# Patient Record
Sex: FEMALE | Race: OTHER | Employment: OTHER | URBAN - NONMETROPOLITAN AREA
[De-identification: names, ages, dates, MRNs, and addresses within clinical notes are randomized per-mention and may not be internally consistent; named-entity substitution may affect disease eponyms.]

---

## 2017-01-10 VITALS — DIASTOLIC BLOOD PRESSURE: 90 MMHG | SYSTOLIC BLOOD PRESSURE: 178 MMHG

## 2017-01-10 DIAGNOSIS — Z01.30 BP CHECK: Primary | ICD-10-CM

## 2017-01-10 DIAGNOSIS — I10 ESSENTIAL HYPERTENSION: ICD-10-CM

## 2017-01-10 DIAGNOSIS — E11.9 TYPE 2 DIABETES MELLITUS WITHOUT COMPLICATION, WITHOUT LONG-TERM CURRENT USE OF INSULIN (HCC): ICD-10-CM

## 2017-01-11 ENCOUNTER — TELEPHONE (OUTPATIENT)
Dept: FAMILY MEDICINE CLINIC | Age: 67
End: 2017-01-11

## 2017-01-11 RX ORDER — HYDROCHLOROTHIAZIDE 50 MG/1
50 TABLET ORAL DAILY
Qty: 30 TABLET | Refills: 1 | Status: SHIPPED | OUTPATIENT
Start: 2017-01-11 | End: 2017-03-03 | Stop reason: SDUPTHER

## 2017-02-01 ENCOUNTER — OFFICE VISIT (OUTPATIENT)
Dept: FAMILY MEDICINE CLINIC | Age: 67
End: 2017-02-01

## 2017-02-01 VITALS
WEIGHT: 170 LBS | SYSTOLIC BLOOD PRESSURE: 152 MMHG | HEART RATE: 84 BPM | HEIGHT: 57 IN | BODY MASS INDEX: 36.68 KG/M2 | DIASTOLIC BLOOD PRESSURE: 82 MMHG | RESPIRATION RATE: 14 BRPM

## 2017-02-01 DIAGNOSIS — E11.9 TYPE 2 DIABETES MELLITUS WITHOUT COMPLICATION, WITHOUT LONG-TERM CURRENT USE OF INSULIN (HCC): ICD-10-CM

## 2017-02-01 DIAGNOSIS — I10 ESSENTIAL HYPERTENSION: Primary | ICD-10-CM

## 2017-02-01 PROCEDURE — 3014F SCREEN MAMMO DOC REV: CPT | Performed by: FAMILY MEDICINE

## 2017-02-01 PROCEDURE — 1090F PRES/ABSN URINE INCON ASSESS: CPT | Performed by: FAMILY MEDICINE

## 2017-02-01 PROCEDURE — 99213 OFFICE O/P EST LOW 20 MIN: CPT | Performed by: FAMILY MEDICINE

## 2017-02-01 PROCEDURE — 1123F ACP DISCUSS/DSCN MKR DOCD: CPT | Performed by: FAMILY MEDICINE

## 2017-02-01 PROCEDURE — 1036F TOBACCO NON-USER: CPT | Performed by: FAMILY MEDICINE

## 2017-02-01 PROCEDURE — 4040F PNEUMOC VAC/ADMIN/RCVD: CPT | Performed by: FAMILY MEDICINE

## 2017-02-01 PROCEDURE — G8399 PT W/DXA RESULTS DOCUMENT: HCPCS | Performed by: FAMILY MEDICINE

## 2017-02-01 PROCEDURE — G8419 CALC BMI OUT NRM PARAM NOF/U: HCPCS | Performed by: FAMILY MEDICINE

## 2017-02-01 PROCEDURE — G8427 DOCREV CUR MEDS BY ELIG CLIN: HCPCS | Performed by: FAMILY MEDICINE

## 2017-02-01 PROCEDURE — 3045F PR MOST RECENT HEMOGLOBIN A1C LEVEL 7.0-9.0%: CPT | Performed by: FAMILY MEDICINE

## 2017-02-01 PROCEDURE — G8484 FLU IMMUNIZE NO ADMIN: HCPCS | Performed by: FAMILY MEDICINE

## 2017-02-01 PROCEDURE — 3017F COLORECTAL CA SCREEN DOC REV: CPT | Performed by: FAMILY MEDICINE

## 2017-02-01 RX ORDER — BLOOD PRESSURE TEST KIT
KIT MISCELLANEOUS
Qty: 1 KIT | Refills: 0 | Status: SHIPPED | OUTPATIENT
Start: 2017-02-01 | End: 2018-09-06

## 2017-02-01 RX ORDER — GLIMEPIRIDE 1 MG/1
1 TABLET ORAL
Qty: 90 TABLET | Refills: 1 | Status: SHIPPED
Start: 2017-02-01 | End: 2017-04-19 | Stop reason: SDUPTHER

## 2017-03-03 DIAGNOSIS — I10 ESSENTIAL HYPERTENSION: ICD-10-CM

## 2017-03-04 RX ORDER — HYDROCHLOROTHIAZIDE 50 MG/1
TABLET ORAL
Qty: 30 TABLET | Refills: 1 | Status: SHIPPED | OUTPATIENT
Start: 2017-03-04 | End: 2017-04-19 | Stop reason: SDUPTHER

## 2017-03-28 DIAGNOSIS — E11.9 TYPE 2 DIABETES MELLITUS WITHOUT COMPLICATION, WITHOUT LONG-TERM CURRENT USE OF INSULIN (HCC): ICD-10-CM

## 2017-03-28 RX ORDER — GLIMEPIRIDE 1 MG/1
TABLET ORAL
Qty: 45 TABLET | Refills: 0 | Status: SHIPPED | OUTPATIENT
Start: 2017-03-28 | End: 2017-06-06 | Stop reason: DRUGHIGH

## 2017-04-12 DIAGNOSIS — E87.6 HYPOKALEMIA: Primary | ICD-10-CM

## 2017-04-19 ENCOUNTER — OFFICE VISIT (OUTPATIENT)
Dept: FAMILY MEDICINE CLINIC | Age: 67
End: 2017-04-19
Payer: MEDICARE

## 2017-04-19 VITALS
HEART RATE: 84 BPM | SYSTOLIC BLOOD PRESSURE: 124 MMHG | DIASTOLIC BLOOD PRESSURE: 84 MMHG | HEIGHT: 57 IN | RESPIRATION RATE: 16 BRPM | BODY MASS INDEX: 37.58 KG/M2 | WEIGHT: 174.2 LBS

## 2017-04-19 DIAGNOSIS — E11.9 TYPE 2 DIABETES MELLITUS WITHOUT COMPLICATION, WITHOUT LONG-TERM CURRENT USE OF INSULIN (HCC): ICD-10-CM

## 2017-04-19 DIAGNOSIS — E78.2 MIXED HYPERLIPIDEMIA: ICD-10-CM

## 2017-04-19 DIAGNOSIS — Z12.31 ENCOUNTER FOR SCREENING MAMMOGRAM FOR BREAST CANCER: ICD-10-CM

## 2017-04-19 DIAGNOSIS — Z23 NEED FOR VACCINATION WITH 13-POLYVALENT PNEUMOCOCCAL CONJUGATE VACCINE: ICD-10-CM

## 2017-04-19 DIAGNOSIS — Z00.00 ROUTINE GENERAL MEDICAL EXAMINATION AT A HEALTH CARE FACILITY: Primary | ICD-10-CM

## 2017-04-19 DIAGNOSIS — E55.9 VITAMIN D DEFICIENCY: ICD-10-CM

## 2017-04-19 DIAGNOSIS — H53.8 BLURRY VISION: ICD-10-CM

## 2017-04-19 DIAGNOSIS — E87.6 HYPOKALEMIA: ICD-10-CM

## 2017-04-19 DIAGNOSIS — I10 ESSENTIAL HYPERTENSION: ICD-10-CM

## 2017-04-19 PROCEDURE — G8399 PT W/DXA RESULTS DOCUMENT: HCPCS | Performed by: FAMILY MEDICINE

## 2017-04-19 PROCEDURE — 3044F HG A1C LEVEL LT 7.0%: CPT | Performed by: FAMILY MEDICINE

## 2017-04-19 PROCEDURE — 1123F ACP DISCUSS/DSCN MKR DOCD: CPT | Performed by: FAMILY MEDICINE

## 2017-04-19 PROCEDURE — G8427 DOCREV CUR MEDS BY ELIG CLIN: HCPCS | Performed by: FAMILY MEDICINE

## 2017-04-19 PROCEDURE — 1036F TOBACCO NON-USER: CPT | Performed by: FAMILY MEDICINE

## 2017-04-19 PROCEDURE — 3017F COLORECTAL CA SCREEN DOC REV: CPT | Performed by: FAMILY MEDICINE

## 2017-04-19 PROCEDURE — G0438 PPPS, INITIAL VISIT: HCPCS | Performed by: FAMILY MEDICINE

## 2017-04-19 PROCEDURE — 3014F SCREEN MAMMO DOC REV: CPT | Performed by: FAMILY MEDICINE

## 2017-04-19 PROCEDURE — G0009 ADMIN PNEUMOCOCCAL VACCINE: HCPCS | Performed by: FAMILY MEDICINE

## 2017-04-19 PROCEDURE — 4040F PNEUMOC VAC/ADMIN/RCVD: CPT | Performed by: FAMILY MEDICINE

## 2017-04-19 PROCEDURE — G8417 CALC BMI ABV UP PARAM F/U: HCPCS | Performed by: FAMILY MEDICINE

## 2017-04-19 PROCEDURE — 1090F PRES/ABSN URINE INCON ASSESS: CPT | Performed by: FAMILY MEDICINE

## 2017-04-19 PROCEDURE — 90670 PCV13 VACCINE IM: CPT | Performed by: FAMILY MEDICINE

## 2017-04-19 PROCEDURE — 99214 OFFICE O/P EST MOD 30 MIN: CPT | Performed by: FAMILY MEDICINE

## 2017-04-19 RX ORDER — HYDROCHLOROTHIAZIDE 50 MG/1
TABLET ORAL
Qty: 45 TABLET | Refills: 1 | Status: SHIPPED | OUTPATIENT
Start: 2017-04-19 | End: 2017-06-06 | Stop reason: SINTOL

## 2017-04-19 RX ORDER — GLIMEPIRIDE 1 MG/1
TABLET ORAL
Qty: 45 TABLET | Refills: 6 | Status: CANCELLED | OUTPATIENT
Start: 2017-04-19

## 2017-04-19 RX ORDER — SIMVASTATIN 40 MG
TABLET ORAL
Qty: 90 TABLET | Refills: 1 | Status: SHIPPED | OUTPATIENT
Start: 2017-04-19 | End: 2017-10-26 | Stop reason: SDUPTHER

## 2017-04-19 RX ORDER — GLIMEPIRIDE 1 MG/1
0.5 TABLET ORAL 2 TIMES DAILY
Qty: 90 TABLET | Refills: 1 | Status: SHIPPED | OUTPATIENT
Start: 2017-04-19 | End: 2017-10-10 | Stop reason: SDUPTHER

## 2017-04-19 RX ORDER — CHOLECALCIFEROL (VITAMIN D3) 1250 MCG
1 CAPSULE ORAL WEEKLY
Qty: 13 CAPSULE | Refills: 1 | Status: SHIPPED | OUTPATIENT
Start: 2017-04-19 | End: 2017-10-13 | Stop reason: SDUPTHER

## 2017-04-19 ASSESSMENT — LIFESTYLE VARIABLES
HOW MANY STANDARD DRINKS CONTAINING ALCOHOL DO YOU HAVE ON A TYPICAL DAY: 0
HOW OFTEN DO YOU HAVE SIX OR MORE DRINKS ON ONE OCCASION: 0
AUDIT-C TOTAL SCORE: 1
HOW OFTEN DO YOU HAVE A DRINK CONTAINING ALCOHOL: 1

## 2017-04-19 ASSESSMENT — ANXIETY QUESTIONNAIRES: GAD7 TOTAL SCORE: 0

## 2017-04-19 ASSESSMENT — PATIENT HEALTH QUESTIONNAIRE - PHQ9: SUM OF ALL RESPONSES TO PHQ QUESTIONS 1-9: 0

## 2017-04-25 ENCOUNTER — TELEPHONE (OUTPATIENT)
Dept: FAMILY MEDICINE CLINIC | Age: 67
End: 2017-04-25

## 2017-04-25 ENCOUNTER — OFFICE VISIT (OUTPATIENT)
Dept: PRIMARY CARE CLINIC | Age: 67
End: 2017-04-25
Payer: MEDICARE

## 2017-04-25 VITALS
TEMPERATURE: 97.5 F | SYSTOLIC BLOOD PRESSURE: 118 MMHG | HEART RATE: 76 BPM | DIASTOLIC BLOOD PRESSURE: 78 MMHG | OXYGEN SATURATION: 97 % | BODY MASS INDEX: 37.48 KG/M2 | WEIGHT: 173.2 LBS

## 2017-04-25 DIAGNOSIS — M79.621 AXILLARY TENDERNESS, RIGHT: Primary | ICD-10-CM

## 2017-04-25 PROCEDURE — 1036F TOBACCO NON-USER: CPT | Performed by: FAMILY MEDICINE

## 2017-04-25 PROCEDURE — 3017F COLORECTAL CA SCREEN DOC REV: CPT | Performed by: FAMILY MEDICINE

## 2017-04-25 PROCEDURE — G8399 PT W/DXA RESULTS DOCUMENT: HCPCS | Performed by: FAMILY MEDICINE

## 2017-04-25 PROCEDURE — G8427 DOCREV CUR MEDS BY ELIG CLIN: HCPCS | Performed by: FAMILY MEDICINE

## 2017-04-25 PROCEDURE — 1090F PRES/ABSN URINE INCON ASSESS: CPT | Performed by: FAMILY MEDICINE

## 2017-04-25 PROCEDURE — 1123F ACP DISCUSS/DSCN MKR DOCD: CPT | Performed by: FAMILY MEDICINE

## 2017-04-25 PROCEDURE — G8417 CALC BMI ABV UP PARAM F/U: HCPCS | Performed by: FAMILY MEDICINE

## 2017-04-25 PROCEDURE — 99213 OFFICE O/P EST LOW 20 MIN: CPT | Performed by: FAMILY MEDICINE

## 2017-04-25 PROCEDURE — 3014F SCREEN MAMMO DOC REV: CPT | Performed by: FAMILY MEDICINE

## 2017-04-25 PROCEDURE — 4040F PNEUMOC VAC/ADMIN/RCVD: CPT | Performed by: FAMILY MEDICINE

## 2017-04-28 DIAGNOSIS — N64.4 BREAST PAIN: Primary | ICD-10-CM

## 2017-06-01 ENCOUNTER — TELEPHONE (OUTPATIENT)
Dept: FAMILY MEDICINE CLINIC | Age: 67
End: 2017-06-01

## 2017-06-06 ENCOUNTER — OFFICE VISIT (OUTPATIENT)
Dept: PRIMARY CARE CLINIC | Age: 67
End: 2017-06-06
Payer: MEDICARE

## 2017-06-06 ENCOUNTER — HOSPITAL ENCOUNTER (OUTPATIENT)
Age: 67
Setting detail: SPECIMEN
Discharge: HOME | End: 2017-06-06
Payer: MEDICARE

## 2017-06-06 VITALS
HEIGHT: 57 IN | SYSTOLIC BLOOD PRESSURE: 152 MMHG | DIASTOLIC BLOOD PRESSURE: 92 MMHG | RESPIRATION RATE: 16 BRPM | BODY MASS INDEX: 37.54 KG/M2 | TEMPERATURE: 98.9 F | HEART RATE: 82 BPM | WEIGHT: 174 LBS | OXYGEN SATURATION: 98 %

## 2017-06-06 DIAGNOSIS — M25.541 ARTHRALGIA OF BOTH HANDS: Primary | ICD-10-CM

## 2017-06-06 DIAGNOSIS — I10 ESSENTIAL HYPERTENSION: ICD-10-CM

## 2017-06-06 DIAGNOSIS — M25.541 ARTHRALGIA OF BOTH HANDS: ICD-10-CM

## 2017-06-06 DIAGNOSIS — M25.542 ARTHRALGIA OF BOTH HANDS: ICD-10-CM

## 2017-06-06 DIAGNOSIS — E87.6 HYPOKALEMIA: ICD-10-CM

## 2017-06-06 DIAGNOSIS — M25.542 ARTHRALGIA OF BOTH HANDS: Primary | ICD-10-CM

## 2017-06-06 LAB
ANION GAP SERPL CALCULATED.3IONS-SCNC: 17 MMOL/L (ref 9–17)
BUN BLDV-MCNC: 12 MG/DL (ref 8–23)
BUN/CREAT BLD: 20 (ref 9–20)
CALCIUM SERPL-MCNC: 9.5 MG/DL (ref 8.6–10.4)
CHLORIDE BLD-SCNC: 100 MMOL/L (ref 98–107)
CO2: 25 MMOL/L (ref 20–31)
CREAT SERPL-MCNC: 0.6 MG/DL (ref 0.5–0.9)
GFR AFRICAN AMERICAN: >60 ML/MIN
GFR NON-AFRICAN AMERICAN: >60 ML/MIN
GFR SERPL CREATININE-BSD FRML MDRD: NORMAL ML/MIN/{1.73_M2}
GFR SERPL CREATININE-BSD FRML MDRD: NORMAL ML/MIN/{1.73_M2}
GLUCOSE BLD-MCNC: 91 MG/DL (ref 70–99)
POTASSIUM SERPL-SCNC: 3.7 MMOL/L (ref 3.7–5.3)
RHEUMATOID FACTOR: <10 IU/ML
SEDIMENTATION RATE, ERYTHROCYTE: 17 MM (ref 0–30)
SODIUM BLD-SCNC: 142 MMOL/L (ref 135–144)
URIC ACID: 4.7 MG/DL (ref 2.4–5.7)

## 2017-06-06 PROCEDURE — 84550 ASSAY OF BLOOD/URIC ACID: CPT | Performed by: FAMILY MEDICINE

## 2017-06-06 PROCEDURE — 1036F TOBACCO NON-USER: CPT | Performed by: FAMILY MEDICINE

## 2017-06-06 PROCEDURE — 36415 COLL VENOUS BLD VENIPUNCTURE: CPT | Performed by: FAMILY MEDICINE

## 2017-06-06 PROCEDURE — G8399 PT W/DXA RESULTS DOCUMENT: HCPCS | Performed by: FAMILY MEDICINE

## 2017-06-06 PROCEDURE — 99214 OFFICE O/P EST MOD 30 MIN: CPT | Performed by: FAMILY MEDICINE

## 2017-06-06 PROCEDURE — G8417 CALC BMI ABV UP PARAM F/U: HCPCS | Performed by: FAMILY MEDICINE

## 2017-06-06 PROCEDURE — 4040F PNEUMOC VAC/ADMIN/RCVD: CPT | Performed by: FAMILY MEDICINE

## 2017-06-06 PROCEDURE — 1090F PRES/ABSN URINE INCON ASSESS: CPT | Performed by: FAMILY MEDICINE

## 2017-06-06 PROCEDURE — 1123F ACP DISCUSS/DSCN MKR DOCD: CPT | Performed by: FAMILY MEDICINE

## 2017-06-06 PROCEDURE — 3014F SCREEN MAMMO DOC REV: CPT | Performed by: FAMILY MEDICINE

## 2017-06-06 PROCEDURE — 85651 RBC SED RATE NONAUTOMATED: CPT | Performed by: FAMILY MEDICINE

## 2017-06-06 PROCEDURE — 3017F COLORECTAL CA SCREEN DOC REV: CPT | Performed by: FAMILY MEDICINE

## 2017-06-06 PROCEDURE — G8427 DOCREV CUR MEDS BY ELIG CLIN: HCPCS | Performed by: FAMILY MEDICINE

## 2017-06-06 PROCEDURE — 80048 BASIC METABOLIC PNL TOTAL CA: CPT | Performed by: FAMILY MEDICINE

## 2017-06-06 RX ORDER — PREDNISONE 10 MG/1
TABLET ORAL
Qty: 36 TABLET | Refills: 0 | Status: SHIPPED | OUTPATIENT
Start: 2017-06-06 | End: 2017-06-16

## 2017-06-07 LAB — ANTI-NUCLEAR ANTIBODY (ANA): NEGATIVE

## 2017-06-08 LAB — CCP IGG ANTIBODIES: <1.5 U/ML

## 2017-06-17 ENCOUNTER — HOSPITAL ENCOUNTER (OUTPATIENT)
Age: 67
Setting detail: OBSERVATION
Discharge: HOME | End: 2017-06-18
Attending: EMERGENCY MEDICINE | Admitting: INTERNAL MEDICINE
Payer: MEDICARE

## 2017-06-17 ENCOUNTER — APPOINTMENT (OUTPATIENT)
Dept: GENERAL RADIOLOGY | Age: 67
End: 2017-06-17
Payer: MEDICARE

## 2017-06-17 ENCOUNTER — TELEPHONE (OUTPATIENT)
Dept: PRIMARY CARE CLINIC | Age: 67
End: 2017-06-17

## 2017-06-17 DIAGNOSIS — E87.6 HYPOKALEMIA: ICD-10-CM

## 2017-06-17 DIAGNOSIS — R07.89 OTHER CHEST PAIN: Primary | ICD-10-CM

## 2017-06-17 DIAGNOSIS — R20.0 NUMBNESS AND TINGLING OF RIGHT ARM: ICD-10-CM

## 2017-06-17 DIAGNOSIS — R07.89 ATYPICAL CHEST PAIN: ICD-10-CM

## 2017-06-17 DIAGNOSIS — R20.2 NUMBNESS AND TINGLING OF RIGHT ARM: ICD-10-CM

## 2017-06-17 LAB
ABSOLUTE EOS #: 0.1 K/UL (ref 0–0.4)
ABSOLUTE LYMPH #: 4 K/UL (ref 1–4.8)
ABSOLUTE MONO #: 0.9 K/UL (ref 0.1–1.2)
ANION GAP SERPL CALCULATED.3IONS-SCNC: 12 MMOL/L (ref 9–17)
BASOPHILS # BLD: 0 %
BASOPHILS ABSOLUTE: 0 K/UL (ref 0–0.2)
BUN BLDV-MCNC: 16 MG/DL (ref 8–23)
BUN/CREAT BLD: 21 (ref 9–20)
CALCIUM SERPL-MCNC: 9.7 MG/DL (ref 8.6–10.4)
CHLORIDE BLD-SCNC: 97 MMOL/L (ref 98–107)
CHP ED QC CHECK: YES
CO2: 27 MMOL/L (ref 20–31)
CREAT SERPL-MCNC: 0.76 MG/DL (ref 0.5–0.9)
D DIMER: 127 NG/ML
DIFFERENTIAL TYPE: ABNORMAL
EKG ATRIAL RATE: 61 BPM
EKG ATRIAL RATE: 84 BPM
EKG P AXIS: 38 DEGREES
EKG P AXIS: 49 DEGREES
EKG P-R INTERVAL: 142 MS
EKG P-R INTERVAL: 148 MS
EKG Q-T INTERVAL: 364 MS
EKG Q-T INTERVAL: 402 MS
EKG QRS DURATION: 74 MS
EKG QRS DURATION: 78 MS
EKG QTC CALCULATION (BAZETT): 404 MS
EKG QTC CALCULATION (BAZETT): 430 MS
EKG R AXIS: -13 DEGREES
EKG R AXIS: -8 DEGREES
EKG T AXIS: 0 DEGREES
EKG T AXIS: 35 DEGREES
EKG VENTRICULAR RATE: 61 BPM
EKG VENTRICULAR RATE: 84 BPM
EOSINOPHILS RELATIVE PERCENT: 1 %
GFR AFRICAN AMERICAN: >60 ML/MIN
GFR NON-AFRICAN AMERICAN: >60 ML/MIN
GFR SERPL CREATININE-BSD FRML MDRD: ABNORMAL ML/MIN/{1.73_M2}
GFR SERPL CREATININE-BSD FRML MDRD: ABNORMAL ML/MIN/{1.73_M2}
GLUCOSE BLD-MCNC: 153 MG/DL (ref 65–105)
GLUCOSE BLD-MCNC: 198 MG/DL (ref 65–105)
GLUCOSE BLD-MCNC: 240 MG/DL (ref 70–99)
GLUCOSE BLD-MCNC: 272 MG/DL (ref 65–105)
HCT VFR BLD CALC: 41.2 % (ref 36–46)
HEMOGLOBIN: 13.4 G/DL (ref 12–16)
INR BLD: 1
LYMPHOCYTES # BLD: 35 %
MCH RBC QN AUTO: 26.4 PG (ref 26–34)
MCHC RBC AUTO-ENTMCNC: 32.5 G/DL (ref 31–37)
MCV RBC AUTO: 81.3 FL (ref 80–100)
MONOCYTES # BLD: 8 %
PARTIAL THROMBOPLASTIN TIME: 25.2 SEC (ref 27–35)
PDW BLD-RTO: 14.2 % (ref 11–14.5)
PLATELET # BLD: 225 K/UL (ref 140–450)
PLATELET ESTIMATE: ABNORMAL
PMV BLD AUTO: 10.1 FL (ref 6–12)
POTASSIUM SERPL-SCNC: 3.7 MMOL/L (ref 3.7–5.3)
PROTHROMBIN TIME: 10.1 SEC (ref 9.4–11.3)
RBC # BLD: 5.07 M/UL (ref 4–5.2)
RBC # BLD: ABNORMAL 10*6/UL
SEG NEUTROPHILS: 56 %
SEGMENTED NEUTROPHILS ABSOLUTE COUNT: 6.5 K/UL (ref 1.8–7.7)
SODIUM BLD-SCNC: 136 MMOL/L (ref 135–144)
TROPONIN INTERP: NORMAL
TROPONIN T: <0.03 NG/ML
WBC # BLD: 11.6 K/UL (ref 3.5–11)
WBC # BLD: ABNORMAL 10*3/UL

## 2017-06-17 PROCEDURE — 6370000000 HC RX 637 (ALT 250 FOR IP): Performed by: EMERGENCY MEDICINE

## 2017-06-17 PROCEDURE — 96361 HYDRATE IV INFUSION ADD-ON: CPT

## 2017-06-17 PROCEDURE — 99219 PR INITIAL OBSERVATION CARE/DAY 50 MINUTES: CPT | Performed by: INTERNAL MEDICINE

## 2017-06-17 PROCEDURE — 2580000003 HC RX 258: Performed by: EMERGENCY MEDICINE

## 2017-06-17 PROCEDURE — G0378 HOSPITAL OBSERVATION PER HR: HCPCS

## 2017-06-17 PROCEDURE — 93005 ELECTROCARDIOGRAM TRACING: CPT

## 2017-06-17 PROCEDURE — 85730 THROMBOPLASTIN TIME PARTIAL: CPT

## 2017-06-17 PROCEDURE — 99284 EMERGENCY DEPT VISIT MOD MDM: CPT

## 2017-06-17 PROCEDURE — 80048 BASIC METABOLIC PNL TOTAL CA: CPT

## 2017-06-17 PROCEDURE — 6370000000 HC RX 637 (ALT 250 FOR IP): Performed by: INTERNAL MEDICINE

## 2017-06-17 PROCEDURE — 85610 PROTHROMBIN TIME: CPT

## 2017-06-17 PROCEDURE — 6360000002 HC RX W HCPCS: Performed by: EMERGENCY MEDICINE

## 2017-06-17 PROCEDURE — 94760 N-INVAS EAR/PLS OXIMETRY 1: CPT

## 2017-06-17 PROCEDURE — 96374 THER/PROPH/DIAG INJ IV PUSH: CPT

## 2017-06-17 PROCEDURE — 71010 XR CHEST PORTABLE: CPT | Performed by: RADIOLOGY

## 2017-06-17 PROCEDURE — 82947 ASSAY GLUCOSE BLOOD QUANT: CPT

## 2017-06-17 PROCEDURE — 85025 COMPLETE CBC W/AUTO DIFF WBC: CPT

## 2017-06-17 PROCEDURE — 96372 THER/PROPH/DIAG INJ SC/IM: CPT

## 2017-06-17 PROCEDURE — 71010 XR CHEST PORTABLE: CPT

## 2017-06-17 PROCEDURE — 84484 ASSAY OF TROPONIN QUANT: CPT

## 2017-06-17 PROCEDURE — 85379 FIBRIN DEGRADATION QUANT: CPT

## 2017-06-17 PROCEDURE — 36415 COLL VENOUS BLD VENIPUNCTURE: CPT

## 2017-06-17 PROCEDURE — 2580000003 HC RX 258: Performed by: INTERNAL MEDICINE

## 2017-06-17 RX ORDER — ASPIRIN 81 MG/1
324 TABLET, CHEWABLE ORAL ONCE
Status: COMPLETED | OUTPATIENT
Start: 2017-06-17 | End: 2017-06-17

## 2017-06-17 RX ORDER — ACETAMINOPHEN 325 MG/1
650 TABLET ORAL EVERY 4 HOURS PRN
Status: DISCONTINUED | OUTPATIENT
Start: 2017-06-17 | End: 2017-06-18 | Stop reason: HOSPADM

## 2017-06-17 RX ORDER — SIMVASTATIN 40 MG
40 TABLET ORAL NIGHTLY
Status: DISCONTINUED | OUTPATIENT
Start: 2017-06-17 | End: 2017-06-18 | Stop reason: HOSPADM

## 2017-06-17 RX ORDER — ONDANSETRON 2 MG/ML
4 INJECTION INTRAMUSCULAR; INTRAVENOUS EVERY 6 HOURS PRN
Status: DISCONTINUED | OUTPATIENT
Start: 2017-06-17 | End: 2017-06-18 | Stop reason: HOSPADM

## 2017-06-17 RX ORDER — PANTOPRAZOLE SODIUM 40 MG/1
40 TABLET, DELAYED RELEASE ORAL
Status: DISCONTINUED | OUTPATIENT
Start: 2017-06-18 | End: 2017-06-18 | Stop reason: HOSPADM

## 2017-06-17 RX ORDER — VERAPAMIL HYDROCHLORIDE 240 MG/1
120 TABLET, FILM COATED, EXTENDED RELEASE ORAL NIGHTLY
Status: DISCONTINUED | OUTPATIENT
Start: 2017-06-18 | End: 2017-06-18 | Stop reason: HOSPADM

## 2017-06-17 RX ORDER — GLIMEPIRIDE 2 MG/1
0.5 TABLET ORAL 2 TIMES DAILY
Status: DISCONTINUED | OUTPATIENT
Start: 2017-06-17 | End: 2017-06-18 | Stop reason: HOSPADM

## 2017-06-17 RX ORDER — ASPIRIN 81 MG/1
81 TABLET, CHEWABLE ORAL DAILY
Status: DISCONTINUED | OUTPATIENT
Start: 2017-06-18 | End: 2017-06-17

## 2017-06-17 RX ORDER — DEXTROSE MONOHYDRATE 25 G/50ML
12.5 INJECTION, SOLUTION INTRAVENOUS PRN
Status: DISCONTINUED | OUTPATIENT
Start: 2017-06-17 | End: 2017-06-18 | Stop reason: HOSPADM

## 2017-06-17 RX ORDER — ASPIRIN 81 MG/1
81 TABLET ORAL DAILY
Status: DISCONTINUED | OUTPATIENT
Start: 2017-06-17 | End: 2017-06-18 | Stop reason: HOSPADM

## 2017-06-17 RX ORDER — DEXTROSE MONOHYDRATE 50 MG/ML
100 INJECTION, SOLUTION INTRAVENOUS PRN
Status: DISCONTINUED | OUTPATIENT
Start: 2017-06-17 | End: 2017-06-18 | Stop reason: HOSPADM

## 2017-06-17 RX ORDER — SODIUM CHLORIDE 0.9 % (FLUSH) 0.9 %
10 SYRINGE (ML) INJECTION EVERY 12 HOURS SCHEDULED
Status: DISCONTINUED | OUTPATIENT
Start: 2017-06-17 | End: 2017-06-18 | Stop reason: HOSPADM

## 2017-06-17 RX ORDER — ONDANSETRON 2 MG/ML
4 INJECTION INTRAMUSCULAR; INTRAVENOUS ONCE
Status: COMPLETED | OUTPATIENT
Start: 2017-06-17 | End: 2017-06-17

## 2017-06-17 RX ORDER — SODIUM CHLORIDE 0.9 % (FLUSH) 0.9 %
10 SYRINGE (ML) INJECTION PRN
Status: DISCONTINUED | OUTPATIENT
Start: 2017-06-17 | End: 2017-06-18 | Stop reason: HOSPADM

## 2017-06-17 RX ORDER — NICOTINE POLACRILEX 4 MG
15 LOZENGE BUCCAL PRN
Status: DISCONTINUED | OUTPATIENT
Start: 2017-06-17 | End: 2017-06-18 | Stop reason: HOSPADM

## 2017-06-17 RX ORDER — VERAPAMIL HYDROCHLORIDE 240 MG/1
120 TABLET, FILM COATED, EXTENDED RELEASE ORAL DAILY
Status: DISCONTINUED | OUTPATIENT
Start: 2017-06-17 | End: 2017-06-17

## 2017-06-17 RX ORDER — 0.9 % SODIUM CHLORIDE 0.9 %
1000 INTRAVENOUS SOLUTION INTRAVENOUS ONCE
Status: COMPLETED | OUTPATIENT
Start: 2017-06-17 | End: 2017-06-17

## 2017-06-17 RX ADMIN — Medication 30 ML: at 12:35

## 2017-06-17 RX ADMIN — ONDANSETRON 4 MG: 2 INJECTION INTRAMUSCULAR; INTRAVENOUS at 12:33

## 2017-06-17 RX ADMIN — SODIUM CHLORIDE 1000 ML: 9 INJECTION, SOLUTION INTRAVENOUS at 12:31

## 2017-06-17 RX ADMIN — INSULIN LISPRO 2 UNITS: 100 INJECTION, SOLUTION INTRAVENOUS; SUBCUTANEOUS at 21:23

## 2017-06-17 RX ADMIN — Medication 10 ML: at 21:23

## 2017-06-17 RX ADMIN — INSULIN LISPRO 1 UNITS: 100 INJECTION, SOLUTION INTRAVENOUS; SUBCUTANEOUS at 18:46

## 2017-06-17 RX ADMIN — GLIMEPIRIDE 0.5 MG: 2 TABLET ORAL at 21:21

## 2017-06-17 RX ADMIN — ENOXAPARIN SODIUM 80 MG: 80 INJECTION SUBCUTANEOUS at 14:05

## 2017-06-17 RX ADMIN — METFORMIN HYDROCHLORIDE 500 MG: 500 TABLET ORAL at 21:22

## 2017-06-17 RX ADMIN — INSULIN HUMAN 6 UNITS: 100 INJECTION, SOLUTION PARENTERAL at 13:09

## 2017-06-17 RX ADMIN — SIMVASTATIN 40 MG: 40 TABLET, FILM COATED ORAL at 21:20

## 2017-06-17 RX ADMIN — ASPIRIN 81 MG 324 MG: 81 TABLET ORAL at 13:59

## 2017-06-17 ASSESSMENT — PAIN DESCRIPTION - PAIN TYPE: TYPE: ACUTE PAIN

## 2017-06-17 ASSESSMENT — PAIN SCALES - GENERAL: PAINLEVEL_OUTOF10: 5

## 2017-06-17 ASSESSMENT — PAIN DESCRIPTION - FREQUENCY: FREQUENCY: INTERMITTENT

## 2017-06-17 ASSESSMENT — PAIN DESCRIPTION - LOCATION: LOCATION: ABDOMEN

## 2017-06-18 ENCOUNTER — APPOINTMENT (OUTPATIENT)
Dept: GENERAL RADIOLOGY | Age: 67
End: 2017-06-18
Payer: MEDICARE

## 2017-06-18 VITALS
HEART RATE: 82 BPM | SYSTOLIC BLOOD PRESSURE: 136 MMHG | TEMPERATURE: 98.2 F | BODY MASS INDEX: 39.26 KG/M2 | DIASTOLIC BLOOD PRESSURE: 63 MMHG | OXYGEN SATURATION: 93 % | HEIGHT: 57 IN | WEIGHT: 182 LBS | RESPIRATION RATE: 14 BRPM

## 2017-06-18 LAB
ANION GAP SERPL CALCULATED.3IONS-SCNC: 11 MMOL/L (ref 9–17)
BUN BLDV-MCNC: 13 MG/DL (ref 8–23)
BUN/CREAT BLD: 17 (ref 9–20)
CALCIUM SERPL-MCNC: 9 MG/DL (ref 8.6–10.4)
CHLORIDE BLD-SCNC: 97 MMOL/L (ref 98–107)
CHOLESTEROL/HDL RATIO: 3.9
CHOLESTEROL: 186 MG/DL
CO2: 30 MMOL/L (ref 20–31)
CREAT SERPL-MCNC: 0.75 MG/DL (ref 0.5–0.9)
EKG ATRIAL RATE: 69 BPM
EKG P AXIS: 30 DEGREES
EKG P-R INTERVAL: 156 MS
EKG Q-T INTERVAL: 402 MS
EKG QRS DURATION: 82 MS
EKG QTC CALCULATION (BAZETT): 430 MS
EKG R AXIS: -7 DEGREES
EKG T AXIS: 15 DEGREES
EKG VENTRICULAR RATE: 69 BPM
GFR AFRICAN AMERICAN: >60 ML/MIN
GFR NON-AFRICAN AMERICAN: >60 ML/MIN
GFR SERPL CREATININE-BSD FRML MDRD: ABNORMAL ML/MIN/{1.73_M2}
GFR SERPL CREATININE-BSD FRML MDRD: ABNORMAL ML/MIN/{1.73_M2}
GLUCOSE BLD-MCNC: 134 MG/DL (ref 65–105)
GLUCOSE BLD-MCNC: 145 MG/DL (ref 65–105)
GLUCOSE BLD-MCNC: 155 MG/DL (ref 70–99)
HCT VFR BLD CALC: 38.4 % (ref 36–46)
HDLC SERPL-MCNC: 48 MG/DL
HEMOGLOBIN: 12.7 G/DL (ref 12–16)
LDL CHOLESTEROL: 64 MG/DL (ref 0–130)
MCH RBC QN AUTO: 27.1 PG (ref 26–34)
MCHC RBC AUTO-ENTMCNC: 33.2 G/DL (ref 31–37)
MCV RBC AUTO: 81.7 FL (ref 80–100)
PDW BLD-RTO: 14.7 % (ref 11–14.5)
PLATELET # BLD: 209 K/UL (ref 140–450)
PMV BLD AUTO: 9.6 FL (ref 6–12)
POTASSIUM SERPL-SCNC: 3.6 MMOL/L (ref 3.7–5.3)
RBC # BLD: 4.7 M/UL (ref 4–5.2)
SODIUM BLD-SCNC: 138 MMOL/L (ref 135–144)
TRIGL SERPL-MCNC: 371 MG/DL
TROPONIN INTERP: NORMAL
TROPONIN INTERP: NORMAL
TROPONIN T: <0.03 NG/ML
TROPONIN T: <0.03 NG/ML
VLDLC SERPL CALC-MCNC: ABNORMAL MG/DL (ref 1–30)
WBC # BLD: 10.2 K/UL (ref 3.5–11)

## 2017-06-18 PROCEDURE — 6370000000 HC RX 637 (ALT 250 FOR IP): Performed by: INTERNAL MEDICINE

## 2017-06-18 PROCEDURE — 96372 THER/PROPH/DIAG INJ SC/IM: CPT

## 2017-06-18 PROCEDURE — 85027 COMPLETE CBC AUTOMATED: CPT

## 2017-06-18 PROCEDURE — 99217 PR OBSERVATION CARE DISCHARGE MANAGEMENT: CPT | Performed by: INTERNAL MEDICINE

## 2017-06-18 PROCEDURE — 2580000003 HC RX 258: Performed by: INTERNAL MEDICINE

## 2017-06-18 PROCEDURE — 72050 X-RAY EXAM NECK SPINE 4/5VWS: CPT

## 2017-06-18 PROCEDURE — 80048 BASIC METABOLIC PNL TOTAL CA: CPT

## 2017-06-18 PROCEDURE — 94760 N-INVAS EAR/PLS OXIMETRY 1: CPT

## 2017-06-18 PROCEDURE — 72050 X-RAY EXAM NECK SPINE 4/5VWS: CPT | Performed by: RADIOLOGY

## 2017-06-18 PROCEDURE — 82947 ASSAY GLUCOSE BLOOD QUANT: CPT

## 2017-06-18 PROCEDURE — 84484 ASSAY OF TROPONIN QUANT: CPT

## 2017-06-18 PROCEDURE — G0378 HOSPITAL OBSERVATION PER HR: HCPCS

## 2017-06-18 PROCEDURE — 36415 COLL VENOUS BLD VENIPUNCTURE: CPT

## 2017-06-18 PROCEDURE — 6360000002 HC RX W HCPCS: Performed by: INTERNAL MEDICINE

## 2017-06-18 PROCEDURE — 93005 ELECTROCARDIOGRAM TRACING: CPT

## 2017-06-18 PROCEDURE — 80061 LIPID PANEL: CPT

## 2017-06-18 RX ORDER — PANTOPRAZOLE SODIUM 40 MG/1
40 TABLET, DELAYED RELEASE ORAL
Qty: 30 TABLET | Refills: 0 | Status: SHIPPED | OUTPATIENT
Start: 2017-06-18 | End: 2017-07-21

## 2017-06-18 RX ADMIN — VERAPAMIL HYDROCHLORIDE 120 MG: 240 TABLET, FILM COATED, EXTENDED RELEASE ORAL at 08:33

## 2017-06-18 RX ADMIN — Medication 10 ML: at 08:34

## 2017-06-18 RX ADMIN — ACETAMINOPHEN 650 MG: 325 TABLET ORAL at 00:50

## 2017-06-18 RX ADMIN — METFORMIN HYDROCHLORIDE 500 MG: 500 TABLET ORAL at 08:32

## 2017-06-18 RX ADMIN — INSULIN LISPRO 1 UNITS: 100 INJECTION, SOLUTION INTRAVENOUS; SUBCUTANEOUS at 08:29

## 2017-06-18 RX ADMIN — GLIMEPIRIDE 0.5 MG: 2 TABLET ORAL at 08:31

## 2017-06-18 RX ADMIN — PANTOPRAZOLE SODIUM 40 MG: 40 TABLET, DELAYED RELEASE ORAL at 08:29

## 2017-06-18 RX ADMIN — ACETAMINOPHEN 650 MG: 325 TABLET ORAL at 10:29

## 2017-06-18 RX ADMIN — ENOXAPARIN SODIUM 40 MG: 40 INJECTION SUBCUTANEOUS at 08:30

## 2017-06-18 RX ADMIN — ASPIRIN 81 MG: 81 TABLET, COATED ORAL at 08:29

## 2017-06-18 ASSESSMENT — PAIN SCALES - GENERAL
PAINLEVEL_OUTOF10: 4
PAINLEVEL_OUTOF10: 3
PAINLEVEL_OUTOF10: 0

## 2017-06-19 ENCOUNTER — APPOINTMENT (OUTPATIENT)
Dept: CT IMAGING | Age: 67
End: 2017-06-19
Payer: MEDICARE

## 2017-06-19 ENCOUNTER — TELEPHONE (OUTPATIENT)
Dept: FAMILY MEDICINE CLINIC | Age: 67
End: 2017-06-19

## 2017-06-19 ENCOUNTER — HOSPITAL ENCOUNTER (EMERGENCY)
Age: 67
Discharge: ANOTHER HOSPITAL | End: 2017-06-19
Attending: EMERGENCY MEDICINE
Payer: MEDICARE

## 2017-06-19 ENCOUNTER — TELEPHONE (OUTPATIENT)
Dept: CARDIOLOGY | Age: 67
End: 2017-06-19

## 2017-06-19 ENCOUNTER — HOSPITAL ENCOUNTER (OUTPATIENT)
Age: 67
Setting detail: OBSERVATION
LOS: 1 days | Discharge: HOME | End: 2017-06-21
Attending: INTERNAL MEDICINE | Admitting: INTERNAL MEDICINE
Payer: MEDICARE

## 2017-06-19 VITALS
OXYGEN SATURATION: 96 % | BODY MASS INDEX: 38.4 KG/M2 | HEIGHT: 57 IN | WEIGHT: 178 LBS | RESPIRATION RATE: 20 BRPM | HEART RATE: 90 BPM | TEMPERATURE: 99.3 F | DIASTOLIC BLOOD PRESSURE: 81 MMHG | SYSTOLIC BLOOD PRESSURE: 179 MMHG

## 2017-06-19 DIAGNOSIS — R07.9 CHEST PAIN, UNSPECIFIED TYPE: Primary | ICD-10-CM

## 2017-06-19 DIAGNOSIS — E11.9 TYPE 2 DIABETES MELLITUS WITHOUT COMPLICATION, WITHOUT LONG-TERM CURRENT USE OF INSULIN (HCC): ICD-10-CM

## 2017-06-19 LAB
ABSOLUTE EOS #: 0.2 K/UL (ref 0–0.4)
ABSOLUTE LYMPH #: 3.9 K/UL (ref 1–4.8)
ABSOLUTE MONO #: 0.7 K/UL (ref 0.1–1.2)
ANION GAP SERPL CALCULATED.3IONS-SCNC: 15 MMOL/L (ref 9–17)
BASOPHILS # BLD: 0 %
BASOPHILS ABSOLUTE: 0 K/UL (ref 0–0.2)
BUN BLDV-MCNC: 13 MG/DL (ref 8–23)
BUN/CREAT BLD: 15 (ref 9–20)
CALCIUM SERPL-MCNC: 9.7 MG/DL (ref 8.6–10.4)
CHLORIDE BLD-SCNC: 98 MMOL/L (ref 98–107)
CHP ED QC CHECK: YES
CO2: 27 MMOL/L (ref 20–31)
CREAT SERPL-MCNC: 0.84 MG/DL (ref 0.5–0.9)
DIFFERENTIAL TYPE: ABNORMAL
EOSINOPHILS RELATIVE PERCENT: 1 %
GFR AFRICAN AMERICAN: >60 ML/MIN
GFR NON-AFRICAN AMERICAN: >60 ML/MIN
GFR SERPL CREATININE-BSD FRML MDRD: ABNORMAL ML/MIN/{1.73_M2}
GFR SERPL CREATININE-BSD FRML MDRD: ABNORMAL ML/MIN/{1.73_M2}
GLUCOSE BLD-MCNC: 150 MG/DL (ref 70–99)
GLUCOSE BLD-MCNC: 249 MG/DL (ref 65–105)
HCT VFR BLD CALC: 42.2 % (ref 36–46)
HEMOGLOBIN: 13.6 G/DL (ref 12–16)
LYMPHOCYTES # BLD: 35 %
MCH RBC QN AUTO: 26.4 PG (ref 26–34)
MCHC RBC AUTO-ENTMCNC: 32.2 G/DL (ref 31–37)
MCV RBC AUTO: 82.1 FL (ref 80–100)
MONOCYTES # BLD: 6 %
PDW BLD-RTO: 14.7 % (ref 11–14.5)
PLATELET # BLD: 233 K/UL (ref 140–450)
PLATELET ESTIMATE: ABNORMAL
PMV BLD AUTO: 9.8 FL (ref 6–12)
POTASSIUM SERPL-SCNC: 3.6 MMOL/L (ref 3.7–5.3)
RBC # BLD: 5.14 M/UL (ref 4–5.2)
RBC # BLD: ABNORMAL 10*6/UL
SEG NEUTROPHILS: 58 %
SEGMENTED NEUTROPHILS ABSOLUTE COUNT: 6.4 K/UL (ref 1.8–7.7)
SODIUM BLD-SCNC: 140 MMOL/L (ref 135–144)
TROPONIN INTERP: NORMAL
TROPONIN T: <0.03 NG/ML
WBC # BLD: 11.1 K/UL (ref 3.5–11)
WBC # BLD: ABNORMAL 10*3/UL

## 2017-06-19 PROCEDURE — 6360000002 HC RX W HCPCS

## 2017-06-19 PROCEDURE — 6360000002 HC RX W HCPCS: Performed by: EMERGENCY MEDICINE

## 2017-06-19 PROCEDURE — 96375 TX/PRO/DX INJ NEW DRUG ADDON: CPT

## 2017-06-19 PROCEDURE — 71260 CT THORAX DX C+: CPT | Performed by: RADIOLOGY

## 2017-06-19 PROCEDURE — 82947 ASSAY GLUCOSE BLOOD QUANT: CPT

## 2017-06-19 PROCEDURE — 85025 COMPLETE CBC W/AUTO DIFF WBC: CPT

## 2017-06-19 PROCEDURE — 96374 THER/PROPH/DIAG INJ IV PUSH: CPT

## 2017-06-19 PROCEDURE — 84484 ASSAY OF TROPONIN QUANT: CPT

## 2017-06-19 PROCEDURE — 6360000004 HC RX CONTRAST MEDICATION: Performed by: EMERGENCY MEDICINE

## 2017-06-19 PROCEDURE — 80048 BASIC METABOLIC PNL TOTAL CA: CPT

## 2017-06-19 PROCEDURE — 36415 COLL VENOUS BLD VENIPUNCTURE: CPT

## 2017-06-19 PROCEDURE — 99285 EMERGENCY DEPT VISIT HI MDM: CPT

## 2017-06-19 PROCEDURE — 6370000000 HC RX 637 (ALT 250 FOR IP)

## 2017-06-19 PROCEDURE — 2580000003 HC RX 258: Performed by: EMERGENCY MEDICINE

## 2017-06-19 PROCEDURE — 93005 ELECTROCARDIOGRAM TRACING: CPT

## 2017-06-19 PROCEDURE — G0378 HOSPITAL OBSERVATION PER HR: HCPCS

## 2017-06-19 PROCEDURE — 71260 CT THORAX DX C+: CPT

## 2017-06-19 PROCEDURE — 6370000000 HC RX 637 (ALT 250 FOR IP): Performed by: INTERNAL MEDICINE

## 2017-06-19 RX ORDER — METHYLPREDNISOLONE SODIUM SUCCINATE 125 MG/2ML
125 INJECTION, POWDER, LYOPHILIZED, FOR SOLUTION INTRAMUSCULAR; INTRAVENOUS ONCE
Status: COMPLETED | OUTPATIENT
Start: 2017-06-19 | End: 2017-06-19

## 2017-06-19 RX ORDER — DIPHENHYDRAMINE HYDROCHLORIDE 50 MG/ML
50 INJECTION INTRAMUSCULAR; INTRAVENOUS ONCE
Status: COMPLETED | OUTPATIENT
Start: 2017-06-19 | End: 2017-06-19

## 2017-06-19 RX ORDER — ASPIRIN 81 MG/1
TABLET, CHEWABLE ORAL
Status: COMPLETED
Start: 2017-06-19 | End: 2017-06-19

## 2017-06-19 RX ORDER — ASPIRIN 81 MG/1
81 TABLET, CHEWABLE ORAL DAILY
Status: DISCONTINUED | OUTPATIENT
Start: 2017-06-20 | End: 2017-06-21 | Stop reason: HOSPADM

## 2017-06-19 RX ORDER — ACETAMINOPHEN 325 MG/1
650 TABLET ORAL EVERY 4 HOURS PRN
Status: DISCONTINUED | OUTPATIENT
Start: 2017-06-19 | End: 2017-06-21 | Stop reason: HOSPADM

## 2017-06-19 RX ORDER — ASPIRIN 81 MG/1
324 TABLET, CHEWABLE ORAL ONCE
Status: COMPLETED | OUTPATIENT
Start: 2017-06-19 | End: 2017-06-19

## 2017-06-19 RX ORDER — PANTOPRAZOLE SODIUM 40 MG/1
40 TABLET, DELAYED RELEASE ORAL
Status: DISCONTINUED | OUTPATIENT
Start: 2017-06-20 | End: 2017-06-21 | Stop reason: HOSPADM

## 2017-06-19 RX ORDER — 0.9 % SODIUM CHLORIDE 0.9 %
1000 INTRAVENOUS SOLUTION INTRAVENOUS ONCE
Status: COMPLETED | OUTPATIENT
Start: 2017-06-19 | End: 2017-06-19

## 2017-06-19 RX ORDER — LABETALOL HYDROCHLORIDE 5 MG/ML
10 INJECTION, SOLUTION INTRAVENOUS EVERY 6 HOURS PRN
Status: DISCONTINUED | OUTPATIENT
Start: 2017-06-19 | End: 2017-06-21 | Stop reason: HOSPADM

## 2017-06-19 RX ORDER — SIMVASTATIN 40 MG
40 TABLET ORAL NIGHTLY
Status: DISCONTINUED | OUTPATIENT
Start: 2017-06-19 | End: 2017-06-21 | Stop reason: HOSPADM

## 2017-06-19 RX ADMIN — DIPHENHYDRAMINE HYDROCHLORIDE 50 MG: 50 INJECTION, SOLUTION INTRAMUSCULAR; INTRAVENOUS at 13:20

## 2017-06-19 RX ADMIN — ACETAMINOPHEN 650 MG: 325 TABLET ORAL at 21:55

## 2017-06-19 RX ADMIN — SODIUM CHLORIDE 1000 ML: 9 INJECTION, SOLUTION INTRAVENOUS at 13:16

## 2017-06-19 RX ADMIN — METHYLPREDNISOLONE SODIUM SUCCINATE 125 MG: 125 INJECTION, POWDER, FOR SOLUTION INTRAMUSCULAR; INTRAVENOUS at 13:19

## 2017-06-19 RX ADMIN — ASPIRIN 81 MG 324 MG: 81 TABLET ORAL at 17:40

## 2017-06-19 RX ADMIN — ASPIRIN 324 MG: 81 TABLET, CHEWABLE ORAL at 17:40

## 2017-06-19 RX ADMIN — IOHEXOL 100 ML: 350 INJECTION, SOLUTION INTRAVENOUS at 14:08

## 2017-06-19 ASSESSMENT — PAIN DESCRIPTION - DESCRIPTORS
DESCRIPTORS: ACHING
DESCRIPTORS: SHARP
DESCRIPTORS: TIGHTNESS

## 2017-06-19 ASSESSMENT — PAIN DESCRIPTION - PAIN TYPE
TYPE: ACUTE PAIN

## 2017-06-19 ASSESSMENT — PAIN DESCRIPTION - LOCATION
LOCATION: BACK
LOCATION: HEAD
LOCATION: CHEST

## 2017-06-19 ASSESSMENT — PAIN DESCRIPTION - PROGRESSION
CLINICAL_PROGRESSION: NOT CHANGED
CLINICAL_PROGRESSION: GRADUALLY IMPROVING

## 2017-06-19 ASSESSMENT — PAIN SCALES - GENERAL
PAINLEVEL_OUTOF10: 4
PAINLEVEL_OUTOF10: 2
PAINLEVEL_OUTOF10: 4
PAINLEVEL_OUTOF10: 2
PAINLEVEL_OUTOF10: 7

## 2017-06-19 ASSESSMENT — PAIN DESCRIPTION - ONSET
ONSET: ON-GOING
ONSET: ON-GOING
ONSET: AWAKENED FROM SLEEP

## 2017-06-19 ASSESSMENT — PAIN DESCRIPTION - FREQUENCY
FREQUENCY: CONTINUOUS
FREQUENCY: CONTINUOUS

## 2017-06-19 ASSESSMENT — PAIN DESCRIPTION - ORIENTATION: ORIENTATION: MID

## 2017-06-20 ENCOUNTER — APPOINTMENT (OUTPATIENT)
Dept: CARDIAC CATH/INVASIVE PROCEDURES | Age: 67
End: 2017-06-20
Attending: INTERNAL MEDICINE
Payer: MEDICARE

## 2017-06-20 PROBLEM — Z95.5 S/P DRUG ELUTING CORONARY STENT PLACEMENT: Status: ACTIVE | Noted: 2017-06-20

## 2017-06-20 LAB
ACTIVATED CLOTTING TIME: 213 SEC (ref 79–149)
ANION GAP SERPL CALCULATED.3IONS-SCNC: 18 MMOL/L (ref 9–17)
BUN BLDV-MCNC: 19 MG/DL (ref 8–23)
BUN/CREAT BLD: ABNORMAL (ref 9–20)
CALCIUM SERPL-MCNC: 8.9 MG/DL (ref 8.6–10.4)
CHLORIDE BLD-SCNC: 100 MMOL/L (ref 98–107)
CO2: 21 MMOL/L (ref 20–31)
CREAT SERPL-MCNC: 0.79 MG/DL (ref 0.5–0.9)
EKG ATRIAL RATE: 89 BPM
EKG P AXIS: 45 DEGREES
EKG P-R INTERVAL: 184 MS
EKG Q-T INTERVAL: 366 MS
EKG QRS DURATION: 72 MS
EKG QTC CALCULATION (BAZETT): 445 MS
EKG R AXIS: -14 DEGREES
EKG T AXIS: -10 DEGREES
EKG VENTRICULAR RATE: 89 BPM
GFR AFRICAN AMERICAN: >60 ML/MIN
GFR NON-AFRICAN AMERICAN: >60 ML/MIN
GFR SERPL CREATININE-BSD FRML MDRD: ABNORMAL ML/MIN/{1.73_M2}
GFR SERPL CREATININE-BSD FRML MDRD: ABNORMAL ML/MIN/{1.73_M2}
GLUCOSE BLD-MCNC: 228 MG/DL (ref 65–105)
GLUCOSE BLD-MCNC: 231 MG/DL (ref 65–105)
GLUCOSE BLD-MCNC: 247 MG/DL (ref 65–105)
GLUCOSE BLD-MCNC: 255 MG/DL (ref 70–99)
GLUCOSE BLD-MCNC: 284 MG/DL (ref 65–105)
GLUCOSE BLD-MCNC: 351 MG/DL (ref 65–105)
HCT VFR BLD CALC: 37.4 % (ref 36–46)
HEMOGLOBIN: 12.6 G/DL (ref 12–16)
LV EF: 58 %
LVEF MODALITY: NORMAL
MCH RBC QN AUTO: 27 PG (ref 26–34)
MCHC RBC AUTO-ENTMCNC: 33.8 G/DL (ref 31–37)
MCV RBC AUTO: 79.9 FL (ref 80–100)
PDW BLD-RTO: 15 % (ref 12.5–15.4)
PLATELET # BLD: 227 K/UL (ref 140–450)
PMV BLD AUTO: 9.9 FL (ref 6–12)
POTASSIUM SERPL-SCNC: 3.9 MMOL/L (ref 3.7–5.3)
RBC # BLD: 4.68 M/UL (ref 4–5.2)
SODIUM BLD-SCNC: 139 MMOL/L (ref 135–144)
TSH SERPL DL<=0.05 MIU/L-ACNC: 0.32 MIU/L (ref 0.3–5)
WBC # BLD: 15.2 K/UL (ref 3.5–11)

## 2017-06-20 PROCEDURE — 93458 L HRT ARTERY/VENTRICLE ANGIO: CPT | Performed by: INTERNAL MEDICINE

## 2017-06-20 PROCEDURE — C9600 PERC DRUG-EL COR STENT SING: HCPCS | Performed by: INTERNAL MEDICINE

## 2017-06-20 PROCEDURE — 6370000000 HC RX 637 (ALT 250 FOR IP): Performed by: INTERNAL MEDICINE

## 2017-06-20 PROCEDURE — 93306 TTE W/DOPPLER COMPLETE: CPT

## 2017-06-20 PROCEDURE — 6360000002 HC RX W HCPCS: Performed by: INTERNAL MEDICINE

## 2017-06-20 PROCEDURE — C1725 CATH, TRANSLUMIN NON-LASER: HCPCS

## 2017-06-20 PROCEDURE — 2580000003 HC RX 258: Performed by: INTERNAL MEDICINE

## 2017-06-20 PROCEDURE — 96374 THER/PROPH/DIAG INJ IV PUSH: CPT

## 2017-06-20 PROCEDURE — C1894 INTRO/SHEATH, NON-LASER: HCPCS

## 2017-06-20 PROCEDURE — 6370000000 HC RX 637 (ALT 250 FOR IP)

## 2017-06-20 PROCEDURE — 36415 COLL VENOUS BLD VENIPUNCTURE: CPT

## 2017-06-20 PROCEDURE — C1769 GUIDE WIRE: HCPCS

## 2017-06-20 PROCEDURE — 93880 EXTRACRANIAL BILAT STUDY: CPT

## 2017-06-20 PROCEDURE — C1874 STENT, COATED/COV W/DEL SYS: HCPCS

## 2017-06-20 PROCEDURE — 2500000003 HC RX 250 WO HCPCS

## 2017-06-20 PROCEDURE — 80048 BASIC METABOLIC PNL TOTAL CA: CPT

## 2017-06-20 PROCEDURE — 85347 COAGULATION TIME ACTIVATED: CPT

## 2017-06-20 PROCEDURE — 96375 TX/PRO/DX INJ NEW DRUG ADDON: CPT

## 2017-06-20 PROCEDURE — 84443 ASSAY THYROID STIM HORMONE: CPT

## 2017-06-20 PROCEDURE — 93005 ELECTROCARDIOGRAM TRACING: CPT

## 2017-06-20 PROCEDURE — 82947 ASSAY GLUCOSE BLOOD QUANT: CPT

## 2017-06-20 PROCEDURE — 85027 COMPLETE CBC AUTOMATED: CPT

## 2017-06-20 PROCEDURE — 2500000003 HC RX 250 WO HCPCS: Performed by: INTERNAL MEDICINE

## 2017-06-20 PROCEDURE — C1887 CATHETER, GUIDING: HCPCS

## 2017-06-20 PROCEDURE — G0378 HOSPITAL OBSERVATION PER HR: HCPCS

## 2017-06-20 PROCEDURE — C1760 CLOSURE DEV, VASC: HCPCS

## 2017-06-20 RX ORDER — DIPHENHYDRAMINE HYDROCHLORIDE 50 MG/ML
50 INJECTION INTRAMUSCULAR; INTRAVENOUS ONCE
Status: COMPLETED | OUTPATIENT
Start: 2017-06-20 | End: 2017-06-20

## 2017-06-20 RX ORDER — METHYLPREDNISOLONE SODIUM SUCCINATE 125 MG/2ML
125 INJECTION, POWDER, LYOPHILIZED, FOR SOLUTION INTRAMUSCULAR; INTRAVENOUS ONCE
Status: COMPLETED | OUTPATIENT
Start: 2017-06-20 | End: 2017-06-20

## 2017-06-20 RX ORDER — DEXTROSE MONOHYDRATE 25 G/50ML
12.5 INJECTION, SOLUTION INTRAVENOUS PRN
Status: DISCONTINUED | OUTPATIENT
Start: 2017-06-20 | End: 2017-06-21 | Stop reason: HOSPADM

## 2017-06-20 RX ORDER — DEXTROSE MONOHYDRATE 50 MG/ML
100 INJECTION, SOLUTION INTRAVENOUS PRN
Status: DISCONTINUED | OUTPATIENT
Start: 2017-06-20 | End: 2017-06-21 | Stop reason: HOSPADM

## 2017-06-20 RX ORDER — NICOTINE POLACRILEX 4 MG
15 LOZENGE BUCCAL PRN
Status: DISCONTINUED | OUTPATIENT
Start: 2017-06-20 | End: 2017-06-21 | Stop reason: HOSPADM

## 2017-06-20 RX ORDER — SODIUM CHLORIDE 9 MG/ML
INJECTION, SOLUTION INTRAVENOUS CONTINUOUS
Status: DISCONTINUED | OUTPATIENT
Start: 2017-06-20 | End: 2017-06-21 | Stop reason: HOSPADM

## 2017-06-20 RX ORDER — SODIUM CHLORIDE 0.9 % (FLUSH) 0.9 %
10 SYRINGE (ML) INJECTION EVERY 12 HOURS SCHEDULED
Status: DISCONTINUED | OUTPATIENT
Start: 2017-06-20 | End: 2017-06-21 | Stop reason: HOSPADM

## 2017-06-20 RX ORDER — SODIUM CHLORIDE 0.9 % (FLUSH) 0.9 %
10 SYRINGE (ML) INJECTION PRN
Status: DISCONTINUED | OUTPATIENT
Start: 2017-06-20 | End: 2017-06-21 | Stop reason: HOSPADM

## 2017-06-20 RX ADMIN — ASPIRIN 81 MG: 81 TABLET, CHEWABLE ORAL at 07:55

## 2017-06-20 RX ADMIN — LABETALOL HYDROCHLORIDE 10 MG: 5 INJECTION, SOLUTION INTRAVENOUS at 01:46

## 2017-06-20 RX ADMIN — SODIUM CHLORIDE: 9 INJECTION, SOLUTION INTRAVENOUS at 14:25

## 2017-06-20 RX ADMIN — ACETAMINOPHEN 650 MG: 325 TABLET ORAL at 21:31

## 2017-06-20 RX ADMIN — PANTOPRAZOLE SODIUM 40 MG: 40 TABLET, DELAYED RELEASE ORAL at 07:54

## 2017-06-20 RX ADMIN — METHYLPREDNISOLONE SODIUM SUCCINATE 125 MG: 125 INJECTION, POWDER, FOR SOLUTION INTRAMUSCULAR; INTRAVENOUS at 14:42

## 2017-06-20 RX ADMIN — VERAPAMIL HYDROCHLORIDE 120 MG: 120 TABLET, FILM COATED, EXTENDED RELEASE ORAL at 07:55

## 2017-06-20 RX ADMIN — INSULIN LISPRO 4 UNITS: 100 INJECTION, SOLUTION INTRAVENOUS; SUBCUTANEOUS at 19:19

## 2017-06-20 RX ADMIN — DIPHENHYDRAMINE HYDROCHLORIDE 50 MG: 50 INJECTION, SOLUTION INTRAMUSCULAR; INTRAVENOUS at 14:42

## 2017-06-20 RX ADMIN — INSULIN LISPRO 5 UNITS: 100 INJECTION, SOLUTION INTRAVENOUS; SUBCUTANEOUS at 21:32

## 2017-06-20 RX ADMIN — LABETALOL HYDROCHLORIDE 10 MG: 5 INJECTION, SOLUTION INTRAVENOUS at 18:58

## 2017-06-20 RX ADMIN — Medication 40 MG: at 21:31

## 2017-06-20 ASSESSMENT — PAIN DESCRIPTION - PROGRESSION

## 2017-06-20 ASSESSMENT — PAIN SCALES - GENERAL
PAINLEVEL_OUTOF10: 6
PAINLEVEL_OUTOF10: 2
PAINLEVEL_OUTOF10: 2

## 2017-06-20 ASSESSMENT — PAIN DESCRIPTION - PAIN TYPE: TYPE: ACUTE PAIN

## 2017-06-20 ASSESSMENT — PAIN DESCRIPTION - FREQUENCY: FREQUENCY: CONTINUOUS

## 2017-06-20 ASSESSMENT — PAIN DESCRIPTION - DESCRIPTORS: DESCRIPTORS: DULL

## 2017-06-20 ASSESSMENT — PAIN DESCRIPTION - ORIENTATION: ORIENTATION: MID

## 2017-06-20 ASSESSMENT — PAIN DESCRIPTION - LOCATION: LOCATION: CHEST

## 2017-06-20 ASSESSMENT — PAIN DESCRIPTION - ONSET: ONSET: ON-GOING

## 2017-06-21 VITALS
OXYGEN SATURATION: 95 % | WEIGHT: 165.7 LBS | BODY MASS INDEX: 35.75 KG/M2 | SYSTOLIC BLOOD PRESSURE: 157 MMHG | HEIGHT: 57 IN | RESPIRATION RATE: 16 BRPM | HEART RATE: 72 BPM | TEMPERATURE: 97.4 F | DIASTOLIC BLOOD PRESSURE: 78 MMHG

## 2017-06-21 LAB
EKG ATRIAL RATE: 87 BPM
EKG P AXIS: 35 DEGREES
EKG P-R INTERVAL: 170 MS
EKG Q-T INTERVAL: 360 MS
EKG QRS DURATION: 78 MS
EKG QTC CALCULATION (BAZETT): 433 MS
EKG R AXIS: -12 DEGREES
EKG T AXIS: -3 DEGREES
EKG VENTRICULAR RATE: 87 BPM
GLUCOSE BLD-MCNC: 261 MG/DL (ref 65–105)
GLUCOSE BLD-MCNC: 311 MG/DL (ref 65–105)
GLUCOSE BLD-MCNC: 325 MG/DL (ref 65–105)

## 2017-06-21 PROCEDURE — 6370000000 HC RX 637 (ALT 250 FOR IP): Performed by: INTERNAL MEDICINE

## 2017-06-21 PROCEDURE — G0378 HOSPITAL OBSERVATION PER HR: HCPCS

## 2017-06-21 PROCEDURE — 82947 ASSAY GLUCOSE BLOOD QUANT: CPT

## 2017-06-21 RX ORDER — CLOPIDOGREL BISULFATE 75 MG/1
75 TABLET ORAL DAILY
Qty: 30 TABLET | Refills: 11 | Status: SHIPPED | OUTPATIENT
Start: 2017-06-21 | End: 2018-06-19 | Stop reason: SDUPTHER

## 2017-06-21 RX ORDER — NITROGLYCERIN 0.4 MG/1
0.4 TABLET SUBLINGUAL EVERY 5 MIN PRN
Qty: 25 TABLET | Refills: 3 | Status: SHIPPED | OUTPATIENT
Start: 2017-06-21 | End: 2018-09-06 | Stop reason: SDUPTHER

## 2017-06-21 RX ORDER — CLOPIDOGREL BISULFATE 75 MG/1
75 TABLET ORAL DAILY
Status: DISCONTINUED | OUTPATIENT
Start: 2017-06-21 | End: 2017-06-21 | Stop reason: HOSPADM

## 2017-06-21 RX ADMIN — INSULIN LISPRO 6 UNITS: 100 INJECTION, SOLUTION INTRAVENOUS; SUBCUTANEOUS at 09:22

## 2017-06-21 RX ADMIN — CLOPIDOGREL 75 MG: 75 TABLET, FILM COATED ORAL at 09:22

## 2017-06-21 RX ADMIN — PANTOPRAZOLE SODIUM 40 MG: 40 TABLET, DELAYED RELEASE ORAL at 08:08

## 2017-06-21 RX ADMIN — VERAPAMIL HYDROCHLORIDE 120 MG: 120 TABLET, FILM COATED, EXTENDED RELEASE ORAL at 08:08

## 2017-06-21 RX ADMIN — ASPIRIN 81 MG: 81 TABLET, CHEWABLE ORAL at 08:08

## 2017-06-21 RX ADMIN — INSULIN LISPRO 8 UNITS: 100 INJECTION, SOLUTION INTRAVENOUS; SUBCUTANEOUS at 12:38

## 2017-06-22 ENCOUNTER — TELEPHONE (OUTPATIENT)
Dept: FAMILY MEDICINE CLINIC | Age: 67
End: 2017-06-22

## 2017-06-22 ENCOUNTER — CARE COORDINATION (OUTPATIENT)
Dept: FAMILY MEDICINE CLINIC | Age: 67
End: 2017-06-22

## 2017-06-22 ENCOUNTER — TELEPHONE (OUTPATIENT)
Dept: CARDIOLOGY | Age: 67
End: 2017-06-22

## 2017-06-28 ENCOUNTER — OFFICE VISIT (OUTPATIENT)
Dept: FAMILY MEDICINE CLINIC | Age: 67
End: 2017-06-28
Payer: MEDICARE

## 2017-06-28 ENCOUNTER — TELEPHONE (OUTPATIENT)
Dept: FAMILY MEDICINE CLINIC | Age: 67
End: 2017-06-28

## 2017-06-28 VITALS
DIASTOLIC BLOOD PRESSURE: 82 MMHG | RESPIRATION RATE: 16 BRPM | SYSTOLIC BLOOD PRESSURE: 120 MMHG | HEART RATE: 76 BPM | HEIGHT: 57 IN | WEIGHT: 168 LBS | BODY MASS INDEX: 36.24 KG/M2

## 2017-06-28 DIAGNOSIS — E11.00 TYPE 2 DIABETES MELLITUS WITH HYPEROSMOLARITY WITHOUT COMA, WITHOUT LONG-TERM CURRENT USE OF INSULIN (HCC): ICD-10-CM

## 2017-06-28 DIAGNOSIS — I25.10 CORONARY ARTERY DISEASE INVOLVING NATIVE CORONARY ARTERY OF NATIVE HEART WITHOUT ANGINA PECTORIS: ICD-10-CM

## 2017-06-28 DIAGNOSIS — Z95.5 S/P DRUG ELUTING CORONARY STENT PLACEMENT: Primary | ICD-10-CM

## 2017-06-28 PROCEDURE — 99495 TRANSJ CARE MGMT MOD F2F 14D: CPT | Performed by: FAMILY MEDICINE

## 2017-07-08 ENCOUNTER — TELEPHONE (OUTPATIENT)
Dept: PRIMARY CARE CLINIC | Age: 67
End: 2017-07-08

## 2017-07-12 ENCOUNTER — OFFICE VISIT (OUTPATIENT)
Dept: PRIMARY CARE CLINIC | Age: 67
End: 2017-07-12
Payer: MEDICARE

## 2017-07-12 VITALS
DIASTOLIC BLOOD PRESSURE: 78 MMHG | HEART RATE: 68 BPM | OXYGEN SATURATION: 98 % | WEIGHT: 167 LBS | BODY MASS INDEX: 36.03 KG/M2 | TEMPERATURE: 98 F | SYSTOLIC BLOOD PRESSURE: 142 MMHG | HEIGHT: 57 IN

## 2017-07-12 DIAGNOSIS — L50.9 URTICARIA: Primary | ICD-10-CM

## 2017-07-12 PROCEDURE — 96372 THER/PROPH/DIAG INJ SC/IM: CPT | Performed by: PHYSICIAN ASSISTANT

## 2017-07-12 PROCEDURE — 3017F COLORECTAL CA SCREEN DOC REV: CPT | Performed by: PHYSICIAN ASSISTANT

## 2017-07-12 PROCEDURE — 3014F SCREEN MAMMO DOC REV: CPT | Performed by: PHYSICIAN ASSISTANT

## 2017-07-12 PROCEDURE — 1123F ACP DISCUSS/DSCN MKR DOCD: CPT | Performed by: PHYSICIAN ASSISTANT

## 2017-07-12 PROCEDURE — 1090F PRES/ABSN URINE INCON ASSESS: CPT | Performed by: PHYSICIAN ASSISTANT

## 2017-07-12 PROCEDURE — G8417 CALC BMI ABV UP PARAM F/U: HCPCS | Performed by: PHYSICIAN ASSISTANT

## 2017-07-12 PROCEDURE — 99213 OFFICE O/P EST LOW 20 MIN: CPT | Performed by: PHYSICIAN ASSISTANT

## 2017-07-12 PROCEDURE — G8598 ASA/ANTIPLAT THER USED: HCPCS | Performed by: PHYSICIAN ASSISTANT

## 2017-07-12 PROCEDURE — 1036F TOBACCO NON-USER: CPT | Performed by: PHYSICIAN ASSISTANT

## 2017-07-12 PROCEDURE — 1111F DSCHRG MED/CURRENT MED MERGE: CPT | Performed by: PHYSICIAN ASSISTANT

## 2017-07-12 PROCEDURE — G8399 PT W/DXA RESULTS DOCUMENT: HCPCS | Performed by: PHYSICIAN ASSISTANT

## 2017-07-12 PROCEDURE — 4040F PNEUMOC VAC/ADMIN/RCVD: CPT | Performed by: PHYSICIAN ASSISTANT

## 2017-07-12 PROCEDURE — G8427 DOCREV CUR MEDS BY ELIG CLIN: HCPCS | Performed by: PHYSICIAN ASSISTANT

## 2017-07-12 RX ORDER — BETAMETHASONE SODIUM PHOSPHATE AND BETAMETHASONE ACETATE 3; 3 MG/ML; MG/ML
12 INJECTION, SUSPENSION INTRA-ARTICULAR; INTRALESIONAL; INTRAMUSCULAR; SOFT TISSUE ONCE
Status: COMPLETED | OUTPATIENT
Start: 2017-07-12 | End: 2017-07-12

## 2017-07-12 RX ORDER — TRIAMCINOLONE ACETONIDE 1 MG/G
CREAM TOPICAL 3 TIMES DAILY
Qty: 80 G | Refills: 0 | Status: SHIPPED | OUTPATIENT
Start: 2017-07-12 | End: 2017-11-29 | Stop reason: ALTCHOICE

## 2017-07-12 RX ADMIN — BETAMETHASONE SODIUM PHOSPHATE AND BETAMETHASONE ACETATE 12 MG: 3; 3 INJECTION, SUSPENSION INTRA-ARTICULAR; INTRALESIONAL; INTRAMUSCULAR; SOFT TISSUE at 15:42

## 2017-07-12 ASSESSMENT — ENCOUNTER SYMPTOMS: SHORTNESS OF BREATH: 0

## 2017-07-21 ENCOUNTER — OFFICE VISIT (OUTPATIENT)
Dept: CARDIOLOGY | Age: 67
End: 2017-07-21
Payer: MEDICARE

## 2017-07-21 VITALS
WEIGHT: 153 LBS | DIASTOLIC BLOOD PRESSURE: 72 MMHG | HEART RATE: 67 BPM | BODY MASS INDEX: 33.01 KG/M2 | SYSTOLIC BLOOD PRESSURE: 130 MMHG | HEIGHT: 57 IN | RESPIRATION RATE: 16 BRPM

## 2017-07-21 DIAGNOSIS — Z95.5 S/P DRUG ELUTING CORONARY STENT PLACEMENT: Primary | ICD-10-CM

## 2017-07-21 PROCEDURE — 4040F PNEUMOC VAC/ADMIN/RCVD: CPT | Performed by: INTERNAL MEDICINE

## 2017-07-21 PROCEDURE — G8598 ASA/ANTIPLAT THER USED: HCPCS | Performed by: INTERNAL MEDICINE

## 2017-07-21 PROCEDURE — 1090F PRES/ABSN URINE INCON ASSESS: CPT | Performed by: INTERNAL MEDICINE

## 2017-07-21 PROCEDURE — G8399 PT W/DXA RESULTS DOCUMENT: HCPCS | Performed by: INTERNAL MEDICINE

## 2017-07-21 PROCEDURE — 1111F DSCHRG MED/CURRENT MED MERGE: CPT | Performed by: INTERNAL MEDICINE

## 2017-07-21 PROCEDURE — 1036F TOBACCO NON-USER: CPT | Performed by: INTERNAL MEDICINE

## 2017-07-21 PROCEDURE — 3017F COLORECTAL CA SCREEN DOC REV: CPT | Performed by: INTERNAL MEDICINE

## 2017-07-21 PROCEDURE — G8417 CALC BMI ABV UP PARAM F/U: HCPCS | Performed by: INTERNAL MEDICINE

## 2017-07-21 PROCEDURE — 99213 OFFICE O/P EST LOW 20 MIN: CPT | Performed by: INTERNAL MEDICINE

## 2017-07-21 PROCEDURE — 93000 ELECTROCARDIOGRAM COMPLETE: CPT | Performed by: INTERNAL MEDICINE

## 2017-07-21 PROCEDURE — G8427 DOCREV CUR MEDS BY ELIG CLIN: HCPCS | Performed by: INTERNAL MEDICINE

## 2017-07-21 PROCEDURE — 1123F ACP DISCUSS/DSCN MKR DOCD: CPT | Performed by: INTERNAL MEDICINE

## 2017-07-21 PROCEDURE — 3014F SCREEN MAMMO DOC REV: CPT | Performed by: INTERNAL MEDICINE

## 2017-07-26 ENCOUNTER — OFFICE VISIT (OUTPATIENT)
Dept: NUTRITION | Age: 67
End: 2017-07-26
Payer: MEDICARE

## 2017-07-26 DIAGNOSIS — I10 ESSENTIAL HYPERTENSION: ICD-10-CM

## 2017-07-26 DIAGNOSIS — E66.9 OBESITY (BMI 30-39.9): ICD-10-CM

## 2017-07-26 DIAGNOSIS — E78.2 MIXED HYPERLIPIDEMIA: ICD-10-CM

## 2017-07-26 DIAGNOSIS — E55.9 VITAMIN D DEFICIENCY: ICD-10-CM

## 2017-07-26 DIAGNOSIS — Z95.5 S/P DRUG ELUTING CORONARY STENT PLACEMENT: Primary | ICD-10-CM

## 2017-07-26 DIAGNOSIS — E87.6 HYPOKALEMIA: ICD-10-CM

## 2017-07-26 DIAGNOSIS — I25.10 CORONARY ARTERY DISEASE INVOLVING NATIVE CORONARY ARTERY OF NATIVE HEART WITHOUT ANGINA PECTORIS: ICD-10-CM

## 2017-07-26 PROCEDURE — G8417 CALC BMI ABV UP PARAM F/U: HCPCS | Performed by: DIETITIAN, REGISTERED

## 2017-07-26 PROCEDURE — 99999 PR OFFICE/OUTPT VISIT,PROCEDURE ONLY: CPT | Performed by: DIETITIAN, REGISTERED

## 2017-07-26 PROCEDURE — G8428 CUR MEDS NOT DOCUMENT: HCPCS | Performed by: DIETITIAN, REGISTERED

## 2017-07-26 PROCEDURE — 3046F HEMOGLOBIN A1C LEVEL >9.0%: CPT | Performed by: DIETITIAN, REGISTERED

## 2017-07-26 PROCEDURE — 97802 MEDICAL NUTRITION INDIV IN: CPT | Performed by: DIETITIAN, REGISTERED

## 2017-07-26 PROCEDURE — 1036F TOBACCO NON-USER: CPT | Performed by: DIETITIAN, REGISTERED

## 2017-10-10 DIAGNOSIS — E11.9 TYPE 2 DIABETES MELLITUS WITHOUT COMPLICATION, WITHOUT LONG-TERM CURRENT USE OF INSULIN (HCC): ICD-10-CM

## 2017-10-10 RX ORDER — GLIMEPIRIDE 1 MG/1
TABLET ORAL
Qty: 90 TABLET | Refills: 0 | Status: SHIPPED | OUTPATIENT
Start: 2017-10-10 | End: 2017-10-26 | Stop reason: SDUPTHER

## 2017-10-12 ENCOUNTER — HOSPITAL ENCOUNTER (OUTPATIENT)
Dept: LAB | Age: 67
Setting detail: SPECIMEN
Discharge: HOME | End: 2017-10-12
Payer: MEDICARE

## 2017-10-12 DIAGNOSIS — E11.9 TYPE 2 DIABETES MELLITUS WITHOUT COMPLICATION, WITHOUT LONG-TERM CURRENT USE OF INSULIN (HCC): ICD-10-CM

## 2017-10-12 DIAGNOSIS — E55.9 VITAMIN D DEFICIENCY: ICD-10-CM

## 2017-10-12 DIAGNOSIS — I10 ESSENTIAL HYPERTENSION: ICD-10-CM

## 2017-10-12 LAB
ALBUMIN SERPL-MCNC: 4.7 G/DL (ref 3.5–5.2)
ALBUMIN/GLOBULIN RATIO: 2 (ref 1–2.5)
ALP BLD-CCNC: 72 U/L (ref 35–104)
ALT SERPL-CCNC: 28 U/L (ref 5–33)
ANION GAP SERPL CALCULATED.3IONS-SCNC: 13 MMOL/L (ref 9–17)
AST SERPL-CCNC: 26 U/L
BILIRUB SERPL-MCNC: 0.37 MG/DL (ref 0.3–1.2)
BUN BLDV-MCNC: 17 MG/DL (ref 8–23)
BUN/CREAT BLD: 23 (ref 9–20)
CALCIUM SERPL-MCNC: 9.5 MG/DL (ref 8.6–10.4)
CHLORIDE BLD-SCNC: 100 MMOL/L (ref 98–107)
CO2: 28 MMOL/L (ref 20–31)
CREAT SERPL-MCNC: 0.74 MG/DL (ref 0.5–0.9)
CREATININE URINE: 374.3 MG/DL (ref 28–217)
ESTIMATED AVERAGE GLUCOSE: 128 MG/DL
GFR AFRICAN AMERICAN: >60 ML/MIN
GFR NON-AFRICAN AMERICAN: >60 ML/MIN
GFR SERPL CREATININE-BSD FRML MDRD: ABNORMAL ML/MIN/{1.73_M2}
GFR SERPL CREATININE-BSD FRML MDRD: ABNORMAL ML/MIN/{1.73_M2}
GLUCOSE BLD-MCNC: 119 MG/DL (ref 70–99)
HBA1C MFR BLD: 6.1 % (ref 4.8–5.9)
MICROALBUMIN/CREAT 24H UR: 27 MG/L
MICROALBUMIN/CREAT UR-RTO: 7 MCG/MG CREAT
POTASSIUM SERPL-SCNC: 3.9 MMOL/L (ref 3.7–5.3)
SODIUM BLD-SCNC: 141 MMOL/L (ref 135–144)
TOTAL PROTEIN: 7.1 G/DL (ref 6.4–8.3)
VITAMIN D 25-HYDROXY: 58.2 NG/ML (ref 30–100)

## 2017-10-12 PROCEDURE — 82306 VITAMIN D 25 HYDROXY: CPT

## 2017-10-12 PROCEDURE — 80053 COMPREHEN METABOLIC PANEL: CPT

## 2017-10-12 PROCEDURE — 82570 ASSAY OF URINE CREATININE: CPT

## 2017-10-12 PROCEDURE — 82043 UR ALBUMIN QUANTITATIVE: CPT

## 2017-10-12 PROCEDURE — 36415 COLL VENOUS BLD VENIPUNCTURE: CPT

## 2017-10-12 PROCEDURE — 83036 HEMOGLOBIN GLYCOSYLATED A1C: CPT

## 2017-10-13 DIAGNOSIS — E55.9 VITAMIN D DEFICIENCY: ICD-10-CM

## 2017-10-13 RX ORDER — CHOLECALCIFEROL (VITAMIN D3) 1250 MCG
1 CAPSULE ORAL WEEKLY
Qty: 13 CAPSULE | Refills: 0 | Status: SHIPPED | OUTPATIENT
Start: 2017-10-13 | End: 2017-10-26 | Stop reason: SDUPTHER

## 2017-10-26 ENCOUNTER — OFFICE VISIT (OUTPATIENT)
Dept: FAMILY MEDICINE CLINIC | Age: 67
End: 2017-10-26
Payer: MEDICARE

## 2017-10-26 VITALS
SYSTOLIC BLOOD PRESSURE: 142 MMHG | BODY MASS INDEX: 36.38 KG/M2 | HEART RATE: 82 BPM | HEIGHT: 57 IN | DIASTOLIC BLOOD PRESSURE: 70 MMHG | WEIGHT: 168.6 LBS | RESPIRATION RATE: 16 BRPM

## 2017-10-26 DIAGNOSIS — E11.9 TYPE 2 DIABETES MELLITUS WITHOUT COMPLICATION, WITHOUT LONG-TERM CURRENT USE OF INSULIN (HCC): Primary | ICD-10-CM

## 2017-10-26 DIAGNOSIS — E55.9 VITAMIN D DEFICIENCY: ICD-10-CM

## 2017-10-26 DIAGNOSIS — I10 ESSENTIAL HYPERTENSION: ICD-10-CM

## 2017-10-26 DIAGNOSIS — E78.2 MIXED HYPERLIPIDEMIA: ICD-10-CM

## 2017-10-26 DIAGNOSIS — Z23 NEED FOR INFLUENZA VACCINATION: ICD-10-CM

## 2017-10-26 PROCEDURE — G8427 DOCREV CUR MEDS BY ELIG CLIN: HCPCS | Performed by: FAMILY MEDICINE

## 2017-10-26 PROCEDURE — 3044F HG A1C LEVEL LT 7.0%: CPT | Performed by: FAMILY MEDICINE

## 2017-10-26 PROCEDURE — 90662 IIV NO PRSV INCREASED AG IM: CPT | Performed by: FAMILY MEDICINE

## 2017-10-26 PROCEDURE — 3014F SCREEN MAMMO DOC REV: CPT | Performed by: FAMILY MEDICINE

## 2017-10-26 PROCEDURE — 1036F TOBACCO NON-USER: CPT | Performed by: FAMILY MEDICINE

## 2017-10-26 PROCEDURE — G8484 FLU IMMUNIZE NO ADMIN: HCPCS | Performed by: FAMILY MEDICINE

## 2017-10-26 PROCEDURE — 1090F PRES/ABSN URINE INCON ASSESS: CPT | Performed by: FAMILY MEDICINE

## 2017-10-26 PROCEDURE — G8399 PT W/DXA RESULTS DOCUMENT: HCPCS | Performed by: FAMILY MEDICINE

## 2017-10-26 PROCEDURE — G8598 ASA/ANTIPLAT THER USED: HCPCS | Performed by: FAMILY MEDICINE

## 2017-10-26 PROCEDURE — 1123F ACP DISCUSS/DSCN MKR DOCD: CPT | Performed by: FAMILY MEDICINE

## 2017-10-26 PROCEDURE — G0008 ADMIN INFLUENZA VIRUS VAC: HCPCS | Performed by: FAMILY MEDICINE

## 2017-10-26 PROCEDURE — G8417 CALC BMI ABV UP PARAM F/U: HCPCS | Performed by: FAMILY MEDICINE

## 2017-10-26 PROCEDURE — 3017F COLORECTAL CA SCREEN DOC REV: CPT | Performed by: FAMILY MEDICINE

## 2017-10-26 PROCEDURE — 4040F PNEUMOC VAC/ADMIN/RCVD: CPT | Performed by: FAMILY MEDICINE

## 2017-10-26 PROCEDURE — 99214 OFFICE O/P EST MOD 30 MIN: CPT | Performed by: FAMILY MEDICINE

## 2017-10-26 RX ORDER — CHOLECALCIFEROL (VITAMIN D3) 1250 MCG
1 CAPSULE ORAL WEEKLY
Qty: 13 CAPSULE | Refills: 1 | Status: SHIPPED | OUTPATIENT
Start: 2017-10-26 | End: 2018-07-18 | Stop reason: SDUPTHER

## 2017-10-26 RX ORDER — SIMVASTATIN 40 MG
TABLET ORAL
Qty: 90 TABLET | Refills: 1 | Status: SHIPPED | OUTPATIENT
Start: 2017-10-26 | End: 2018-01-17 | Stop reason: SDUPTHER

## 2017-10-26 RX ORDER — GLIMEPIRIDE 1 MG/1
TABLET ORAL
Qty: 90 TABLET | Refills: 1 | Status: SHIPPED | OUTPATIENT
Start: 2017-10-26 | End: 2018-03-06 | Stop reason: SDUPTHER

## 2017-10-26 NOTE — PROGRESS NOTES
[address], [address] [place] [address] Drive                        Telephone [phone]             Fax [phone]     Yves Bartlett  1950  MRN:  [id number]  Date of visit:  10/26/17    Subjective:    Yves Bartlett is a 75 y.o.  female who presents to [facility] today (10/26/17) for follow up/evaluation of:  Hyperlipidemia (6 month follow up); Hypertension (6 month follow up); and Diabetes (6 month follow up)      She states that she has been feeling well. She has been exercising regularly. She walks 3-4 miles most days. She denies chest pain or shortness of breath with activity. She states that she feels like she is losing weight, and her clothes are fitting more loosely, but she was surprised that her weight was up on the scales in our office today. She admits to occasional brief episodes of chest discomfort. She has Nitroglycerin at home, but she has never taken any. She had a cardiac catheterization on 6/20/2017 and had 2 stents placed. She also had less significant narrowing in 2 other arteries. She states that she has never had any episodes of chest discomfort with activity. She has the following problem list:  Patient Active Problem List   Diagnosis    Mixed hyperlipidemia    Obesity (BMI 30-39. 9)    Interstitial cystitis    History of seasonal allergies    Trigger finger, left    High risk medication use    Uncontrolled type 2 diabetes mellitus without complication, without long-term current use of insulin (HCC)    Vitamin D deficiency    Essential hypertension    Atypical chest pain    Hypokalemia    Numbness and tingling of right arm    S/P drug eluting coronary stent placement-RCA 6/20/17 - Dr. Dinah Ireland    Coronary artery disease involving native coronary artery of native heart without angina pectoris        Current medications are:  Outpatient Prescriptions Marked as Taking for the 10/26/17 encounter (Office Visit) with John Hurtado MD   Medication Sig Dispense Refill    Cholecalciferol (VITAMIN D3) 60045 units CAPS Take 1 capsule by mouth once a week 13 capsule 0    glimepiride (AMARYL) 1 MG tablet TAKE ONE-HALF TABLET BY MOUTH TWICE DAILY 90 tablet 0    metFORMIN (GLUCOPHAGE) 500 MG tablet TAKE 1 TABLET BY MOUTH TWO TIMES A DAY WITH MEALS 180 tablet 1    clopidogrel (PLAVIX) 75 MG tablet Take 1 tablet by mouth daily 30 tablet 11    metoprolol tartrate (LOPRESSOR) 25 MG tablet Take 0.5 tablets by mouth 2 times daily 60 tablet 3    nitroGLYCERIN (NITROSTAT) 0.4 MG SL tablet Place 1 tablet under the tongue every 5 minutes as needed for Chest pain up to max of 3 total doses. If no relief after 1 dose, call 911. 25 tablet 3    simvastatin (ZOCOR) 40 MG tablet TAKE ONE TABLET BY MOUTH NIGHTLY 90 tablet 1    Blood Pressure KIT Blood pressure monitor 1 kit 0    Blood Glucose Monitoring Suppl MADELEINE Patient needs meter,tests strips and lancets. Tests daily. Dx E11.9,250.00 1 Device 0    NONFORMULARY OTC Biotin 1000mcg daily      aspirin 81 MG tablet Take 81 mg by mouth daily. She is allergic to iv dye [iodides]; sulfa antibiotics; ace inhibitors; cozaar [losartan]; ezetimibe-simvastatin; lipitor; and penicillins. She  reports that she has never smoked. She has never used smokeless tobacco.      Objective:    Vitals:    10/26/17 1333   BP: (!) 142/70   Site: Right Arm   Position: Sitting   Cuff Size: Large Adult   Pulse: 82   Resp: 16   Weight: 168 lb 9.6 oz (76.5 kg)   Height: 4' 9\" (1.448 m)     Body mass index is 36.48 kg/m². Obese  female, healthy-appearing, alert, and cooperative. Neck is supple, with no lymphadenopathy. Chest is clear to auscultation, no wheezes, rales, or rhonchi. Heart sounds are regular rate and rhythm, no murmurs. Lower extremities have no edema. Her feet were examined.   There were no areas of

## 2017-10-27 ENCOUNTER — TELEPHONE (OUTPATIENT)
Dept: FAMILY MEDICINE CLINIC | Age: 67
End: 2017-10-27

## 2017-10-27 NOTE — TELEPHONE ENCOUNTER
Please advise the patient that she can increase her dose of Lopressor. She is currently taking 12.5 mg BID. Please advise her to continue taking 12.5 mg in the morning, and begin taking 25 mg in the evening. Please have her monitor her blood pressure and pulse at home, and call next week with the readings. If she does not have a monitor at home, please have her come in for a blood pressure check next week. Please change her medication record--remove Verapamil, and change the dosing of Lopressor.

## 2017-10-27 NOTE — TELEPHONE ENCOUNTER
Pt calling stating the verapamil cost her $31 and pt is requesting to be put on something cheaper, when pulled up on goodrx it states it is $4.00 at [facility] if you pay cash but pt called [facility] and they told her it's $31 no matter what, to pt requesting to be put on something that would cost $4.00 at any pharmacy, please advise pt at above number.

## 2017-10-27 NOTE — TELEPHONE ENCOUNTER
Notified patient, states she went ahead and got a 30-day supply of the verapamil in the mean time. She will try it and see if it helps.

## 2017-11-02 ENCOUNTER — TELEPHONE (OUTPATIENT)
Dept: FAMILY MEDICINE CLINIC | Age: 67
End: 2017-11-02

## 2017-11-02 DIAGNOSIS — I10 ESSENTIAL HYPERTENSION: ICD-10-CM

## 2017-11-02 NOTE — TELEPHONE ENCOUNTER
Pt calling stating she was started on verapamil on Saturday, pt states it caused her feet to hurt, her joints to hurt and to cough all the time, pt did not take last night, please call recommendations at above number, pt uses above pharmacy.

## 2017-11-14 ENCOUNTER — NURSE ONLY (OUTPATIENT)
Dept: LAB | Age: 67
End: 2017-11-14

## 2017-11-14 VITALS — DIASTOLIC BLOOD PRESSURE: 80 MMHG | SYSTOLIC BLOOD PRESSURE: 140 MMHG

## 2017-11-14 DIAGNOSIS — Z01.30 BLOOD PRESSURE CHECK: Primary | ICD-10-CM

## 2017-11-14 NOTE — PROGRESS NOTES
Please advise Kika to increase her dose of Metoprolol 25 to one full tablet BID. Her dose was Metoprolol 25 mg one-half tablet in the morning and 1 full tablet in the evening.

## 2017-11-15 NOTE — PROGRESS NOTES
Since Sunday she has had swelling in her lower legs,continues with pain in the back of both legs from knees down to her feet. She questions if it's her Metoprolol.

## 2017-11-16 ENCOUNTER — OFFICE VISIT (OUTPATIENT)
Dept: PRIMARY CARE CLINIC | Age: 67
End: 2017-11-16
Payer: MEDICARE

## 2017-11-16 VITALS
SYSTOLIC BLOOD PRESSURE: 142 MMHG | WEIGHT: 169 LBS | RESPIRATION RATE: 16 BRPM | BODY MASS INDEX: 36.46 KG/M2 | HEIGHT: 57 IN | HEART RATE: 76 BPM | DIASTOLIC BLOOD PRESSURE: 84 MMHG

## 2017-11-16 DIAGNOSIS — R05.9 COUGH: ICD-10-CM

## 2017-11-16 DIAGNOSIS — I25.10 CORONARY ARTERY DISEASE INVOLVING NATIVE CORONARY ARTERY OF NATIVE HEART WITHOUT ANGINA PECTORIS: ICD-10-CM

## 2017-11-16 DIAGNOSIS — I10 ESSENTIAL HYPERTENSION: Primary | ICD-10-CM

## 2017-11-16 PROCEDURE — G8417 CALC BMI ABV UP PARAM F/U: HCPCS | Performed by: FAMILY MEDICINE

## 2017-11-16 PROCEDURE — G8598 ASA/ANTIPLAT THER USED: HCPCS | Performed by: FAMILY MEDICINE

## 2017-11-16 PROCEDURE — 1090F PRES/ABSN URINE INCON ASSESS: CPT | Performed by: FAMILY MEDICINE

## 2017-11-16 PROCEDURE — 3017F COLORECTAL CA SCREEN DOC REV: CPT | Performed by: FAMILY MEDICINE

## 2017-11-16 PROCEDURE — 3014F SCREEN MAMMO DOC REV: CPT | Performed by: FAMILY MEDICINE

## 2017-11-16 PROCEDURE — G8484 FLU IMMUNIZE NO ADMIN: HCPCS | Performed by: FAMILY MEDICINE

## 2017-11-16 PROCEDURE — 1123F ACP DISCUSS/DSCN MKR DOCD: CPT | Performed by: FAMILY MEDICINE

## 2017-11-16 PROCEDURE — G8399 PT W/DXA RESULTS DOCUMENT: HCPCS | Performed by: FAMILY MEDICINE

## 2017-11-16 PROCEDURE — 4040F PNEUMOC VAC/ADMIN/RCVD: CPT | Performed by: FAMILY MEDICINE

## 2017-11-16 PROCEDURE — 1036F TOBACCO NON-USER: CPT | Performed by: FAMILY MEDICINE

## 2017-11-16 PROCEDURE — G8427 DOCREV CUR MEDS BY ELIG CLIN: HCPCS | Performed by: FAMILY MEDICINE

## 2017-11-16 PROCEDURE — 99213 OFFICE O/P EST LOW 20 MIN: CPT | Performed by: FAMILY MEDICINE

## 2017-11-16 RX ORDER — CLONIDINE HYDROCHLORIDE 0.1 MG/1
0.1 TABLET ORAL 2 TIMES DAILY
Qty: 60 TABLET | Refills: 3 | Status: ON HOLD | OUTPATIENT
Start: 2017-11-16 | End: 2017-11-20

## 2017-11-16 RX ORDER — GUAIFENESIN AND CODEINE PHOSPHATE 100; 10 MG/5ML; MG/5ML
5 SOLUTION ORAL 4 TIMES DAILY PRN
Qty: 118 ML | Refills: 0 | Status: SHIPPED | OUTPATIENT
Start: 2017-11-16 | End: 2019-03-13

## 2017-11-16 NOTE — PROGRESS NOTES
[facility]             [address] [facility], [address]                        Telephone [phone]             Fax [phone]       Morris Grijalva  1950  MRN:  [id number]  Date of visit:  11/16/17    Subjective:    Morris Grijalva is a 75 y.o.  female who presents to [facility] today (11/16/17) for evaluation of:  Leg Pain (bilateral lower leg pain,starts in the back of her knee and moves to her feet-right worse than left x 1 1/2 months) and Swelling (ankles and lower legs x 1 week)      She states that she has been having intermittent pain in the back of her legs for about a month and a half. She believes that the pain has been increasing as her dose of Metoprolol has been increased. Unfortunately, she has been intolerant of many anti-hypertensive medications. Some of the anti-hypertensives that were prescribed she was unable to fill due to cost.  She states that she has not had swelling in her legs. She states that she had been walking for exercise, but she now feels that she cannot do that due to the leg pain. She also requested a refill of Cheratussin that she uses prn. She also has questions about activity restrictions. She is status placement of 2 stents in the right coronary artery in June 2017. She has been given clearance to return to all previous activities by her cardiologist.    She has the following problem list:  Patient Active Problem List   Diagnosis    Mixed hyperlipidemia    Obesity (BMI 30-39. 9)    Interstitial cystitis    History of seasonal allergies    Trigger finger, left    High risk medication use    Uncontrolled type 2 diabetes mellitus without complication, without long-term current use of insulin ([facility])    Vitamin D deficiency    Essential hypertension    Atypical chest pain    Hypokalemia    Numbness and tingling of right arm    S/P drug eluting coronary stent placement-RCA 6/20/17 - Dr. Marivel Russell    Coronary artery disease involving native coronary artery of native heart without angina pectoris        Current medications are:  Current Outpatient Prescriptions   Medication Sig Dispense Refill    metoprolol tartrate (LOPRESSOR) 25 MG tablet 1/2 po in the AM and 1 po in the  tablet 1    Cholecalciferol (VITAMIN D3) 90205 units CAPS Take 1 capsule by mouth once a week 13 capsule 1    glimepiride (AMARYL) 1 MG tablet TAKE ONE-HALF TABLET BY MOUTH TWICE DAILY 90 tablet 1    metFORMIN (GLUCOPHAGE) 500 MG tablet TAKE 1 TABLET BY MOUTH TWO TIMES A DAY WITH MEALS 180 tablet 1    simvastatin (ZOCOR) 40 MG tablet TAKE ONE TABLET BY MOUTH NIGHTLY 90 tablet 1    triamcinolone (KENALOG) 0.1 % cream Apply topically 3 times daily 80 g 0    clopidogrel (PLAVIX) 75 MG tablet Take 1 tablet by mouth daily 30 tablet 11    nitroGLYCERIN (NITROSTAT) 0.4 MG SL tablet Place 1 tablet under the tongue every 5 minutes as needed for Chest pain up to max of 3 total doses. If no relief after 1 dose, call 911. 25 tablet 3    Blood Pressure KIT Blood pressure monitor 1 kit 0    Blood Glucose Monitoring Suppl MADELEINE Patient needs meter,tests strips and lancets. Tests daily. Dx E11.9,250.00 1 Device 0    NONFORMULARY OTC Biotin 1000mcg daily      aspirin 81 MG tablet Take 81 mg by mouth daily. No current facility-administered medications for this visit. She is allergic to iv dye [iodides]; sulfa antibiotics; ace inhibitors; cozaar [losartan]; ezetimibe-simvastatin; lipitor; and penicillins. She  reports that she has never smoked. She has never used smokeless tobacco.      Objective:    Vitals:    11/16/17 1015   BP: (!) 142/84   Site: Left Arm   Position: Sitting   Cuff Size: Large Adult   Pulse: 76   Resp: 16   Weight: 169 lb (76.7 kg)   Height: 4' 9\" (1.448 m)     Body mass index is 36.57 kg/m².     Obese  female, anxious but otherwise in no acute distress. Neck is supple, with no lymphadenopathy. Chest is clear to auscultation, no wheezes, rales, or rhonchi. Heart sounds are regular rate and rhythm, no murmurs. Lower extremities have trace edema. There is mild tenderness to palpation of the popliteal fossa on the right. There is less tenderness to palpation of the popliteal fossa on the left. Assessment and Plan:    1. Essential hypertension  As above, she has had multiple medication intolerances. I discussed with her that I am not certain that Lopressor is the cause of her leg pain, although she feels that the leg pain increased as her dose of Lopressor increased. I will have her discontinue Lopressor for now. Clonidine was prescribed:  - cloNIDine (CATAPRES) 0.1 MG tablet; Take 1 tablet by mouth 2 times daily  Dispense: 60 tablet; Refill: 3    I have asked her to either call next week with an update on her symptoms and her blood pressure readings, or to stop in for a blood pressure check at the Injection Room. I have advised her that she will likely need to have her dose of Clonidine increased. 2. Cough  She was given a refill of Cheratussin:  - guaiFENesin-codeine (CHERATUSSIN AC) 100-10 MG/5ML syrup; Take 5 mLs by mouth 4 times daily as needed for Cough . Dispense: 118 mL; Refill: 0    3. Coronary artery disease involving native coronary artery of native heart without angina pectoris  She was advised that she should have no activity restrictions, including sexual activity.           (Please note that portions of this note were completed with a voice-recognition program. Efforts were made to edit the dictation but occasionally words are mis-transcribed.)

## 2017-11-17 ENCOUNTER — TELEPHONE (OUTPATIENT)
Dept: FAMILY MEDICINE CLINIC | Age: 67
End: 2017-11-17

## 2017-11-17 NOTE — TELEPHONE ENCOUNTER
Please advise [place] that her blood pressure is excellent. I would recommend that she continue to take the medication. It may take a couple of days for her to adjust to the new medication. Please ask her to continue to monitor her blood pressure frequently, especially when she is having symptoms.

## 2017-11-19 ENCOUNTER — OFFICE VISIT (OUTPATIENT)
Dept: PRIMARY CARE CLINIC | Age: 67
End: 2017-11-19

## 2017-11-19 ENCOUNTER — APPOINTMENT (OUTPATIENT)
Dept: GENERAL RADIOLOGY | Age: 67
End: 2017-11-19
Payer: MEDICARE

## 2017-11-19 ENCOUNTER — APPOINTMENT (OUTPATIENT)
Dept: CT IMAGING | Age: 67
End: 2017-11-19
Payer: MEDICARE

## 2017-11-19 ENCOUNTER — APPOINTMENT (OUTPATIENT)
Dept: MRI IMAGING | Age: 67
End: 2017-11-19
Payer: MEDICARE

## 2017-11-19 ENCOUNTER — HOSPITAL ENCOUNTER (EMERGENCY)
Age: 67
Discharge: ANOTHER HOSPITAL | End: 2017-11-19
Attending: EMERGENCY MEDICINE
Payer: MEDICARE

## 2017-11-19 ENCOUNTER — HOSPITAL ENCOUNTER (OUTPATIENT)
Age: 67
Setting detail: OBSERVATION
Discharge: NURSING, REHAB OR LONG-TERM CARE | End: 2017-11-21
Attending: EMERGENCY MEDICINE | Admitting: INTERNAL MEDICINE
Payer: MEDICARE

## 2017-11-19 VITALS
RESPIRATION RATE: 14 BRPM | TEMPERATURE: 97.5 F | SYSTOLIC BLOOD PRESSURE: 110 MMHG | HEART RATE: 68 BPM | HEIGHT: 57 IN | WEIGHT: 170 LBS | DIASTOLIC BLOOD PRESSURE: 60 MMHG | BODY MASS INDEX: 36.68 KG/M2 | OXYGEN SATURATION: 98 %

## 2017-11-19 VITALS
HEART RATE: 65 BPM | SYSTOLIC BLOOD PRESSURE: 176 MMHG | BODY MASS INDEX: 36.79 KG/M2 | TEMPERATURE: 97.2 F | RESPIRATION RATE: 14 BRPM | OXYGEN SATURATION: 97 % | WEIGHT: 170 LBS | DIASTOLIC BLOOD PRESSURE: 74 MMHG

## 2017-11-19 DIAGNOSIS — R07.9 CHEST PAIN, UNSPECIFIED TYPE: Primary | ICD-10-CM

## 2017-11-19 DIAGNOSIS — I10 ESSENTIAL HYPERTENSION: ICD-10-CM

## 2017-11-19 DIAGNOSIS — R42 DIZZINESS: Primary | ICD-10-CM

## 2017-11-19 DIAGNOSIS — R29.90 STROKE-LIKE SYMPTOMS: Primary | ICD-10-CM

## 2017-11-19 DIAGNOSIS — R47.81 SLURRED SPEECH: ICD-10-CM

## 2017-11-19 PROBLEM — G45.9 TIA (TRANSIENT ISCHEMIC ATTACK): Status: ACTIVE | Noted: 2017-11-19

## 2017-11-19 LAB
-: ABNORMAL
ABSOLUTE EOS #: 0.2 K/UL (ref 0–0.4)
ABSOLUTE IMMATURE GRANULOCYTE: ABNORMAL K/UL (ref 0–0.3)
ABSOLUTE LYMPH #: 2.5 K/UL (ref 1–4.8)
ABSOLUTE MONO #: 0.5 K/UL (ref 0.1–1.2)
AMORPHOUS: ABNORMAL
ANION GAP SERPL CALCULATED.3IONS-SCNC: 15 MMOL/L (ref 9–17)
BACTERIA: ABNORMAL
BASOPHILS # BLD: 1 % (ref 0–1)
BASOPHILS ABSOLUTE: 0 K/UL (ref 0–0.2)
BILIRUBIN URINE: NEGATIVE
BUN BLDV-MCNC: 17 MG/DL (ref 8–23)
BUN/CREAT BLD: 22 (ref 9–20)
CALCIUM SERPL-MCNC: 9.4 MG/DL (ref 8.6–10.4)
CASTS UA: ABNORMAL /LPF (ref 0–2)
CHLORIDE BLD-SCNC: 99 MMOL/L (ref 98–107)
CHOLESTEROL/HDL RATIO: 3.7
CHOLESTEROL: 169 MG/DL
CO2: 26 MMOL/L (ref 20–31)
COLOR: ABNORMAL
COMMENT UA: ABNORMAL
CREAT SERPL-MCNC: 0.79 MG/DL (ref 0.5–0.9)
CRYSTALS, UA: ABNORMAL /HPF
DIFFERENTIAL TYPE: ABNORMAL
EKG ATRIAL RATE: 64 BPM
EKG P AXIS: 25 DEGREES
EKG P-R INTERVAL: 184 MS
EKG Q-T INTERVAL: 422 MS
EKG QRS DURATION: 88 MS
EKG QTC CALCULATION (BAZETT): 435 MS
EKG R AXIS: -6 DEGREES
EKG T AXIS: -9 DEGREES
EKG VENTRICULAR RATE: 64 BPM
EOSINOPHILS RELATIVE PERCENT: 3 % (ref 1–7)
EPITHELIAL CELLS UA: ABNORMAL /HPF (ref 0–5)
GFR AFRICAN AMERICAN: >60 ML/MIN
GFR NON-AFRICAN AMERICAN: >60 ML/MIN
GFR SERPL CREATININE-BSD FRML MDRD: ABNORMAL ML/MIN/{1.73_M2}
GFR SERPL CREATININE-BSD FRML MDRD: ABNORMAL ML/MIN/{1.73_M2}
GLUCOSE BLD-MCNC: 83 MG/DL (ref 65–105)
GLUCOSE BLD-MCNC: 97 MG/DL (ref 70–99)
GLUCOSE BLD-MCNC: 99 MG/DL (ref 65–105)
GLUCOSE URINE: NEGATIVE
HCT VFR BLD CALC: 36.4 % (ref 36–46)
HDLC SERPL-MCNC: 46 MG/DL
HEMOGLOBIN: 12.1 G/DL (ref 12–16)
IMMATURE GRANULOCYTES: ABNORMAL %
INR BLD: 1
KETONES, URINE: NEGATIVE
LDL CHOLESTEROL: 90 MG/DL (ref 0–130)
LEUKOCYTE ESTERASE, URINE: NEGATIVE
LYMPHOCYTES # BLD: 36 % (ref 16–46)
MCH RBC QN AUTO: 26.5 PG (ref 26–34)
MCHC RBC AUTO-ENTMCNC: 33.4 G/DL (ref 31–37)
MCV RBC AUTO: 79.3 FL (ref 80–100)
MONOCYTES # BLD: 7 % (ref 4–11)
MUCUS: ABNORMAL
NITRITE, URINE: NEGATIVE
OTHER OBSERVATIONS UA: ABNORMAL
PARTIAL THROMBOPLASTIN TIME: 28.5 SEC (ref 27–35)
PDW BLD-RTO: 14.2 % (ref 11–14.5)
PH UA: 5 (ref 5–6)
PLATELET # BLD: 213 K/UL (ref 140–450)
PLATELET ESTIMATE: ABNORMAL
PMV BLD AUTO: 9.2 FL (ref 6–12)
POTASSIUM SERPL-SCNC: 3.7 MMOL/L (ref 3.7–5.3)
PROTEIN UA: NEGATIVE
PROTHROMBIN TIME: 10.6 SEC (ref 9.4–11.3)
RBC # BLD: 4.59 M/UL (ref 4–5.2)
RBC # BLD: ABNORMAL 10*6/UL
RBC UA: ABNORMAL /HPF (ref 0–4)
RENAL EPITHELIAL, UA: ABNORMAL /HPF
SEG NEUTROPHILS: 53 % (ref 43–77)
SEGMENTED NEUTROPHILS ABSOLUTE COUNT: 3.7 K/UL (ref 1.8–7.7)
SODIUM BLD-SCNC: 140 MMOL/L (ref 135–144)
SPECIFIC GRAVITY UA: 1 (ref 1.01–1.02)
TRICHOMONAS: ABNORMAL
TRIGL SERPL-MCNC: 165 MG/DL
TROPONIN INTERP: NORMAL
TROPONIN T: <0.03 NG/ML
TSH SERPL DL<=0.05 MIU/L-ACNC: 2.63 MIU/L (ref 0.3–5)
TURBIDITY: ABNORMAL
URINE HGB: ABNORMAL
UROBILINOGEN, URINE: NORMAL
VLDLC SERPL CALC-MCNC: ABNORMAL MG/DL (ref 1–30)
WBC # BLD: 7 K/UL (ref 3.5–11)
WBC # BLD: ABNORMAL 10*3/UL
WBC UA: ABNORMAL /HPF (ref 0–4)
YEAST: ABNORMAL

## 2017-11-19 PROCEDURE — G0378 HOSPITAL OBSERVATION PER HR: HCPCS

## 2017-11-19 PROCEDURE — 70551 MRI BRAIN STEM W/O DYE: CPT

## 2017-11-19 PROCEDURE — 85025 COMPLETE CBC W/AUTO DIFF WBC: CPT

## 2017-11-19 PROCEDURE — 85730 THROMBOPLASTIN TIME PARTIAL: CPT

## 2017-11-19 PROCEDURE — 85610 PROTHROMBIN TIME: CPT

## 2017-11-19 PROCEDURE — 6370000000 HC RX 637 (ALT 250 FOR IP): Performed by: EMERGENCY MEDICINE

## 2017-11-19 PROCEDURE — 99222 1ST HOSP IP/OBS MODERATE 55: CPT | Performed by: PSYCHIATRY & NEUROLOGY

## 2017-11-19 PROCEDURE — 36415 COLL VENOUS BLD VENIPUNCTURE: CPT

## 2017-11-19 PROCEDURE — 82947 ASSAY GLUCOSE BLOOD QUANT: CPT

## 2017-11-19 PROCEDURE — 80061 LIPID PANEL: CPT

## 2017-11-19 PROCEDURE — 99285 EMERGENCY DEPT VISIT HI MDM: CPT

## 2017-11-19 PROCEDURE — 2580000003 HC RX 258: Performed by: STUDENT IN AN ORGANIZED HEALTH CARE EDUCATION/TRAINING PROGRAM

## 2017-11-19 PROCEDURE — 93005 ELECTROCARDIOGRAM TRACING: CPT

## 2017-11-19 PROCEDURE — 84484 ASSAY OF TROPONIN QUANT: CPT

## 2017-11-19 PROCEDURE — 71010 XR CHEST PORTABLE: CPT

## 2017-11-19 PROCEDURE — 2060000000 HC ICU INTERMEDIATE R&B

## 2017-11-19 PROCEDURE — 6370000000 HC RX 637 (ALT 250 FOR IP): Performed by: STUDENT IN AN ORGANIZED HEALTH CARE EDUCATION/TRAINING PROGRAM

## 2017-11-19 PROCEDURE — 70450 CT HEAD/BRAIN W/O DYE: CPT

## 2017-11-19 PROCEDURE — 81001 URINALYSIS AUTO W/SCOPE: CPT

## 2017-11-19 PROCEDURE — 70547 MR ANGIOGRAPHY NECK W/O DYE: CPT

## 2017-11-19 PROCEDURE — 80048 BASIC METABOLIC PNL TOTAL CA: CPT

## 2017-11-19 PROCEDURE — 70544 MR ANGIOGRAPHY HEAD W/O DYE: CPT

## 2017-11-19 PROCEDURE — 84443 ASSAY THYROID STIM HORMONE: CPT

## 2017-11-19 RX ORDER — ACETAMINOPHEN 325 MG/1
650 TABLET ORAL EVERY 4 HOURS PRN
Status: DISCONTINUED | OUTPATIENT
Start: 2017-11-19 | End: 2017-11-21 | Stop reason: HOSPADM

## 2017-11-19 RX ORDER — CLOPIDOGREL BISULFATE 75 MG/1
75 TABLET ORAL DAILY
Status: DISCONTINUED | OUTPATIENT
Start: 2017-11-20 | End: 2017-11-21 | Stop reason: HOSPADM

## 2017-11-19 RX ORDER — ONDANSETRON 2 MG/ML
4 INJECTION INTRAMUSCULAR; INTRAVENOUS EVERY 6 HOURS PRN
Status: DISCONTINUED | OUTPATIENT
Start: 2017-11-19 | End: 2017-11-21 | Stop reason: HOSPADM

## 2017-11-19 RX ORDER — CLOPIDOGREL BISULFATE 75 MG/1
75 TABLET ORAL ONCE
Status: COMPLETED | OUTPATIENT
Start: 2017-11-19 | End: 2017-11-19

## 2017-11-19 RX ORDER — ERGOCALCIFEROL 1.25 MG/1
50000 CAPSULE ORAL WEEKLY
Status: DISCONTINUED | OUTPATIENT
Start: 2017-11-19 | End: 2017-11-19

## 2017-11-19 RX ORDER — SODIUM CHLORIDE 0.9 % (FLUSH) 0.9 %
10 SYRINGE (ML) INJECTION EVERY 12 HOURS SCHEDULED
Status: DISCONTINUED | OUTPATIENT
Start: 2017-11-19 | End: 2017-11-21 | Stop reason: HOSPADM

## 2017-11-19 RX ORDER — ASPIRIN 81 MG/1
81 TABLET ORAL DAILY
Status: DISCONTINUED | OUTPATIENT
Start: 2017-11-20 | End: 2017-11-21 | Stop reason: HOSPADM

## 2017-11-19 RX ORDER — CLONIDINE HYDROCHLORIDE 0.1 MG/1
0.1 TABLET ORAL 2 TIMES DAILY
Status: DISCONTINUED | OUTPATIENT
Start: 2017-11-19 | End: 2017-11-20

## 2017-11-19 RX ORDER — SODIUM CHLORIDE 0.9 % (FLUSH) 0.9 %
10 SYRINGE (ML) INJECTION PRN
Status: DISCONTINUED | OUTPATIENT
Start: 2017-11-19 | End: 2017-11-21 | Stop reason: HOSPADM

## 2017-11-19 RX ORDER — LISINOPRIL 5 MG/1
5 TABLET ORAL DAILY
Status: DISCONTINUED | OUTPATIENT
Start: 2017-11-20 | End: 2017-11-20

## 2017-11-19 RX ORDER — CLOPIDOGREL BISULFATE 75 MG/1
300 TABLET ORAL ONCE
Status: DISCONTINUED | OUTPATIENT
Start: 2017-11-19 | End: 2017-11-19

## 2017-11-19 RX ORDER — NITROGLYCERIN 0.4 MG/1
0.4 TABLET SUBLINGUAL EVERY 5 MIN PRN
Status: DISCONTINUED | OUTPATIENT
Start: 2017-11-19 | End: 2017-11-21 | Stop reason: HOSPADM

## 2017-11-19 RX ORDER — ASPIRIN 81 MG/1
324 TABLET, CHEWABLE ORAL ONCE
Status: COMPLETED | OUTPATIENT
Start: 2017-11-19 | End: 2017-11-19

## 2017-11-19 RX ORDER — ERGOCALCIFEROL 1.25 MG/1
50000 CAPSULE ORAL WEEKLY
Status: DISCONTINUED | OUTPATIENT
Start: 2017-11-20 | End: 2017-11-21 | Stop reason: HOSPADM

## 2017-11-19 RX ORDER — SIMVASTATIN 40 MG
40 TABLET ORAL NIGHTLY
Status: DISCONTINUED | OUTPATIENT
Start: 2017-11-19 | End: 2017-11-20

## 2017-11-19 RX ADMIN — SIMVASTATIN 40 MG: 40 TABLET, FILM COATED ORAL at 22:56

## 2017-11-19 RX ADMIN — CLONIDINE HYDROCHLORIDE 0.1 MG: 0.1 TABLET ORAL at 22:56

## 2017-11-19 RX ADMIN — CLOPIDOGREL BISULFATE 75 MG: 75 TABLET ORAL at 16:12

## 2017-11-19 RX ADMIN — ASPIRIN 81 MG 324 MG: 81 TABLET ORAL at 16:12

## 2017-11-19 RX ADMIN — Medication 10 ML: at 22:24

## 2017-11-19 ASSESSMENT — PAIN SCALES - GENERAL
PAINLEVEL_OUTOF10: 1
PAINLEVEL_OUTOF10: 5

## 2017-11-19 ASSESSMENT — ENCOUNTER SYMPTOMS
BLOOD IN STOOL: 0
SHORTNESS OF BREATH: 0
VOMITING: 0
CHOKING: 0
CHEST TIGHTNESS: 0
NAUSEA: 0
STRIDOR: 0
DIARRHEA: 0
PHOTOPHOBIA: 0
WHEEZING: 0
COLOR CHANGE: 0
ABDOMINAL PAIN: 0
FACIAL SWELLING: 0
TROUBLE SWALLOWING: 0

## 2017-11-19 ASSESSMENT — PAIN DESCRIPTION - ORIENTATION: ORIENTATION: RIGHT;LOWER;ANTERIOR

## 2017-11-19 ASSESSMENT — PAIN DESCRIPTION - LOCATION: LOCATION: LEG

## 2017-11-19 NOTE — ED PROVIDER NOTES
[address] [facility]  Emergency Department Encounter  Emergency Medicine Resident     Pt Name: Benja Adams  MRN: [id number]  [place] 1950  Date of evaluation: 11/19/17  PCP:  Calvin Chau MD    [address] [place] [address]       Chief Complaint   Patient presents with    Cerebrovascular Accident     Pt arrives per EMS, transferred from [facility] for slurred speech. Pt reports symptoms started around noon today. Pt denies any other deficits. CT head negative. Pt had two stents placed in June. HISTORY OF PRESENT ILLNESS  (Location/Symptom, Timing/Onset, Context/Setting, Quality, Duration, Modifying Factors, Severity.)      Benja Adams is a 75 y.o. female who presents As a transfer from [facility], the patient was found to have strokelike symptoms specifically the patient reports that around 11:30 AM this morning she began having slurred speech and a headache, they state around 4:00 the symptoms began to resolve and patient was transferred to our facility for further stroke evaluation, my exam there slurred speech and right-sided facial droop, the  and son were both present and when asked they agree that this is new, and agreed that the slight slurring of words is a change from her baseline as the patient has a normal baseline. Patient has a history of multiple stent placement and takes Plavix for this placement and the patient also admits to me she started clonidine on Thursday for blood pressure management and is stated that she is felt dizzy approximate 2-3 hours after taking this medication over the last few days, the patient is found to be hypertensive at appliance and hypotensive in our department at 189/79 seems to be running in the 306E to 519K systolic. PAST MEDICAL / SURGICAL / SOCIAL / FAMILY HISTORY      has a past medical history of CAD (coronary artery disease); Hyperlipidemia; Hypertension;  Interstitial cystitis; Obesity; Plantar fasciitis, bilateral; Seasonal allergies; Trigger finger, left; and Type 2 diabetes mellitus ([facility] [address] has a past surgical history that includes Nasal septum surgery (3/9/2005); Cholecystectomy, laparoscopic (6/9/2000); Cystocopy (3/1996); Ovary removal (Right, 7/1993); laparoscopy (3/1979); Hysterectomy, total abdominal (1980); Colonoscopy (3/11/2015); Cataract removal with implant (Left, 10/20/2015); Cataract removal with implant (Right, 12/08/2015); Yag capsulotomy (Right, 04/2016); Cardiac catheterization (06/20/2017); Coronary angioplasty with stent (06/20/2017); Appendectomy; and eye surgery. Social History     Social History    Marital status:      Spouse name: N/A    Number of children: N/A    Years of education: N/A     Occupational History    Not on file. Social History Main Topics    Smoking status: Never Smoker    Smokeless tobacco: Never Used      Comment: [facility] Sold[facility] 6/17/17    Alcohol use No    Drug use: No    Sexual activity: Not on file     Other Topics Concern    Not on file     Social History Narrative    No narrative on file       Family History   Problem Relation Age of Onset    Diabetes Mother     Hypertension Mother     Diabetes Sister     Diabetes Brother     Diabetes Brother     Diabetes Brother     Diabetes Sister     Diabetes Maternal Grandfather     Heart Attack Maternal Grandfather     Heart Attack Paternal Grandmother        Allergies: Iv dye [iodides]; Sulfa antibiotics; Ace inhibitors; Cozaar [losartan]; Ezetimibe-simvastatin; Lipitor; Lopressor [metoprolol]; and Penicillins    Home Medications:  Prior to Admission medications    Medication Sig Start Date End Date Taking? Authorizing Provider   cloNIDine (CATAPRES) 0.1 MG tablet Take 1 tablet by mouth 2 times daily 11/16/17   Maria A Victor MD   guaiFENesin-codeine (CHERATUSSIN AC) 100-10 MG/5ML syrup Take 5 mLs by mouth 4 times daily as needed for Cough .  11/16/17   Maria A Victor MD   Cholecalciferol (VITAMIN D3) 79961 units CAPS Take 1 Neurological: Positive for dizziness, facial asymmetry, speech difficulty, numbness and headaches. Negative for syncope and light-headedness. Psychiatric/Behavioral: Negative for agitation, behavioral problems, confusion and decreased concentration. PHYSICAL EXAM   (up to 7 for level 4, 8 or more for level 5)      INITIAL VITALS:   BP (!) 142/52   Pulse 67   Temp 97.5 °F (36.4 °C) (Oral)   Resp 17   Ht 4' 9\" (1.448 m)   Wt 166 lb 0.1 oz (75.3 kg)   SpO2 97%   BMI 35.92 kg/m²     Physical Exam   Constitutional: She is oriented to person, place, and time. She appears well-developed and well-nourished. HENT:   Head: Normocephalic and atraumatic. Eyes: Pupils are equal, round, and reactive to light. Right eye exhibits no discharge. Left eye exhibits no discharge. Neck: Normal range of motion. Cardiovascular: Normal rate, regular rhythm and normal heart sounds. Exam reveals no gallop and no friction rub. No murmur heard. Pulmonary/Chest: No respiratory distress. She has no wheezes. She has no rales. She exhibits no tenderness. Abdominal: Soft. Bowel sounds are normal. She exhibits no distension and no mass. There is no tenderness. There is no rebound and no guarding. Musculoskeletal: Normal range of motion. She exhibits no edema, tenderness or deformity. Neurological: She is alert and oriented to person, place, and time. She has normal strength. No cranial nerve deficit or sensory deficit. Coordination normal. GCS eye subscore is 4. GCS verbal subscore is 5. GCS motor subscore is 6. Refer to NIH stroke scale below for more details of neuro exam.    Skin: Skin is warm, dry and intact. No rash noted. She is not diaphoretic. No erythema. No pallor. Psychiatric: She has a normal mood and affect.  Her speech is normal and behavior is normal. Judgment and thought content normal. Cognition and memory are normal.        INITIAL NIH STROKE SCALE    Time Performed:  5:54 PM    Administer Glucose Fingerstick   Result Value Ref Range    POC Glucose 99 65 - 105 mg/dL       RADIOLOGY:  Ct Head Wo Contrast    Result Date: 11/19/2017  EXAMINATION: CT OF THE HEAD WITHOUT CONTRAST  11/19/2017 3:09 pm TECHNIQUE: CT of the head was performed without the administration of intravenous contrast. Dose modulation, iterative reconstruction, and/or weight based adjustment of the mA/kV was utilized to reduce the radiation dose to as low as reasonably achievable. COMPARISON: None. HISTORY: ORDERING SYSTEM PROVIDED HISTORY: slurred speech TECHNOLOGIST PROVIDED HISTORY: Ordering Physician Provided Reason for Exam: Slurred speech, more fatigue, and dizzy for 3 days, has started a new med 3 days ago, ringing in the ears FINDINGS: BRAIN/VENTRICLES: There is no acute intracranial hemorrhage, mass effect or midline shift. No abnormal extra-axial fluid collection. The gray-white differentiation is maintained without evidence of an acute infarct. There is no evidence of hydrocephalus. Small well-defined low-attenuation lesion in the cerebral hemisphere probably representing a small old infarct. ORBITS: The visualized portion of the orbits demonstrate no acute abnormality. SINUSES: The visualized paranasal sinuses and mastoid air cells demonstrate no acute abnormality. SOFT TISSUES/SKULL:  No acute abnormality of the visualized skull or soft tissues. No acute intracranial abnormality. Xr Chest Portable    Result Date: 11/19/2017  EXAMINATION: SINGLE VIEW OF THE CHEST 11/19/2017 3:13 pm COMPARISON: 17 June 2017 HISTORY: ORDERING SYSTEM PROVIDED HISTORY: dizziness TECHNOLOGIST PROVIDED HISTORY: Reason for exam:->dizziness Ordering Physician Provided Reason for Exam: Shortness of breath a little bit FINDINGS: AP portable view of the chest time stamped at 1512 hours demonstrates overlying cardiac monitoring electrodes.   Heart size is normal.  No vascular congestion, focal consolidation, effusion, or pneumothorax is noted.  Osseous and mediastinal structures are age-appropriate. No evidence of acute cardiopulmonary disease. EKG    EKG: normal sinus rhythm. No change from previous ECG      All EKG's are interpreted by the Emergency Department Physician who either signs or Co-signs this chart in the absence of a cardiologist.    MDM/EMERGENCY DEPARTMENT COURSE:    6:22 PM    ED Course as of Nov 20 0049 Marcheta Amend Nov 19, 2017 2125 We will do an MRI MRA head and neck the patient has a dye allergy so we will not do CTAs at this time as we do not want to delay imaging. Stroke team is currently evaluating patient. [KW]   1914 Patient admitted to the internal medicine team, patient currently awaiting MRA's and bed on floor in stable condition   [KW]      ED Course User Index  [KW] David Gifford DO     The patient had MRAs that did not show any significant occlusion or stenosis or flow limitation, patient was transferred to the floor with no acute events while in emergency department. PROCEDURES:  None    CONSULTS:  IP CONSULT TO NEUROLOGY  IP CONSULT TO SOCIAL WORK    CRITICAL CARE:  None    FINAL IMPRESSION      1.  Stroke-like symptoms          DISPOSITION / PLAN     DISPOSITION     PATIENT REFERRED TO:  Corinne Vera MD  [address] [place] [address]  [phone]            DISCHARGE MEDICATIONS:  Current Discharge Medication List          David Gifford DO  Emergency Medicine Resident    (Please note that portions of this note were completed with a voice recognition program.  Efforts were made to edit the dictations but occasionally words are mis-transcribed.)       David Gifford DO  11/20/17 9296

## 2017-11-19 NOTE — PROGRESS NOTES
To ER via wheelchair for further evaluation and treatment, (c/o light-headed, dizziness, unsteady staggery gait, slurred speeech, pain leg, blood sugar this morning was 130. ) per verbal order Dr. Ronni Gross. Janette Bravo RN

## 2017-11-19 NOTE — ED PROVIDER NOTES
Cataract removal with implant (Right, 2015); Yag capsulotomy (Right, 2016); Cardiac catheterization (2017); and Coronary angioplasty with stent (2017). CURRENT MEDICATIONS       Previous Medications    ASPIRIN 81 MG TABLET    Take 81 mg by mouth daily. BLOOD GLUCOSE MONITORING SUPPL MADELEINE    Patient needs meter,tests strips and lancets. Tests daily. Dx E11.9,250.00    BLOOD PRESSURE KIT    Blood pressure monitor    CHOLECALCIFEROL (VITAMIN D3) 32071 UNITS CAPS    Take 1 capsule by mouth once a week    CLONIDINE (CATAPRES) 0.1 MG TABLET    Take 1 tablet by mouth 2 times daily    CLOPIDOGREL (PLAVIX) 75 MG TABLET    Take 1 tablet by mouth daily    GLIMEPIRIDE (AMARYL) 1 MG TABLET    TAKE ONE-HALF TABLET BY MOUTH TWICE DAILY    GUAIFENESIN-CODEINE (CHERATUSSIN AC) 100-10 MG/5ML SYRUP    Take 5 mLs by mouth 4 times daily as needed for Cough . METFORMIN (GLUCOPHAGE) 500 MG TABLET    TAKE 1 TABLET BY MOUTH TWO TIMES A DAY WITH MEALS    NITROGLYCERIN (NITROSTAT) 0.4 MG SL TABLET    Place 1 tablet under the tongue every 5 minutes as needed for Chest pain up to max of 3 total doses. If no relief after 1 dose, call 911. NONFORMULARY    OTC Biotin 1000mcg daily    SIMVASTATIN (ZOCOR) 40 MG TABLET    TAKE ONE TABLET BY MOUTH NIGHTLY    TRIAMCINOLONE (KENALOG) 0.1 % CREAM    Apply topically 3 times daily       ALLERGIES     is allergic to iv dye [iodides]; sulfa antibiotics; ace inhibitors; cozaar [losartan]; ezetimibe-simvastatin; lipitor; lopressor [metoprolol]; and penicillins. FAMILY HISTORY     indicated that her mother is . She indicated that the status of her maternal grandfather is unknown. She indicated that the status of her paternal grandmother is unknown.      family history includes Diabetes in her brother, brother, brother, maternal grandfather, mother, sister, and sister;  Heart Attack in her maternal grandfather and paternal grandmother; Hypertension in her mother. SOCIAL HISTORY      reports that she has never smoked. She has never used smokeless tobacco. She reports that she does not drink alcohol or use drugs. PHYSICAL EXAM    (up to 7 for level 4, 8 or more for level 5)   INITIAL VITALS:  weight is 170 lb (77.1 kg). Her tympanic temperature is 97.2 °F (36.2 °C). Her blood pressure is 184/85 (abnormal) and her pulse is 61. Her respiration is 17 and oxygen saturation is 97%. Physical Exam   Constitutional: She is oriented to person, place, and time. She appears well-developed and well-nourished. HENT:   Head: Normocephalic and atraumatic. Right Ear: External ear normal.   Left Ear: External ear normal.   Mouth/Throat: Oropharynx is clear and moist.   Eyes: Conjunctivae and EOM are normal. Pupils are equal, round, and reactive to light. Neck: Normal range of motion. Neck supple. No JVD present. No tracheal deviation present. Cardiovascular: Normal rate, regular rhythm, normal heart sounds and intact distal pulses. Pulmonary/Chest: Effort normal and breath sounds normal. No stridor. No respiratory distress. She has no wheezes. She has no rales. Abdominal: Soft. She exhibits no distension. There is no tenderness. Musculoskeletal: Normal range of motion. She exhibits no edema or tenderness. Lymphadenopathy:     She has no cervical adenopathy. Neurological: She is alert and oriented to person, place, and time. No cranial nerve deficit. Coordination normal.   Cranial nerves II-12 are grossly intact with no focal neurologic deficits appreciated. No cerebellar deficits. The patient is noted to have some slight slurred speech. Otherwise no other deficits are appreciated   Skin: Skin is warm and dry. No rash noted. Psychiatric: She has a normal mood and affect. Nursing note and vitals reviewed.       DIFFERENTIAL DIAGNOSIS/ MDM:     The patient relates that she did have a cardiac cath with 2 stents this summer, so I will get a CT scan of the head, EKG, labs and check a UA    DIAGNOSTIC RESULTS     EKG: All EKG's are interpreted by the Emergency Department Physician who either signs or Co-signs this chart in the absence of a cardiologist.      Interpreted by Clemente Jameson MD     Rhythm: normal sinus   Rate: 64  Axis: -6  Ectopy: none  Conduction: normal  ST Segments: no acute change  T Waves: no acute change  Q Waves: none    Clinical Impression: normal sinus rhythm with no acute changes/normal EKG. No acute infarction/ischemia noted. RADIOLOGY:   Non-plain film images such as CT, Ultrasound and MRI are read by the radiologist. Brianna Beets radiographic images are visualized and the radiologist interpretations are reviewed as follows:         Interpretation per the Radiologist below, if available at the time of this note:    XR Chest Portable (Final result)   Result time 11/19/17 15:16:01   Final result by Garth Madrigal MD (11/19/17 15:16:01)                Impression:    No evidence of acute cardiopulmonary disease. Narrative:    EXAMINATION:  SINGLE VIEW OF THE CHEST    11/19/2017 3:13 pm    COMPARISON:  17 June 2017    HISTORY:  ORDERING SYSTEM PROVIDED HISTORY: dizziness  TECHNOLOGIST PROVIDED HISTORY:  Reason for exam:->dizziness  Ordering Physician Provided Reason for Exam: Shortness of breath a little bit    FINDINGS:  AP portable view of the chest time stamped at 1512 hours demonstrates  overlying cardiac monitoring electrodes.  Heart size is normal.  No vascular  congestion, focal consolidation, effusion, or pneumothorax is noted.  Osseous  and mediastinal structures are age-appropriate.                    CT Head WO Contrast (Final result)   Result time 11/19/17 15:14:46   Final result by Milan Baig MD (11/19/17 15:14:46)                Impression:    No acute intracranial abnormality.             Narrative:    EXAMINATION:  CT OF THE HEAD WITHOUT CONTRAST  11/19/2017 3:09 pm    TECHNIQUE:  CT of the head was performed 105 mg/dL   EKG 12 Lead   Result Value Ref Range    Ventricular Rate 64 BPM    Atrial Rate 64 BPM    P-R Interval 184 ms    QRS Duration 88 ms    Q-T Interval 422 ms    QTc Calculation (Bazett) 435 ms    P Axis 25 degrees    R Axis -6 degrees    T Axis -9 degrees       Labs, Imaging, and EKG are unremarkable. EMERGENCY DEPARTMENT COURSE:   Vitals:    Vitals:    11/19/17 1352 11/19/17 1523   BP: (!) 203/83 (!) 184/85   Pulse: 67 61   Resp: 16 17   Temp: 97.2 °F (36.2 °C)    TempSrc: Tympanic    SpO2: 98% 97%   Weight: 170 lb (77.1 kg)      -------------------------  BP: (!) 184/85, Temp: 97.2 °F (36.2 °C), Pulse: 61, Resp: 17      RE-EVALUATION:  The patient presents with slurred speech starting at 8:30 AM associated with dizziness. The patient has no other focal deficits other than you can't appreciate where she does have the slurred speech. Given that the patient has known vascular disease, has had a cardiac cath with stents, I do feel that she warrants further workup of her slurred speech. The patient is agreeable to this I will contact neurology for further workup and evaluation  I have reviewed the disposition diagnosis with the patient and or their family/guardian. I have answered their questions and given discharge instructions. They voiced understanding of these instructions and did not have any further questions or complaints. CONSULTS:  I did discuss the patient with neurology and they are kind enough to accept the patient in transfer. I further discussed the patient with Dr. Shahid Larry in the Emergency Department who is kind enough to accept the patient    PROCEDURES:  None    FINAL IMPRESSION      1. Dizziness    2.  Slurred speech          DISPOSITION/PLAN   DISPOSITION     CONDITION ON DISPOSITION:   Stable    PATIENT REFERRED TO:    Transfer to [facility]          DISCHARGE MEDICATIONS:  New Prescriptions    No medications on file       (Please note that portions of this note were completed with a voice recognition program.  Efforts were made to edit the dictations but occasionally words are mis-transcribed.)    Lopez MD, F.A.C.E.P.   Attending Emergency Medicine Physician       Jesse Lambert MD  11/21/17 [id number]

## 2017-11-19 NOTE — ED PROVIDER NOTES
[facility] ED  eMERGENCY dEPARTMENT eNCOUnter   Attending Attestation     Pt Name: Kalyani Godfrey  MRN: [id number]  [place] 1950  Date of evaluation: 11/19/17       Kalyani Godfrey is a 75 y.o. female who presents with Cerebrovascular Accident (Pt arrives per EMS, transferred from [facility] for slurred speech. Pt reports symptoms started around noon today. Pt denies any other deficits. CT head negative. Pt had two stents placed in June. )      History: Patient states that she had some headache, dizziness, slurred speech around 11:30 this morning. Patient says that she has had improvement of her symptoms and symptoms had resolved temporarily. Patient says that now she still has some slurred speech but otherwise feels much better besides some difficulty with subjective weakness on the right. Patient denies any chest pain, fever, chills, nausea, vomiting, diarrhea. Patient says that she just feels anxious because of the situation. Exam:   Physical Exam   Constitutional: She is oriented to person, place, and time and well-developed, well-nourished, and in no distress. HENT:   Head: Normocephalic and atraumatic. Eyes: EOM are normal. Pupils are equal, round, and reactive to light. Right eye exhibits no discharge. Left eye exhibits no discharge. Neck: Normal range of motion. No JVD present. No tracheal deviation present. Cardiovascular: Normal rate, regular rhythm, normal heart sounds and intact distal pulses. Exam reveals no gallop and no friction rub. No murmur heard. Pulmonary/Chest: Effort normal and breath sounds normal. No respiratory distress. She has no wheezes. She has no rales. Abdominal: Soft. Bowel sounds are normal. She exhibits no distension and no mass. There is no tenderness. There is no rebound and no guarding. Musculoskeletal: Normal range of motion. She exhibits no edema or tenderness.    Neurological: She is alert and oriented to person, place, and time. No cranial nerve deficit. Coordination normal. GCS score is 15. Patient has mild slurring. Patient has nearly normal strength on bilateral upper and lower extremities however she does have sensation of weakness and 4+ out of 5 weakness on the right. Patient has no drift. Patient has normal finger-nose. Patient's normal sensation. No visual field deficits. Skin: Skin is warm and dry. No rash noted. She is not diaphoretic. No erythema. Psychiatric: Mood and affect normal.     EKG Interpretation    Interpreted by emergency department physician    Rhythm: normal sinus   Rate: normal  Axis: normal  Ectopy: none  Conduction: normal  ST Segments: no acute change  T Waves: no acute change  Q Waves: none    Clinical Impression: non-specific EKG      Hitesh Andre M.D. Plan for admission. Patient will get MRI or CTA. Patient is apparently allergic to CT dye. Will try to get MRI instead so that we don't have to wait 4 hours to prophylaxis the patient for CT dye. I performed a history and physical examination of the patient and discussed management with the resident. I reviewed the residents note and agree with the documented findings and plan of care. Any areas of disagreement are noted on the chart. I was personally present for the key portions of any procedures. I have documented in the chart those procedures where I was not present during the key portions. I have personally reviewed all images and agree with the resident's interpretation. I have reviewed the emergency nurses triage note. I agree with the chief complaint, past medical history, past surgical history, allergies, medications, social and family history as documented unless otherwise noted below. Documentation of the HPI, Physical Exam and Medical Decision Making performed by medical students or scribes is based on my personal performance of the HPI, PE and MDM.  For Phys Assistant/ Nurse Practitioner cases/documentation I have had a

## 2017-11-19 NOTE — FLOWSHEET NOTE
[facility] CARE DEPARTMENT - [facility]i 82     Emergency/Trauma Note    PATIENT NAME: Morris Grijalva    Shift date: 11/19/17  Shift day: Sunday   Shift # 2    Room # 30/30   Name: Morris Grijalva            Age: 75 y.o. Gender: female          Muslim: Rastafarian    Trauma/Incident type: Stroke Alert  Admit Date & Time: 11/19/2017  5:54 PM      PATIENT/EVENT DESCRIPTION:  Morris Grijalva is a 75 y.o. female who arrived via EMS from [facility] as a stroke alert. Patient had been experiencing slurred speech, right side drooping and dizziness. Family stated that she had been experiencing dizziness over the last week when she began taking a new medication. Pt to be admitted to 30/30. SPIRITUAL ASSESSMENT/INTERVENTION:   encountered , Catina Clark, outside of patient's room. He admitted to being worried.  escorted patient's son, Lorne Motta, and daughter-in-law, Indira Chu, from the lobby to patient's room. All were somewhat anxious.  provided a listening, supportive presence and prayer. Patient became tearful during prayer and  thanked .  provided water for family and assured them of continued support if needed. PATIENT BELONGINGS:  With family    ANY BELONGINGS OF SIGNIFICANT VALUE NOTED:  None     REGISTRATION STAFF NOTIFIED? No      WHAT IS YOUR SPIRITUAL CARE PLAN FOR THIS PATIENT?:   Chaplains to remain available for support if needed. Electronically signed by Chaplain Snehal, on 11/19/2017 at 6:32 PM.       11/19/17 3080   Encounter Summary   Services provided to: Patient and family together   Referral/Consult From: Multi-disciplinary team   Support System Spouse; Children   Continue Visiting (11/19/17)   Complexity of Encounter Moderate   Length of Encounter 30 minutes   Spiritual Assessment Completed Yes   Crisis   Type Stroke Alert   Assessment Approachable; Anxious   Intervention Active listening;Explored feelings, thoughts,

## 2017-11-19 NOTE — ED NOTES
Bed: 30  Expected date:   Expected time:   Means of arrival:   Comments:  kiera Hernandez, JOSE C  11/19/17 1694

## 2017-11-19 NOTE — ED TRIAGE NOTES
From Urgent Care. Patient has not felt well for the last few days. Dr. Ash Hayes changed her blood pressure medications and she is not feeling any better. Patient states that she just \"feels out of it\". Had chest pain right before they came to the Urgent Care. Has chronic right leg pain.

## 2017-11-20 PROBLEM — R53.82 CHRONIC FATIGUE: Status: ACTIVE | Noted: 2017-11-20

## 2017-11-20 PROBLEM — R47.81 SLURRED SPEECH: Status: ACTIVE | Noted: 2017-11-20

## 2017-11-20 PROBLEM — R42 DIZZINESS: Status: ACTIVE | Noted: 2017-11-20

## 2017-11-20 LAB
EKG ATRIAL RATE: 62 BPM
EKG ATRIAL RATE: 70 BPM
EKG P AXIS: 30 DEGREES
EKG P AXIS: 33 DEGREES
EKG P-R INTERVAL: 174 MS
EKG P-R INTERVAL: 176 MS
EKG Q-T INTERVAL: 416 MS
EKG Q-T INTERVAL: 454 MS
EKG QRS DURATION: 84 MS
EKG QRS DURATION: 92 MS
EKG QTC CALCULATION (BAZETT): 449 MS
EKG QTC CALCULATION (BAZETT): 460 MS
EKG R AXIS: -9 DEGREES
EKG R AXIS: -9 DEGREES
EKG T AXIS: -5 DEGREES
EKG T AXIS: -6 DEGREES
EKG VENTRICULAR RATE: 62 BPM
EKG VENTRICULAR RATE: 70 BPM
GLUCOSE BLD-MCNC: 102 MG/DL (ref 65–105)
GLUCOSE BLD-MCNC: 133 MG/DL (ref 65–105)
GLUCOSE BLD-MCNC: 260 MG/DL (ref 65–105)
GLUCOSE BLD-MCNC: 94 MG/DL (ref 65–105)

## 2017-11-20 PROCEDURE — 6370000000 HC RX 637 (ALT 250 FOR IP): Performed by: STUDENT IN AN ORGANIZED HEALTH CARE EDUCATION/TRAINING PROGRAM

## 2017-11-20 PROCEDURE — 93005 ELECTROCARDIOGRAM TRACING: CPT

## 2017-11-20 PROCEDURE — 97530 THERAPEUTIC ACTIVITIES: CPT

## 2017-11-20 PROCEDURE — G0378 HOSPITAL OBSERVATION PER HR: HCPCS

## 2017-11-20 PROCEDURE — G8978 MOBILITY CURRENT STATUS: HCPCS

## 2017-11-20 PROCEDURE — 82947 ASSAY GLUCOSE BLOOD QUANT: CPT

## 2017-11-20 PROCEDURE — G8988 SELF CARE GOAL STATUS: HCPCS

## 2017-11-20 PROCEDURE — 2580000003 HC RX 258: Performed by: STUDENT IN AN ORGANIZED HEALTH CARE EDUCATION/TRAINING PROGRAM

## 2017-11-20 PROCEDURE — 99220 PR INITIAL OBSERVATION CARE/DAY 70 MINUTES: CPT | Performed by: INTERNAL MEDICINE

## 2017-11-20 PROCEDURE — 96372 THER/PROPH/DIAG INJ SC/IM: CPT

## 2017-11-20 PROCEDURE — 97162 PT EVAL MOD COMPLEX 30 MIN: CPT

## 2017-11-20 PROCEDURE — 6360000002 HC RX W HCPCS: Performed by: STUDENT IN AN ORGANIZED HEALTH CARE EDUCATION/TRAINING PROGRAM

## 2017-11-20 PROCEDURE — 99219 PR INITIAL OBSERVATION CARE/DAY 50 MINUTES: CPT | Performed by: PSYCHIATRY & NEUROLOGY

## 2017-11-20 PROCEDURE — 97165 OT EVAL LOW COMPLEX 30 MIN: CPT

## 2017-11-20 PROCEDURE — G8979 MOBILITY GOAL STATUS: HCPCS

## 2017-11-20 PROCEDURE — G8987 SELF CARE CURRENT STATUS: HCPCS

## 2017-11-20 PROCEDURE — 97535 SELF CARE MNGMENT TRAINING: CPT

## 2017-11-20 RX ORDER — HYDRALAZINE HYDROCHLORIDE 20 MG/ML
10 INJECTION INTRAMUSCULAR; INTRAVENOUS EVERY 6 HOURS PRN
Status: DISCONTINUED | OUTPATIENT
Start: 2017-11-20 | End: 2017-11-21 | Stop reason: HOSPADM

## 2017-11-20 RX ORDER — SIMVASTATIN 40 MG
40 TABLET ORAL NIGHTLY
Status: DISCONTINUED | OUTPATIENT
Start: 2017-11-20 | End: 2017-11-21 | Stop reason: HOSPADM

## 2017-11-20 RX ORDER — SIMVASTATIN 40 MG
80 TABLET ORAL NIGHTLY
Status: DISCONTINUED | OUTPATIENT
Start: 2017-11-20 | End: 2017-11-20

## 2017-11-20 RX ORDER — DEXTROSE MONOHYDRATE 50 MG/ML
100 INJECTION, SOLUTION INTRAVENOUS PRN
Status: DISCONTINUED | OUTPATIENT
Start: 2017-11-20 | End: 2017-11-21 | Stop reason: HOSPADM

## 2017-11-20 RX ORDER — LISINOPRIL 5 MG/1
5 TABLET ORAL DAILY
Qty: 30 TABLET | Refills: 3 | Status: SHIPPED | OUTPATIENT
Start: 2017-11-21 | End: 2017-11-21 | Stop reason: HOSPADM

## 2017-11-20 RX ORDER — DEXTROSE MONOHYDRATE 25 G/50ML
12.5 INJECTION, SOLUTION INTRAVENOUS PRN
Status: DISCONTINUED | OUTPATIENT
Start: 2017-11-20 | End: 2017-11-21 | Stop reason: HOSPADM

## 2017-11-20 RX ORDER — NICOTINE POLACRILEX 4 MG
15 LOZENGE BUCCAL PRN
Status: DISCONTINUED | OUTPATIENT
Start: 2017-11-20 | End: 2017-11-21 | Stop reason: HOSPADM

## 2017-11-20 RX ORDER — CLONIDINE HYDROCHLORIDE 0.1 MG/1
0.1 TABLET ORAL NIGHTLY
Qty: 5 TABLET | Refills: 0 | Status: SHIPPED | OUTPATIENT
Start: 2017-11-20 | End: 2017-11-21 | Stop reason: HOSPADM

## 2017-11-20 RX ADMIN — INSULIN LISPRO 3 UNITS: 100 INJECTION, SOLUTION INTRAVENOUS; SUBCUTANEOUS at 12:20

## 2017-11-20 RX ADMIN — Medication 10 ML: at 20:40

## 2017-11-20 RX ADMIN — ASPIRIN 81 MG: 81 TABLET, COATED ORAL at 08:45

## 2017-11-20 RX ADMIN — ACETAMINOPHEN 650 MG: 325 TABLET ORAL at 02:48

## 2017-11-20 RX ADMIN — ERGOCALCIFEROL 50000 UNITS: 1.25 CAPSULE ORAL at 08:46

## 2017-11-20 RX ADMIN — CLOPIDOGREL 75 MG: 75 TABLET, FILM COATED ORAL at 08:46

## 2017-11-20 RX ADMIN — ACETAMINOPHEN 650 MG: 325 TABLET ORAL at 12:18

## 2017-11-20 RX ADMIN — Medication 40 MG: at 20:40

## 2017-11-20 RX ADMIN — LISINOPRIL 5 MG: 5 TABLET ORAL at 08:46

## 2017-11-20 RX ADMIN — CLONIDINE HYDROCHLORIDE 0.1 MG: 0.1 TABLET ORAL at 11:20

## 2017-11-20 RX ADMIN — ENOXAPARIN SODIUM 40 MG: 40 INJECTION SUBCUTANEOUS at 08:46

## 2017-11-20 ASSESSMENT — PAIN SCALES - GENERAL
PAINLEVEL_OUTOF10: 0
PAINLEVEL_OUTOF10: 7
PAINLEVEL_OUTOF10: 6
PAINLEVEL_OUTOF10: 7
PAINLEVEL_OUTOF10: 0
PAINLEVEL_OUTOF10: 7
PAINLEVEL_OUTOF10: 0

## 2017-11-20 ASSESSMENT — PAIN DESCRIPTION - ORIENTATION
ORIENTATION: RIGHT

## 2017-11-20 ASSESSMENT — PAIN DESCRIPTION - PAIN TYPE
TYPE: CHRONIC PAIN

## 2017-11-20 ASSESSMENT — PAIN DESCRIPTION - LOCATION
LOCATION: KNEE

## 2017-11-20 ASSESSMENT — PAIN DESCRIPTION - DESCRIPTORS
DESCRIPTORS: ACHING
DESCRIPTORS: ACHING;DISCOMFORT;THROBBING
DESCRIPTORS: ACHING

## 2017-11-20 ASSESSMENT — PAIN DESCRIPTION - PROGRESSION: CLINICAL_PROGRESSION: NOT CHANGED

## 2017-11-20 ASSESSMENT — PAIN DESCRIPTION - FREQUENCY
FREQUENCY: CONTINUOUS

## 2017-11-20 ASSESSMENT — PAIN DESCRIPTION - ONSET: ONSET: PROGRESSIVE

## 2017-11-20 NOTE — ED NOTES
Rec'd report from [facility]. Pt resting on cot, no distress noted, MRI checklist faxed, pt alert and oriented. Awaiting MRI.      Lacy Reeves RN  11/19/17 1913

## 2017-11-20 NOTE — CARE COORDINATION
Case Management Initial Discharge Plan  Cristin Rubi,         Readmission Risk              Readmission Risk:        19.5       Age 72 or Greater:  1    Admitted from SNF or Requires Paid or Family Care:  0    Currently has CHF,COPD,ARF,CRI,or is on dialysis:  0    Takes more than 5 Prescription Medications:  4    Takes Digoxin,Insulin,Anticoagulants,Narcotics or ASA/Plavix:  [address] in Past 12 Months:  10    On Disability:  0    Patient Considers own Health:  2.5            Met with:patient to discuss discharge plans. Information verified: address, contacts, phone number, , insurance Yes  PCP: Román Saucedo MD  Date of last visit: This month    Insurance Provider: medicare    Discharge Planning  Current Residence:  Private Residence  Living Arrangements:  Spouse/Significant Other   Home has 1 stories/4 stairs to climb  Support Systems:  Spouse/Significant Other, Children, Family Members  Current Services PTA:  None Supplier: n/a  Patient able to perform ADL's:Independent  DME used to aid ambulation prior to admission: 0/during admission0    Potential Assistance Needed:  N/A    Pharmacy: [facility] in [address] [place] [address] Medications:  No  Does patient want to participate in local refill/ meds to beds program?  No    Patient agreeable to home care: No  [place] of choice provided:  n/a      Type of Home Care Services:  None  Patient expects to be discharged to:  home    Prior SNF/Rehab Placement and Facility: n/a  Agreeable to SNF/Rehab: No  [place] of choice provided: n/a   Evaluation: no    Expected Discharge date:  17  Follow Up Appointment: Best Day/ Time: Monday AM    Transportation provider: family  Transportation arrangements needed for discharge: No    Discharge Plan: home independently with family.         Electronically signed by Rhiannon Main RN on 17 at 5:30 PM

## 2017-11-20 NOTE — CONSULTS
The condition is 74 yo wf with fatigue right side tingling dizziness along with slurring . She is hypertensive along with diabetic having undergone blood pressure medication readjustment this past week . Within 2 days she report to have developed fatigue with tingling on right face arm and leg  There was also lightheadeness on standing with slurring and some trouble coming out with words . She denies headache or other focal motor ,sensory or bulbar complaints . She has coronary artery disease with prior coronary stents on plavix 75 mg po qd along with aspirin 81 mg po qd . She presented to [facility] ER with these complaints given plavix 300 mg po qd and aspirin 325 mg po qd . Head CT normal . Patient was transferred to [facility] . MRI of Head with mild subcortical chronic periventricular small vessel ischemia . MRA of Head normal . MRA of neck normal . She is reported to be allergic to lipitor placed on zocor by stroke team. Significant medications plavix 75 mg po qd , aspirin 81 mg po qd . Testing MRI of Head with mild subcortical chronic periventricular small vessel ischemia . MRA of Head normal . MRA of neck normal. Cholesterol 169 , LDL 90 ,         Past Medical History:   Diagnosis Date    CAD (coronary artery disease)     Hyperlipidemia     Hypertension     Interstitial cystitis     History of.  Obesity     Weight 176 lb, height 5 ft, BMI 35, 2/28/2013.  Plantar fasciitis, bilateral     History of.  Seasonal allergies     Trigger finger, left     History of triggering, left long finger.  Type 2 diabetes mellitus ([facility]        Past Surgical History:   Procedure Laterality Date    APPENDECTOMY      CARDIAC CATHETERIZATION  06/20/2017    Left main: normal, LAD: proximal 30% stenosis, LCX: 20 % proximal stenosis, RCA: Ostial 70% with pressure damping and mid 90% stenosis. Required PTCA-GRACE in both lesions. LVEF 55%.   LV wall motion normal.  Ruy Edwards. Matt Askew Crivitz      CATARACT REMOVAL WITH IMPLANT Left 10/20/2015    Dr. Milton Orellana, [facility] [place] [place]    CATARACT REMOVAL WITH IMPLANT Right 12/08/2015    [facility] with DANIAL. Dr Milton Orellana, [address] [place] [address], LAPAROSCOPIC  6/9/2000    COLONOSCOPY  3/11/2015    Internal and external hemorrhoids otherwise normal    CORONARY ANGIOPLASTY WITH STENT PLACEMENT  06/20/2017    Right coronary artery 70% ostial lesion and mid RCA 90% lesion; successful PTCA with drug-eluting stents in both lesions, Dr. Chidi Villalpando, [facility]    CYSTOSCOPY  3/1996    Bladder biopsy, urethral dilatation.  EYE SURGERY      HYSTERECTOMY, TOTAL ABDOMINAL  1980    LAPAROSCOPY  3/1979    NASAL SEPTUM SURGERY  3/9/2005    Nasoseptal reconstruction.  OVARY REMOVAL Right 7/1993    Laparotomy.     YAG CAPSULOTOMY Right 04/2016    Dr Milton Orellana       Family History   Problem Relation Age of Onset    Diabetes Mother     Hypertension Mother     Diabetes Sister     Diabetes Brother     Diabetes Brother     Diabetes Brother     Diabetes Sister     Diabetes Maternal Grandfather     Heart Attack Maternal Grandfather     Heart Attack Paternal Grandmother        Social History     Social History    Marital status:      Spouse name: N/A    Number of children: N/A    Years of education: N/A     Social History Main Topics    Smoking status: Never Smoker    Smokeless tobacco: Never Used      Comment: Francisco Lundy RRT 6/17/17    Alcohol use No    Drug use: No    Sexual activity: Not Asked     Other Topics Concern    None     Social History Narrative    None       Current Facility-Administered Medications   Medication Dose Route Frequency Provider Last Rate Last Dose    simvastatin (ZOCOR) tablet 80 mg  80 mg Oral Nightly Quentin Lane MD        glucose (GLUTOSE) 40 % oral gel 15 g  15 g Oral PRN Quentin Lane MD        dextrose 50 % solution 12.5 g  12.5 g Intravenous PRN Rina Fothergill, MD

## 2017-11-20 NOTE — H&P
[facility]     Department of Internal Medicine - Staff Internal Medicine Service          ADMISSION NOTE/HISTORY AND PHYSICAL EXAMINATION       CHIEF COMPLAINT:    Chief Complaint   Patient presents with    Cerebrovascular Accident     Pt arrives per EMS, transferred from [facility] for slurred speech. Pt reports symptoms started around noon today. Pt denies any other deficits. CT head negative. Pt had two stents placed in June. HISTORY OBTAIN FROM:  Patient     HISTORY OF PRESENT ILLNESS:      The patient is a pleasant 75 y.o. female with significant past medical history of HTN,DM, hyperlipidemia,CAD  S/p 2 stents in RCA presented with complain of Headache,dizziness,generalized weakness which all started on Thursday after taking her new blood pressure med clonidine and become worse today. She also noticed slurred speech this morning. She also complain of R leg pain from knee to ankle which gets worse with folding and walking for long distances. Denies any chest Pain,N/V,dyspnea,palpitations. She had 2 stents placement in her RCA in June. Denies any smoking,drinking or recreational drug use. From the chart she has been intolerant to many BP medicines and some she was not able to due to cost.Her antihypertensive med has been changed to clonidine from lopressor. HbA1c was 6.1 in October. In ER blood pressure was 189/79. BMP and CBC normal.  Medical History:   Past Medical History:   Diagnosis Date    Hyperlipidemia     Hypertension     Interstitial cystitis     History of.  Obesity     Weight 176 lb, height 5 ft, BMI 35, 2/28/2013.  Plantar fasciitis, bilateral     History of.  Seasonal allergies     Trigger finger, left     History of triggering, left long finger.     Type 2 diabetes mellitus ([facility] [address]        SURGICAL HISTORY:  Past Surgical History:   Procedure Laterality Date    CARDIAC CATHETERIZATION  06/20/2017    Left main: normal, LAD: proximal 30% stenosis, LCX: 20 % proximal stenosis, RCA: Ostial 70% with pressure damping and mid 90% stenosis. Required PTCA-GRACE in both lesions. LVEF 55%. LV wall motion normal.  Dr. Larisa Carroll, [place]      CATARACT REMOVAL WITH IMPLANT Left 10/20/2015    [facility] IM[facility] Right 12/08/2015    [place] with DANIAL. Dr Raul Babcock, [facility], LAPAROSCOPIC  6/9/2000    COLONOSCOPY  3/11/2015    Internal and external hemorrhoids otherwise normal    CORONARY ANGIOPLASTY WITH STENT PLACEMENT  06/20/2017    Right coronary artery 70% ostial lesion and mid RCA 90% lesion; successful PTCA with drug-eluting stents in both lesions[facility]    CYSTOSCOPY  3/1996    Bladder biopsy, urethral dilatation.  HYSTERECTOMY, TOTAL ABDOMINAL  1980    LAPAROSCOPY  3/1979    NASAL SEPTUM SURGERY  3/9/2005    Nasoseptal reconstruction.  OVARY REMOVAL Right 7/1993    Laparotomy.  YAG CAPSULOTOMY Right 04/2016    Dr Carly Wright:  Prior to Admission medications    Medication Sig Start Date End Date Taking? Authorizing Provider   cloNIDine (CATAPRES) 0.1 MG tablet Take 1 tablet by mouth 2 times daily 11/16/17   Kavon Pepe MD   guaiFENesin-codeine (CHERATUSSIN AC) 100-10 MG/5ML syrup Take 5 mLs by mouth 4 times daily as needed for Cough .  11/16/17   Alcides Vasquez MD   Cholecalciferol (VITAMIN D3) 67360 units CAPS Take 1 capsule by mouth once a week 10/26/17   Kavon Pepe MD   glimepiride (AMARYL) 1 MG tablet TAKE ONE-HALF TABLET BY MOUTH TWICE DAILY 10/26/17   Alcides Vasquez MD   metFORMIN (GLUCOPHAGE) 500 MG tablet TAKE 1 TABLET BY MOUTH TWO TIMES A DAY WITH MEALS 10/26/17   Alcides Vasquez MD   simvastatin (ZOCOR) 40 MG tablet TAKE ONE TABLET BY MOUTH NIGHTLY 10/26/17   Alcides Vasquez MD   triamcinolone (KENALOG) 0.1 % cream Apply topically 3 times daily 7/12/17   [place] Samario Grandmother        REVIEW OF SYSTEMS:  · Constitutional: Negative for Fever, chills  · Eyes: Negative for visual changes, diplopia  · ENT: Negative for mouth sores, sore throat. · Cardiovascular: Positive  for lightheadedness . · Respiratory:Negative for Shortness of breath,cough or wheezing. · Gastrointestinal: Negative for nausea/vomiting, change in bowel habits, abdominal pain  · Genitourinary:Negative for change in bladder habits, dysuria, hematuria. · Musculoskeletal: Negative for joint pain   · Neurological: Positive  for headache, generalized weakness. · Psychiatric: negative for change in mood, affect      PHYSICAL EXAM:  BP (!) 195/90   Pulse 68   Temp 97.9 °F (36.6 °C) (Oral)   Resp 17   Ht 4' 9\" (1.448 m)   Wt 178 lb (80.7 kg)   SpO2 98%   BMI 38.52 kg/m²    Wt Readings from Last 3 Encounters:   11/19/17 178 lb (80.7 kg)   11/19/17 170 lb (77.1 kg)   11/19/17 170 lb (77.1 kg)       · General appearance: awake, alert, cooperative  · HEENT: Head: Normocephalic, no lesions, without obvious abnormality. · Lungs: clear to auscultation bilaterally  · Heart: regular rate and rhythm, S1, S2 normal, no murmur  · Abdomen: soft, non-tender; bowel sounds normal; no masses,  no organomegaly  · Extremities: extremities normal, atraumatic, no cyanosis or edema  · Neurological:  Awake, alert, oriented to name, place and time. Cranial nerves II-XII are grossly intact. Reflexes normal and symmetric. Sensation mildly  decreased on R side of face as compared to L side.  Motor 4/5 on R side of extremities as compared top L    · Eye no icterus no redness  · Psych-normal affect       LABS:  CBC:   Recent Labs      11/19/17   1430   WBC  7.0   RBC  4.59   HGB  12.1   HCT  36.4   MCV  79.3*   RDW  14.2   PLT  213     BMP:   Recent Labs      11/19/17   1430   NA  140   K  3.7   CL  99   CO2  26   BUN  17   CREATININE  0.79     ANEMIA STUDIES  No results for input(s): LABIRON, TIBC, FERRITIN, [id number], FOLATE, OCCULTBLD in the last 72 hours. BNP: No results for input(s): BNP in the last 72 hours. PT/INR:   Recent Labs      11/19/17   1430   PROTIME  10.6   INR  1.0     APTT:   Recent Labs      11/19/17   1430   APTT  28.5     CARDIAC ENZYMES: No results for input(s): CKMB, CKMBINDEX, TROPONINI in the last 72 hours. Invalid input(s): CKTOTAL;3  FASTING LIPID PANEL:  Lab Results   Component Value Date    CHOL 186 06/18/2017    HDL 48 06/18/2017    TRIG 371 (H) 06/18/2017     LFTS  No results for input(s): ALKPHOS, ALT, AST, BILITOT, BILIDIR, LABALBU in the last 72 hours. AMYLASE/LIPASE/AMMONIA  No results for input(s): AMYLASE, LIPASE, AMMONIA in the last 72 hours. ASSESSMENT:     Patient Active Problem List    Diagnosis Date Noted    S/P drug eluting coronary stent placement-RCA 6/20/17 - Dr. Esther Goldsmith 06/20/2017     Priority: High    Stroke-like symptoms 11/19/2017    Coronary artery disease involving native coronary artery of native heart without angina pectoris 06/28/2017    Hypokalemia     Numbness and tingling of right arm     Atypical chest pain     Essential hypertension 01/10/2017    Vitamin D deficiency 04/14/2016    Uncontrolled type 2 diabetes mellitus without complication, without long-term current use of insulin ([facility] [address] 10/07/2015    High risk medication use 10/08/2014    Mixed hyperlipidemia     Obesity (BMI 30-39. 9)     Interstitial cystitis     History of seasonal allergies     Trigger finger, left       Paresthesia, slurred speech, resolved  Coronary artery disease post stenting of native coronary arteries  PLAN:   1. Right-sided paresthesia and weakness which has resolved  CT head negative   MRI brain ,mra head and neck - negative   F/u lipid panel,tsh, echo  Neurology consult  trop's negative   EKG showed NSR,possible ant infarct undetermined. Aspirin 81 mg after loading 325 mg and Plavix  75 mg after loading 300 mg. PT/OT  Swallow evaluation    2.  HTN-

## 2017-11-20 NOTE — CONSULTS
midline tenderness to palpation    BACK:  without midline tenderness, step-offs or deformities    LUNGS:  Equal air entry bilaterally   CARDIOVASCULAR:  normal s1 / s2   ABDOMEN:  Soft, no rigidity   NEUROLOGIC:  Mental Status:  A & O x3,awake             Cranial Nerves:    cranial nerves II-XII are grossly intact except for reported numbness to R trigeminal V1, V2 distribution    Motor Exam:    Drift:  absent  Tone:  normal    Motor exam is symmetrical 5 out of 5 all extremities bilaterally    Sensory:    Touch:    Right Upper Extremity:  abnormal - light touch preserved, subjective numbness  Left Upper Extremity:  normal  Right Lower Extremity:  abnormal - light touch preserved, subjective numbness  Left Lower Extremity:  normal    Coordination/Dysmetria:  Heel to Shin:  Right:  normal  Left:  normal  Finger to Nose:   Right:  normal  Left:  normal    SKIN:  no rash        INITIAL NIH STROKE SCALE    Time Performed:  705 PM    Administer stroke scale items in the order listed. Record performance in each category after each subscale exam. Do not go back and change scores. Follow directions provided for each exam technique. Scores should reflect what the patient does, not what the clinician thinks the patient can do. The clinician should record answers while administering the exam and work quickly. Except where indicated, the patient should not be coached (i.e., repeated requests to patient to make a special effort). 1a.  Level of consciousness:  0 - alert; keenly responsive  1b. Level of consciousness questions:  0 - answers both questions correctly  1c. Level of consciousness questions:  0 - performs both tasks correctly  2. Best Gaze:  0 - normal  3. Visual:  0 - no visual loss  4. Facial Palsy:  0 - normal symmetric movement  5a. Motor left arm:  0 - no drift, limb holds 90 (or 45) degrees for full 10 seconds  5b.   Motor right arm:  0 - no drift, limb holds 90 (or 45) degrees for full 10 No acute intracranial abnormality. Mra Head Wo Contrast    Result Date: 11/19/2017  EXAMINATION: MRI OF THE BRAIN WITHOUT CONTRAST; MRA OF THE NECK WITHOUT CONTRAST; MRA OF THE HEAD WITHOUT CONTRAST  11/19/2017 8:47 pm; 11/19/2017 8:48 pm TECHNIQUE: Multiplanar multisequence MRI of the brain was performed without the administration of intravenous contrast.; Multiplanar multisequence MRA of the neck was performed without the administration of intravenous contrast. Stenosis of the internal carotid arteries measured using NASCET criteria.; MRA of the head was performed utilizing time-of-flight imaging with MIP images. No intravenous contrast was administered. COMPARISON: None. HISTORY: ORDERING SYSTEM PROVIDED HISTORY: stroke; ORDERING SYSTEM PROVIDED HISTORY: stat FINDINGS: MRI brain: INTRACRANIAL STRUCTURES/VENTRICLES: There is no acute infarct. There are mild chronic white matter microvascular ischemic changes characterized by foci of periventricular and subcortical white matter signal abnormality. No intracranial mass, shift, or bleed is identified. Normal expected signal voids are present within the vessels at the base of skull. ORBITS: The visualized portion of the orbits demonstrate no acute abnormality. SINUSES: Trace air-fluid levels are present within the maxillary sinuses. BONES/SOFT TISSUES: The bone marrow signal intensity appears normal. The craniocervical junction is normal in appearance. MRA neck: The left vertebral artery is dominant. The carotids are grossly normal in caliber and signal to the extent visualized. MRA brain: The vertebrobasilar system is within normal limits. The posterior cerebral arteries are normal in course and caliber to the extent visualized. The anterior and middle cerebral arteries demonstrate normal arborization patterns. 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 No aneurysm or vascular malformation is identified. No acute infarct. No flow limiting stenosis or branch occlusion detected within the head or neck. Xr Chest Portable    Result Date: 11/19/2017  EXAMINATION: SINGLE VIEW OF THE CHEST 11/19/2017 3:13 pm COMPARISON: 17 June 2017 HISTORY: ORDERING SYSTEM PROVIDED HISTORY: dizziness TECHNOLOGIST PROVIDED HISTORY: Reason for exam:->dizziness Ordering Physician Provided Reason for Exam: Shortness of breath a little bit FINDINGS: AP portable view of the chest time stamped at 1512 hours demonstrates overlying cardiac monitoring electrodes. Heart size is normal.  No vascular congestion, focal consolidation, effusion, or pneumothorax is noted. Osseous and mediastinal structures are age-appropriate. No evidence of acute cardiopulmonary disease. Mra Neck Wo Contrast    Result Date: 11/19/2017  EXAMINATION: MRI OF THE BRAIN WITHOUT CONTRAST; MRA OF THE NECK WITHOUT CONTRAST; MRA OF THE HEAD WITHOUT CONTRAST  11/19/2017 8:47 pm; 11/19/2017 8:48 pm TECHNIQUE: Multiplanar multisequence MRI of the brain was performed without the administration of intravenous contrast.; Multiplanar multisequence MRA of the neck was performed without the administration of intravenous contrast. Stenosis of the internal carotid arteries measured using NASCET criteria.; MRA of the head was performed utilizing time-of-flight imaging with MIP images. No intravenous contrast was administered. COMPARISON: None. HISTORY: ORDERING SYSTEM PROVIDED HISTORY: stroke; ORDERING SYSTEM PROVIDED HISTORY: stat FINDINGS: MRI brain: INTRACRANIAL STRUCTURES/VENTRICLES: There is no acute infarct. There are mild chronic white matter microvascular ischemic changes characterized by foci of periventricular and subcortical white matter signal abnormality. No intracranial mass, shift, or bleed is identified. Normal expected signal voids are present within the vessels at the base of skull.  ORBITS: The visualized portion of the orbits demonstrate no acute abnormality. SINUSES: Trace air-fluid levels are present within the maxillary sinuses. BONES/SOFT TISSUES: The bone marrow signal intensity appears normal. The craniocervical junction is normal in appearance. MRA neck: The left vertebral artery is dominant. The carotids are grossly normal in caliber and signal to the extent visualized. MRA brain: The vertebrobasilar system is within normal limits. The posterior cerebral arteries are normal in course and caliber to the extent visualized. The anterior and middle cerebral arteries demonstrate normal arborization patterns. 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 No aneurysm or vascular malformation is identified. No acute infarct. No flow limiting stenosis or branch occlusion detected within the head or neck. Mri Brain Wo Contrast    Result Date: 11/19/2017  EXAMINATION: MRI OF THE BRAIN WITHOUT CONTRAST; MRA OF THE NECK WITHOUT CONTRAST; MRA OF THE HEAD WITHOUT CONTRAST  11/19/2017 8:47 pm; 11/19/2017 8:48 pm TECHNIQUE: Multiplanar multisequence MRI of the brain was performed without the administration of intravenous contrast.; Multiplanar multisequence MRA of the neck was performed without the administration of intravenous contrast. Stenosis of the internal carotid arteries measured using NASCET criteria.; MRA of the head was performed utilizing time-of-flight imaging with MIP images. No intravenous contrast was administered. COMPARISON: None. HISTORY: ORDERING SYSTEM PROVIDED HISTORY: stroke; ORDERING SYSTEM PROVIDED HISTORY: stat FINDINGS: MRI brain: INTRACRANIAL STRUCTURES/VENTRICLES: There is no acute infarct. There are mild chronic white matter microvascular ischemic changes characterized by foci of periventricular and subcortical white matter signal abnormality. No intracranial mass, shift, or bleed is identified. Normal expected signal voids are present within the vessels at the base of skull.

## 2017-11-20 NOTE — PROGRESS NOTES
IM RESIDENT PROGRESS NOTE        Patient:  Gracy Price  YOB: 1950    MRN: [id number]     Acct: [de-identified]   Chief complaint lightheadedness  Admit date: 11/19/2017    Pt seen and Chart reviewed. Subjective:   Pt doing fine. No complain of weakness,lightheadedness or slurred speech. No acute events overnight. VSS    Diet:  DIET CARDIAC;      Medications:Current Inpatient    Scheduled Meds:   aspirin  81 mg Oral Daily    cloNIDine  0.1 mg Oral BID    clopidogrel  75 mg Oral Daily    simvastatin  40 mg Oral Nightly    sodium chloride flush  10 mL Intravenous 2 times per day    enoxaparin  40 mg Subcutaneous Daily    insulin lispro  0-6 Units Subcutaneous TID WC    insulin lispro  0-3 Units Subcutaneous Nightly    lisinopril  5 mg Oral Daily    vitamin D  50,000 Units Oral Weekly     Continuous Infusions:   PRN Meds:nitroGLYCERIN, sodium chloride flush, acetaminophen, magnesium hydroxide, ondansetron    Objective:    Physical Exam:  Vitals: BP (!) 142/52   Pulse 66   Temp 97.2 °F (36.2 °C) (Oral)   Resp 17   Ht 4' 9\" (1.448 m)   Wt 166 lb 0.1 oz (75.3 kg)   SpO2 97%   BMI 35.92 kg/m²   24 hour intake/output:No intake or output data in the 24 hours ending 11/20/17 0157  Last 3 weights: Wt Readings from Last 3 Encounters:   11/19/17 166 lb 0.1 oz (75.3 kg)   11/19/17 170 lb (77.1 kg)   11/19/17 170 lb (77.1 kg)   Review of Systems -  General ROS: negative for - chills, fatigue, fever or weight loss  ENT ROS: negative for - headaches, oral lesions or sore throat  Cardiovascular ROS: no chest pain , orthopnea or pnd   Gastrointestinal ROS: no abdominal pain, change in bowel habits, or black or bloody stools  Skin - no rash   Neuro - no blurry vision , no loc .  No focal weakness   msk - no jt tenderness or swelling    Vascular - no claudication , rest completed and negative   Lymphatic - complete and negative Hematology - oncology - complete and negative   Allergy immunology - complete and negative    no burning or hematuria      Physical Examination:   General appearance - alert, well appearing, and in no distress  Mental status - alert, oriented to person, place, and time  Eyes - pupils equal and reactive, extraocular eye movements intact  Chest - clear to auscultation, no wheezes, rales or rhonchi, symmetric air entry  Heart - normal rate, regular rhythm, normal S1, S2, no murmurs, rubs, clicks or gallops  Abdomen - soft, nontender, nondistended, no masses or organomegaly  Neurological - alert, oriented, normal speech,Sensation mildly  decreased on R side of face as compared to L side. Motor 4/5 on R side of extremities as compared to L    Extremities - peripheral pulses normal, no pedal edema, no clubbing or cyanosis  Skin - normal coloration and turgor, no rashes, no suspicious skin lesions noted     Labs:-    CBC:   Recent Labs      11/19/17   1430   WBC  7.0   HGB  12.1   PLT  213     BMP:    Recent Labs      11/19/17   1430   NA  140   K  3.7   CL  99   CO2  26   BUN  17   CREATININE  0.79   GLUCOSE  97     Calcium:  Recent Labs      11/19/17   1430   CALCIUM  9.4     Ionized Calcium:No results for input(s): IONCA in the last 72 hours. Magnesium:No results for input(s): MG in the last 72 hours. Phosphorus:No results for input(s): PHOS in the last 72 hours. BNP:No results for input(s): BNP in the last 72 hours. Glucose:  Recent Labs      11/19/17   1348  11/19/17   2150   POCGLU  83  99     HgbA1C: No results for input(s): LABA1C in the last 72 hours. INR:   Recent Labs      11/19/17   1430   INR  1.0     Hepatic: No results for input(s): ALKPHOS, ALT, AST, PROT, BILITOT, BILIDIR, LABALBU in the last 72 hours. Amylase and Lipase:No results for input(s): LACTA, AMYLASE in the last 72 hours. Lactic Acid: No results for input(s): LACTA in the last 72 hours.   CARDIAC ENZYMES:No results for input(s):

## 2017-11-20 NOTE — PROGRESS NOTES
surgery. Restrictions  Restrictions/Precautions  Restrictions/Precautions: Fall Risk, General Precautions  Required Braces or Orthoses?: No  Vision/Hearing  Vision: Impaired  Vision Exceptions: Wears glasses at all times; Cataracts  Hearing: Exceptions to [facility]  Hearing Exceptions: Hard of hearing/hearing concerns     Subjective  General  Chart Reviewed: Yes  Patient assessed for rehabilitation services?: Yes  Family / Caregiver Present: No  Follows Commands: Within Functional Limits  Pain Screening  Patient Currently in Pain: Yes  Pain Assessment  Pain Assessment: 0-10  Pain Level: 7  Pain Type: Chronic pain  Pain Location: Knee (pt reports arthritis in R knee)  Pain Orientation: Right  Pain Frequency: Continuous  Vital Signs  Patient Currently in Pain: Yes     Orientation  Orientation  Overall Orientation Status: Within Normal Limits    Social/Functional History  Social/Functional History  Lives With: Spouse; spouse works during the day until 3:00PM  Type of Home: House  Home Layout: One level  Home Access: Stairs to enter without rails  Entrance Stairs - Number of Steps: 3  Receives Help From: Family  ADL Assistance: Independent  Homemaking Assistance: Independent  Homemaking Responsibilities: Yes  Ambulation Assistance: Independent  Transfer Assistance: Independent  Active : Yes (pt can drive, but  does most of driving due to vision problems )  Occupation: Retired  Type of occupation: cleaning   Objective     Observation/Palpation  Posture: Good    AROM RLE (degrees)  RLE AROM: WNL  AROM LLE (degrees)  LLE AROM : WNL  AROM RUE (degrees)  RUE AROM : WNL  AROM LUE (degrees)  LUE AROM : WNL  Strength RLE  Strength RLE: WFL  Strength LLE  Strength LLE: WFL  Strength RUE  Strength RUE: WFL  Strength LUE  Strength LUE: WFL  Tone RLE  RLE Tone: Normotonic  Tone LLE  LLE Tone: Normotonic  Motor Control  Gross Motor?: WNL  Sensation  Overall Sensation Status: WNL  Bed mobility  Supine to Sit: Independent  Sit to Supine: Independent  Scooting: Independent  Transfers  Sit to Stand: Stand by assistance  Stand to sit: Stand by assistance  Ambulation  Ambulation?: Yes  Ambulation 1  Surface: level tile  Device: No Device  Assistance: Contact guard assistance  Quality of Gait: pt amb with antalgic gait and decreased step length on R LE due to pain/knee arthritis. normal step length, slowed declan. Distance: 76'  Comments: Pt required rest breaks due to pain. Pt refused use of assistive device. Pt did occasionally use railing in [place]. Pt states she doesn't leave home or do much walking out of home. Pt states she furniture surfs at home. Pt was educated on safety and how assistive devices may help. Stairs/Curb  Stairs?: No     Balance  Posture: Good  Sitting - Static: Good  Sitting - Dynamic: Good  Standing - Static: Good  Standing - Dynamic: Good;-      Assessment   Body structures, Functions, Activity limitations: Decreased functional mobility ; Decreased balance  Prognosis: Good  Decision Making: Medium Complexity  REQUIRES PT FOLLOW UP: Yes  Activity Tolerance  Activity Tolerance: Patient limited by pain  PT Equipment Recommendations  Other:  (safety concerns for pt amb without use of assistive device. May need a cane to go home safely if pt agreeable. Pt has been adament about not using assistive device. )     Discharge Recommendations:  Home with assist PRN      Plan   Plan  Times per week: 5-6  Current Treatment Recommendations: Balance Training, Functional Mobility Training, Transfer Training, Endurance Training, Gait Training, Stair training, Safety Education & Training  Safety Devices  Type of devices:  All fall risk precautions in place, Call light within reach, Gait belt, Nurse notified, Left in chair, Patient at risk for falls  Restraints  Initially in place: No    G-Code  PT G-Codes  Functional Assessment Tool Used: [place] Tool   Score: 21  Functional Limitation: Mobility: Walking and moving around  Mobility: Walking and Moving Around Current Status (): At least 20 percent but less than 40 percent impaired, limited or restricted  Mobility: Walking and Moving Around Goal Status (): 0 percent impaired, limited or restricted    Goals  Short term goals  Time Frame for Short term goals: 14  Short term goal 1: Pt to be independent in transfers without assistive device  Short term goal 2: Pt to be independent in amb 200' without assistive device  Short term goal 3: Pt to improve dynamic standing balance to good without assistive device  Short term goal 4: Pt to be able to negotiate 3 stairs without railing independently. Patient Goals   Patient goals : Pt would like to decrease knee pain so she can walk more       Therapy Time   Individual Concurrent Group Co-treatment   Time In 1010         Time Out 1034         Minutes 24         Timed Code Treatment Minutes: [address] [facility] [place]  This treatment/evaluation completed by signing SPT. Signing PT agrees with treatment and documentation.

## 2017-11-20 NOTE — PROGRESS NOTES
Balance: Stand by assistance  Standing Balance  Time: ~1 minute  Activity: Standing at chair side in prep for func mob  Sit to stand: Stand by assistance  Stand to sit: Stand by assistance  Comment: No device used  Functional Mobility  Functional - Mobility Device: Standard Walker  Activity:  (Around room to assess use of walker for ease of func mob)  Assist Level: Stand by assistance  Functional Mobility Comments: Pt reports use of walker does not decrease limp or pain during func mob  ADL  Feeding: Independent  Grooming: Modified independent ;Setup  UE Bathing: Modified independent ;Setup  LE Bathing: Modified independent ;Setup  UE Dressing: Modified independent ;Setup  LE Dressing: Modified independent ;Setup  Toileting: Modified independent   Bed mobility  Sit to Supine: Independent  Scooting: Independent  Transfers  Sit to stand: Stand by assistance  Stand to sit: Stand by assistance  Cognition  Overall Cognitive Status: WFL  Sensation  Overall Sensation Status: WNL    LUE AROM (degrees)  LUE AROM : WFL  Left Hand AROM (degrees)  Left Hand AROM: WFL  RUE AROM (degrees)  RUE AROM : WFL  Right Hand AROM (degrees)  Right Hand AROM: WFL  LUE Strength  Gross LUE Strength: WFL  RUE Strength  Gross RUE Strength: WFL    Assessment   Performance deficits / Impairments: Decreased functional mobility ; Decreased ADL status; Decreased endurance;Decreased balance;Decreased high-level IADLs  Prognosis: Good  Decision Making: Low Complexity  Patient Education: OT POC, discharge recommendations, fall prevention techs, EC/WS techs with pt verbalizing understanding  Discharge Recommendations: Continue to assess pending progress;Home with assist PRN  REQUIRES OT FOLLOW UP: Yes  Activity Tolerance  Activity Tolerance: Patient limited by pain  Safety Devices  Safety Devices in place: Yes  Type of devices: Call light within reach; Patient at risk for falls;Nurse notified; Left in bed        Discharge Recommendations:  Continue to assess pending progress, Home with assist PRN     Plan   Plan  Times per week: 3-4x/wk  Current Treatment Recommendations: Balance Training, Functional Mobility Training, Endurance Training, Pain Management, Self-Care / ADL, Safety Education & Training, Home Management Training    G-Code  OT G-codes  Functional Assessment Tool Used: Barthel Index  Score: 17/20  Functional Limitation: Self care  Self Care Current Status (): At least 1 percent but less than 20 percent impaired, limited or restricted  Self Care Goal Status (): 0 percent impaired, limited or restricted    Goals  Short term goals  Time Frame for Short term goals: Pt will, upon discharge,  Short term goal 1: demo good safety awareness during func mob with <2 vcs and LRD  Short term goal 2: perform UB/LB ADLs with (I)  Short term goal 3: demo sit<>stand transfer with Mod. I with LRD  Short term goal 4: demo dynamic standing during ADL completion for at least 15 minutes Mod. I with 0 rest breaks and LRD    Therapy Time   Individual Concurrent Group Co-treatment   Time In 1027         Time Out 1054         Minutes 27            Upon entering room, pt finishing session with PT and seated in chair. Pt cognition WFL as pt answered social hx questions appropriately. Pt demonstrated above assist for sit<>stand transfer and func mob with walker to assess if walker helped to steady pt; per pt report, walker not helpful. Per OT observation, walker assisted pt in steadying UB during func mob secondary to limp demonstrated by pt d/t R leg pain. Pt educated on fall prevention techs and EC techs to implement at home and work. Discharge recommendations discussed with patient during initial evaluation. Pt left in bed, call light within reach, family present in room, and RN notified.     Maty Solomon, S/OT

## 2017-11-20 NOTE — ED NOTES
Report called to [facility]. All questions answered, pt transported to floor via wc, rr even and unlabored with no distress.      Whitney Alva RN  11/19/17 [id number]

## 2017-11-20 NOTE — PLAN OF CARE
Problem: Safety:  Goal: Free from accidental physical injury  Free from accidental physical injury   Outcome: Met This Shift  Patient remained free from any physical injury this shift. Calls out appropriately for assistance    Problem: Pain:  Goal: Pain level will decrease  Pain level will decrease   Outcome: Met This Shift  Patients pain is controlled per patient.  Currently not having any pain

## 2017-11-21 VITALS
TEMPERATURE: 98 F | HEIGHT: 57 IN | BODY MASS INDEX: 35.81 KG/M2 | RESPIRATION RATE: 18 BRPM | HEART RATE: 76 BPM | DIASTOLIC BLOOD PRESSURE: 78 MMHG | WEIGHT: 166.01 LBS | OXYGEN SATURATION: 97 % | SYSTOLIC BLOOD PRESSURE: 142 MMHG

## 2017-11-21 LAB
GLUCOSE BLD-MCNC: 114 MG/DL (ref 65–105)
GLUCOSE BLD-MCNC: 119 MG/DL (ref 65–105)
GLUCOSE BLD-MCNC: 128 MG/DL (ref 65–105)

## 2017-11-21 PROCEDURE — 97110 THERAPEUTIC EXERCISES: CPT

## 2017-11-21 PROCEDURE — 97116 GAIT TRAINING THERAPY: CPT

## 2017-11-21 PROCEDURE — 82947 ASSAY GLUCOSE BLOOD QUANT: CPT

## 2017-11-21 PROCEDURE — 99217 PR OBSERVATION CARE DISCHARGE MANAGEMENT: CPT | Performed by: INTERNAL MEDICINE

## 2017-11-21 PROCEDURE — 2580000003 HC RX 258: Performed by: STUDENT IN AN ORGANIZED HEALTH CARE EDUCATION/TRAINING PROGRAM

## 2017-11-21 PROCEDURE — G0378 HOSPITAL OBSERVATION PER HR: HCPCS

## 2017-11-21 PROCEDURE — 93971 EXTREMITY STUDY: CPT

## 2017-11-21 PROCEDURE — 6370000000 HC RX 637 (ALT 250 FOR IP): Performed by: STUDENT IN AN ORGANIZED HEALTH CARE EDUCATION/TRAINING PROGRAM

## 2017-11-21 PROCEDURE — 99233 SBSQ HOSP IP/OBS HIGH 50: CPT | Performed by: PSYCHIATRY & NEUROLOGY

## 2017-11-21 PROCEDURE — 97535 SELF CARE MNGMENT TRAINING: CPT

## 2017-11-21 PROCEDURE — 96372 THER/PROPH/DIAG INJ SC/IM: CPT

## 2017-11-21 PROCEDURE — 6360000002 HC RX W HCPCS: Performed by: STUDENT IN AN ORGANIZED HEALTH CARE EDUCATION/TRAINING PROGRAM

## 2017-11-21 RX ORDER — AMLODIPINE BESYLATE 5 MG/1
5 TABLET ORAL DAILY
Status: DISCONTINUED | OUTPATIENT
Start: 2017-11-21 | End: 2017-11-21 | Stop reason: HOSPADM

## 2017-11-21 RX ORDER — AMLODIPINE BESYLATE 5 MG/1
5 TABLET ORAL DAILY
Qty: 30 TABLET | Refills: 3 | Status: SHIPPED | OUTPATIENT
Start: 2017-11-22 | End: 2018-01-17 | Stop reason: SDUPTHER

## 2017-11-21 RX ADMIN — ENOXAPARIN SODIUM 40 MG: 40 INJECTION SUBCUTANEOUS at 08:15

## 2017-11-21 RX ADMIN — ACETAMINOPHEN 650 MG: 325 TABLET ORAL at 02:53

## 2017-11-21 RX ADMIN — CLOPIDOGREL 75 MG: 75 TABLET, FILM COATED ORAL at 08:16

## 2017-11-21 RX ADMIN — Medication 10 ML: at 08:15

## 2017-11-21 RX ADMIN — AMLODIPINE BESYLATE 5 MG: 5 TABLET ORAL at 10:01

## 2017-11-21 RX ADMIN — ACETAMINOPHEN 650 MG: 325 TABLET ORAL at 12:14

## 2017-11-21 RX ADMIN — ASPIRIN 81 MG: 81 TABLET, COATED ORAL at 08:15

## 2017-11-21 ASSESSMENT — PAIN DESCRIPTION - PAIN TYPE
TYPE: ACUTE PAIN
TYPE: CHRONIC PAIN
TYPE: ACUTE PAIN

## 2017-11-21 ASSESSMENT — PAIN DESCRIPTION - DESCRIPTORS
DESCRIPTORS: ACHING
DESCRIPTORS: ACHING

## 2017-11-21 ASSESSMENT — PAIN DESCRIPTION - FREQUENCY
FREQUENCY: CONTINUOUS
FREQUENCY: CONTINUOUS

## 2017-11-21 ASSESSMENT — PAIN SCALES - GENERAL
PAINLEVEL_OUTOF10: 6
PAINLEVEL_OUTOF10: 7
PAINLEVEL_OUTOF10: 2
PAINLEVEL_OUTOF10: 4
PAINLEVEL_OUTOF10: 7
PAINLEVEL_OUTOF10: 7
PAINLEVEL_OUTOF10: 8

## 2017-11-21 ASSESSMENT — PAIN DESCRIPTION - LOCATION
LOCATION: KNEE
LOCATION: KNEE

## 2017-11-21 ASSESSMENT — PAIN DESCRIPTION - ORIENTATION
ORIENTATION: RIGHT
ORIENTATION: RIGHT

## 2017-11-21 NOTE — PROGRESS NOTES
LCX: 20 % proximal stenosis, RCA: Ostial 70% with pressure damping and mid 90% stenosis. Required PTCA-GRACE in both lesions. LVEF 55%. LV wall motion normal.  [facility]      CATARACT REMOVAL WITH IMPLANT Left 10/20/2015    Dr. Aliya Morataya, [address] [facility] WITH IMPLANT Right 2015    [place] with DANIAL. Dr Aliya Morataya, [address] [facility], LAPAROSCOPIC  2000    COLONOSCOPY  3/11/2015    Internal and external hemorrhoids otherwise normal    CORONARY ANGIOPLASTY WITH STENT PLACEMENT  2017    Right coronary artery 70% ostial lesion and mid RCA 90% lesion; successful PTCA with drug-eluting stents in both lesions, Dr[facility]    CYSTOSCOPY  3/1996    Bladder biopsy, urethral dilatation.  EYE SURGERY      HYSTERECTOMY, TOTAL ABDOMINAL      LAPAROSCOPY  3/1979    NASAL SEPTUM SURGERY  3/9/2005    Nasoseptal reconstruction.  OVARY REMOVAL Right 1993    Laparotomy.     YAG CAPSULOTOMY Right 2016    Dr Aliya Morataya           Medications:     amLODIPine  5 mg Oral Daily    simvastatin  40 mg Oral Nightly    aspirin  81 mg Oral Daily    clopidogrel  75 mg Oral Daily    sodium chloride flush  10 mL Intravenous 2 times per day    enoxaparin  40 mg Subcutaneous Daily    insulin lispro  0-6 Units Subcutaneous TID WC    insulin lispro  0-3 Units Subcutaneous Nightly    vitamin D  50,000 Units Oral Weekly     PRN Meds include: glucose, dextrose, glucagon (rDNA), dextrose, hydrALAZINE, nitroGLYCERIN, sodium chloride flush, acetaminophen, magnesium hydroxide, ondansetron    Objective:   /66   Pulse 58   Temp 97.7 °F (36.5 °C) (Oral)   Resp 14   Ht 4' 9\" (1.448 m)   Wt 166 lb 0.1 oz (75.3 kg)   SpO2 95%   BMI 35.92 kg/m²     Blood pressure range: Systolic (71VNZ), RUX:287 , Min:107 , BREANNA:650   ; Diastolic (75JRT), ER, Min:51, Max:67     Neurological Examination    Musculoskeletal. Muscle bulk and tone normal                                                           Muscle strength 5/5 strength throughout                                                                                No dysmetria or dysdiadokinesis  No tremor   Normal fine motor  Orientation Alert and oriented x 3   Attention and concentration normal  Short term memory normal  Language process and speech normal . No aphasia   Cranial nerve 2 normal acuety and visual fields  Cranial nerve 3, 4 and 6 . Extraocular muscles are intact . Pupils are equal and reactive   Cranial nerve 5 . Normal strength of masseter and temporalis . Intact corneal reflex. Normal facial sensation  Cranial nerve 7 normal exam   Cranial nerve 8. Grossly intact hearing   Cranial nerve 9 and 10. Symmetric palate elevation   Cranial nerve 11 , 5 out of 5 strength   Cranial Nerve 12 midline tongue . No atrophy  Musculoskeletal. Muscle bulk and tone normal                                                           Muscle strength 5/5 strength throughout                                                                                No dysmetria or dysdiadokinesis  No tremor   Normal fine motor  Sensation . Decreased pinprick and light touch right arm and right leg   Deep Tendon Reflexes normal  Plantar response flexor bilaterally         Data:   Provider Status: Ordered   Echocardiogram 2D W M-Mode   Order: [id number]   Status:  Final result   Visible to patient:  No (Not Released) Next appt:  12/19/2017 at 03:20 PM in Neurology [facility])   Details     Reading Physician Reading Date Result Priority   Unknown Provider Result 6/20/2017    Narrative     Transthoracic Echocardiography Report (TTE)     Patient Name GARSIA      Date of Study               06/20/2017               Guilherme Veronica      Date of      1950  Gender                      Female   Birth      Age         [place] year(s)  Race                       Ramin Kerr      Room Number  0404        Height:                     57 inch, 144.78 cm      Corporate ID [id number]  Weight:                     166 pounds, 75.3 kg   #      Patient Acct [de-identified]   BSA:          1.66 m^2      BMI:      35.92   #                                                              kg/m^2      MR #         [id number]     Sonographer                 Michelle Shah      Accession #  [id number]   Interpreting Physician      Tri Morelos      Fellow       Siobhan Antonio  Referring Nurse                            Practitioner      Interpreting             Referring Physician         Navid Ohara   Fellow     Type of Study      TTE procedure:2D Echocardiogram, M-Mode, Doppler, Color Doppler.     Procedure Date  Date: 06/20/2017 Start: 01:18 PM    Study Location: [facility]    Mily / Johnathon Morales. Comments:  Chest Pain    Patient Status: Inpatient    Height: 57 inches Weight: 166 pounds BSA: 1.66 m^2 BMI: 35.92 kg/m^2    CONCLUSIONS    Summary  Left ventricle is normal in size with normal systolic function. Estimated ejection fraction is 55-60%. Normal right ventricular size and function. Mild aortic insufficiency. Mild mitral regurgitation. Estimated right ventricular systolic pressure is 22 mmHg. No significant pericardial effusion is seen.     Signature  ----------------------------------------------------------------------------   Electronically signed by Mark Candelario(Sonographer) on 06/20/2017 01:55   PM               Lab Results:   CBC:   Recent Labs      11/19/17   1430   WBC  7.0   HGB  12.1   PLT  213     BMP:    Recent Labs      11/19/17   1430   NA  140   K  3.7   CL  99   CO2  26   BUN  17   CREATININE  0.79   GLUCOSE  97         Lab Results   Component Value Date    CHOL 169 11/19/2017    LDLCHOLESTEROL 90 11/19/2017    HDL 46 11/19/2017    TRIG 165 (H) 11/19/2017    ALT 28 10/12/2017    AST 26 10/12/2017    TSH 2.63 11/19/2017    INR 1.0 11/19/2017    LABA1C 6.1 (H) 10/12/2017    LABMICR 7 10/12/2017     Assessment :     Right side paresthesias, fatigue , lightheadedness , speech complaints . Suspect more attributed to blood medication changes      Plan:     Cardiac 2 D echo . Orthostatic blood pressures . Plavix , aspirin 81 mg po qd , zocor 40 mg po qd . PT/OT .  Neurologically clear with recommended followup in office in 4 weeks

## 2017-11-21 NOTE — PROGRESS NOTES
regarding importance of safety  Stairs/Curb  Stairs?: Yes  Stairs  # Steps : 3  Stairs Height: 6\"  Rails: Right ascending  Curbs: 6\"  Assistance: Contact guard assistance  Comment: cues for sequencing with poor follow-thru     Balance  Posture: Good  Sitting - Static: Good  Sitting - Dynamic: Good  Standing - Static: Good  Standing - Dynamic: Fair;+       Exercises:  Upper extremity exercises: Bicep curl, shoulder flexion/extension, punches, tricep curl, shoulder abduction/adduction. Reps: 15x with 1#  UBE x 4 minutes, seated    Assessment   Body structures, Functions, Activity limitations: Decreased functional mobility ; Decreased balance  Assessment: No LOB during ambulation or stair navigation; however pt has balance deficits and is an increased fall risk. Poor activity tolerance d/t pain  Prognosis: Good  Patient Education: safety with mobility  REQUIRES PT FOLLOW UP: Yes  Activity Tolerance  Activity Tolerance: Patient limited by pain        Goals  Short term goals  Time Frame for Short term goals: 15  Short term goal 1: Pt to be independent in transfers without assistive device  Short term goal 2: Pt to be independent in amb 200' without assistive device  Short term goal 3: Pt to improve dynamic standing balance to good without assistive device  Short term goal 4: Pt to be able to negotiate 3 stairs without railing independently. Patient Goals   Patient goals : Pt would like to decrease knee pain so she can walk more    Plan    Plan  Times per week: 5-6  Current Treatment Recommendations: Balance Training, Functional Mobility Training, Transfer Training, Endurance Training, Gait Training, Stair training, Safety Education & Training  Safety Devices  Type of devices:  All fall risk precautions in place, Gait belt  Restraints  Initially in place: No     Therapy Time   Individual Concurrent Group Co-treatment   Time In 1130         Time Out 1204         Minutes [address]

## 2017-11-21 NOTE — PROGRESS NOTES
assistance  Standing Balance  Sit to stand: Stand by assistance  Stand to sit: Stand by assistance  Comment: pt used no device but pt was furniture/wall surfing noted during the session while completing transfers- increased time given on education of the benefits to using a cane for support /safety- fair return by pt but family very supportive and encouraging of pt using a cane for safety reasons. Emotional support given by writer and family d/t pt expressing not wanting to use a cane ever. Toilet Transfers  Toilet - Technique: Ambulating  Equipment Used: Standard toilet  Toilet Transfer: Independent  Bed mobility  Rolling to Left: Modified independent  Rolling to Right: Modified independent  Supine to Sit: Independent  Sit to Supine: Independent  Scooting: Independent  Transfers  Stand Step Transfers: Stand by assistance  Sit to stand: Stand by assistance  Stand to sit: Stand by assistance  Attendance  Participation: Active participation       Assessment   Performance deficits / Impairments: Decreased functional mobility ; Decreased ADL status; Decreased endurance;Decreased balance;Decreased high-level IADLs  Prognosis: Good  Patient Education: OT POC, discharge recommendations, fall prevention techs, EC/WS techs, DME/AE, benefits to bathroom grab bars and using a cane for support/safety, bathroom safety,  with pt verbalizing understanding  REQUIRES OT FOLLOW UP: Yes  Activity Tolerance  Activity Tolerance: Patient Tolerated treatment well  Safety Devices  Safety Devices in place: Yes  Type of devices: All fall risk precautions in place;Call light within reach; Chair alarm in place; Left in chair;Nurse notified        Plan   Plan  Times per week: 3-4x/wk  Current Treatment Recommendations: Balance Training, Functional Mobility Training, Endurance Training, Pain Management, Self-Care / ADL, Safety Education & Training, Home Management Training    Goals  Short term goals  Time Frame for Short term goals: Pt will, upon discharge,  Short term goal 1: demo good safety awareness during func mob with <2 vcs and LRD  Short term goal 2: perform UB/LB ADLs with (I)  Short term goal 3: demo sit<>stand transfer with Mod. I with LRD  Short term goal 4: demo dynamic standing during ADL completion for at least 15 minutes with 0 rest breaks and LRD       Therapy Time   Individual Concurrent Group Co-treatment   Time In  10:50-11:00- pt ready to begin therapy and had demo bed mob and was getting ready to complete ADLs when 2 family members arrived that pt had not seen in a long time, pt became emotional w/ family and writer s/u time to return to complete session so pt could visit w/ family.    arrived back at 1400         Time Out  1430         Minutes  40 min                 [address], [place]/L

## 2017-11-21 NOTE — PLAN OF CARE
Problem: Safety:  Goal: Free from accidental physical injury  Free from accidental physical injury   Outcome: Ongoing  No falls, pt up with assist as needed, call light in reach , bed alarm on at night for safety. Problem: Pain:  Goal: Control of acute pain  Control of acute pain   Outcome: Ongoing  Pt able to call out for pain meds as needed.

## 2017-11-21 NOTE — PROCEDURES
depression diagnosis that warrants treatment or treatment change, needs at least one of the first two questions endorsed as positive  (little pleasure, feeling depressed) indicating the symptom has been present more than half the time in the past two weeks. --In addition, the tenth question about difficulty at work or home or getting along with others should be answered at least somewhat difficult.   --When a depression diagnosis has been made, patient preferences should be considered, especially when choosing between treatment recommendations of antidepressants treatment and psychotherapy. PHQ-9  Score Provisional diagnosis Treatment recommendation   5-9 Minimal symptoms Support, educate to call if worse; return in 1 month   10-14 Minor depression  -----------------------------------------  Dysthymia  -----------------------------------------  Major depression, mild Support, watchful waiting  -------------------------------------------------------------  Antidepressant or psychotherapy  -------------------------------------------------------------Antidepressant or psychotherapy   15-19 Major depression, moderately severe Antidepressant or psychotherapy     >20 Major depression, severe Antidepressant and psychotherapy  (especially if not improved on monotherapy)   # If symptoms present > two years, then probable chronic depression which warrants antidepressant or psychotherapy  (ask, In the past 2 years have you felt depressed or sad most days, even if you felt okay sometimes? )  ## If symptoms present > one month or severe functional impairment, consider active treatment.

## 2017-11-21 NOTE — PROGRESS NOTES
IM RESIDENT PROGRESS NOTE        Patient:  Los Kwok  YOB: 1950    MRN: [id number]     Acct: [de-identified]   Chief complaint lightheadedness  Admit date: 11/19/2017    Pt seen and Chart reviewed. Subjective:   Pt doing fine. No complain of weakness,lightheadedness or slurred speech. No acute events overnight. VSS  She has chronic right LE pain, no swelling, no Homans. Patient is requesting a dvt scan     Diet:  DIET CARDIAC;      Medications:Current Inpatient    Scheduled Meds:   amLODIPine  5 mg Oral Daily    simvastatin  40 mg Oral Nightly    aspirin  81 mg Oral Daily    clopidogrel  75 mg Oral Daily    sodium chloride flush  10 mL Intravenous 2 times per day    enoxaparin  40 mg Subcutaneous Daily    insulin lispro  0-6 Units Subcutaneous TID WC    insulin lispro  0-3 Units Subcutaneous Nightly    vitamin D  50,000 Units Oral Weekly     Continuous Infusions:   dextrose       PRN Meds:glucose, dextrose, glucagon (rDNA), dextrose, hydrALAZINE, nitroGLYCERIN, sodium chloride flush, acetaminophen, magnesium hydroxide, ondansetron    Objective:    Physical Exam:  Vitals: /66   Pulse 58   Temp 97.7 °F (36.5 °C) (Oral)   Resp 14   Ht 4' 9\" (1.448 m)   Wt 166 lb 0.1 oz (75.3 kg)   SpO2 95%   BMI 35.92 kg/m²   24 hour intake/output:    Intake/Output Summary (Last 24 hours) at 11/21/17 1451  Last data filed at 11/20/17 1953   Gross per 24 hour   Intake              840 ml   Output              600 ml   Net              240 ml     Last 3 weights:   Wt Readings from Last 3 Encounters:   11/19/17 166 lb 0.1 oz (75.3 kg)   11/19/17 170 lb (77.1 kg)   11/19/17 170 lb (77.1 kg)   Review of Systems -  General ROS: negative for - chills, fatigue, fever or weight loss  ENT ROS: negative for - headaches, oral lesions or sore throat  Cardiovascular ROS: no chest pain , orthopnea or pnd   Gastrointestinal ROS: no Hepatic: No results for input(s): ALKPHOS, ALT, AST, PROT, BILITOT, BILIDIR, LABALBU in the last 72 hours. Amylase and Lipase:No results for input(s): LACTA, AMYLASE in the last 72 hours. Lactic Acid: No results for input(s): LACTA in the last 72 hours. CARDIAC ENZYMES:No results for input(s): CKTOTAL, CKMB, CKMBINDEX, TROPONINI in the last 72 hours. BNP: No results for input(s): BNP in the last 72 hours. Lipids:   Recent Labs      11/19/17   2246   CHOL  169   TRIG  165*   HDL  46     ABGs: No results found for: PH, PCO2, PO2, HCO3, O2SAT  Thyroid:   Lab Results   Component Value Date    TSH 2.63 11/19/2017      Urinalysis:   Color, UA   Date Value Ref Range Status   11/19/2017 NOT REPORTED YEL Final     pH, UA   Date Value Ref Range Status   11/19/2017 5.0 5.0 - 6.0 Final     Specific Gravity, UA   Date Value Ref Range Status   11/19/2017 1.005 (L) 1.010 - 1.025 Final     Protein, UA   Date Value Ref Range Status   11/19/2017 NEGATIVE NEG Final     RBC, UA   Date Value Ref Range Status   11/19/2017 None 0 - 4 /HPF Final     Bacteria, UA   Date Value Ref Range Status   11/19/2017 TRACE (A) NONE Final     Comment:     Performed at [facility] [address] [id number] [phone]       Nitrite, Urine   Date Value Ref Range Status   11/19/2017 NEGATIVE NEG Final     WBC, UA   Date Value Ref Range Status   11/19/2017 0 TO 4 0 - 4 /HPF Final     Leukocyte Esterase, Urine   Date Value Ref Range Status   11/19/2017 NEGATIVE NEG Final     Comment:     Performed at [facility] [address] [id number] [phone]       Yeast, UA   Date Value Ref Range Status   11/19/2017 NOT REPORTED NONE Final     Glucose, Ur   Date Value Ref Range Status   11/19/2017 NEGATIVE NEG Final     Bilirubin Urine   Date Value Ref Range Status   11/19/2017 NEGATIVE NEG Final       CULTURES:    Current Rehabilitation some errors in transcription may have occurred.

## 2017-11-22 ENCOUNTER — TELEPHONE (OUTPATIENT)
Dept: FAMILY MEDICINE CLINIC | Age: 67
End: 2017-11-22

## 2017-11-22 ENCOUNTER — CARE COORDINATION (OUTPATIENT)
Dept: CASE MANAGEMENT | Age: 67
End: 2017-11-22

## 2017-11-22 NOTE — TELEPHONE ENCOUNTER
mLs by mouth 4 times daily as needed for Cough . 118 mL 0    Cholecalciferol (VITAMIN D3) 38894 units CAPS Take 1 capsule by mouth once a week 13 capsule 1    glimepiride (AMARYL) 1 MG tablet TAKE ONE-HALF TABLET BY MOUTH TWICE DAILY 90 tablet 1    metFORMIN (GLUCOPHAGE) 500 MG tablet TAKE 1 TABLET BY MOUTH TWO TIMES A DAY WITH MEALS 180 tablet 1    simvastatin (ZOCOR) 40 MG tablet TAKE ONE TABLET BY MOUTH NIGHTLY 90 tablet 1    triamcinolone (KENALOG) 0.1 % cream Apply topically 3 times daily 80 g 0    clopidogrel (PLAVIX) 75 MG tablet Take 1 tablet by mouth daily 30 tablet 11    nitroGLYCERIN (NITROSTAT) 0.4 MG SL tablet Place 1 tablet under the tongue every 5 minutes as needed for Chest pain up to max of 3 total doses. If no relief after 1 dose, call 911. 25 tablet 3    Blood Pressure KIT Blood pressure monitor 1 kit 0    Blood Glucose Monitoring Suppl MADELEINE Patient needs meter,tests strips and lancets. Tests daily. Dx E11.9,250.00 1 Device 0    NONFORMULARY OTC Biotin 1000mcg daily      aspirin 81 MG tablet Take 81 mg by mouth daily. No current facility-administered medications for this visit. Current Medications (record all meds as 'taken' or 'not taken' in home med activity)  No outpatient prescriptions have been marked as taking for the 11/22/17 encounter (Telephone) with Felicia Cordova MD.         Are there any questions about how the patient should take care of herself? No   Does the patient understand her diet regimen? yes   Does the patient understand his or her activity level? yes   Does the patient understand how to care for her wound? N/A   Does the patient understand how to monitor her weight? N/A   Does the patient understand what to do if she becomes short of breath? yes   Does the patient understand what to do if she has increased edema? yes    Does the patient understand how to monitor Blood sugar? N/A    Is the patient having any trouble with ADL's?  No        Home care services initiated: no     Services provided: none      Does that patient have equipment needs? No    Reinforce Follow-Up  - Does patient have an appointment with her PCP? yes  - Does patient have an appointment with her specialist physicians?  Yes        Follow up appointment date: (7 days for more severe illness, 14 days for others)  Future Appointments  Date Time Provider Paoc Sanchez   11/29/2017 3:00 PM [facility]   12/19/2017 3:20 PM [facility]   1/26/2018 1:30 PM Evangelist Greer MD [facility]       Other Interventions or Actions taken as result of  Assessment  - none        Love Jason LPN

## 2017-11-22 NOTE — CARE COORDINATION
Stan 45 Transitions Initial Follow Up Call    Call within 2 business days of discharge: Yes    Patient: Baldomero Troncoso Patient : 1950   MRN: [id number]    Reason for Admission: CVA  Discharge Date: 17   RARS: [facility] Risk Score: 19.5, CM 5     Spoke with: patient  Facility: [facility]  Non-face-to-face services provided:  Scheduled appointment with PCP-patient is calling today to schedule appt   Scheduled appointment with Luna Pedraza scheduled   Obtained and reviewed discharge summary and/or continuity of care documents  Assessment and support for treatment adherence and medication management-reviewed with patient      Care Transitions 24 Hour Call    Schedule Follow Up Appointment with PCP:  Completed  Do you have any ongoing symptoms?:  Yes  Patient-reported symptoms:  Weakness, Other (Comment: slight lightheadedness)  Interventions for patient-reported symptoms:  Other (Comment: safety - checking BP @ home)  Do you have a copy of your discharge instructions?:  Yes  Do you have all of your prescriptions and are they filled?:  Yes (Comment: started norvasc)  Have you been contacted by a [address]?:  No  Have you scheduled your follow up appointment?:  Yes (Comment: patient calling PCP today - neuro appt is already scheduled)  How are you going to get to your appointment?:  Car - family or friend to transport  Were you discharged with any Home Care or Post Acute Services:  No  Do you feel like you have everything you need to keep you well at home?:  Yes  Care Transitions Interventions       I spoke with patient for initial discharge call. Feels \"a little weak & slightly lightheaded. \"  Other symptoms have totally resolved. Ambulating @ home - says she is \"getting back to feeling like herself\"  Took a shower without problems this AM.  Monitoring her BP @ home - 154/90 prior to her norvasc. She will recheck it later. Stopped clonidine.   Patient is scheduling her PCP appt - 11/29. Has neuro appt already scheduled for 12/19 - she plans to check if there is a neurologist that she can follow long term locally, but plans to go to initial neuro hospital f/u in [facility] as scheduled. [address] to Stay. Care Transition contact information shared with patient. Will continue to follow.     Follow Up: 11/29 - PCP, 12/19- NEURO  Future Appointments  Date Time Provider Paco Sanchez   11/29/2017 3:00 PM [facility]itas [facility]   12/19/2017 3:20 PM Ever Bautista CNP [facility]   1/26/2018 1:30 PM [facility]     Angela Gardner RN

## 2017-11-27 ENCOUNTER — TELEPHONE (OUTPATIENT)
Dept: FAMILY MEDICINE CLINIC | Age: 67
End: 2017-11-27

## 2017-11-27 NOTE — TELEPHONE ENCOUNTER
Norvasc 5 mg was prescribed during her recent hospitalization. She can use saline nasal spray, Flonase nasal spray, Loratadine, and Coricidan HBP. She has an appointment scheduled 11/29/2017.   Future Appointments  Date Time Provider Paco Sanchez   11/29/2017 3:00 PM Sameer Valdez MD [facility]   12/19/2017 3:20 PM [facility]idez [facility]   1/26/2018 1:30 PM Colleen Mccoy MD [facility]

## 2017-11-27 NOTE — PROGRESS NOTES
.  Stroke Follow up Phone Call    Ryan this is Justin Walker from [facility] stroke team calling to see how you are doing since your d/c. Medication List      START taking these medications    amLODIPine 5 MG tablet  Commonly known as:  NORVASC  Take 1 tablet by mouth daily        CONTINUE taking these medications    aspirin 81 MG tablet     Blood Glucose Monitoring Suppl Shilpi  Patient needs meter,tests strips and lancets. Tests daily. Dx E11.9,250.00     Blood Pressure Kit  Blood pressure monitor     clopidogrel 75 MG tablet  Commonly known as:  PLAVIX  Take 1 tablet by mouth daily     glimepiride 1 MG tablet  Commonly known as:  AMARYL  TAKE ONE-HALF TABLET BY MOUTH TWICE DAILY     guaiFENesin-codeine 100-10 MG/5ML syrup  Commonly known as:  CHERATUSSIN AC  Take 5 mLs by mouth 4 times daily as needed for Cough . metFORMIN 500 MG tablet  Commonly known as:  GLUCOPHAGE  TAKE 1 TABLET BY MOUTH TWO TIMES A DAY WITH MEALS     nitroGLYCERIN 0.4 MG SL tablet  Commonly known as:  NITROSTAT  Place 1 tablet under the tongue every 5 minutes as needed for Chest pain up to max of 3 total doses. If no relief after 1 dose, call 911. NONFORMULARY     simvastatin 40 MG tablet  Commonly known as:  ZOCOR  TAKE ONE TABLET BY MOUTH NIGHTLY     triamcinolone 0.1 % cream  Commonly known as:  KENALOG  Apply topically 3 times daily     Vitamin D3 15616 units Caps  Take 1 capsule by mouth once a week        STOP taking these medications    cloNIDine 0.1 MG tablet  Commonly known as:  CATAPRES           Where to Get Your Medications      These medications were sent to [facility], [address], [address] [place] [address]    Phone:  [phone]   · amLODIPine 5 MG tablet         Can you tell me what medications you are taking? [x]   Yes  []   No    Do you have any questions about your medications?    []   Yes  [x] No    All medications reviewed with patient Yes  Do you have a follow up apt. With the neurologist?   [x]   Yes  []   No  Provided number to [facility] [phone]    Do you now your risk factors for stroke? [x]   Yes  []   No    Can you tell me the signs and symptoms of stroke? [x]   Yes  []   No  FAST instructions provided    Do if you know what to do if you were having signs/symptoms of stroke? [x]   Yes  []   No  Instructions to call 911 provided    Our goal is to provide the very best stroke care, on a scale of 1 to 10 with 10 as the very best who would you rank the care you received? 10      What can we do better?  Everything was ok

## 2017-11-27 NOTE — TELEPHONE ENCOUNTER
Pt calling questioning if she can take any over the counter cold medication. Pt wanted to check since because she was just started on a now blood pressure medication. Please call pt and advise.

## 2017-11-28 ENCOUNTER — CARE COORDINATION (OUTPATIENT)
Dept: CASE MANAGEMENT | Age: 67
End: 2017-11-28

## 2017-11-28 NOTE — CARE COORDINATION
Stan 45 Transitions Follow Up Call    2017    Patient: Zachery Goldberg  Patient : 1950   MRN: [id number]  Reason for Admission: There are no discharge diagnoses documented for the most recent discharge. Discharge Date: 17 RARS: Risk Score: 19.5       Spoke with: Eva Valverde     Patient states she is feeling weak and fatigued for the past 2 days. She states she has a cough but non productive and denies fever. Blood pressure yesterday was 142/89. She denies needs or concerns at this time. She has her transitional appointment tomorrow with Dr. Teresita Spann. Expressed to drink plenty of fluids and report symptoms to MD tomorrow at visit. Care Transitions Subsequent and Final Call    Subsequent and Final Calls  Do you have any ongoing symptoms?:  Yes  Onset of Patient-reported symptoms: In the past 7 days  Patient-reported symptoms:  Weakness, Other  Have your medications changed?:  No  Do you have any questions related to your medications?:  No  Do you currently have any active services?:  No  Do you have any needs or concerns that I can assist you with?:  No  Identified Barriers:  Lack of Education, Lack of Support  Care Transitions Interventions  Other Interventions:             Follow Up  Future Appointments  Date Time Provider Paco Sanchez   2017 3:00 PM [facility]   2017 3:20 PM Bever[facility] Wood [facility]   2018 1:30 PM Bryan Adame MD [facility]       Gabi Dewey, RN

## 2017-11-29 ENCOUNTER — OFFICE VISIT (OUTPATIENT)
Dept: FAMILY MEDICINE CLINIC | Age: 67
End: 2017-11-29
Payer: MEDICARE

## 2017-11-29 VITALS
HEIGHT: 57 IN | DIASTOLIC BLOOD PRESSURE: 70 MMHG | OXYGEN SATURATION: 96 % | WEIGHT: 164.4 LBS | SYSTOLIC BLOOD PRESSURE: 136 MMHG | RESPIRATION RATE: 16 BRPM | HEART RATE: 78 BPM | TEMPERATURE: 98.4 F | BODY MASS INDEX: 35.47 KG/M2

## 2017-11-29 DIAGNOSIS — R47.81 SLURRED SPEECH: Primary | ICD-10-CM

## 2017-11-29 DIAGNOSIS — L30.9 HAND DERMATITIS: ICD-10-CM

## 2017-11-29 DIAGNOSIS — M25.561 RIGHT KNEE PAIN, UNSPECIFIED CHRONICITY: ICD-10-CM

## 2017-11-29 DIAGNOSIS — G45.9 TRANSIENT CEREBRAL ISCHEMIA, UNSPECIFIED TYPE: ICD-10-CM

## 2017-11-29 PROCEDURE — 99495 TRANSJ CARE MGMT MOD F2F 14D: CPT | Performed by: FAMILY MEDICINE

## 2017-11-29 PROCEDURE — 1111F DSCHRG MED/CURRENT MED MERGE: CPT | Performed by: FAMILY MEDICINE

## 2017-11-29 NOTE — DISCHARGE SUMMARY
Physician Discharge Summary     Patient ID:  Hollis Lara  [id number]  75 y.o.  1950    Admit date: 11/19/2017    Discharge date and time:     Admission Diagnoses:   Patient Active Problem List   Diagnosis    Mixed hyperlipidemia    Obesity (BMI 30-39. 9)    Interstitial cystitis    History of seasonal allergies    Trigger finger, left    High risk medication use    Uncontrolled type 2 diabetes mellitus without complication, without long-term current use of insulin (HCC)    Vitamin D deficiency    Essential hypertension    Atypical chest pain    Hypokalemia    Paresthesias    S/P drug eluting coronary stent placement-RCA 6/20/17 - Dr. Destinee Fitzpatrick    Coronary artery disease involving native coronary artery of native heart without angina pectoris    Stroke-like symptoms    TIA (transient ischemic attack)    Dizziness    Chronic fatigue    Slurred speech    Right sided weakness       Discharge Diagnoses: Principal Problem:    TIA (transient ischemic attack)  Active Problems:    Mixed hyperlipidemia    Obesity (BMI 30-39. 9)    Uncontrolled type 2 diabetes mellitus without complication, without long-term current use of insulin ([address]    Essential hypertension    Paresthesias    S/P drug eluting coronary stent placement-RCA 6/20/17 - Dr. Destinee Fitzpatrick    Coronary artery disease involving native coronary artery of native heart without angina pectoris    Stroke-like symptoms    Dizziness    Chronic fatigue    Slurred speech    Right sided weakness      Consults: neurology    Procedures: eeg    Hospital Course:    75 y.o. female admitted on 11/19/2017 with fatigue, right side tingling, dizziness along with slurring . She is hypertensive along with diabetic having undergone blood pressure medication readjustment this past week . Within 2 days she report to have developed fatigue with tingling on right face arm and leg  There was also lightheadeness on standing with slurring and some trouble coming out with words . Disp-180 tablet, R-1Normal      simvastatin (ZOCOR) 40 MG tablet TAKE ONE TABLET BY MOUTH NIGHTLY, Disp-90 tablet, R-1Normal      triamcinolone (KENALOG) 0.1 % cream Apply topically 3 times daily, Topical, 3 TIMES DAILY Starting 7/12/2017, Until Discontinued, Disp-80 g, R-0, Normal      clopidogrel (PLAVIX) 75 MG tablet Take 1 tablet by mouth daily, Disp-30 tablet, R-11Normal      nitroGLYCERIN (NITROSTAT) 0.4 MG SL tablet Place 1 tablet under the tongue every 5 minutes as needed for Chest pain up to max of 3 total doses. If no relief after 1 dose, call 911., Disp-25 tablet, R-3Normal      Blood Pressure KIT Disp-1 kit, R-0, PrintBlood pressure monitor      Blood Glucose Monitoring Suppl MADELEINE Disp-1 Device, R-0, NormalPatient needs meter,tests strips and lancets. Tests daily. Dx E11.9,250.00      NONFORMULARY OTC Biotin 1000mcg daily      aspirin 81 MG tablet Take 81 mg by mouth daily.          STOP taking these medications       cloNIDine (CATAPRES) 0.1 MG tablet Comments:   Reason for Stopping:         lisinopril (PRINIVIL;ZESTRIL) 5 MG tablet Comments:   Reason for Stopping:             Activity: activity as tolerated  Diet: regular diet    Jameson Hussein MD  [facility] [id number]  [address] [place] [address]  [phone]    On 12/19/2017  Arrival at 3:00 pm.     Neurology follow up       Note that over 30 minutes was spent in preparing discharge papers, discussing discharge with patient, medication review, etc.    Signed:    Annabelle Landis MD  11/29/2017  3:11 PM

## 2017-11-29 NOTE — PROGRESS NOTES
Post-Discharge Transitional Care Management Services      Yves Bartlett   YOB: 1950    Date of Office Visit:  11/29/2017  Date of Hospital Admission: 11/19/17  Date of Hospital Discharge: 11/21/17  [facility]er Risk Score [risk of hospital readmission >=10  medium risk (chance of readmission ~ 12%) >14  high risk (chance of readmission ~18%)]:Risk Score: 19.5    Care management risk score Rising risk (score 2-5) and Complex Care (Scores >=6): 5       Patient Active Problem List   Diagnosis    Mixed hyperlipidemia    Obesity (BMI 30-39. 9)    Interstitial cystitis    History of seasonal allergies    Trigger finger, left    High risk medication use    Uncontrolled type 2 diabetes mellitus without complication, without long-term current use of insulin ([facility])    Vitamin D deficiency    Essential hypertension    Atypical chest pain    Hypokalemia    Paresthesias    S/P drug eluting coronary stent placement-RCA 6/20/17 - Dr. Dinah Ireland    Coronary artery disease involving native coronary artery of native heart without angina pectoris    Stroke-like symptoms    TIA (transient ischemic attack)    Dizziness    Chronic fatigue    Slurred speech    Right sided weakness       Allergies   Allergen Reactions    Iv Dye [Iodides] Shortness Of Breath    Sulfa Antibiotics Shortness Of Breath    Ace Inhibitors Nausea Only     Difficulty swallowing the Lisinopril. Gagging. Nausea.  Cozaar [Losartan] Nausea Only    Ezetimibe-Simvastatin     Lipitor Other (See Comments)     Muscle aches.  Lopressor [Metoprolol]      leg pain    Penicillins Swelling       Medications listed as ordered at the time of discharge from hospital   Gerardo Varma   Home Medication Instructions CY:    Printed on:11/29/17 0515   Medication Information                      amLODIPine (NORVASC) 5 MG tablet  Take 1 tablet by mouth daily             aspirin 81 MG tablet  Take 81 mg by mouth daily.              Blood Glucose Monitoring Suppl MADELEINE  Patient needs meter,tests strips and lancets. Tests daily. Dx E11.9,250.00             Blood Pressure KIT  Blood pressure monitor             Cholecalciferol (VITAMIN D3) 00641 units CAPS  Take 1 capsule by mouth once a week             clopidogrel (PLAVIX) 75 MG tablet  Take 1 tablet by mouth daily             glimepiride (AMARYL) 1 MG tablet  TAKE ONE-HALF TABLET BY MOUTH TWICE DAILY             guaiFENesin-codeine (CHERATUSSIN AC) 100-10 MG/5ML syrup  Take 5 mLs by mouth 4 times daily as needed for Cough . metFORMIN (GLUCOPHAGE) 500 MG tablet  TAKE 1 TABLET BY MOUTH TWO TIMES A DAY WITH MEALS             nitroGLYCERIN (NITROSTAT) 0.4 MG SL tablet  Place 1 tablet under the tongue every 5 minutes as needed for Chest pain up to max of 3 total doses. If no relief after 1 dose, call 911. NONFORMULARY  OTC Biotin 1000mcg daily             simvastatin (ZOCOR) 40 MG tablet  TAKE ONE TABLET BY MOUTH NIGHTLY                   Medications marked \"taking\" at this time  Outpatient Prescriptions Marked as Taking for the 11/29/17 encounter (Office Visit) with Migdalia Joseph MD   Medication Sig Dispense Refill    amLODIPine (NORVASC) 5 MG tablet Take 1 tablet by mouth daily 30 tablet 3    guaiFENesin-codeine (CHERATUSSIN AC) 100-10 MG/5ML syrup Take 5 mLs by mouth 4 times daily as needed for Cough .  118 mL 0    Cholecalciferol (VITAMIN D3) 49152 units CAPS Take 1 capsule by mouth once a week 13 capsule 1    glimepiride (AMARYL) 1 MG tablet TAKE ONE-HALF TABLET BY MOUTH TWICE DAILY 90 tablet 1    metFORMIN (GLUCOPHAGE) 500 MG tablet TAKE 1 TABLET BY MOUTH TWO TIMES A DAY WITH MEALS 180 tablet 1    simvastatin (ZOCOR) 40 MG tablet TAKE ONE TABLET BY MOUTH NIGHTLY 90 tablet 1    clopidogrel (PLAVIX) 75 MG tablet Take 1 tablet by mouth daily 30 tablet 11    nitroGLYCERIN (NITROSTAT) 0.4 MG SL tablet Place 1 tablet under the tongue every 5 minutes as needed for Chest pain up to max of 3 total doses. If no relief after 1 dose, call 911. 25 tablet 3    Blood Pressure KIT Blood pressure monitor 1 kit 0    Blood Glucose Monitoring Suppl MADELEINE Patient needs meter,tests strips and lancets. Tests daily. Dx E11.9,250.00 1 Device 0    NONFORMULARY OTC Biotin 1000mcg daily      aspirin 81 MG tablet Take 81 mg by mouth daily. Medications patient taking as of now reconciled against medications ordered at time of hospital discharge. Clonidine was discontinued. Norvasc 5 mg was prescribed. Vitals:    11/29/17 1534   BP: 136/70   Site: Right Arm   Position: Sitting   Cuff Size: Large Adult   Pulse: 78   Resp: 16   Temp: 98.4 °F (36.9 °C)   SpO2: 96%   Weight: 164 lb 6.4 oz (74.6 kg)   Height: 4' 9\" (1.448 m)     Body mass index is 35.58 kg/m². Wt Readings from Last 3 Encounters:   11/29/17 164 lb 6.4 oz (74.6 kg)   11/19/17 166 lb 0.1 oz (75.3 kg)   11/19/17 170 lb (77.1 kg)     BP Readings from Last 3 Encounters:   11/29/17 136/70   11/21/17 (!) 142/78   11/19/17 (!) 176/74        Inpatient course: Discharge summary reviewed- see chart. History of Present illness - Follow up of Hospital diagnosis(es): Slurred speech, possible TIA    She was evaluated at [facility] ER on 11/19/2017 due to slurred speech, fatigue, dizziness, and tingling on the right side of her face and body. She had a CT scan of the head in [place] that was negative. She was admitted to [facility]. MRI of the brain, and MRA of the brain and neck were negative. A neurology consult was obtained. Dr. Nicole Mcclure opinion was that her symptoms may be due to the recent blood pressure medication changes. Clonidine was discontinued, and Norvasc was prescribed for blood pressure management. She also had a Doppler exam of the right lower leg while hospitalized that was negative for DVT.     Non face to face  following discharge, date last encounter closed

## 2017-12-01 ENCOUNTER — CARE COORDINATION (OUTPATIENT)
Dept: CASE MANAGEMENT | Age: 67
End: 2017-12-01

## 2017-12-01 NOTE — CARE COORDINATION
[facility] Transitions Follow Up Call  2017    Patient: Los Kwok  Patient : 1950   MRN: [id number]    Reason for Admission:   Discharge Date: 17 RARS: Risk Score: 19.5, CM 5     Spoke with: patient    Inpatient Assessment  Care Transitions Subsequent and Final Call    Subsequent and Final Calls  Do you have any ongoing symptoms?:  Yes  Onset of Patient-reported symptoms: In the past 7 days  Patient-reported symptoms:  Cough, Fatigue  Interventions for patient-reported symptoms:  Other  Have your medications changed?:  No  Do you have any questions related to your medications?:  No  Do you currently have any active services?:  Yes  Are you currently active with any services?:  Other  Do you have any needs or concerns that I can assist you with?:  No  Care Transitions Interventions  Other Interventions:          Spoke with patient who tells me she is still fatigued & has a cough - mostly nonproductive, occasional clear expectorant. Persistent fatigue, but no worse since she has seen Dr. Rosina Ahsby. No fever. [address] has seen & will contact patient again next week.     Future Appointments  Date Time Provider Paco Sanchez   2018 1:30 PM MD KIRAN Johnson [facility]   3/6/2018 3:40 PM [facility]     Cherelle Vidal, RN

## 2017-12-02 ENCOUNTER — TELEPHONE (OUTPATIENT)
Dept: PRIMARY CARE CLINIC | Age: 67
End: 2017-12-02

## 2017-12-02 NOTE — TELEPHONE ENCOUNTER
Dr. Digna Glass wrote Rx for [facility] on 11-16-17, but patient did not try to fill it till today. It is past the 2-week law, so they need permission to fill. OR, do you want her to contact Dr. Ernesto Gutierrez office on Monday?

## 2017-12-05 ENCOUNTER — CARE COORDINATION (OUTPATIENT)
Dept: CASE MANAGEMENT | Age: 67
End: 2017-12-05

## 2017-12-06 ENCOUNTER — CARE COORDINATION (OUTPATIENT)
Dept: CARE COORDINATION | Age: 67
End: 2017-12-06

## 2017-12-06 ENCOUNTER — OFFICE VISIT (OUTPATIENT)
Dept: PRIMARY CARE CLINIC | Age: 67
End: 2017-12-06
Payer: MEDICARE

## 2017-12-06 VITALS
RESPIRATION RATE: 16 BRPM | OXYGEN SATURATION: 98 % | HEIGHT: 57 IN | BODY MASS INDEX: 35.47 KG/M2 | WEIGHT: 164.4 LBS | TEMPERATURE: 98.6 F | SYSTOLIC BLOOD PRESSURE: 136 MMHG | DIASTOLIC BLOOD PRESSURE: 86 MMHG | HEART RATE: 84 BPM

## 2017-12-06 DIAGNOSIS — J01.40 ACUTE PANSINUSITIS, RECURRENCE NOT SPECIFIED: Primary | ICD-10-CM

## 2017-12-06 PROCEDURE — 1090F PRES/ABSN URINE INCON ASSESS: CPT | Performed by: FAMILY MEDICINE

## 2017-12-06 PROCEDURE — G8598 ASA/ANTIPLAT THER USED: HCPCS | Performed by: FAMILY MEDICINE

## 2017-12-06 PROCEDURE — G8427 DOCREV CUR MEDS BY ELIG CLIN: HCPCS | Performed by: FAMILY MEDICINE

## 2017-12-06 PROCEDURE — G8484 FLU IMMUNIZE NO ADMIN: HCPCS | Performed by: FAMILY MEDICINE

## 2017-12-06 PROCEDURE — 3014F SCREEN MAMMO DOC REV: CPT | Performed by: FAMILY MEDICINE

## 2017-12-06 PROCEDURE — G8399 PT W/DXA RESULTS DOCUMENT: HCPCS | Performed by: FAMILY MEDICINE

## 2017-12-06 PROCEDURE — 1123F ACP DISCUSS/DSCN MKR DOCD: CPT | Performed by: FAMILY MEDICINE

## 2017-12-06 PROCEDURE — 1036F TOBACCO NON-USER: CPT | Performed by: FAMILY MEDICINE

## 2017-12-06 PROCEDURE — G8417 CALC BMI ABV UP PARAM F/U: HCPCS | Performed by: FAMILY MEDICINE

## 2017-12-06 PROCEDURE — 4040F PNEUMOC VAC/ADMIN/RCVD: CPT | Performed by: FAMILY MEDICINE

## 2017-12-06 PROCEDURE — 99213 OFFICE O/P EST LOW 20 MIN: CPT | Performed by: FAMILY MEDICINE

## 2017-12-06 PROCEDURE — 3017F COLORECTAL CA SCREEN DOC REV: CPT | Performed by: FAMILY MEDICINE

## 2017-12-06 RX ORDER — DOXYCYCLINE HYCLATE 100 MG
100 TABLET ORAL 2 TIMES DAILY
Qty: 20 TABLET | Refills: 0 | Status: SHIPPED | OUTPATIENT
Start: 2017-12-06 | End: 2017-12-16

## 2017-12-06 NOTE — PROGRESS NOTES
OTC Biotin 1000mcg daily      aspirin 81 MG tablet Take 81 mg by mouth daily. No current facility-administered medications for this visit. She is allergic to iv dye [iodides]; sulfa antibiotics; ace inhibitors; cozaar [losartan]; ezetimibe-simvastatin; lipitor; lopressor [metoprolol]; and penicillins. She has the following problem list:  Patient Active Problem List   Diagnosis    Mixed hyperlipidemia    Obesity (BMI 30-39. 9)    Interstitial cystitis    History of seasonal allergies    Trigger finger, left    High risk medication use    Uncontrolled type 2 diabetes mellitus without complication, without long-term current use of insulin ([facility])    Vitamin D deficiency    Essential hypertension    Atypical chest pain    Hypokalemia    Paresthesias    S/P drug eluting coronary stent placement-RCA 6/20/17 - Dr. Ronal Begum    Coronary artery disease involving native coronary artery of native heart without angina pectoris    Stroke-like symptoms    TIA (transient ischemic attack)    Dizziness    Chronic fatigue    Slurred speech    Right sided weakness        She  reports that she has never smoked. She has never used smokeless tobacco.      Objective:    Vitals:    12/06/17 1147   BP: 136/86   Pulse: 84   Resp: 16   Temp: 98.6 °F (37 °C)   SpO2: 98%     SpO2: 98 %       Body mass index is 35.58 kg/m². Obese  female alert and cooperative. The tympanic membranes are dull bilaterally. Oropharynx has mild erythema. There is no exudate. There is moderate tenderness over the maxillary sinuses bilaterally. Neck is supple, with no lymphadenopathy. Chest is clear to auscultation, no wheezes, rales, or rhonchi. Heart sounds are regular rate and rhythm, no murmurs. Assessment & Plan:    Acute pansinusitis, recurrence not specified  - doxycycline hyclate (VIBRA-TABS) 100 MG tablet; Take 1 tablet by mouth 2 times daily for 10 days  Dispense: 20 tablet;  Refill: 0    She was advised to follow up if symptoms worsen or do not resolve.        (Please note that portions of this note were completed with a voice-recognition program. Efforts were made to edit the dictation but occasionally words are mis-transcribed.)

## 2017-12-06 NOTE — CARE COORDINATION
Ambulatory Care Coordination Note  12/6/2017  CM Risk Score: 5  Joshua Mortality Risk Score: 2.78    ACC: Felicia Leonard RN    Summary Note: Qing Chi was referred to [facility] from [facility]. She was recently hospitalized for slurred speech, stroke-like symptoms, HTN  She has Diabetes type II- not on insulin- controlled, CAD- stent x 2- 6/2017, Obesity, Hyperlipodemia    Called and spoke with Kika \"Nyla\". She monitors BS daily- fasting 120-132. She continues with clear runny nose, cough- productive at times, SOB with exertion, and fatigue. She reported that \"I cough so hard until I can bring up phlegm\". She has been taking OTC cough syrup etc without relief. Stated that spouse wanted her to call and be seen today. Spoke with Dr. Ash Hayes (PCP) she is in Urgent Care today- she will see Patient today. Nyla aware. She stated she has been driving and will get around and come in. This writer's contact information given. Patient in agreement to follow in [facility]. This writer will follow up next week and continue assessment, education and support. General Assessment    Do you have any symptoms that are causing concern?:  Yes  Progression since Onset:  Gradually Worsening  Reported Symptoms:  Cough, Fatigue, Shortness of Breath (Comment: seeing PCP in  today)         Ambulatory Care Coordination Assessment    Care Coordination Protocol  Program Enrollment:  Rising Risk  Referral from Primary Care Provider:  No  Week 1 - Initial Assessment     Do you have all of your prescriptions and are they filled?:  Yes (Comment: started norvasc)  Barriers to medication adherence:  None  How often do you have trouble taking your medications the way you have been told to take them?:  I always take them as prescribed. Do you have Home O2 Therapy?:  No      Ability to seek help/take action for Emergent Urgent situations i.e. fire, crime, inclement weather or health crisis. :  Independent  Ability to ambulate to restroom: Independent  Ability handle personal hygeine needs (bathing/dressing/grooming): Independent  Ability to manage Medications: Independent  Ability to prepare Food Preparation:  Independent  Ability to maintain home (clean home, laundry): Independent  Ability to drive and/or has transportation:  Independent  Ability to do shopping:  Independent  Ability to manage finances: Independent  Is patient able to live independently?:  Yes     Current Housing:  Private Residence        Per the Fall Risk Screening, did the patient have 2 or more falls or 1 fall with injury in the past year?:  No     Frequent urination at night?:  No  Do you use rails/bars?:  No  Do you have a non-slip tub mat?:  Yes     Are you experiencing loss of meaning?:  No  Are you experiencing loss of hope and peace?:  No     Thinking about your patient's physical health needs, are there any symptoms or problems (risk indicators) you are unsure about that require further investigation?:  No identified areas of uncertainly or problems already being investigated   Are the patients physical health problems impacting on their mental well-being?:  No identified areas of concern   Are there any problems with your patients lifestyle behaviors (alcohol, drugs, diet, exercise) that are impacting on physical or mental well-being?:  No identified areas of concern   Do you have any other concerns about your patients mental well-being?  How would you rate their severity and impact on the patient?:  No identified areas of concern   How would you rate their home environment in terms of safety and stability (including domestic violence, insecure housing, neighbor harassment)?:  Consistently safe, supportive, stable, no identified problems   How do daily activities impact on the patient's well-being? (include current or anticipated unemployment, work, caregiving, access to transportation or other):  No identified problems or perceived positive benefits   How would 10/26/17   John Hurtado MD   simvastatin (ZOCOR) 40 MG tablet TAKE ONE TABLET BY MOUTH NIGHTLY 10/26/17   Ashley Vasquez MD   clopidogrel (PLAVIX) 75 MG tablet Take 1 tablet by mouth daily 6/21/17   Lisbeth Badillo CNP   nitroGLYCERIN (NITROSTAT) 0.4 MG SL tablet Place 1 tablet under the tongue every 5 minutes as needed for Chest pain up to max of 3 total doses. If no relief after 1 dose, call 911. 6/21/17   Lisbeth Badillo CNP   Blood Pressure KIT Blood pressure monitor 2/1/17   John Hurtado MD   Blood Glucose Monitoring Suppl MADELEINE Patient needs meter,tests strips and lancets. Tests daily. Dx Z54.4,737.99 1/11/17   John Hurtado MD   NONFORMULARY OTC Biotin 1000mcg daily    Historical Provider, MD   aspirin 81 MG tablet Take 81 mg by mouth daily.     Historical Provider, MD       Future Appointments  Date Time Provider Paco Laura   1/26/2018 1:30 PM MD KIRAN Johnson [facility]   3/6/2018 3:40 PM John Hurtado MD [facility]

## 2017-12-11 ENCOUNTER — TELEPHONE (OUTPATIENT)
Dept: FAMILY MEDICINE CLINIC | Age: 67
End: 2017-12-11

## 2017-12-11 ENCOUNTER — CARE COORDINATION (OUTPATIENT)
Dept: CARE COORDINATION | Age: 67
End: 2017-12-11

## 2017-12-11 DIAGNOSIS — J32.4 PANSINUSITIS, UNSPECIFIED CHRONICITY: Primary | ICD-10-CM

## 2017-12-11 NOTE — TELEPHONE ENCOUNTER
Pt calling stating she was seen and given doxycycline and states every time she eats she vomits, pt states her cough is a little better but her sinuses are still draining, pt not sure if she wants anything else called in as she had to pay $48 for the med she already has, please call pt at above number with recommendations.

## 2017-12-11 NOTE — TELEPHONE ENCOUNTER
She was seen at Urgent Care on 12/6/2017. Doxycycline was prescribed for pansinusitis. She is allergic to Iv dye [iodides]; Sulfa antibiotics; Ace inhibitors; Cozaar [losartan]; Ezetimibe-simvastatin; Lipitor; Lopressor [metoprolol]; and Penicillins. As she is not tolerating Doxycycline, I can prescribe Cipro 500 mg BID x 7 days. This is free at [email or web address]. Please confirm if she wants the prescription sent to [facility].

## 2017-12-12 RX ORDER — CIPROFLOXACIN 500 MG/1
500 TABLET, FILM COATED ORAL 2 TIMES DAILY
Qty: 14 TABLET | Refills: 0 | Status: SHIPPED | OUTPATIENT
Start: 2017-12-12 | End: 2017-12-19

## 2017-12-13 ENCOUNTER — TELEPHONE (OUTPATIENT)
Dept: FAMILY MEDICINE CLINIC | Age: 67
End: 2017-12-13

## 2017-12-13 NOTE — TELEPHONE ENCOUNTER
Please advise the patient that it would be reasonable to discontinue Cipro and take Coricidin HBP. Please advise her to call if she has a temperature over 100.5, productive cough, or shortness of breath.

## 2017-12-19 ENCOUNTER — OFFICE VISIT (OUTPATIENT)
Dept: NEUROLOGY | Age: 67
End: 2017-12-19
Payer: MEDICARE

## 2017-12-19 VITALS
HEIGHT: 57 IN | HEART RATE: 84 BPM | WEIGHT: 164.46 LBS | OXYGEN SATURATION: 98 % | BODY MASS INDEX: 35.48 KG/M2 | DIASTOLIC BLOOD PRESSURE: 68 MMHG | SYSTOLIC BLOOD PRESSURE: 134 MMHG

## 2017-12-19 DIAGNOSIS — R20.2 PARESTHESIAS: ICD-10-CM

## 2017-12-19 DIAGNOSIS — I25.10 CORONARY ARTERY DISEASE INVOLVING NATIVE CORONARY ARTERY OF NATIVE HEART WITHOUT ANGINA PECTORIS: ICD-10-CM

## 2017-12-19 DIAGNOSIS — E78.2 MIXED HYPERLIPIDEMIA: ICD-10-CM

## 2017-12-19 DIAGNOSIS — E66.9 OBESITY (BMI 30-39.9): ICD-10-CM

## 2017-12-19 DIAGNOSIS — R53.82 CHRONIC FATIGUE: ICD-10-CM

## 2017-12-19 DIAGNOSIS — Z88.9 HISTORY OF SEASONAL ALLERGIES: ICD-10-CM

## 2017-12-19 DIAGNOSIS — I63.00 CEREBROVASCULAR ACCIDENT (CVA) DUE TO THROMBOSIS OF PRECEREBRAL ARTERY (HCC): Primary | ICD-10-CM

## 2017-12-19 DIAGNOSIS — R29.90 STROKE-LIKE SYMPTOMS: ICD-10-CM

## 2017-12-19 DIAGNOSIS — I10 ESSENTIAL HYPERTENSION: ICD-10-CM

## 2017-12-19 DIAGNOSIS — E55.9 VITAMIN D DEFICIENCY: ICD-10-CM

## 2017-12-19 DIAGNOSIS — G45.8 OTHER SPECIFIED TRANSIENT CEREBRAL ISCHEMIAS: ICD-10-CM

## 2017-12-19 DIAGNOSIS — R42 DIZZINESS: ICD-10-CM

## 2017-12-19 PROCEDURE — 1036F TOBACCO NON-USER: CPT | Performed by: PSYCHIATRY & NEUROLOGY

## 2017-12-19 PROCEDURE — 3044F HG A1C LEVEL LT 7.0%: CPT | Performed by: PSYCHIATRY & NEUROLOGY

## 2017-12-19 PROCEDURE — 3017F COLORECTAL CA SCREEN DOC REV: CPT | Performed by: PSYCHIATRY & NEUROLOGY

## 2017-12-19 PROCEDURE — G8598 ASA/ANTIPLAT THER USED: HCPCS | Performed by: PSYCHIATRY & NEUROLOGY

## 2017-12-19 PROCEDURE — 4040F PNEUMOC VAC/ADMIN/RCVD: CPT | Performed by: PSYCHIATRY & NEUROLOGY

## 2017-12-19 PROCEDURE — G8484 FLU IMMUNIZE NO ADMIN: HCPCS | Performed by: PSYCHIATRY & NEUROLOGY

## 2017-12-19 PROCEDURE — 1123F ACP DISCUSS/DSCN MKR DOCD: CPT | Performed by: PSYCHIATRY & NEUROLOGY

## 2017-12-19 PROCEDURE — 99205 OFFICE O/P NEW HI 60 MIN: CPT | Performed by: PSYCHIATRY & NEUROLOGY

## 2017-12-19 PROCEDURE — G8417 CALC BMI ABV UP PARAM F/U: HCPCS | Performed by: PSYCHIATRY & NEUROLOGY

## 2017-12-19 PROCEDURE — 1090F PRES/ABSN URINE INCON ASSESS: CPT | Performed by: PSYCHIATRY & NEUROLOGY

## 2017-12-19 PROCEDURE — 3014F SCREEN MAMMO DOC REV: CPT | Performed by: PSYCHIATRY & NEUROLOGY

## 2017-12-19 PROCEDURE — G8427 DOCREV CUR MEDS BY ELIG CLIN: HCPCS | Performed by: PSYCHIATRY & NEUROLOGY

## 2017-12-19 PROCEDURE — G8399 PT W/DXA RESULTS DOCUMENT: HCPCS | Performed by: PSYCHIATRY & NEUROLOGY

## 2017-12-19 ASSESSMENT — ENCOUNTER SYMPTOMS
APNEA: 0
CHEST TIGHTNESS: 0
PHOTOPHOBIA: 0
WHEEZING: 0
EYE DISCHARGE: 0
BLOOD IN STOOL: 0
ABDOMINAL PAIN: 0
SORE THROAT: 0
CHOKING: 0
FACIAL SWELLING: 0
BACK PAIN: 0
EYE REDNESS: 0
SINUS PRESSURE: 0
ABDOMINAL DISTENTION: 0
EYE ITCHING: 0
NAUSEA: 0
TROUBLE SWALLOWING: 0
VOICE CHANGE: 0
SWOLLEN GLANDS: 0
DIARRHEA: 0
CHANGE IN BOWEL HABIT: 0
EYE PAIN: 0
COUGH: 0
SHORTNESS OF BREATH: 0
VOMITING: 0
CONSTIPATION: 0
COLOR CHANGE: 0
VISUAL CHANGE: 0

## 2017-12-19 NOTE — PROGRESS NOTES
dilatation.  EYE SURGERY      HYSTERECTOMY, TOTAL ABDOMINAL  1980    LAPAROSCOPY  3/1979    NASAL SEPTUM SURGERY  3/9/2005    Nasoseptal reconstruction.  OVARY REMOVAL Right 7/1993    Laparotomy.  YAG CAPSULOTOMY Right 04/2016    Dr Doris William         Current Outpatient Prescriptions   Medication Sig Dispense Refill    amLODIPine (NORVASC) 5 MG tablet Take 1 tablet by mouth daily 30 tablet 3    guaiFENesin-codeine (CHERATUSSIN AC) 100-10 MG/5ML syrup Take 5 mLs by mouth 4 times daily as needed for Cough . 118 mL 0    Cholecalciferol (VITAMIN D3) 77828 units CAPS Take 1 capsule by mouth once a week 13 capsule 1    glimepiride (AMARYL) 1 MG tablet TAKE ONE-HALF TABLET BY MOUTH TWICE DAILY 90 tablet 1    metFORMIN (GLUCOPHAGE) 500 MG tablet TAKE 1 TABLET BY MOUTH TWO TIMES A DAY WITH MEALS 180 tablet 1    simvastatin (ZOCOR) 40 MG tablet TAKE ONE TABLET BY MOUTH NIGHTLY 90 tablet 1    clopidogrel (PLAVIX) 75 MG tablet Take 1 tablet by mouth daily 30 tablet 11    nitroGLYCERIN (NITROSTAT) 0.4 MG SL tablet Place 1 tablet under the tongue every 5 minutes as needed for Chest pain up to max of 3 total doses. If no relief after 1 dose, call 911. 25 tablet 3    Blood Pressure KIT Blood pressure monitor 1 kit 0    Blood Glucose Monitoring Suppl MADELEINE Patient needs meter,tests strips and lancets. Tests daily. Dx E11.9,250.00 1 Device 0    NONFORMULARY OTC Biotin 1000mcg daily      aspirin 81 MG tablet Take 81 mg by mouth daily.  ciprofloxacin (CIPRO) 500 MG tablet Take 1 tablet by mouth 2 times daily for 7 days 14 tablet 0     No current facility-administered medications for this visit. Allergies   Allergen Reactions    Iv Dye [Iodides] Shortness Of Breath    Sulfa Antibiotics Shortness Of Breath    Ace Inhibitors Nausea Only     Difficulty swallowing the Lisinopril. Gagging. Nausea.     Cozaar [Losartan] Nausea Only    Ezetimibe-Simvastatin     Lipitor Other (See Comments)     Muscle 10/12/2017    PROT 7.1 10/12/2017    BILITOT 0.37 10/12/2017    LABALBU 4.7 10/12/2017     Lab Results   Component Value Date    BUN 17 11/19/2017    CREATININE 0.79 11/19/2017     Lab Results   Component Value Date    CALCIUM 9.4 11/19/2017     Lab Results   Component Value Date    AST 26 10/12/2017    ALT 28 10/12/2017       Lab Results   Component Value Date    URICACID 4.7 06/06/2017     Lab Results   Component Value Date    CKTOTAL 106 09/06/2013       Review of Systems   Constitutional: Negative for appetite change, chills, fatigue, fever and unexpected weight change. HENT: Negative for congestion, dental problem, drooling, ear discharge, ear pain, facial swelling, hearing loss, mouth sores, nosebleeds, postnasal drip, sinus pressure, sore throat, tinnitus, trouble swallowing and voice change. Eyes: Negative for photophobia, pain, discharge, redness, itching and visual disturbance. Respiratory: Negative for apnea, cough, choking, chest tightness, shortness of breath and wheezing. Cardiovascular: Negative for chest pain, palpitations and leg swelling. Gastrointestinal: Negative for abdominal distention, abdominal pain, anorexia, blood in stool, change in bowel habit, constipation, diarrhea, nausea and vomiting. Endocrine: Negative for cold intolerance, heat intolerance, polydipsia, polyphagia and polyuria. Musculoskeletal: Negative for arthralgias, back pain, gait problem, joint swelling, myalgias, neck pain and neck stiffness. Skin: Negative for color change, pallor, rash and wound. Allergic/Immunologic: Negative for environmental allergies, food allergies and immunocompromised state. Neurological: Negative for dizziness, vertigo, tremors, seizures, syncope, facial asymmetry, speech difficulty, weakness, light-headedness, numbness and headaches. Hematological: Negative for adenopathy. Does not bruise/bleed easily.    Psychiatric/Behavioral: Negative for agitation, behavioral problems, commands without   Any  Difficulty. No right  To left confusion. Normal  Speech  And language function. Insight and  Judgment ,Fund  Of  Knowledge   within normal limits              Recent  And  Remote memory  within normal limits              Attention &Concentration are within normal limits                                                 B) CRANIAL NERVES :             2 CN : Visual  Acuity  And  Visual fields  within normal limits                      Fundi  Could  Not  Be  Could  Not  Be  Evaluated. 3,4,6 CN : Both  Pupils are   Reactive and  Equal.                          Extraocular   Movements  Are  Intact. No  Nystagmus. No  MAXWELL. No  Afferent  Pupillary  Defect noted. 5 CN :  Normal  Facial sensations and Corneal  Reflexes         7 CN :  Normal  Facial  Symmetry  And  Strength. No facial  Weakness. 8 CN :  Hearing  Appears within normal limits        9, 10 CN: Normal spontaneous, reflex palate movements       11 CN:   Normal  Shoulder shrug and  strength       12 CN :   Normal  Tongue movements and  Tongue  In midline                      No tongue   Fasciculations or atrophy     C) MOTOR  EXAM:                 Strength  In upper  And  Lower extremities   within normal limits             No  Drift. No  Atrophy             Rapid alternating  And  repetitions  Movements  within normal limits               Muscle  Tone  In upper  And  Lower  Extremities  normal              No rigidity. No  Spasticity. Bradykinesia   absent               No  Asterixis. Sustention  Tremor , Resting  Tremor   absent                  No other  Abnormal  Movements noted         D) SENSORY :             light touch, pinprick, position  And  Vibration  DECREASED     E) REFLEXES:                 Deep  Tendon  Reflexes normal                  No pathological  Reflexes  Bilaterally. F) COORDINATION  AND  GAIT :                              Station and  Gait  normal                                      Romberg's test   POSITIVE                        Ataxia negative    ASSESSMENT:    Patient Active Problem List   Diagnosis    Mixed hyperlipidemia    Obesity (BMI 30-39. 9)    Interstitial cystitis    History of seasonal allergies    Trigger finger, left    High risk medication use    Uncontrolled type 2 diabetes mellitus without complication, without long-term current use of insulin (HCC)    Vitamin D deficiency    Essential hypertension    Atypical chest pain    Hypokalemia    Paresthesias    S/P drug eluting coronary stent placement-RCA 6/20/17 - Dr. Kimberly Gordon    Coronary artery disease involving native coronary artery of native heart without angina pectoris    Stroke-like symptoms    TIA (transient ischemic attack)    Dizziness    Chronic fatigue    Slurred speech    Right sided weakness    Cerebrovascular accident (CVA) due to thrombosis of precerebral artery ([address]     MRI OF THE BRAIN WITHOUT CONTRAST; MRA OF THE NECK WITHOUT CONTRAST; MRA OF   THE HEAD WITHOUT CONTRAST  11/19/2017 8:47 pm; 11/19/2017 8:48 pm       TECHNIQUE:   Multiplanar multisequence MRI of the brain was performed without the   administration of intravenous contrast.; Multiplanar multisequence MRA of the   neck was performed without the administration of intravenous contrast.   Stenosis of the internal carotid arteries measured using NASCET criteria.;   MRA of the head was performed utilizing time-of-flight imaging with MIP   images. No intravenous contrast was administered.       COMPARISON:   None.       HISTORY:   ORDERING SYSTEM PROVIDED HISTORY: stroke; ORDERING SYSTEM PROVIDED HISTORY:   stat       FINDINGS:   MRI brain:       INTRACRANIAL STRUCTURES/VENTRICLES: There is no acute infarct.  There are mild   chronic white matter microvascular ischemic changes characterized by foci of periventricular and subcortical white matter signal abnormality.  No   intracranial mass, shift, or bleed is identified.  Normal expected signal   voids are present within the vessels at the base of skull.       ORBITS: The visualized portion of the orbits demonstrate no acute abnormality.       SINUSES: Trace air-fluid levels are present within the maxillary sinuses.       BONES/SOFT TISSUES: The bone marrow signal intensity appears normal. The   craniocervical junction is normal in appearance.       MRA neck:       The left vertebral artery is dominant.  The carotids are grossly normal in   caliber and signal to the extent visualized.       MRA brain:       The vertebrobasilar system is within normal limits.  The posterior cerebral   arteries are normal in course and caliber to the extent visualized.  The   anterior and middle cerebral arteries demonstrate normal arborization   patterns.  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  No aneurysm or vascular malformation is identified.           Impression   No acute infarct.       No flow limiting stenosis or branch occlusion detected within the head or   neck.         MRI OF THE BRAIN WITHOUT CONTRAST; MRA OF THE NECK WITHOUT CONTRAST; MRA OF   THE HEAD WITHOUT CONTRAST  11/19/2017 8:47 pm; 11/19/2017 8:48 pm       TECHNIQUE:   Multiplanar multisequence MRI of the brain was performed without the   administration of intravenous contrast.; Multiplanar multisequence MRA of the   neck was performed without the administration of intravenous contrast.   Stenosis of the internal carotid arteries measured using NASCET criteria.;   MRA of the head was performed utilizing time-of-flight imaging with MIP   images.  No intravenous contrast was administered.       COMPARISON:   None.       HISTORY:   ORDERING SYSTEM PROVIDED HISTORY: stroke; ORDERING SYSTEM PROVIDED HISTORY:   stat       FINDINGS:   MRI brain:       INTRACRANIAL Interpreting Physician  Kirill Barrow        Referring                  Referring Physician     Navid Ohara    Nurse    Practitioner       Additional Comments   Classification of ICA Stenosis   16-49% spectral broadening without increased velocities   63-75% peak systolic velocities >439ZW/ILD   05-73% peak systolic velocities >522WN/TXC, end diastolic velocities   >013DC/EMT   ICA/CCA ratio greater than 4.0 suggests a >70% stenosis.       Procedure   Type of Study:        Cerebral: Carotid, Carotid Scan Bilateral.       Indications for Study:Dizziness.       Risk Factors         - The patient's risk factor(s) include: diabetes mellitus, dyslipidemia       and arterial hypertension.       Findings:        Right                                Left    Intimal thickening right common      Intimal thickening left common    carotid artery. Some increase peak   carotid artery.    flow velocity noted along the common Carotid scan shows mild to moderate    carotid artery.                      calcific plaque formation at the    Carotid scan shows mild hard plaque  bifurcation, origin of the left    formation at the bifurcation, origin internal carotid artery.    of the right internal carotid        Minimal spectral broadening of the    artery.                              left internal carotid artery with    Minimal spectral broadening of the   elevation of peak systolic    right internal carotid artery with   velocities.    elevation of peak systolic           There is smooth plaque formation at    velocities.                          the origin of the external carotid    There is heterogeneous plaque        artery.    formation at the origin of the       Vertebral artery flow is antegrade .    external carotid artery.    Vertebral artery flow is antegrade .       Patient Status: In Patient.       Velocities are measured in cm/s ; Diameters are measured in mm       Carotid Right Measurements +------------+-------+-------+--------+-------+------------+---------------+   ! Location    !PSV    !EDV    ! Angle   !RI     !%Stenosis   ! Tortuosity     !   +------------+-------+-------+--------+-------+------------+---------------+   ! Prox CCA    !146    !18.9   !60      !0.87   !            !               !   +------------+-------+-------+--------+-------+------------+---------------+   ! Mid CCA     !125    !19.6   !60      !0.84   !            !               !   +------------+-------+-------+--------+-------+------------+---------------+   ! Dist CCA    !95.1   !18.1   !60      !0.81   !            !               !   +------------+-------+-------+--------+-------+------------+---------------+   ! Prox ICA    !91.1   !25.9   !60      !0.72   !            !               !   +------------+-------+-------+--------+-------+------------+---------------+   ! Mid ICA     !107    !33.8   !60      !0.68   !            !               !   +------------+-------+-------+--------+-------+------------+---------------+   ! Dist ICA    !710    !33     !60      !0.76   !            !               !   +------------+-------+-------+--------+-------+------------+---------------+   ! Prox ECA   [id number]    !15.7   !60      !0.89   !            !               !   +------------+-------+-------+--------+-------+------------+---------------+   ! Vertebral   !73.1   !12.6   !60      !0.83   !            !               !   +------------+-------+-------+--------+-------+------------+---------------+         - There is antegrade vertebral flow noted on the right side.         - Additional Measurements:ICAPSV/CCAPSV 0.94. ICAEDV/CCAEDV 1.79.       Carotid Left Measurements   +------------+-------+-------+--------+-------+------------+---------------+   ! Location    !PSV    !EDV    ! Angle   !RI     !%Stenosis   ! Tortuosity     !   +------------+-------+-------+--------+-------+------------+---------------+   ! Prox CCA    !89.6   !19.6   !60      !0.78   !            !               !   +------------+-------+-------+--------+-------+------------+---------------+   ! Mid CCA     !99     !23.6   !60      !0.76   !            !               !   +------------+-------+-------+--------+-------+------------+---------------+   ! Dist CCA    !97.4   !20.4   !60      !0.79   !            !               !   +------------+-------+-------+--------+-------+------------+---------------+   ! Prox ICA    !99.8   !34.6   !60      !0.65   !            !               !   +------------+-------+-------+--------+-------+------------+---------------+   ! Mid ICA     !82.5   !20.4   !60      !0.75   !            !               !   +------------+-------+-------+--------+-------+------------+---------------+   ! Dist ICA    !130    !40.9   !60      !0.69   !            !               !   +------------+-------+-------+--------+-------+------------+---------------+   ! Prox ECA    !161    !16.5   !60      !0.9    !            !               !   +------------+-------+-------+--------+-------+------------+---------------+   ! Vertebral   !74.6   !16.5   !60      !0.78   !            !               !   +------------+-------+-------+--------+-------+------------+---------------+         - Additional Measurements:ICAPSV/CCAPSV 1.45. ICAEDV/CCAEDV 2.09.        Conclusions        Summary        Mild 16-49% stenosis of the right internal carotid artery.    Mild to moderate 45-55 % stenosis of the left internal carotid artery.    Patent vertebral arteries bilaterally with antegrade flow.        Signature        ----------------------------------------------------------------    Electronically signed by Taylor Cuenca(Sonographer) on    06/20/2017 03:22 PM    ----------------------------------------------------------------             VISITING DIAGNOSIS:      ICD-10-CM ICD-9-CM    1.  Cerebrovascular accident (CVA) due to thrombosis of precerebral artery (HCC) I63.00 433.91 US Transcranial Doppler Complete   2. Mixed hyperlipidemia E78.2 272.2    3. Obesity (BMI 30-39. 9) E66.9 278.00    4. History of seasonal allergies Z88.9 V15.09    5. Uncontrolled type 2 diabetes mellitus without complication, without long-term current use of insulin (HCC) E11.65 250.02    6. Vitamin D deficiency E55.9 268.9    7. Essential hypertension I10 401.9    8. Coronary artery disease involving native coronary artery of native heart without angina pectoris I25.10 414.01 US Transcranial Doppler Complete   9. Paresthesias R20.2 782.0    10. Other specified transient cerebral ischemias G45.8 435.8    11. Stroke-like symptoms R29.90 781.99 US Transcranial Doppler Complete   12. Dizziness R42 780.4 US Transcranial Doppler Complete   13. Chronic fatigue R53.82 780.79               CONCERNS   &   INCREASED   RISK   FOR         * TIA,  CEREBRO  VASCULAR  ISCHEMIA, STROKE      *   PERIPHERAL  NEUROPATHY,   NEUROPATHIC  PAIN          VARIOUS  RISK   FACTORS   WERE  REVIEWED   AND   DISCUSSED. PLAN:       Kevin Jannet  Of  The  Diagnoses,  The  Management & Treatment  Options           AND    Care  plan  Were        Reviewed and   Discussed   With  patient. * Goals  And  Expectations  Of  The  Therapy  Discussed   And  Reviewed. *   Benefits   And   Side  Effect  Profile  Of  Medication/s   Were   Discussed             * Need   For  Further   Follow up For  The  Various  Problems  Were discussed. * Results  Of  The  Previous  Diagnostic tests were reviewed and questions answered. patient  understand the same. Medical  Decision  Making  Was  Made  Based on the   Complexity  Of  Above  Mentioned  Diagnoses,    Data reviewed   & diagnostic  Tests  Reviewed,  Risk  Of  Significant   Co morbidities and complicating   Factors.              Medical  Decision  Was   High  Complexity  Due   To  The  Patient's  Multiple  Symptoms,  Advancing   Disease,  Complex  Treatment  Regimen,  Multiple medications and Healthy  Life Style    With   Periodic  Monitoring  Of    Any  Medical  Conditions  Including   Elevated  Blood  Pressure,  Lipid  Profile,   Blood  Sugar levels  And   Heart  Disease. *   Period   Screening  For  Cancers  Involving  Breast,  Colon,  Prostate, lungs  And  Other  Organs  As  Applicable,  In consultation   With  Your  Primary Care Providers. * Second  Neurological  Opinion  And  Evaluations  In  [facility] AND [facility]  Setting  If  Patient  Is  Interested. *  If  The  Patient remains  Neurologically  Stable   Return   To  [facility] Neurology Department   IN  3 MONTHS  TIME   FOR  FURTHER  FOLLOW UP.                 *  If   There is  Any  Significant  Worsening   Of  Current  Symptoms  And  Or  If patient  Develops   Any additional  New  Neurological  Symptoms  Or  Significant  Concerns   Should  Call  911 or  Go  To  Emergency  Department  For  Further  Immediate  Evaluation. *   The  Neurological   Findings,  Possible  Diagnosis,  Differential diagnoses   And  Options  For    Further   Investigations   And  management   Are  Discussed  Comprehensively. Medications   And  Prescription   Risks  And  Side effects  Are   Also  Discussed. The  Above  Were  Reviewed  With  patient and   questions  Answered  In  Detail. More   Than   50% of face  To face Time   Was  Spent  On  Counseling   And   Coordination  Of  Care   Of   Patient's multiple   Neurological  Problems   And   Comorbid  Medical   Conditions. Electronically signed by Ivis Leger MD   Board Certified in  Neurology &  In  [facility] ([facility])      DISCLAIMER:   Although every effort was made to ensure the accuracy of this  electronic transcription, some errors in transcription may have occurred.       GENERAL PATIENT INSTRUCTIONS:     A Healthy Lifestyle: Care Instructions  Your Care most people, but it is always a good idea to talk to your doctor before starting an exercise program.  Keep moving. [place] the lawn, work in the garden, or [employer]. Take the stairs instead of the elevator at work. If you smoke, quit. People who smoke have an increased risk for heart attack, stroke, cancer, and other lung illnesses. Quitting is hard, but there are ways to boost your chance of quitting tobacco for good. Use nicotine gum, patches, or lozenges. Ask your doctor about stop-smoking programs and medicines. Keep trying. In addition to reducing your risk of diseases in the future, you will notice some benefits soon after you stop using tobacco. If you have shortness of breath or asthma symptoms, they will likely get better within a few weeks after you quit. Limit how much alcohol you drink. Moderate amounts of alcohol (up to 2 drinks a day for men, 1 drink a day for women) are okay. But drinking too much can lead to liver problems, high blood pressure, and other health problems. Family health  If you have a family, there are many things you can do together to improve your health. Eat meals together as a family as often as possible. Eat healthy foods. This includes fruits, vegetables, lean meats and dairy, and whole grains. Include your family in your fitness plan. Most people think of activities such as jogging or tennis as the way to fitness, but there are many ways you and your family can be more active. Anything that makes you breathe hard and gets your heart pumping is exercise. Here are some tips:  Walk to do errands or to take your child to school or the bus. Go for a family bike ride after dinner instead of watching TV. Where can you learn more? Go to https://"[employer]saroj.healthSmartDocs ([employer]partners. org and sign in to your [employer] account. Enter S038 in the [facility] box to learn more about \"A Healthy Lifestyle: Care Instructions. \"     If you do not have an account, please click on the \"Sign Up Now\" link. Current as of: July 26, 2016  Content Version: 11.2  © 5141-5758 [employer]. Care instructions adapted under license by [facility] ([facility]). If you have questions about a medical condition or this instruction, always ask your healthcare professional. [place] [address] any warranty or liability for your use of this information.

## 2017-12-26 ENCOUNTER — OFFICE VISIT (OUTPATIENT)
Dept: PRIMARY CARE CLINIC | Age: 67
End: 2017-12-26
Payer: MEDICARE

## 2017-12-26 VITALS
HEART RATE: 94 BPM | HEIGHT: 57 IN | SYSTOLIC BLOOD PRESSURE: 118 MMHG | WEIGHT: 162.8 LBS | BODY MASS INDEX: 35.12 KG/M2 | OXYGEN SATURATION: 97 % | DIASTOLIC BLOOD PRESSURE: 70 MMHG

## 2017-12-26 DIAGNOSIS — K52.9 GASTROENTERITIS: Primary | ICD-10-CM

## 2017-12-26 PROCEDURE — 99202 OFFICE O/P NEW SF 15 MIN: CPT | Performed by: NURSE PRACTITIONER

## 2017-12-26 PROCEDURE — G8484 FLU IMMUNIZE NO ADMIN: HCPCS | Performed by: NURSE PRACTITIONER

## 2017-12-26 PROCEDURE — G8427 DOCREV CUR MEDS BY ELIG CLIN: HCPCS | Performed by: NURSE PRACTITIONER

## 2017-12-26 PROCEDURE — 1123F ACP DISCUSS/DSCN MKR DOCD: CPT | Performed by: NURSE PRACTITIONER

## 2017-12-26 PROCEDURE — 3014F SCREEN MAMMO DOC REV: CPT | Performed by: NURSE PRACTITIONER

## 2017-12-26 PROCEDURE — 1036F TOBACCO NON-USER: CPT | Performed by: NURSE PRACTITIONER

## 2017-12-26 PROCEDURE — 1090F PRES/ABSN URINE INCON ASSESS: CPT | Performed by: NURSE PRACTITIONER

## 2017-12-26 PROCEDURE — 3017F COLORECTAL CA SCREEN DOC REV: CPT | Performed by: NURSE PRACTITIONER

## 2017-12-26 PROCEDURE — G8598 ASA/ANTIPLAT THER USED: HCPCS | Performed by: NURSE PRACTITIONER

## 2017-12-26 PROCEDURE — G8399 PT W/DXA RESULTS DOCUMENT: HCPCS | Performed by: NURSE PRACTITIONER

## 2017-12-26 PROCEDURE — G8417 CALC BMI ABV UP PARAM F/U: HCPCS | Performed by: NURSE PRACTITIONER

## 2017-12-26 PROCEDURE — 4040F PNEUMOC VAC/ADMIN/RCVD: CPT | Performed by: NURSE PRACTITIONER

## 2017-12-26 RX ORDER — DICYCLOMINE HCL 20 MG
20 TABLET ORAL EVERY 6 HOURS PRN
Qty: 20 TABLET | Refills: 0 | Status: SHIPPED | OUTPATIENT
Start: 2017-12-26 | End: 2018-01-26 | Stop reason: ALTCHOICE

## 2017-12-26 ASSESSMENT — ENCOUNTER SYMPTOMS
NAUSEA: 1
CHANGE IN BOWEL HABIT: 1
ABDOMINAL PAIN: 1
RESPIRATORY NEGATIVE: 1
VOMITING: 1
DIARRHEA: 1

## 2017-12-26 NOTE — PROGRESS NOTES
Subjective:      Patient ID: Lisa Bernal is a 75 y.o. female. Nausea & Vomiting   This is a new problem. The current episode started in the past 7 days. The problem occurs constantly. The problem has been gradually worsening. Associated symptoms include abdominal pain, a change in bowel habit, nausea and vomiting. Pertinent negatives include no fever. She has tried nothing for the symptoms. The treatment provided no relief. Review of Systems   Constitutional: Positive for appetite change. Negative for fever. Respiratory: Negative. Cardiovascular: Negative. Gastrointestinal: Positive for abdominal pain, change in bowel habit, diarrhea, nausea and vomiting. Musculoskeletal: Negative. Skin: Negative. Neurological: Negative. Objective:   Physical Exam   Constitutional: She is oriented to person, place, and time. She appears well-developed. HENT:   Head: Normocephalic and atraumatic. Right Ear: Tympanic membrane, external ear and ear canal normal.   Left Ear: Tympanic membrane, external ear and ear canal normal.   Nose: Nose normal.   Mouth/Throat: Uvula is midline, oropharynx is clear and moist and mucous membranes are normal.   Eyes: Conjunctivae, EOM and lids are normal. Pupils are equal, round, and reactive to light. Neck: Trachea normal and normal range of motion. Cardiovascular: Normal rate, regular rhythm, normal heart sounds and normal pulses. Pulmonary/Chest: Effort normal and breath sounds normal.   Musculoskeletal: Normal range of motion. Neurological: She is alert and oriented to person, place, and time. Skin: Skin is warm, dry and intact. Vitals:    12/26/17 0901   BP: 118/70   Pulse: 94   SpO2: 97%     I have reviewed pertinent family and social history. Assessment:      1. Gastroenteritis  dicyclomine (BENTYL) 20 MG tablet           Plan:      Rest and increase fluid intake as tolerated. May use Bentyl QID prn cramping.   Recommend Pepto Bismol for nausea/diarrhea or Immodium AD for diarrhea. May use OTC acetaminophen or ibuprofen prn pain/fever. Return to ER or UC for symptoms which worsen or fail to improve. Follow up with PCP for routine health maintenance and monitoring. Allergies   Allergen Reactions    Iv Dye [Iodides] Shortness Of Breath    Sulfa Antibiotics Shortness Of Breath    Ace Inhibitors Nausea Only     Difficulty swallowing the Lisinopril. Gagging. Nausea.  Cozaar [Losartan] Nausea Only    Ezetimibe-Simvastatin     Lipitor Other (See Comments)     Muscle aches.  Lopressor [Metoprolol]      leg pain    Penicillins Swelling     Current Outpatient Prescriptions   Medication Sig Dispense Refill    dicyclomine (BENTYL) 20 MG tablet Take 1 tablet by mouth every 6 hours as needed (Abdominal cramping) 20 tablet 0    amLODIPine (NORVASC) 5 MG tablet Take 1 tablet by mouth daily 30 tablet 3    guaiFENesin-codeine (CHERATUSSIN AC) 100-10 MG/5ML syrup Take 5 mLs by mouth 4 times daily as needed for Cough . 118 mL 0    Cholecalciferol (VITAMIN D3) 21991 units CAPS Take 1 capsule by mouth once a week 13 capsule 1    glimepiride (AMARYL) 1 MG tablet TAKE ONE-HALF TABLET BY MOUTH TWICE DAILY 90 tablet 1    metFORMIN (GLUCOPHAGE) 500 MG tablet TAKE 1 TABLET BY MOUTH TWO TIMES A DAY WITH MEALS 180 tablet 1    simvastatin (ZOCOR) 40 MG tablet TAKE ONE TABLET BY MOUTH NIGHTLY 90 tablet 1    clopidogrel (PLAVIX) 75 MG tablet Take 1 tablet by mouth daily 30 tablet 11    nitroGLYCERIN (NITROSTAT) 0.4 MG SL tablet Place 1 tablet under the tongue every 5 minutes as needed for Chest pain up to max of 3 total doses. If no relief after 1 dose, call 911. 89 tablet 3    Blood Pressure KIT Blood pressure monitor 1 kit 0    Blood Glucose Monitoring Suppl MADELEINE Patient needs meter,tests strips and lancets. Tests daily. Dx E11.9,250.00 1 Device 0    NONFORMULARY OTC Biotin 1000mcg daily      aspirin 81 MG tablet Take 81 mg by mouth daily.

## 2017-12-28 ENCOUNTER — CARE COORDINATION (OUTPATIENT)
Dept: CARE COORDINATION | Age: 67
End: 2017-12-28

## 2018-01-10 ENCOUNTER — OFFICE VISIT (OUTPATIENT)
Dept: PRIMARY CARE CLINIC | Age: 68
End: 2018-01-10
Payer: MEDICARE

## 2018-01-10 VITALS
RESPIRATION RATE: 16 BRPM | SYSTOLIC BLOOD PRESSURE: 132 MMHG | OXYGEN SATURATION: 97 % | HEART RATE: 84 BPM | WEIGHT: 163.2 LBS | BODY MASS INDEX: 35.21 KG/M2 | DIASTOLIC BLOOD PRESSURE: 80 MMHG | TEMPERATURE: 100 F | HEIGHT: 57 IN

## 2018-01-10 DIAGNOSIS — J01.40 ACUTE PANSINUSITIS, RECURRENCE NOT SPECIFIED: Primary | ICD-10-CM

## 2018-01-10 DIAGNOSIS — R50.9 FEVER, UNSPECIFIED FEVER CAUSE: ICD-10-CM

## 2018-01-10 DIAGNOSIS — H10.503 BLEPHAROCONJUNCTIVITIS OF BOTH EYES, UNSPECIFIED BLEPHAROCONJUNCTIVITIS TYPE: ICD-10-CM

## 2018-01-10 LAB
INFLUENZA A ANTIBODY: NORMAL
INFLUENZA B ANTIBODY: NORMAL

## 2018-01-10 PROCEDURE — G8484 FLU IMMUNIZE NO ADMIN: HCPCS | Performed by: FAMILY MEDICINE

## 2018-01-10 PROCEDURE — 1123F ACP DISCUSS/DSCN MKR DOCD: CPT | Performed by: FAMILY MEDICINE

## 2018-01-10 PROCEDURE — 1036F TOBACCO NON-USER: CPT | Performed by: FAMILY MEDICINE

## 2018-01-10 PROCEDURE — G8399 PT W/DXA RESULTS DOCUMENT: HCPCS | Performed by: FAMILY MEDICINE

## 2018-01-10 PROCEDURE — 1090F PRES/ABSN URINE INCON ASSESS: CPT | Performed by: FAMILY MEDICINE

## 2018-01-10 PROCEDURE — 3017F COLORECTAL CA SCREEN DOC REV: CPT | Performed by: FAMILY MEDICINE

## 2018-01-10 PROCEDURE — 3014F SCREEN MAMMO DOC REV: CPT | Performed by: FAMILY MEDICINE

## 2018-01-10 PROCEDURE — G8417 CALC BMI ABV UP PARAM F/U: HCPCS | Performed by: FAMILY MEDICINE

## 2018-01-10 PROCEDURE — 4040F PNEUMOC VAC/ADMIN/RCVD: CPT | Performed by: FAMILY MEDICINE

## 2018-01-10 PROCEDURE — G8598 ASA/ANTIPLAT THER USED: HCPCS | Performed by: FAMILY MEDICINE

## 2018-01-10 PROCEDURE — 99213 OFFICE O/P EST LOW 20 MIN: CPT | Performed by: FAMILY MEDICINE

## 2018-01-10 PROCEDURE — G8427 DOCREV CUR MEDS BY ELIG CLIN: HCPCS | Performed by: FAMILY MEDICINE

## 2018-01-10 PROCEDURE — 87804 INFLUENZA ASSAY W/OPTIC: CPT | Performed by: FAMILY MEDICINE

## 2018-01-10 RX ORDER — AZITHROMYCIN 250 MG/1
TABLET, FILM COATED ORAL
Qty: 1 PACKET | Refills: 0 | Status: SHIPPED | OUTPATIENT
Start: 2018-01-10 | End: 2018-01-14

## 2018-01-10 NOTE — PROGRESS NOTES
[facility]             [address] [facility], [place], [address]                        Telephone [phone]             Fax [phone]       Camelia Barnes  1950  MRN:  [id number]  Date of visit:  1/10/18    Subjective:    Camelia Barnes is a 75 y.o.  female who presents to [facility] today (1/10/18) for evaluation of:  Nasal Congestion (for a couple days,body aches) and Eye Drainage (yellow colored,both eyes red,itchy,started yesterday)      She states that she has had nasal congestion for the past several days, cough, and body aches. She also complains of itchy, painful, red eyes. She has had some drainage from her eyes also. She does not wear contact lenses. She states that her cough is not productive. She has had an elevated temperature at home, around 100 F. Current medications are:  Current Outpatient Prescriptions   Medication Sig Dispense Refill    dicyclomine (BENTYL) 20 MG tablet Take 1 tablet by mouth every 6 hours as needed (Abdominal cramping) 20 tablet 0    amLODIPine (NORVASC) 5 MG tablet Take 1 tablet by mouth daily 30 tablet 3    guaiFENesin-codeine (CHERATUSSIN AC) 100-10 MG/5ML syrup Take 5 mLs by mouth 4 times daily as needed for Cough . 118 mL 0    Cholecalciferol (VITAMIN D3) 75472 units CAPS Take 1 capsule by mouth once a week 13 capsule 1    glimepiride (AMARYL) 1 MG tablet TAKE ONE-HALF TABLET BY MOUTH TWICE DAILY 90 tablet 1    metFORMIN (GLUCOPHAGE) 500 MG tablet TAKE 1 TABLET BY MOUTH TWO TIMES A DAY WITH MEALS 180 tablet 1    simvastatin (ZOCOR) 40 MG tablet TAKE ONE TABLET BY MOUTH NIGHTLY 90 tablet 1    clopidogrel (PLAVIX) 75 MG tablet Take 1 tablet by mouth daily 30 tablet 11    nitroGLYCERIN (NITROSTAT) 0.4 MG SL tablet Place 1 tablet under the tongue every 5 minutes as needed for Chest pain up to max of 3 total doses.  If no relief after 1 dose, call 330. 21 tablet 3    Blood Pressure KIT Blood pressure monitor 1 kit 0    Blood Glucose Monitoring Suppl MADELEINE Patient needs meter,tests strips and lancets. Tests daily. Dx E11.9,250.00 1 Device 0    NONFORMULARY OTC Biotin 1000mcg daily      aspirin 81 MG tablet Take 81 mg by mouth daily. No current facility-administered medications for this visit. She is allergic to iv dye [iodides]; sulfa antibiotics; ace inhibitors; cozaar [losartan]; ezetimibe-simvastatin; lipitor; lopressor [metoprolol]; and penicillins. She has the following problem list:  Patient Active Problem List   Diagnosis    Mixed hyperlipidemia    Obesity (BMI 30-39. 9)    Interstitial cystitis    History of seasonal allergies    Trigger finger, left    High risk medication use    Uncontrolled type 2 diabetes mellitus without complication, without long-term current use of insulin ([facility])    Vitamin D deficiency    Essential hypertension    Atypical chest pain    Hypokalemia    Paresthesias    S/P drug eluting coronary stent placement-RCA 6/20/17 - Dr. Rika Cook    Coronary artery disease involving native coronary artery of native heart without angina pectoris    Stroke-like symptoms    TIA (transient ischemic attack)    Dizziness    Chronic fatigue    Slurred speech    Right sided weakness    Cerebrovascular accident (CVA) due to thrombosis of precerebral artery ([address]        She  reports that she has never smoked. She has never used smokeless tobacco.      Objective:    Vitals:    01/10/18 0915   BP: 132/80   Pulse: 84   Resp: 16   Temp: 100 °F (37.8 °C)   SpO2: 97%     SpO2: 97 %       Body mass index is 35.32 kg/m². Obese  female alert and cooperative. The conjunctivae are erythematous bilaterally. There is mild edema and erythema of the upper and lower lids bilaterally. The tympanic membranes are clear bilaterally. Oropharynx has no erythema. There is no exudate.   There is moderate tenderness over the frontal and maxillary sinuses bilaterally. Neck is supple, with no lymphadenopathy. Chest is clear to auscultation, no wheezes, rales, or rhonchi. Respirations are not labored. Heart sounds are regular rate and rhythm, no murmurs. POCT Influenza A/B was negative for Influenza A and negative for Influenza B. Assessment & Plan:    1. Acute pansinusitis, recurrence not specified  2. Blepharoconjunctivitis of both eyes, unspecified blepharoconjunctivitis type  - azithromycin (ZITHROMAX) 250 MG tablet; Take 2 po Day 1, then 1 po Daily Days 2-5. Take with food. Dispense: 1 packet; Refill: 0    She was advised to follow up if symptoms worsen or do not resolve.          (Please note that portions of this note were completed with a voice-recognition program. Efforts were made to edit the dictation but occasionally words are mis-transcribed.)

## 2018-01-17 DIAGNOSIS — E78.2 MIXED HYPERLIPIDEMIA: ICD-10-CM

## 2018-01-17 DIAGNOSIS — I10 ESSENTIAL HYPERTENSION: Primary | ICD-10-CM

## 2018-01-17 RX ORDER — SIMVASTATIN 20 MG
TABLET ORAL
Qty: 30 TABLET | Refills: 2 | Status: SHIPPED | OUTPATIENT
Start: 2018-01-17 | End: 2018-03-06 | Stop reason: SDUPTHER

## 2018-01-17 RX ORDER — AMLODIPINE BESYLATE 5 MG/1
5 TABLET ORAL DAILY
Qty: 30 TABLET | Refills: 3 | Status: SHIPPED | OUTPATIENT
Start: 2018-01-17 | End: 2018-03-06 | Stop reason: SDUPTHER

## 2018-01-17 NOTE — TELEPHONE ENCOUNTER
Please advise the patient that the maximum dose of Simvastatin recommended to be taken with Norvasc is 20 mg. She is currently taking Simvastatin 40 mg.  I will refill Norvasc, and I will also e-prescribe Simvastatin 20 mg.

## 2018-01-26 ENCOUNTER — OFFICE VISIT (OUTPATIENT)
Dept: CARDIOLOGY | Age: 68
End: 2018-01-26
Payer: MEDICARE

## 2018-01-26 ENCOUNTER — HOSPITAL ENCOUNTER (OUTPATIENT)
Dept: LAB | Age: 68
Setting detail: SPECIMEN
Discharge: HOME | End: 2018-01-26
Payer: MEDICARE

## 2018-01-26 ENCOUNTER — HOSPITAL ENCOUNTER (OUTPATIENT)
Dept: NEUROLOGY | Age: 68
Discharge: HOME | End: 2018-01-26
Payer: MEDICARE

## 2018-01-26 VITALS
HEIGHT: 57 IN | RESPIRATION RATE: 16 BRPM | SYSTOLIC BLOOD PRESSURE: 132 MMHG | HEART RATE: 72 BPM | WEIGHT: 163.4 LBS | BODY MASS INDEX: 35.25 KG/M2 | DIASTOLIC BLOOD PRESSURE: 70 MMHG

## 2018-01-26 DIAGNOSIS — I25.9 CHEST PAIN DUE TO MYOCARDIAL ISCHEMIA, UNSPECIFIED ISCHEMIC CHEST PAIN TYPE: ICD-10-CM

## 2018-01-26 DIAGNOSIS — R42 DIZZINESS: ICD-10-CM

## 2018-01-26 DIAGNOSIS — R53.83 OTHER FATIGUE: ICD-10-CM

## 2018-01-26 DIAGNOSIS — I25.10 CORONARY ARTERY DISEASE INVOLVING NATIVE CORONARY ARTERY OF NATIVE HEART WITHOUT ANGINA PECTORIS: ICD-10-CM

## 2018-01-26 DIAGNOSIS — R06.83 SNORING: ICD-10-CM

## 2018-01-26 DIAGNOSIS — R29.90 STROKE-LIKE SYMPTOMS: ICD-10-CM

## 2018-01-26 DIAGNOSIS — I63.00 CEREBROVASCULAR ACCIDENT (CVA) DUE TO THROMBOSIS OF PRECEREBRAL ARTERY (HCC): ICD-10-CM

## 2018-01-26 DIAGNOSIS — R06.02 SOB (SHORTNESS OF BREATH): ICD-10-CM

## 2018-01-26 DIAGNOSIS — I10 ESSENTIAL HYPERTENSION: Primary | ICD-10-CM

## 2018-01-26 LAB
ANION GAP SERPL CALCULATED.3IONS-SCNC: 13 MMOL/L (ref 9–17)
BNP INTERPRETATION: NORMAL
BUN BLDV-MCNC: 13 MG/DL (ref 8–23)
BUN/CREAT BLD: 21 (ref 9–20)
CALCIUM SERPL-MCNC: 9.6 MG/DL (ref 8.6–10.4)
CHLORIDE BLD-SCNC: 100 MMOL/L (ref 98–107)
CO2: 28 MMOL/L (ref 20–31)
CREAT SERPL-MCNC: 0.61 MG/DL (ref 0.5–0.9)
GFR AFRICAN AMERICAN: >60 ML/MIN
GFR NON-AFRICAN AMERICAN: >60 ML/MIN
GFR SERPL CREATININE-BSD FRML MDRD: ABNORMAL ML/MIN/{1.73_M2}
GFR SERPL CREATININE-BSD FRML MDRD: ABNORMAL ML/MIN/{1.73_M2}
GLUCOSE BLD-MCNC: 100 MG/DL (ref 70–99)
HCT VFR BLD CALC: 37.9 % (ref 36–46)
HEMOGLOBIN: 12.6 G/DL (ref 12–16)
MCH RBC QN AUTO: 26.8 PG (ref 26–34)
MCHC RBC AUTO-ENTMCNC: 33.3 G/DL (ref 31–37)
MCV RBC AUTO: 80.2 FL (ref 80–100)
NRBC AUTOMATED: ABNORMAL PER 100 WBC
PDW BLD-RTO: 15 % (ref 11–14.5)
PLATELET # BLD: 288 K/UL (ref 140–450)
PMV BLD AUTO: 9.3 FL (ref 6–12)
POTASSIUM SERPL-SCNC: 3.6 MMOL/L (ref 3.7–5.3)
PRO-BNP: <20 PG/ML
RBC # BLD: 4.72 M/UL (ref 4–5.2)
SODIUM BLD-SCNC: 141 MMOL/L (ref 135–144)
TSH SERPL DL<=0.05 MIU/L-ACNC: 1.05 MIU/L (ref 0.3–5)
WBC # BLD: 8 K/UL (ref 3.5–11)

## 2018-01-26 PROCEDURE — 84443 ASSAY THYROID STIM HORMONE: CPT

## 2018-01-26 PROCEDURE — 80048 BASIC METABOLIC PNL TOTAL CA: CPT

## 2018-01-26 PROCEDURE — 93886 INTRACRANIAL COMPLETE STUDY: CPT

## 2018-01-26 PROCEDURE — 85027 COMPLETE CBC AUTOMATED: CPT

## 2018-01-26 PROCEDURE — 93000 ELECTROCARDIOGRAM COMPLETE: CPT | Performed by: INTERNAL MEDICINE

## 2018-01-26 PROCEDURE — 99214 OFFICE O/P EST MOD 30 MIN: CPT | Performed by: INTERNAL MEDICINE

## 2018-01-26 PROCEDURE — 93892 TCD EMBOLI DETECT W/O INJ: CPT

## 2018-01-26 PROCEDURE — 36415 COLL VENOUS BLD VENIPUNCTURE: CPT

## 2018-01-26 PROCEDURE — 83880 ASSAY OF NATRIURETIC PEPTIDE: CPT

## 2018-01-26 ASSESSMENT — ENCOUNTER SYMPTOMS
SHORTNESS OF BREATH: 1
CHEST TIGHTNESS: 0
ABDOMINAL PAIN: 0
NAUSEA: 0
VOMITING: 0

## 2018-01-26 NOTE — PATIENT INSTRUCTIONS
Nuclear Stress Test      Please report to the [facility] Cardiopulmonary Testing Department located in the basement. Check in at the PHYSICAL THERAPY window. Central Scheduling  [phone]. Please allow four hours to complete this test.     --PLEASE GIVE 24 HOURS NOTICE TO CANCEL/RESCHEDULE--     >>  Nothing to eat or drink for FOUR (4) HOURS prior to arrival time. >>  No caffeine for 24 hours prior to test. This includes decaffeinated beverages,  chocolate, tea and cola drinks. >>  If you are diabetic, check with your Primary Care Provider regarding changes in your insulin or diabetic pills on test day.     >>  Please bring your prescription medications with you on Stress Day. ~  Take regular medications with sips of water. >>  Please let the nurse know if you are claustrophobic. The nuclear camera comes close to your body during pictures. You are not enclosed however.

## 2018-01-26 NOTE — PROGRESS NOTES
BY MOUTH TWICE DAILY 10/26/17   Adore Vasquez MD   metFORMIN (GLUCOPHAGE) 500 MG tablet TAKE 1 TABLET BY MOUTH TWO TIMES A DAY WITH MEALS 10/26/17   Adore Vasquez MD   clopidogrel (PLAVIX) 75 MG tablet Take 1 tablet by mouth daily 6/21/17   Yvonne Malhotra CNP   nitroGLYCERIN (NITROSTAT) 0.4 MG SL tablet Place 1 tablet under the tongue every 5 minutes as needed for Chest pain up to max of 3 total doses. If no relief after 1 dose, call 911. 6/21/17   Yvonne Malhotra CNP   Blood Pressure KIT Blood pressure monitor 2/1/17   Cristina Mcghee MD   Blood Glucose Monitoring Suppl MADELEINE Patient needs meter,tests strips and lancets. Tests daily. Dx W92.4,863.32 1/11/17   Cristina Mcghee MD   NONFORMULARY OTC Biotin 1000mcg daily    Historical Provider, MD   aspirin 81 MG tablet Take 81 mg by mouth daily. Historical Provider, MD       Allergies: Iv dye [iodides]; Sulfa antibiotics; Ace inhibitors; Cozaar [losartan]; Ezetimibe-simvastatin; Lipitor; Lopressor [metoprolol]; and Penicillins    Social History:   reports that she has never smoked. She has never used smokeless tobacco. She reports that she does not drink alcohol or use drugs. Review of Systems:  Review of Systems   Constitutional: Negative for chills and fever. HENT: Negative for congestion. Respiratory: Positive for shortness of breath. Negative for chest tightness. Cardiovascular: Negative for chest pain and palpitations. Gastrointestinal: Negative for abdominal pain, nausea and vomiting. Neurological: Negative for dizziness and syncope. Psychiatric/Behavioral: Negative for agitation and behavioral problems. Physical Exam: /70 HR 72  Physical Exam   Constitutional: She is oriented to person, place, and time. She appears well-developed and well-nourished. HENT:   Head: Normocephalic and atraumatic. Neck: No JVD present. Cardiovascular: Normal rate and regular rhythm.     Murmur (ejection systolic murmur over the aortic area.)

## 2018-01-27 ENCOUNTER — CARE COORDINATION (OUTPATIENT)
Dept: CARE COORDINATION | Age: 68
End: 2018-01-27

## 2018-01-30 ENCOUNTER — TELEPHONE (OUTPATIENT)
Dept: FAMILY MEDICINE CLINIC | Age: 68
End: 2018-01-30

## 2018-02-01 ENCOUNTER — HOSPITAL ENCOUNTER (OUTPATIENT)
Dept: NON INVASIVE DIAGNOSTICS | Age: 68
Discharge: HOME | End: 2018-02-01
Payer: MEDICARE

## 2018-02-01 ENCOUNTER — HOSPITAL ENCOUNTER (OUTPATIENT)
Dept: NUCLEAR MEDICINE | Age: 68
Discharge: HOME | End: 2018-02-03
Payer: MEDICARE

## 2018-02-01 DIAGNOSIS — I25.9 CHEST PAIN DUE TO MYOCARDIAL ISCHEMIA, UNSPECIFIED ISCHEMIC CHEST PAIN TYPE: ICD-10-CM

## 2018-02-01 PROCEDURE — 6360000002 HC RX W HCPCS: Performed by: INTERNAL MEDICINE

## 2018-02-01 PROCEDURE — 93017 CV STRESS TEST TRACING ONLY: CPT

## 2018-02-01 PROCEDURE — 78452 HT MUSCLE IMAGE SPECT MULT: CPT

## 2018-02-01 PROCEDURE — A9500 TC99M SESTAMIBI: HCPCS | Performed by: INTERNAL MEDICINE

## 2018-02-01 PROCEDURE — 3430000000 HC RX DIAGNOSTIC RADIOPHARMACEUTICAL: Performed by: INTERNAL MEDICINE

## 2018-02-01 RX ORDER — AMINOPHYLLINE DIHYDRATE 25 MG/ML
100 INJECTION, SOLUTION INTRAVENOUS ONCE
Status: COMPLETED | OUTPATIENT
Start: 2018-02-01 | End: 2018-02-01

## 2018-02-01 RX ADMIN — TETRAKIS(2-METHOXYISOBUTYLISOCYANIDE)COPPER(I) TETRAFLUOROBORATE 30 MILLICURIE: 1 INJECTION, POWDER, LYOPHILIZED, FOR SOLUTION INTRAVENOUS at 14:25

## 2018-02-01 RX ADMIN — AMINOPHYLLINE 100 MG: 25 INJECTION, SOLUTION INTRAVENOUS at 14:32

## 2018-02-01 RX ADMIN — REGADENOSON 0.4 MG: 0.08 INJECTION, SOLUTION INTRAVENOUS at 14:28

## 2018-02-01 RX ADMIN — TETRAKIS(2-METHOXYISOBUTYLISOCYANIDE)COPPER(I) TETRAFLUOROBORATE 10 MILLICURIE: 1 INJECTION, POWDER, LYOPHILIZED, FOR SOLUTION INTRAVENOUS at 13:00

## 2018-02-01 NOTE — PROGRESS NOTES
Patient Name:  Kayla James MRN:  [id number]   :  1950  Age:  75 y.o. Sex: female   Ordering Provider:   Referring Provider:   Primary Care Provider:  Christen Khan MD     Indications: chest pain     Medications:     Current Outpatient Prescriptions:     amLODIPine (NORVASC) 5 MG tablet, Take 1 tablet by mouth daily, Disp: 30 tablet, Rfl: 3    simvastatin (ZOCOR) 20 MG tablet, TAKE ONE TABLET BY MOUTH NIGHTLY, Disp: 30 tablet, Rfl: 2    guaiFENesin-codeine (CHERATUSSIN AC) 100-10 MG/5ML syrup, Take 5 mLs by mouth 4 times daily as needed for Cough . , Disp: 118 mL, Rfl: 0    Cholecalciferol (VITAMIN D3) 01827 units CAPS, Take 1 capsule by mouth once a week, Disp: 13 capsule, Rfl: 1    glimepiride (AMARYL) 1 MG tablet, TAKE ONE-HALF TABLET BY MOUTH TWICE DAILY, Disp: 90 tablet, Rfl: 1    metFORMIN (GLUCOPHAGE) 500 MG tablet, TAKE 1 TABLET BY MOUTH TWO TIMES A DAY WITH MEALS, Disp: 180 tablet, Rfl: 1    clopidogrel (PLAVIX) 75 MG tablet, Take 1 tablet by mouth daily, Disp: 30 tablet, Rfl: 11    nitroGLYCERIN (NITROSTAT) 0.4 MG SL tablet, Place 1 tablet under the tongue every 5 minutes as needed for Chest pain up to max of 3 total doses. If no relief after 1 dose, call 911., Disp: 25 tablet, Rfl: 3    Blood Pressure KIT, Blood pressure monitor, Disp: 1 kit, Rfl: 0    Blood Glucose Monitoring Suppl MADELEINE, Patient needs meter,tests strips and lancets. Tests daily. Dx E11.9,250.00, Disp: 1 Device, Rfl: 0    NONFORMULARY, OTC Biotin 1000mcg daily, Disp: , Rfl:     aspirin 81 MG tablet, Take 81 mg by mouth daily. , Disp: , Rfl:   No current facility-administered medications for this encounter.      Facility-Administered Medications Ordered in Other Encounters:     technetium sestamibi (CARDIOLITE) injection 30 millicurie, 30 millicurie, Intravenous, ONCE PRN, Tulio Zhang MD      ----------------------------------------------------------------------------------------------------------

## 2018-02-06 ENCOUNTER — TELEPHONE (OUTPATIENT)
Dept: CARDIOLOGY | Age: 68
End: 2018-02-06

## 2018-02-06 NOTE — PROCEDURES
[facility] [address]                   [address] [place] [id number]                                CARDIAC STRESS TEST    PATIENT NAME: Kahlil Rodriguez                   :        1950  MED REC NO:   [id number]                             ROOM:  ACCOUNT NO:   [de-identified]                           ADMIT DATE: 2018  PROVIDER:     Pauline To    DATE OF STUDY:  2018    Nuclear Myocardial Perfusion Imaging (SPECT)    Ordering Physician:  Janelle Trevizo    Referring Physician: Janelle Trevizo    Primary Care Provider: Beverly Moore MD    Patient's Age: 75        Height: 4 foot 9 inches       Weight: 163 pounds    TESTING METHOD  INDICATION:  Chest pain    STRESS:   Lexiscan  AGENT:    Cardiolite  REST:     Injection Date: 18 Time:  1320  Amount:  11 mCi  STRESS:   Injection Date: 18 Time:  1320  Amount:  35 mCi  IMAGE TIME:    Rest: 1400     Stress: 1500    FINDINGS:  Ischemia (Reversible Defect): No  Infarction (Reversible Defect):    No  Ejection fraction Calculated: Normal  Segmental wall motion:   Normal  Diastolic LV Compliance (Stiffness):    IMPRESSION:  1. No ischemia or infarction. 2.  Normal LV systolic function.       ENID BONDS    D: 2018 6:09:53       T: 2018 6:11:23     /ALAYNA_EDIT  Job#: [id number]    Doc#: Unknown

## 2018-02-21 RX ORDER — CLOPIDOGREL BISULFATE 75 MG/1
75 TABLET ORAL DAILY
Qty: 90 TABLET | Refills: 0 | Status: CANCELLED | OUTPATIENT
Start: 2018-02-21

## 2018-02-21 RX ORDER — CLOPIDOGREL BISULFATE 75 MG/1
75 TABLET ORAL DAILY
Qty: 90 TABLET | Refills: 0 | OUTPATIENT
Start: 2018-02-21

## 2018-02-23 ENCOUNTER — CARE COORDINATION (OUTPATIENT)
Dept: CARE COORDINATION | Age: 68
End: 2018-02-23

## 2018-03-06 ENCOUNTER — OFFICE VISIT (OUTPATIENT)
Dept: FAMILY MEDICINE CLINIC | Age: 68
End: 2018-03-06
Payer: MEDICARE

## 2018-03-06 VITALS
SYSTOLIC BLOOD PRESSURE: 132 MMHG | HEART RATE: 72 BPM | DIASTOLIC BLOOD PRESSURE: 78 MMHG | WEIGHT: 166.8 LBS | HEIGHT: 57 IN | BODY MASS INDEX: 35.99 KG/M2 | RESPIRATION RATE: 16 BRPM

## 2018-03-06 DIAGNOSIS — R25.2 LEG CRAMPING: ICD-10-CM

## 2018-03-06 DIAGNOSIS — E78.2 MIXED HYPERLIPIDEMIA: ICD-10-CM

## 2018-03-06 DIAGNOSIS — Z23 NEED FOR SHINGLES VACCINE: ICD-10-CM

## 2018-03-06 DIAGNOSIS — I10 ESSENTIAL HYPERTENSION: ICD-10-CM

## 2018-03-06 DIAGNOSIS — E11.9 TYPE 2 DIABETES MELLITUS WITHOUT COMPLICATION, WITHOUT LONG-TERM CURRENT USE OF INSULIN (HCC): Primary | ICD-10-CM

## 2018-03-06 PROCEDURE — G8598 ASA/ANTIPLAT THER USED: HCPCS | Performed by: FAMILY MEDICINE

## 2018-03-06 PROCEDURE — G8427 DOCREV CUR MEDS BY ELIG CLIN: HCPCS | Performed by: FAMILY MEDICINE

## 2018-03-06 PROCEDURE — 3046F HEMOGLOBIN A1C LEVEL >9.0%: CPT | Performed by: FAMILY MEDICINE

## 2018-03-06 PROCEDURE — 99214 OFFICE O/P EST MOD 30 MIN: CPT | Performed by: FAMILY MEDICINE

## 2018-03-06 PROCEDURE — G8417 CALC BMI ABV UP PARAM F/U: HCPCS | Performed by: FAMILY MEDICINE

## 2018-03-06 PROCEDURE — G8482 FLU IMMUNIZE ORDER/ADMIN: HCPCS | Performed by: FAMILY MEDICINE

## 2018-03-06 PROCEDURE — 3017F COLORECTAL CA SCREEN DOC REV: CPT | Performed by: FAMILY MEDICINE

## 2018-03-06 PROCEDURE — 1036F TOBACCO NON-USER: CPT | Performed by: FAMILY MEDICINE

## 2018-03-06 PROCEDURE — 3014F SCREEN MAMMO DOC REV: CPT | Performed by: FAMILY MEDICINE

## 2018-03-06 PROCEDURE — G8399 PT W/DXA RESULTS DOCUMENT: HCPCS | Performed by: FAMILY MEDICINE

## 2018-03-06 PROCEDURE — 1090F PRES/ABSN URINE INCON ASSESS: CPT | Performed by: FAMILY MEDICINE

## 2018-03-06 PROCEDURE — 4040F PNEUMOC VAC/ADMIN/RCVD: CPT | Performed by: FAMILY MEDICINE

## 2018-03-06 PROCEDURE — 1123F ACP DISCUSS/DSCN MKR DOCD: CPT | Performed by: FAMILY MEDICINE

## 2018-03-06 RX ORDER — SIMVASTATIN 20 MG
TABLET ORAL
Qty: 90 TABLET | Refills: 1 | Status: SHIPPED | OUTPATIENT
Start: 2018-03-06 | End: 2018-09-06 | Stop reason: SDUPTHER

## 2018-03-06 RX ORDER — AMLODIPINE BESYLATE 5 MG/1
5 TABLET ORAL DAILY
Qty: 90 TABLET | Refills: 1 | Status: SHIPPED | OUTPATIENT
Start: 2018-03-06 | End: 2018-09-06 | Stop reason: SDUPTHER

## 2018-03-06 RX ORDER — GLIMEPIRIDE 1 MG/1
TABLET ORAL
Qty: 90 TABLET | Refills: 1 | Status: SHIPPED | OUTPATIENT
Start: 2018-03-06 | End: 2018-09-06 | Stop reason: SDUPTHER

## 2018-03-06 NOTE — PROGRESS NOTES
Patient refused Tdap vaccine
Resp: 16   Weight: 166 lb 12.8 oz (75.7 kg)   Height: 4' 9\" (1.448 m)     Body mass index is 36.1 kg/m². Obese  female, alert, cooperative and anxious, but otherwise in no acute distress. Neck is supple, with no lymphadenopathy. Chest is clear to auscultation, no wheezes, rales, or rhonchi. Heart sounds are regular rate and rhythm, no murmurs. Lower extremities have no edema. She has had no recent labs. Assessment and Plan:    1. Type 2 diabetes mellitus without complication, without long-term current use of insulin ([facility]  She has orders for a HgbA1c to be done when she returns fasting. Amaryl and Glucophage were refilled:  - glimepiride (AMARYL) 1 MG tablet; TAKE ONE-HALF TABLET BY MOUTH TWICE DAILY  Dispense: 90 tablet; Refill: 1  - metFORMIN (GLUCOPHAGE) 500 MG tablet; TAKE 1 TABLET BY MOUTH TWO TIMES A DAY WITH MEALS  Dispense: 180 tablet; Refill: 1    2. Essential hypertension  Her blood pressure is marginally-controlled. (BP: 132/78)   She was advised to continue current medications. Amlodipine was refilled:   - amLODIPine (NORVASC) 5 MG tablet; Take 1 tablet by mouth daily  Dispense: 90 tablet; Refill: 1    She has orders for a comprehensive metabolic panel to be done when she returns fasting. 3. Mixed hyperlipidemia  She is tolerating Simvastatin well; this was refilled:  - simvastatin (ZOCOR) 20 MG tablet; TAKE ONE TABLET BY MOUTH NIGHTLY  Dispense: 90 tablet; Refill: 1    She has orders for a lipid panel to be done when she returns fasting. 4. Leg cramping  - Magnesium; Future was ordered to be done with her other labs. 5.  Routine health maintenance  Health maintenance was reviewed with the patient. She has received an influenza vaccine this influenza season. Tdap was recommended and declined. Shingrix was recommended, and a printed prescription for Shingrix was given to the patient.    All other health maintenance, including Prevnar-13, is up to date at this

## 2018-03-07 ENCOUNTER — CARE COORDINATION (OUTPATIENT)
Dept: CARE COORDINATION | Age: 68
End: 2018-03-07

## 2018-03-07 ASSESSMENT — PATIENT HEALTH QUESTIONNAIRE - PHQ9
SUM OF ALL RESPONSES TO PHQ9 QUESTIONS 1 & 2: 0
5. POOR APPETITE OR OVEREATING: 0
SUM OF ALL RESPONSES TO PHQ QUESTIONS 1-9: 0
9. THOUGHTS THAT YOU WOULD BE BETTER OFF DEAD, OR OF HURTING YOURSELF: 0
4. FEELING TIRED OR HAVING LITTLE ENERGY: 0
8. MOVING OR SPEAKING SO SLOWLY THAT OTHER PEOPLE COULD HAVE NOTICED. OR THE OPPOSITE, BEING SO FIGETY OR RESTLESS THAT YOU HAVE BEEN MOVING AROUND A LOT MORE THAN USUAL: 0
7. TROUBLE CONCENTRATING ON THINGS, SUCH AS READING THE NEWSPAPER OR WATCHING TELEVISION: 0
3. TROUBLE FALLING OR STAYING ASLEEP: 0
2. FEELING DOWN, DEPRESSED OR HOPELESS: 0
6. FEELING BAD ABOUT YOURSELF - OR THAT YOU ARE A FAILURE OR HAVE LET YOURSELF OR YOUR FAMILY DOWN: 0

## 2018-03-07 NOTE — CARE COORDINATION
Ambulatory Care Coordination Note  3/7/2018  CM Risk Score: 6  Joshua Mortality Risk Score: 3.71    ACC: Graciela Zaman RN    Summary Note: Chu Moran was referred to [facility] from [facility]. She was recently hospitalized for slurred speech, stroke-like symptoms, HTN  She has Diabetes type II- not on insulin- controlled, CAD- stent x 2- 6/2017, Obesity, Hyperlipidemia  She is following with neurology and cardiology. Lab Results   Component Value Date    LABA1C 6.1 (H) 10/12/2017    LABA1C 6.9 (H) 04/12/2017    LABA1C 7.1 (H) 10/14/2016     Lab Results   Component Value Date     10/12/2017     04/12/2017     10/14/2016       Met with Nahun John while here to see PCP. Educated on sign, symptoms of flu, treatment- early intervention, prevention-to avoid crowds, hand hygiene, vaccination- Flu- up to date, reviewed Clinic and Urgent Care Hours. Instructed to call [facility] or office if symptoms arise. Handout given. PHQ-9  3/7/2018 4/19/2017 10/7/2015 10/1/2014   Little interest or pleasure in doing things - 0 1 0   Feeling down, depressed, or hopeless 0 0 1 0   Trouble falling or staying asleep, or sleeping too much 0 - - -   Feeling tired or having little energy 0 - - -   Poor appetite or overeating 0 - - -   Feeling bad about yourself - or that you are a failure or have let yourself or your family down 0 - - -   Trouble concentrating on things, such as reading the newspaper or watching television 0 - - -   Moving or speaking so slowly that other people could have noticed.  Or the opposite - being so fidgety or restless that you have been moving around a lot more than usual 0 - - -   Thoughts that you would be better off dead, or of hurting yourself in some way 0 - - -   PHQ-2 Score 0 0 2 0   PHQ-9 Total Score 0 0 2 0     Interpretation of Total Score Total Score Depression Severity: 1-4 = Minimal depression, 5-9 = Mild depression, 10-14 = Moderate depression, 15-19 = Moderately severe depression, 20-27 =

## 2018-03-09 ENCOUNTER — HOSPITAL ENCOUNTER (OUTPATIENT)
Dept: LAB | Age: 68
Setting detail: SPECIMEN
Discharge: HOME | End: 2018-03-09
Payer: MEDICARE

## 2018-03-09 DIAGNOSIS — R25.2 LEG CRAMPING: ICD-10-CM

## 2018-03-09 LAB — MAGNESIUM: 2.1 MG/DL (ref 1.6–2.6)

## 2018-03-09 PROCEDURE — 83735 ASSAY OF MAGNESIUM: CPT

## 2018-03-09 PROCEDURE — 36415 COLL VENOUS BLD VENIPUNCTURE: CPT

## 2018-03-20 ENCOUNTER — OFFICE VISIT (OUTPATIENT)
Dept: NEUROLOGY | Age: 68
End: 2018-03-20
Payer: MEDICARE

## 2018-03-20 VITALS
HEIGHT: 57 IN | WEIGHT: 166.89 LBS | SYSTOLIC BLOOD PRESSURE: 136 MMHG | HEART RATE: 77 BPM | DIASTOLIC BLOOD PRESSURE: 78 MMHG | BODY MASS INDEX: 36 KG/M2 | OXYGEN SATURATION: 97 %

## 2018-03-20 DIAGNOSIS — E66.9 OBESITY (BMI 30-39.9): ICD-10-CM

## 2018-03-20 DIAGNOSIS — R53.82 CHRONIC FATIGUE: ICD-10-CM

## 2018-03-20 DIAGNOSIS — E55.9 VITAMIN D DEFICIENCY: ICD-10-CM

## 2018-03-20 DIAGNOSIS — G63 POLYNEUROPATHY ASSOCIATED WITH UNDERLYING DISEASE (HCC): Primary | ICD-10-CM

## 2018-03-20 DIAGNOSIS — R53.1 RIGHT SIDED WEAKNESS: ICD-10-CM

## 2018-03-20 DIAGNOSIS — I63.00 CEREBROVASCULAR ACCIDENT (CVA) DUE TO THROMBOSIS OF PRECEREBRAL ARTERY (HCC): ICD-10-CM

## 2018-03-20 DIAGNOSIS — R29.90 STROKE-LIKE SYMPTOMS: ICD-10-CM

## 2018-03-20 DIAGNOSIS — R42 DIZZINESS: ICD-10-CM

## 2018-03-20 DIAGNOSIS — R20.2 PARESTHESIAS: ICD-10-CM

## 2018-03-20 DIAGNOSIS — E78.2 MIXED HYPERLIPIDEMIA: ICD-10-CM

## 2018-03-20 DIAGNOSIS — I10 ESSENTIAL HYPERTENSION: ICD-10-CM

## 2018-03-20 DIAGNOSIS — M62.838 MUSCLE SPASMS OF BOTH LOWER EXTREMITIES: ICD-10-CM

## 2018-03-20 DIAGNOSIS — G45.8 OTHER SPECIFIED TRANSIENT CEREBRAL ISCHEMIAS: ICD-10-CM

## 2018-03-20 PROCEDURE — 1123F ACP DISCUSS/DSCN MKR DOCD: CPT | Performed by: PSYCHIATRY & NEUROLOGY

## 2018-03-20 PROCEDURE — 3017F COLORECTAL CA SCREEN DOC REV: CPT | Performed by: PSYCHIATRY & NEUROLOGY

## 2018-03-20 PROCEDURE — G8399 PT W/DXA RESULTS DOCUMENT: HCPCS | Performed by: PSYCHIATRY & NEUROLOGY

## 2018-03-20 PROCEDURE — G8417 CALC BMI ABV UP PARAM F/U: HCPCS | Performed by: PSYCHIATRY & NEUROLOGY

## 2018-03-20 PROCEDURE — 1036F TOBACCO NON-USER: CPT | Performed by: PSYCHIATRY & NEUROLOGY

## 2018-03-20 PROCEDURE — 4040F PNEUMOC VAC/ADMIN/RCVD: CPT | Performed by: PSYCHIATRY & NEUROLOGY

## 2018-03-20 PROCEDURE — 3014F SCREEN MAMMO DOC REV: CPT | Performed by: PSYCHIATRY & NEUROLOGY

## 2018-03-20 PROCEDURE — G8427 DOCREV CUR MEDS BY ELIG CLIN: HCPCS | Performed by: PSYCHIATRY & NEUROLOGY

## 2018-03-20 PROCEDURE — 3046F HEMOGLOBIN A1C LEVEL >9.0%: CPT | Performed by: PSYCHIATRY & NEUROLOGY

## 2018-03-20 PROCEDURE — G8598 ASA/ANTIPLAT THER USED: HCPCS | Performed by: PSYCHIATRY & NEUROLOGY

## 2018-03-20 PROCEDURE — G8482 FLU IMMUNIZE ORDER/ADMIN: HCPCS | Performed by: PSYCHIATRY & NEUROLOGY

## 2018-03-20 PROCEDURE — 1090F PRES/ABSN URINE INCON ASSESS: CPT | Performed by: PSYCHIATRY & NEUROLOGY

## 2018-03-20 PROCEDURE — 99214 OFFICE O/P EST MOD 30 MIN: CPT | Performed by: PSYCHIATRY & NEUROLOGY

## 2018-03-20 ASSESSMENT — ENCOUNTER SYMPTOMS
PHOTOPHOBIA: 0
CONSTIPATION: 0
COUGH: 0
NAUSEA: 0
FACIAL SWELLING: 0
ABDOMINAL DISTENTION: 0
SORE THROAT: 0
ABDOMINAL PAIN: 0
WHEEZING: 0
VOMITING: 0
VOICE CHANGE: 0
SINUS PRESSURE: 0
BLOOD IN STOOL: 0
APNEA: 0
CHOKING: 0
TROUBLE SWALLOWING: 0
CHEST TIGHTNESS: 0
SWOLLEN GLANDS: 0
EYE PAIN: 0
DIARRHEA: 0
VISUAL CHANGE: 0
EYE DISCHARGE: 0
EYE ITCHING: 0
SHORTNESS OF BREATH: 0
EYE REDNESS: 0
COLOR CHANGE: 0
CHANGE IN BOWEL HABIT: 0
BACK PAIN: 0

## 2018-03-20 NOTE — PATIENT INSTRUCTIONS
nerves can send electrical signals. EMG and nerve conduction studies are often done together. If they are done together, the nerve conduction studies are done before the EMG. Why are they done? You may need an EMG to find diseases that damage your muscles or nerves or to find why you cannot move your muscles (paralysis), why they feel weak, or why they twitch. You may need nerve conduction studies to find damage to the nerves that lead from the brain and spinal cord to the rest of the body (peripheral nervous system). Nerve conduction studies are often used to help find nerve disorders, such as carpal tunnel syndrome. How can you prepare for these tests? · Tell your doctors ALL the medicines, vitamins, supplements, and herbal remedies you take. Some medicines can affect the test results. You may need to stop taking some medicines before you have this test.   ? · If you take aspirin or some other blood thinner, be sure to talk to your doctor. He or she will tell you if you should stop taking it before your test. Make sure that you understand exactly what your doctor wants you to do. ? · Wear loose-fitting clothing. You may be given a hospital gown to wear. ? · The electrodes for the test are attached to your skin. Your skin needs to be clean and free of sprays, oils, creams, and lotions. What happens during the tests? You lie on a table or bed or sit in a reclining chair so your muscles are relaxed. For an EMG:  · Your doctor will insert a needle electrode into a muscle. This will record the electrical activity while the muscle is at rest. You may feel a quick, sharp pain when the needle electrode is put into a muscle. · Your doctor will ask you to tighten the same muscle slowly and steadily while the electrical activity is recorded. · Your doctor may move the electrode to a different area of the muscle or a different muscle.   For nerve conduction studies:  · Your doctor will attach two types of

## 2018-03-20 NOTE — PROGRESS NOTES
[facility]  Neurology  [address] [place] [address]  Phone: [phone]   Fax: [phone]    SUBJECTIVE:     PATIENT ID:  Meridith Mohs is a  RIGHT     HANDED 75 y.o. female. Cerebrovascular Accident   This is a new problem. Episode onset: NOVEMBER 2017. Episode frequency: ONE  EPISODE  WITH  RIGHT  SIDED  WEAKNESS  AND  SPEECH  DIFFICULY. RESOLVED    COMPLETELY. Pertinent negatives include no abdominal pain, anorexia, arthralgias, change in bowel habit, chest pain, chills, congestion, coughing, diaphoresis, fatigue, fever, headaches, joint swelling, myalgias, nausea, neck pain, numbness, rash, sore throat, swollen glands, urinary symptoms, vertigo, visual change, vomiting or weakness. Nothing aggravates the symptoms. Treatments tried: ASA, PLAVIX. The treatment provided significant relief. Neurologic Problem   The patient's primary symptoms include clumsiness, focal sensory loss, focal weakness and a loss of balance. The patient's pertinent negatives include no altered mental status, memory loss, near-syncope, slurred speech, syncope, visual change or weakness. This is a recurrent problem. The current episode started more than 1 year ago. The neurological problem developed insidiously. The problem is unchanged. There was lower extremity and right-sided focality noted. Pertinent negatives include no abdominal pain, auditory change, aura, back pain, bladder incontinence, chest pain, confusion, diaphoresis, dizziness, fatigue, fever, headaches, light-headedness, nausea, neck pain, palpitations, shortness of breath, vertigo or vomiting. Past treatments include bed rest and aspirin. The treatment provided no relief. Her past medical history is significant for a CVA. There is no history of a bleeding disorder, a clotting disorder, dementia, head trauma, liver disease, mood changes or seizures.        History obtained from  The patient   and other  available medical records velocities >045ST/BLL, end diastolic velocities   >597DW/CEY   ICA/CCA ratio greater than 4.0 suggests a >70% stenosis.       Procedure   Type of Study:        Cerebral: Carotid, Carotid Scan Bilateral.       Indications for Study:Dizziness.       Risk Factors         - The patient's risk factor(s) include: diabetes mellitus, dyslipidemia       and arterial hypertension.       Findings:        Right                                Left    Intimal thickening right common      Intimal thickening left common    carotid artery. Some increase peak   carotid artery.    flow velocity noted along the common Carotid scan shows mild to moderate    carotid artery.                      calcific plaque formation at the    Carotid scan shows mild hard plaque  bifurcation, origin of the left    formation at the bifurcation, origin internal carotid artery.    of the right internal carotid        Minimal spectral broadening of the    artery.                              left internal carotid artery with    Minimal spectral broadening of the   elevation of peak systolic    right internal carotid artery with   velocities.    elevation of peak systolic           There is smooth plaque formation at    velocities.                          the origin of the external carotid    There is heterogeneous plaque        artery.    formation at the origin of the       Vertebral artery flow is antegrade .    external carotid artery.    Vertebral artery flow is antegrade .       Patient Status: In Patient.       Velocities are measured in cm/s ; Diameters are measured in mm       Carotid Right Measurements   +------------+-------+-------+--------+-------+------------+---------------+   ! Location    !PSV    !EDV    ! Angle   !RI     !%Stenosis   ! Tortuosity     !   +------------+-------+-------+--------+-------+------------+---------------+   ! Prox CCA    !146    !18.9   !60      !0.87   !            !               ! +------------+-------+-------+--------+-------+------------+---------------+   ! Mid CCA     !125    !19.6   !60      !0.84   !            !               !   +------------+-------+-------+--------+-------+------------+---------------+   ! Dist CCA    !95.1   !18.1   !60      !0.81   !            !               !   +------------+-------+-------+--------+-------+------------+---------------+   ! Prox ICA    !91.1   !25.9   !60      !0.72   !            !               !   +------------+-------+-------+--------+-------+------------+---------------+   ! Mid ICA     !107    !33.8   !60      !0.68   !            !               !   +------------+-------+-------+--------+-------+------------+---------------+   ! Dist ICA    !908    !33     !60      !0.76   !            !               !   +------------+-------+-------+--------+-------+------------+---------------+   ! Prox ECA   [id number]    !15.7   !60      !0.89   !            !               !   +------------+-------+-------+--------+-------+------------+---------------+   ! Vertebral   !73.1   !12.6   !60      !0.83   !            !               !   +------------+-------+-------+--------+-------+------------+---------------+         - There is antegrade vertebral flow noted on the right side.         - Additional Measurements:ICAPSV/CCAPSV 0.94. ICAEDV/CCAEDV 1.79.       Carotid Left Measurements   +------------+-------+-------+--------+-------+------------+---------------+   ! Location    !PSV    !EDV    ! Angle   !RI     !%Stenosis   ! Tortuosity     !   +------------+-------+-------+--------+-------+------------+---------------+   ! Prox CCA    !89.6   !19.6   !60      !0.78   !            !               !   +------------+-------+-------+--------+-------+------------+---------------+   ! Mid CCA     !99     !23.6   !60      !0.76   !            !               !   +------------+-------+-------+--------+-------+------------+---------------+   ! Dist CCA    !97.4   !20.4   !60      !0.79   !            !               !   +------------+-------+-------+--------+-------+------------+---------------+   ! Prox ICA    !99.8   !34.6   !60      !0.65   !            !               !   +------------+-------+-------+--------+-------+------------+---------------+   ! Mid ICA     !82.5   !20.4   !60      !0.75   !            !               !   +------------+-------+-------+--------+-------+------------+---------------+   ! Dist ICA    !130    !40.9   !60      !0.69   !            !               !   +------------+-------+-------+--------+-------+------------+---------------+   ! Prox ECA    !161    !16.5   !60      !0.9    !            !               !   +------------+-------+-------+--------+-------+------------+---------------+   ! Vertebral   !74.6   !16.5   !60      !0.78   !            !               !   +------------+-------+-------+--------+-------+------------+---------------+         - Additional Measurements:ICAPSV/CCAPSV 1.45. ICAEDV/CCAEDV 2.09.        Conclusions        Summary        Mild 16-49% stenosis of the right internal carotid artery.    Mild to moderate 45-55 % stenosis of the left internal carotid artery.    Patent vertebral arteries bilaterally with antegrade flow.        Signature        ----------------------------------------------------------------    Electronically signed by Taylor Cuenca(Sonographer) on    06/20/2017 03:22 PM    ----------------------------------------------------------------             VISITING DIAGNOSIS:      ICD-10-CM ICD-9-CM    1. Polyneuropathy associated with underlying disease ([facility] [address] G63 357.4    2. Mixed hyperlipidemia E78.2 272.2    3. Essential hypertension I10 401.9    4. Chronic fatigue R53.82 780.79    5. Cerebrovascular accident (CVA) due to thrombosis of precerebral artery ([facility] [address] I63.00 433.91    6. Right sided weakness R53.1 728.87    7. Other specified transient cerebral ischemias G45.8 435.8    8. Dizziness R42 780.4    9.  Uncontrolled type 2 If you have questions about a medical condition or this instruction, always ask your healthcare professional. [place] [address] any warranty or liability for your use of this information.

## 2018-03-23 ENCOUNTER — HOSPITAL ENCOUNTER (OUTPATIENT)
Dept: LAB | Age: 68
Setting detail: SPECIMEN
Discharge: HOME | End: 2018-03-23
Payer: MEDICARE

## 2018-03-23 DIAGNOSIS — E78.2 MIXED HYPERLIPIDEMIA: ICD-10-CM

## 2018-03-23 DIAGNOSIS — I63.00 CEREBROVASCULAR ACCIDENT (CVA) DUE TO THROMBOSIS OF PRECEREBRAL ARTERY (HCC): ICD-10-CM

## 2018-03-23 LAB
ESTIMATED AVERAGE GLUCOSE: 137 MG/DL
FOLATE: >20 NG/ML
HBA1C MFR BLD: 6.4 % (ref 4.8–5.9)
VITAMIN B-12: <150 PG/ML (ref 232–1245)

## 2018-03-23 PROCEDURE — 82746 ASSAY OF FOLIC ACID SERUM: CPT

## 2018-03-23 PROCEDURE — 82607 VITAMIN B-12: CPT

## 2018-03-23 PROCEDURE — 36415 COLL VENOUS BLD VENIPUNCTURE: CPT

## 2018-03-23 PROCEDURE — 83036 HEMOGLOBIN GLYCOSYLATED A1C: CPT

## 2018-03-26 DIAGNOSIS — E53.8 VITAMIN B 12 DEFICIENCY: Primary | ICD-10-CM

## 2018-03-26 RX ORDER — CYANOCOBALAMIN 1000 UG/ML
2000 INJECTION INTRAMUSCULAR; SUBCUTANEOUS
Qty: 2 ML | Refills: 3 | Status: SHIPPED | OUTPATIENT
Start: 2018-03-26 | End: 2018-04-18

## 2018-03-27 ENCOUNTER — NURSE ONLY (OUTPATIENT)
Dept: LAB | Age: 68
End: 2018-03-27
Payer: MEDICARE

## 2018-03-27 DIAGNOSIS — E53.8 VITAMIN B 12 DEFICIENCY: Primary | ICD-10-CM

## 2018-03-27 PROCEDURE — 96372 THER/PROPH/DIAG INJ SC/IM: CPT | Performed by: FAMILY MEDICINE

## 2018-03-27 RX ORDER — CYANOCOBALAMIN 1000 UG/ML
2000 INJECTION INTRAMUSCULAR; SUBCUTANEOUS
Qty: 2 ML | Refills: 3 | Status: SHIPPED | OUTPATIENT
Start: 2018-03-27 | End: 2018-03-27 | Stop reason: CLARIF

## 2018-03-27 RX ORDER — CYANOCOBALAMIN 1000 UG/ML
2000 INJECTION INTRAMUSCULAR; SUBCUTANEOUS
Qty: 2 ML | Refills: 5 | Status: SHIPPED | OUTPATIENT
Start: 2018-03-27 | End: 2018-03-27 | Stop reason: CLARIF

## 2018-03-27 RX ORDER — CYANOCOBALAMIN 1000 UG/ML
2000 INJECTION INTRAMUSCULAR; SUBCUTANEOUS WEEKLY
Status: COMPLETED | OUTPATIENT
Start: 2018-03-27 | End: 2018-04-17

## 2018-03-27 RX ORDER — CYANOCOBALAMIN 1000 UG/ML
2000 INJECTION INTRAMUSCULAR; SUBCUTANEOUS
Status: SHIPPED | OUTPATIENT
Start: 2018-03-27

## 2018-03-27 RX ADMIN — CYANOCOBALAMIN 2000 MCG: 1000 INJECTION INTRAMUSCULAR; SUBCUTANEOUS at 14:10

## 2018-04-02 ENCOUNTER — CARE COORDINATION (OUTPATIENT)
Dept: CARE COORDINATION | Age: 68
End: 2018-04-02

## 2018-04-03 ENCOUNTER — NURSE ONLY (OUTPATIENT)
Dept: LAB | Age: 68
End: 2018-04-03
Payer: MEDICARE

## 2018-04-03 DIAGNOSIS — E53.8 VITAMIN B 12 DEFICIENCY: Primary | ICD-10-CM

## 2018-04-03 PROCEDURE — 96372 THER/PROPH/DIAG INJ SC/IM: CPT | Performed by: FAMILY MEDICINE

## 2018-04-03 RX ADMIN — CYANOCOBALAMIN 2000 MCG: 1000 INJECTION INTRAMUSCULAR; SUBCUTANEOUS at 14:09

## 2018-04-06 ENCOUNTER — OFFICE VISIT (OUTPATIENT)
Dept: CARDIOLOGY | Age: 68
End: 2018-04-06
Payer: MEDICARE

## 2018-04-06 VITALS
HEART RATE: 92 BPM | SYSTOLIC BLOOD PRESSURE: 138 MMHG | BODY MASS INDEX: 36.76 KG/M2 | WEIGHT: 170.4 LBS | HEIGHT: 57 IN | DIASTOLIC BLOOD PRESSURE: 88 MMHG

## 2018-04-06 DIAGNOSIS — I10 ESSENTIAL HYPERTENSION: Primary | ICD-10-CM

## 2018-04-06 PROCEDURE — 99213 OFFICE O/P EST LOW 20 MIN: CPT | Performed by: INTERNAL MEDICINE

## 2018-04-10 ENCOUNTER — TELEPHONE (OUTPATIENT)
Dept: CARDIOLOGY | Age: 68
End: 2018-04-10

## 2018-04-10 ENCOUNTER — NURSE ONLY (OUTPATIENT)
Dept: LAB | Age: 68
End: 2018-04-10
Payer: MEDICARE

## 2018-04-10 DIAGNOSIS — E53.8 VITAMIN B 12 DEFICIENCY: Primary | ICD-10-CM

## 2018-04-10 PROCEDURE — 96372 THER/PROPH/DIAG INJ SC/IM: CPT | Performed by: FAMILY MEDICINE

## 2018-04-10 RX ADMIN — CYANOCOBALAMIN 2000 MCG: 1000 INJECTION INTRAMUSCULAR; SUBCUTANEOUS at 11:42

## 2018-04-13 ENCOUNTER — CARE COORDINATION (OUTPATIENT)
Dept: CARE COORDINATION | Age: 68
End: 2018-04-13

## 2018-04-17 ENCOUNTER — NURSE ONLY (OUTPATIENT)
Dept: LAB | Age: 68
End: 2018-04-17
Payer: MEDICARE

## 2018-04-17 ENCOUNTER — TELEPHONE (OUTPATIENT)
Dept: PRIMARY CARE CLINIC | Age: 68
End: 2018-04-17

## 2018-04-17 VITALS — SYSTOLIC BLOOD PRESSURE: 150 MMHG | DIASTOLIC BLOOD PRESSURE: 84 MMHG | HEART RATE: 88 BPM

## 2018-04-17 DIAGNOSIS — Z01.30 BLOOD PRESSURE CHECK: ICD-10-CM

## 2018-04-17 DIAGNOSIS — E53.8 VITAMIN B 12 DEFICIENCY: Primary | ICD-10-CM

## 2018-04-17 PROCEDURE — 96372 THER/PROPH/DIAG INJ SC/IM: CPT | Performed by: PSYCHIATRY & NEUROLOGY

## 2018-04-17 RX ADMIN — CYANOCOBALAMIN 2000 MCG: 1000 INJECTION INTRAMUSCULAR; SUBCUTANEOUS at 12:13

## 2018-04-18 ENCOUNTER — TELEPHONE (OUTPATIENT)
Dept: FAMILY MEDICINE CLINIC | Age: 68
End: 2018-04-18

## 2018-04-18 ENCOUNTER — TELEPHONE (OUTPATIENT)
Dept: NEUROLOGY | Age: 68
End: 2018-04-18

## 2018-04-18 ENCOUNTER — TELEPHONE (OUTPATIENT)
Dept: LAB | Age: 68
End: 2018-04-18

## 2018-04-18 ENCOUNTER — HOSPITAL ENCOUNTER (OUTPATIENT)
Dept: GENERAL RADIOLOGY | Age: 68
Discharge: HOME | End: 2018-04-20
Payer: MEDICARE

## 2018-04-18 DIAGNOSIS — M25.561 RIGHT KNEE PAIN, UNSPECIFIED CHRONICITY: ICD-10-CM

## 2018-04-18 DIAGNOSIS — E53.8 VITAMIN B 12 DEFICIENCY: Primary | ICD-10-CM

## 2018-04-18 PROCEDURE — 73562 X-RAY EXAM OF KNEE 3: CPT

## 2018-04-18 RX ORDER — CYANOCOBALAMIN 1000 UG/ML
1000 INJECTION INTRAMUSCULAR; SUBCUTANEOUS
Status: SHIPPED | OUTPATIENT
Start: 2018-04-19 | End: 2019-04-14

## 2018-04-19 ENCOUNTER — HOSPITAL ENCOUNTER (OUTPATIENT)
Dept: GENERAL RADIOLOGY | Age: 68
Discharge: HOME | End: 2018-04-21
Payer: MEDICARE

## 2018-04-19 ENCOUNTER — OFFICE VISIT (OUTPATIENT)
Dept: FAMILY MEDICINE CLINIC | Age: 68
End: 2018-04-19
Payer: MEDICARE

## 2018-04-19 VITALS
SYSTOLIC BLOOD PRESSURE: 138 MMHG | WEIGHT: 170.4 LBS | HEART RATE: 84 BPM | BODY MASS INDEX: 36.76 KG/M2 | DIASTOLIC BLOOD PRESSURE: 86 MMHG | RESPIRATION RATE: 16 BRPM | HEIGHT: 57 IN

## 2018-04-19 DIAGNOSIS — Z12.31 ENCOUNTER FOR SCREENING MAMMOGRAM FOR BREAST CANCER: ICD-10-CM

## 2018-04-19 DIAGNOSIS — R07.9 CHEST PAIN, UNSPECIFIED TYPE: ICD-10-CM

## 2018-04-19 DIAGNOSIS — Z23 NEED FOR PNEUMOCOCCAL VACCINATION: ICD-10-CM

## 2018-04-19 DIAGNOSIS — R07.9 CHEST PAIN, UNSPECIFIED TYPE: Primary | ICD-10-CM

## 2018-04-19 PROCEDURE — G8399 PT W/DXA RESULTS DOCUMENT: HCPCS | Performed by: FAMILY MEDICINE

## 2018-04-19 PROCEDURE — G8427 DOCREV CUR MEDS BY ELIG CLIN: HCPCS | Performed by: FAMILY MEDICINE

## 2018-04-19 PROCEDURE — G8598 ASA/ANTIPLAT THER USED: HCPCS | Performed by: FAMILY MEDICINE

## 2018-04-19 PROCEDURE — 99214 OFFICE O/P EST MOD 30 MIN: CPT | Performed by: FAMILY MEDICINE

## 2018-04-19 PROCEDURE — 1123F ACP DISCUSS/DSCN MKR DOCD: CPT | Performed by: FAMILY MEDICINE

## 2018-04-19 PROCEDURE — G8417 CALC BMI ABV UP PARAM F/U: HCPCS | Performed by: FAMILY MEDICINE

## 2018-04-19 PROCEDURE — 1036F TOBACCO NON-USER: CPT | Performed by: FAMILY MEDICINE

## 2018-04-19 PROCEDURE — G0009 ADMIN PNEUMOCOCCAL VACCINE: HCPCS | Performed by: FAMILY MEDICINE

## 2018-04-19 PROCEDURE — 71046 X-RAY EXAM CHEST 2 VIEWS: CPT

## 2018-04-19 PROCEDURE — 93000 ELECTROCARDIOGRAM COMPLETE: CPT | Performed by: FAMILY MEDICINE

## 2018-04-19 PROCEDURE — 3017F COLORECTAL CA SCREEN DOC REV: CPT | Performed by: FAMILY MEDICINE

## 2018-04-19 PROCEDURE — 1090F PRES/ABSN URINE INCON ASSESS: CPT | Performed by: FAMILY MEDICINE

## 2018-04-19 PROCEDURE — 90732 PPSV23 VACC 2 YRS+ SUBQ/IM: CPT | Performed by: FAMILY MEDICINE

## 2018-04-19 PROCEDURE — 4040F PNEUMOC VAC/ADMIN/RCVD: CPT | Performed by: FAMILY MEDICINE

## 2018-05-02 ENCOUNTER — HOSPITAL ENCOUNTER (OUTPATIENT)
Dept: NEUROLOGY | Age: 68
Discharge: HOME | End: 2018-05-02
Payer: MEDICARE

## 2018-05-02 DIAGNOSIS — G63 POLYNEUROPATHY ASSOCIATED WITH UNDERLYING DISEASE (HCC): ICD-10-CM

## 2018-05-02 DIAGNOSIS — M62.838 MUSCLE SPASMS OF BOTH LOWER EXTREMITIES: ICD-10-CM

## 2018-05-02 DIAGNOSIS — I63.00 CEREBROVASCULAR ACCIDENT (CVA) DUE TO THROMBOSIS OF PRECEREBRAL ARTERY (HCC): ICD-10-CM

## 2018-05-02 DIAGNOSIS — R20.2 PARESTHESIAS: ICD-10-CM

## 2018-05-02 PROCEDURE — 95886 MUSC TEST DONE W/N TEST COMP: CPT

## 2018-05-02 PROCEDURE — 95911 NRV CNDJ TEST 9-10 STUDIES: CPT

## 2018-05-15 ENCOUNTER — TELEPHONE (OUTPATIENT)
Dept: PRIMARY CARE CLINIC | Age: 68
End: 2018-05-15

## 2018-05-16 ENCOUNTER — CARE COORDINATION (OUTPATIENT)
Dept: FAMILY MEDICINE CLINIC | Age: 68
End: 2018-05-16

## 2018-05-18 ENCOUNTER — NURSE ONLY (OUTPATIENT)
Dept: LAB | Age: 68
End: 2018-05-18
Payer: MEDICARE

## 2018-05-18 ENCOUNTER — HOSPITAL ENCOUNTER (OUTPATIENT)
Dept: LAB | Age: 68
Setting detail: SPECIMEN
Discharge: HOME | End: 2018-05-18
Payer: MEDICARE

## 2018-05-18 DIAGNOSIS — E53.8 VITAMIN B 12 DEFICIENCY: Primary | ICD-10-CM

## 2018-05-18 DIAGNOSIS — E78.2 MIXED HYPERLIPIDEMIA: ICD-10-CM

## 2018-05-18 DIAGNOSIS — E11.9 TYPE 2 DIABETES MELLITUS WITHOUT COMPLICATION, WITHOUT LONG-TERM CURRENT USE OF INSULIN (HCC): ICD-10-CM

## 2018-05-18 DIAGNOSIS — I10 ESSENTIAL HYPERTENSION: ICD-10-CM

## 2018-05-18 LAB
ALBUMIN SERPL-MCNC: 4.3 G/DL (ref 3.5–5.2)
ALBUMIN/GLOBULIN RATIO: 1.5 (ref 1–2.5)
ALP BLD-CCNC: 107 U/L (ref 35–104)
ALT SERPL-CCNC: 28 U/L (ref 5–33)
ANION GAP SERPL CALCULATED.3IONS-SCNC: 12 MMOL/L (ref 9–17)
AST SERPL-CCNC: 23 U/L
BILIRUB SERPL-MCNC: 0.32 MG/DL (ref 0.3–1.2)
BUN BLDV-MCNC: 13 MG/DL (ref 8–23)
BUN/CREAT BLD: 19 (ref 9–20)
CALCIUM SERPL-MCNC: 9.6 MG/DL (ref 8.6–10.4)
CHLORIDE BLD-SCNC: 101 MMOL/L (ref 98–107)
CHOLESTEROL/HDL RATIO: 3.5
CHOLESTEROL: 166 MG/DL
CO2: 29 MMOL/L (ref 20–31)
CREAT SERPL-MCNC: 0.69 MG/DL (ref 0.5–0.9)
ESTIMATED AVERAGE GLUCOSE: 148 MG/DL
GFR AFRICAN AMERICAN: >60 ML/MIN
GFR NON-AFRICAN AMERICAN: >60 ML/MIN
GFR SERPL CREATININE-BSD FRML MDRD: ABNORMAL ML/MIN/{1.73_M2}
GFR SERPL CREATININE-BSD FRML MDRD: ABNORMAL ML/MIN/{1.73_M2}
GLUCOSE BLD-MCNC: 128 MG/DL (ref 70–99)
HBA1C MFR BLD: 6.8 % (ref 4.8–5.9)
HDLC SERPL-MCNC: 48 MG/DL
LDL CHOLESTEROL: 71 MG/DL (ref 0–130)
POTASSIUM SERPL-SCNC: 3.8 MMOL/L (ref 3.7–5.3)
SODIUM BLD-SCNC: 142 MMOL/L (ref 135–144)
TOTAL PROTEIN: 7.1 G/DL (ref 6.4–8.3)
TRIGL SERPL-MCNC: 237 MG/DL
VLDLC SERPL CALC-MCNC: ABNORMAL MG/DL (ref 1–30)

## 2018-05-18 PROCEDURE — 36415 COLL VENOUS BLD VENIPUNCTURE: CPT

## 2018-05-18 PROCEDURE — 83036 HEMOGLOBIN GLYCOSYLATED A1C: CPT

## 2018-05-18 PROCEDURE — 80053 COMPREHEN METABOLIC PANEL: CPT

## 2018-05-18 PROCEDURE — 80061 LIPID PANEL: CPT

## 2018-05-18 PROCEDURE — 96372 THER/PROPH/DIAG INJ SC/IM: CPT | Performed by: PSYCHIATRY & NEUROLOGY

## 2018-05-18 RX ADMIN — CYANOCOBALAMIN 1000 MCG: 1000 INJECTION INTRAMUSCULAR; SUBCUTANEOUS at 08:57

## 2018-05-21 DIAGNOSIS — E78.2 MIXED HYPERLIPIDEMIA: ICD-10-CM

## 2018-05-21 DIAGNOSIS — I10 ESSENTIAL HYPERTENSION: ICD-10-CM

## 2018-05-22 ENCOUNTER — HOSPITAL ENCOUNTER (OUTPATIENT)
Dept: MAMMOGRAPHY | Age: 68
Discharge: HOME | End: 2018-05-24
Payer: MEDICARE

## 2018-05-22 DIAGNOSIS — Z12.31 ENCOUNTER FOR SCREENING MAMMOGRAM FOR BREAST CANCER: ICD-10-CM

## 2018-05-22 PROCEDURE — 77063 BREAST TOMOSYNTHESIS BI: CPT

## 2018-05-25 ENCOUNTER — OFFICE VISIT (OUTPATIENT)
Dept: NEUROLOGY | Age: 68
End: 2018-05-25
Payer: MEDICARE

## 2018-05-25 VITALS
HEART RATE: 83 BPM | OXYGEN SATURATION: 96 % | WEIGHT: 170.4 LBS | BODY MASS INDEX: 34.35 KG/M2 | RESPIRATION RATE: 16 BRPM | DIASTOLIC BLOOD PRESSURE: 82 MMHG | HEIGHT: 59 IN | SYSTOLIC BLOOD PRESSURE: 124 MMHG

## 2018-05-25 DIAGNOSIS — M62.838 MUSCLE SPASMS OF BOTH LOWER EXTREMITIES: ICD-10-CM

## 2018-05-25 DIAGNOSIS — E53.8 B12 DEFICIENCY: ICD-10-CM

## 2018-05-25 DIAGNOSIS — M25.561 RIGHT KNEE PAIN, UNSPECIFIED CHRONICITY: ICD-10-CM

## 2018-05-25 DIAGNOSIS — R53.1 RIGHT SIDED WEAKNESS: ICD-10-CM

## 2018-05-25 DIAGNOSIS — E66.9 OBESITY (BMI 30-39.9): ICD-10-CM

## 2018-05-25 DIAGNOSIS — R42 DIZZINESS: ICD-10-CM

## 2018-05-25 DIAGNOSIS — R53.82 CHRONIC FATIGUE: ICD-10-CM

## 2018-05-25 DIAGNOSIS — I63.00 CEREBROVASCULAR ACCIDENT (CVA) DUE TO THROMBOSIS OF PRECEREBRAL ARTERY (HCC): ICD-10-CM

## 2018-05-25 DIAGNOSIS — R26.9 GAIT DIFFICULTY: ICD-10-CM

## 2018-05-25 DIAGNOSIS — E55.9 VITAMIN D DEFICIENCY: ICD-10-CM

## 2018-05-25 DIAGNOSIS — R26.89 BALANCE PROBLEM: ICD-10-CM

## 2018-05-25 DIAGNOSIS — R29.90 STROKE-LIKE SYMPTOMS: ICD-10-CM

## 2018-05-25 DIAGNOSIS — R20.2 PARESTHESIAS: ICD-10-CM

## 2018-05-25 DIAGNOSIS — E53.8 VITAMIN B 12 DEFICIENCY: Primary | ICD-10-CM

## 2018-05-25 DIAGNOSIS — G57.32 NEUROPATHY OF LEFT PERONEAL NERVE: ICD-10-CM

## 2018-05-25 DIAGNOSIS — I10 ESSENTIAL HYPERTENSION: ICD-10-CM

## 2018-05-25 DIAGNOSIS — I25.10 CORONARY ARTERY DISEASE INVOLVING NATIVE CORONARY ARTERY OF NATIVE HEART WITHOUT ANGINA PECTORIS: ICD-10-CM

## 2018-05-25 DIAGNOSIS — G45.8 OTHER SPECIFIED TRANSIENT CEREBRAL ISCHEMIAS: ICD-10-CM

## 2018-05-25 DIAGNOSIS — G63 POLYNEUROPATHY ASSOCIATED WITH UNDERLYING DISEASE (HCC): ICD-10-CM

## 2018-05-25 DIAGNOSIS — G57.41 RIGHT TIBIAL NEUROPATHY: ICD-10-CM

## 2018-05-25 PROCEDURE — 1036F TOBACCO NON-USER: CPT | Performed by: PSYCHIATRY & NEUROLOGY

## 2018-05-25 PROCEDURE — 3044F HG A1C LEVEL LT 7.0%: CPT | Performed by: PSYCHIATRY & NEUROLOGY

## 2018-05-25 PROCEDURE — G8598 ASA/ANTIPLAT THER USED: HCPCS | Performed by: PSYCHIATRY & NEUROLOGY

## 2018-05-25 PROCEDURE — 2022F DILAT RTA XM EVC RTNOPTHY: CPT | Performed by: PSYCHIATRY & NEUROLOGY

## 2018-05-25 PROCEDURE — 1123F ACP DISCUSS/DSCN MKR DOCD: CPT | Performed by: PSYCHIATRY & NEUROLOGY

## 2018-05-25 PROCEDURE — G8399 PT W/DXA RESULTS DOCUMENT: HCPCS | Performed by: PSYCHIATRY & NEUROLOGY

## 2018-05-25 PROCEDURE — 3017F COLORECTAL CA SCREEN DOC REV: CPT | Performed by: PSYCHIATRY & NEUROLOGY

## 2018-05-25 PROCEDURE — 4040F PNEUMOC VAC/ADMIN/RCVD: CPT | Performed by: PSYCHIATRY & NEUROLOGY

## 2018-05-25 PROCEDURE — 99214 OFFICE O/P EST MOD 30 MIN: CPT | Performed by: PSYCHIATRY & NEUROLOGY

## 2018-05-25 PROCEDURE — G8417 CALC BMI ABV UP PARAM F/U: HCPCS | Performed by: PSYCHIATRY & NEUROLOGY

## 2018-05-25 PROCEDURE — 1090F PRES/ABSN URINE INCON ASSESS: CPT | Performed by: PSYCHIATRY & NEUROLOGY

## 2018-05-25 PROCEDURE — G8427 DOCREV CUR MEDS BY ELIG CLIN: HCPCS | Performed by: PSYCHIATRY & NEUROLOGY

## 2018-05-25 ASSESSMENT — ENCOUNTER SYMPTOMS
ABDOMINAL PAIN: 0
CHEST TIGHTNESS: 0
VOMITING: 0
DIARRHEA: 0
PHOTOPHOBIA: 0
SWOLLEN GLANDS: 0
BACK PAIN: 0
TROUBLE SWALLOWING: 0
WHEEZING: 0
EYE REDNESS: 0
FACIAL SWELLING: 0
APNEA: 0
VOICE CHANGE: 0
EYE PAIN: 0
SORE THROAT: 0
COLOR CHANGE: 0
VISUAL CHANGE: 0
CHANGE IN BOWEL HABIT: 0
CHOKING: 0
EYE ITCHING: 0
SHORTNESS OF BREATH: 0
NAUSEA: 0
EYE DISCHARGE: 0
COUGH: 0
ABDOMINAL DISTENTION: 0
CONSTIPATION: 0
BLOOD IN STOOL: 0
SINUS PRESSURE: 0

## 2018-06-05 ENCOUNTER — OFFICE VISIT (OUTPATIENT)
Dept: ORTHOPEDIC SURGERY | Age: 68
End: 2018-06-05
Payer: MEDICARE

## 2018-06-05 ENCOUNTER — HOSPITAL ENCOUNTER (OUTPATIENT)
Dept: GENERAL RADIOLOGY | Age: 68
Discharge: HOME | End: 2018-06-07
Payer: MEDICARE

## 2018-06-05 VITALS
HEART RATE: 88 BPM | WEIGHT: 170 LBS | DIASTOLIC BLOOD PRESSURE: 82 MMHG | BODY MASS INDEX: 36.68 KG/M2 | SYSTOLIC BLOOD PRESSURE: 136 MMHG | HEIGHT: 57 IN | RESPIRATION RATE: 18 BRPM

## 2018-06-05 DIAGNOSIS — M76.899 HAMSTRING TENDONITIS: ICD-10-CM

## 2018-06-05 DIAGNOSIS — M54.50 LUMBAR SPINE PAIN: Primary | ICD-10-CM

## 2018-06-05 DIAGNOSIS — M54.50 LUMBAR SPINE PAIN: ICD-10-CM

## 2018-06-05 PROCEDURE — G8399 PT W/DXA RESULTS DOCUMENT: HCPCS | Performed by: PHYSICIAN ASSISTANT

## 2018-06-05 PROCEDURE — G8598 ASA/ANTIPLAT THER USED: HCPCS | Performed by: PHYSICIAN ASSISTANT

## 2018-06-05 PROCEDURE — G8417 CALC BMI ABV UP PARAM F/U: HCPCS | Performed by: PHYSICIAN ASSISTANT

## 2018-06-05 PROCEDURE — 1123F ACP DISCUSS/DSCN MKR DOCD: CPT | Performed by: PHYSICIAN ASSISTANT

## 2018-06-05 PROCEDURE — 1090F PRES/ABSN URINE INCON ASSESS: CPT | Performed by: PHYSICIAN ASSISTANT

## 2018-06-05 PROCEDURE — 4040F PNEUMOC VAC/ADMIN/RCVD: CPT | Performed by: PHYSICIAN ASSISTANT

## 2018-06-05 PROCEDURE — G8427 DOCREV CUR MEDS BY ELIG CLIN: HCPCS | Performed by: PHYSICIAN ASSISTANT

## 2018-06-05 PROCEDURE — 3017F COLORECTAL CA SCREEN DOC REV: CPT | Performed by: PHYSICIAN ASSISTANT

## 2018-06-05 PROCEDURE — 72100 X-RAY EXAM L-S SPINE 2/3 VWS: CPT

## 2018-06-05 PROCEDURE — 99212 OFFICE O/P EST SF 10 MIN: CPT | Performed by: PHYSICIAN ASSISTANT

## 2018-06-05 PROCEDURE — 1036F TOBACCO NON-USER: CPT | Performed by: PHYSICIAN ASSISTANT

## 2018-06-11 ENCOUNTER — HOSPITAL ENCOUNTER (OUTPATIENT)
Dept: PHYSICAL THERAPY | Age: 68
Setting detail: THERAPIES SERIES
Discharge: HOME | End: 2018-06-11
Payer: MEDICARE

## 2018-06-11 PROCEDURE — G8979 MOBILITY GOAL STATUS: HCPCS

## 2018-06-11 PROCEDURE — 97035 APP MDLTY 1+ULTRASOUND EA 15: CPT

## 2018-06-11 PROCEDURE — 97162 PT EVAL MOD COMPLEX 30 MIN: CPT

## 2018-06-11 PROCEDURE — G8978 MOBILITY CURRENT STATUS: HCPCS

## 2018-06-11 ASSESSMENT — PAIN DESCRIPTION - DESCRIPTORS: DESCRIPTORS: SHARP

## 2018-06-11 ASSESSMENT — PAIN DESCRIPTION - ORIENTATION: ORIENTATION: RIGHT

## 2018-06-11 ASSESSMENT — PAIN SCALES - GENERAL: PAINLEVEL_OUTOF10: 2

## 2018-06-11 ASSESSMENT — PAIN DESCRIPTION - LOCATION: LOCATION: KNEE

## 2018-06-11 ASSESSMENT — PAIN DESCRIPTION - PROGRESSION: CLINICAL_PROGRESSION: NOT CHANGED

## 2018-06-13 ENCOUNTER — CARE COORDINATION (OUTPATIENT)
Dept: FAMILY MEDICINE CLINIC | Age: 68
End: 2018-06-13

## 2018-06-14 ENCOUNTER — HOSPITAL ENCOUNTER (OUTPATIENT)
Dept: PHYSICAL THERAPY | Age: 68
Setting detail: THERAPIES SERIES
Discharge: HOME | End: 2018-06-14
Payer: MEDICARE

## 2018-06-14 PROCEDURE — 97110 THERAPEUTIC EXERCISES: CPT | Performed by: PHYSICAL THERAPY ASSISTANT

## 2018-06-19 RX ORDER — CLOPIDOGREL BISULFATE 75 MG/1
75 TABLET ORAL DAILY
Qty: 90 TABLET | Refills: 3 | Status: SHIPPED | OUTPATIENT
Start: 2018-06-19 | End: 2019-06-10 | Stop reason: SDUPTHER

## 2018-06-20 ENCOUNTER — NURSE ONLY (OUTPATIENT)
Dept: LAB | Age: 68
End: 2018-06-20
Payer: MEDICARE

## 2018-06-20 ENCOUNTER — HOSPITAL ENCOUNTER (OUTPATIENT)
Dept: PHYSICAL THERAPY | Age: 68
Setting detail: THERAPIES SERIES
Discharge: HOME | End: 2018-06-20
Payer: MEDICARE

## 2018-06-20 DIAGNOSIS — E53.8 B12 DEFICIENCY: Primary | ICD-10-CM

## 2018-06-20 PROCEDURE — 97035 APP MDLTY 1+ULTRASOUND EA 15: CPT | Performed by: PHYSICAL THERAPY ASSISTANT

## 2018-06-20 PROCEDURE — 96372 THER/PROPH/DIAG INJ SC/IM: CPT | Performed by: FAMILY MEDICINE

## 2018-06-20 PROCEDURE — 97110 THERAPEUTIC EXERCISES: CPT | Performed by: PHYSICAL THERAPY ASSISTANT

## 2018-06-20 RX ADMIN — CYANOCOBALAMIN 1000 MCG: 1000 INJECTION INTRAMUSCULAR; SUBCUTANEOUS at 11:54

## 2018-06-22 ENCOUNTER — HOSPITAL ENCOUNTER (OUTPATIENT)
Dept: PHYSICAL THERAPY | Age: 68
Setting detail: THERAPIES SERIES
Discharge: HOME | End: 2018-06-22
Payer: MEDICARE

## 2018-06-22 PROCEDURE — 97012 MECHANICAL TRACTION THERAPY: CPT

## 2018-06-22 PROCEDURE — 97110 THERAPEUTIC EXERCISES: CPT

## 2018-06-25 ENCOUNTER — CARE COORDINATION (OUTPATIENT)
Dept: FAMILY MEDICINE CLINIC | Age: 68
End: 2018-06-25

## 2018-06-29 ENCOUNTER — HOSPITAL ENCOUNTER (OUTPATIENT)
Dept: PHYSICAL THERAPY | Age: 68
Setting detail: THERAPIES SERIES
Discharge: HOME | End: 2018-06-29
Payer: MEDICARE

## 2018-06-29 PROCEDURE — G0283 ELEC STIM OTHER THAN WOUND: HCPCS | Performed by: PHYSICAL THERAPY ASSISTANT

## 2018-06-29 PROCEDURE — 97110 THERAPEUTIC EXERCISES: CPT | Performed by: PHYSICAL THERAPY ASSISTANT

## 2018-07-05 ENCOUNTER — HOSPITAL ENCOUNTER (OUTPATIENT)
Dept: PHYSICAL THERAPY | Age: 68
Setting detail: THERAPIES SERIES
Discharge: HOME | End: 2018-07-05
Payer: MEDICARE

## 2018-07-05 PROCEDURE — 97110 THERAPEUTIC EXERCISES: CPT

## 2018-07-05 PROCEDURE — G0283 ELEC STIM OTHER THAN WOUND: HCPCS

## 2018-07-05 NOTE — FLOWSHEET NOTE
Physical Therapy Daily Treatment Note    Date:  2018    Patient Name:  Yumiko Swanson  \"JUSTO\"  :  1950  MRN: [id number]  Restrictions/Precautions:     Medical/Treatment Diagnosis Information:   · Diagnosis: M76.899 (ICD-10-CM) - Hamstring tendonitis     Insurance/Certification information:  PT Insurance Information: Medicare/EQU[employer]d AND YOU  Physician Information:  Referring Practitioner: Tae Kenny PA-C  Plan of care signed (Y/N):  Y  Visit# / total visits: 2/10  Pain level: /10     G-Code (if applicable):      Date G-Code Applied:  18  PT G-Codes  Functional Assessment Tool Used: LEFS   Score: 30/80=63% Disability   Functional Limitation: Mobility: Walking and moving around  Mobility: Walking and Moving Around Current Status (): At least 60 percent but less than 80 percent impaired, limited or restricted  Mobility: Walking and Moving Around Goal Status (): At least 20 percent but less than 40 percent impaired, limited or restricted    Time In:1234  Time Out: 120    Progress Note: []  Yes  [x]  No  Next due by: Visit #10      Subjective: Patient noting pain of 2/10 this date. Patient noting over 50% improved since starting therapy. Objective: BOOM complete per flow chart to facilitate motion and decrease pain and discomfort to allow ease with daily activities and ambulation. Verbal cuing for progression and technique with exercises. Able to progress and add several exs this date. Tightness and pain at end range motion with prone press ups. Able to add standing there-ex this date without increase in pain. IFC to decrease pain and tightness, discomfort. Observation: Able to abolish radicular pain with prone exercises.      Test measurements:      Exercises:   Exercise/Equipment Resistance/Repetitions Other comments        Supine Ham Stretch  HEP    Supine Piriformis Stretch  HEP    Seated Long Sitting HS Stretch  HEP   Nustep     Counter Exs     Standing Ham Stretch Standing ITB Stretch  Manual   Prone Lying      Prone prop 5'    Prone knee flexion 10x2 Initiated    Prone hip ext 15x Adv   Prone pressup  10x2              Abd Bracing  5\" x 10     Pelvic Tilts  5\" x 10     Bridging  5\" 12x Wide/narrow Adv   Standing Trunk Ext  10x    Standing Hip Ext/Abd      Abd Brace with march     LTR  10x5''    Sciatic Flossing     [x] Provided verbal/tactile cueing for activities related to strengthening, flexibility, endurance, ROM. (03113)  [] Provided verbal/tactile cueing for activities related to improving balance, coordination, kinesthetic sense, posture, motor skill, proprioception. (77792)    Therapeutic Activities:     [] Therapeutic activities, direct (one-on-one) patient contact (use of dynamic activities to improve functional performance). (47281)    Gait:   [] Provided training and instruction to the patient for ambulation re-education. (31743)    Self-Care/ADL's  [] Self-care/home management training and compensatory training, meal preparation, safety procedures, and instructions in use of assistive technology devices/adaptive equipment, direct one-on-one contact. (41246)    Home Exercise Program:    Reviewed with understanding noted. [x] Reviewed/Progressed HEP activities related to strengthening, flexibility, endurance, ROM. (82563)  [] Reviewed/Progressed HEP activities related to improving balance, coordination, kinesthetic sense, posture, motor skill, proprioception.  (14417)    Manual Treatments:  Therapeutic Cupping   [x] Provided manual therapy to mobilize soft tissue/joints for the purpose of modulating pain, promoting relaxation,  increasing ROM, reducing/eliminating soft tissue swelling/inflammation/restriction, improving soft tissue extensibility. (38429)    Service Based Modalities:  15' IFC to the lumbar spine to decrease pain and muscle tightness.      Timed Code Treatment Minutes:  32' BOOM    Total Treatment Minutes:46'    Treatment/Activity Tolerance:  [x]

## 2018-07-10 ENCOUNTER — CARE COORDINATION (OUTPATIENT)
Dept: FAMILY MEDICINE CLINIC | Age: 68
End: 2018-07-10

## 2018-07-10 NOTE — CARE COORDINATION
Ambulatory Care Coordination Note  7/10/2018  CM Risk Score: 6  Joshua Mortality Risk Score: 2.78    ACC: Jesus Alberto Mata RN    Summary Note:  Valerie Nash was referred to amb CC from IP CC. She was recently hospitalized for slurred speech, stroke-like symptoms, HTN  She has Diabetes type II- not on insulin- controlled, CAD- stent x 2- 6/2017, Obesity, Hyperlipidemia  She is following with neurology and cardiology.        Plan of Care : F/U on eye issues                           F/U on neurology visit                           Continue assessment, education, and support.                            F/U next month and if appropriate- move to surveillance program.     Spoke with [place]. She stated BS are running around 130-140. She denied any issues. She stated she is Physical Therapy for her leg issues. She has had her eye exam and is returning for dilation-7/13/2018. She stated she is not driving due to blurred vision. She is aware to have report forwarded to PCP. She saw neurologist for follow up testing. She stated she will discuss with PCP in 9/2018. She stated she did not reschedule with neurologist.     General Assessment    Do you have any symptoms that are causing concern?:  No       Diabetes Assessment    Medic Alert ID:  No  Meal Planning:  Avoidance of concentrated sweets   How often do you test your blood sugar?:  Daily   Do you have barriers with adherence to non-pharmacologic self-management interventions?  (Nutrition/Exercise/Self-Monitoring):  No   Have you ever had to go to the ED for symptoms of low blood sugar?:  No       No patient-reported symptoms        Lab Results   Component Value Date    LABA1C 6.8 (H) 05/18/2018    LABA1C 6.4 (H) 03/23/2018    LABA1C 6.1 (H) 10/12/2017     Lab Results   Component Value Date     05/18/2018     03/23/2018     10/12/2017         Care Coordination Interventions    Program Enrollment:  Complex Care  Referral from Primary Care Provider: minutes as needed for Chest pain up to max of 3 total doses. If no relief after 1 dose, call 911. 6/21/17   Lizet EMY Wetzel - CNP   Blood Pressure KIT Blood pressure monitor 2/1/17   Socorro Berger MD   Blood Glucose Monitoring Suppl MADELEINE Patient needs meter,tests strips and lancets. Tests daily. Dx E11.9,250.00 1/11/17   Socorro Berger MD   NONFORMULARY 5,000 mcg daily OTC Biotin 1000mcg daily     Historical Provider, MD   aspirin 81 MG tablet Take 81 mg by mouth daily.     Historical Provider, MD       Future Appointments  Date Time Provider Paco Laura   7/12/2018 12:45 PM Cathy Mckeon [facility] PT [place]   7/19/2018 12:45 PM Cathy Mckeon PTA MD PT [place]   8/30/2018 9:05 AM SCHEDULE, [address] [facility] MD LAB [place]   9/6/2018 1:20 [facility]   10/5/2018 1:00 PM [facility]

## 2018-07-12 ENCOUNTER — HOSPITAL ENCOUNTER (OUTPATIENT)
Dept: PHYSICAL THERAPY | Age: 68
Setting detail: THERAPIES SERIES
Discharge: HOME | End: 2018-07-12
Payer: MEDICARE

## 2018-07-12 PROCEDURE — G0283 ELEC STIM OTHER THAN WOUND: HCPCS | Performed by: PHYSICAL THERAPY ASSISTANT

## 2018-07-12 PROCEDURE — 97110 THERAPEUTIC EXERCISES: CPT | Performed by: PHYSICAL THERAPY ASSISTANT

## 2018-07-12 NOTE — FLOWSHEET NOTE
Physical Therapy Daily Treatment Note    Date:  2018    Patient Name:  Grisel Lou  \"KRISHNA"  :  1950  MRN: [id number]  Restrictions/Precautions:     Medical/Treatment Diagnosis Information:   · Diagnosis: M76.899 (ICD-10-CM) - Hamstring tendonitis     Insurance/Certification information:  PT Insurance Information: Medicare/EQUITABLE AND YOU  Physician Information:  Referring Practitioner: Marv Stein PA-C  Plan of care signed (Y/N):  Y  Visit# / total visits: 7/10 (2nd POC)   Pain level: /10     G-Code (if applicable):      Date G-Code Applied:  18  PT G-Codes  Functional Assessment Tool Used: LEFS   Score: 30/80=63% Disability   Functional Limitation: Mobility: Walking and moving around  Mobility: Walking and Moving Around Current Status (): At least 60 percent but less than 80 percent impaired, limited or restricted  Mobility: Walking and Moving Around Goal Status (): At least 20 percent but less than 40 percent impaired, limited or restricted    Time In:1251   Time Out: 1:40    Progress Note: []  Yes  [x]  No  Next due by: Visit #10  18    Subjective: Patient noting pain of 7/10 this date. Notes increasing walking time from 30-60 min with increased LE pain and discomfort noted since change. Poor compliance with HEP over last several days. Objective: Discussed need to perform HEP regularly and advised patient to decrease walking time. Progress slowly. BOOM complete per flow chart to facilitate motion and decrease pain and discomfort to allow ease with daily activities and ambulation. Verbal cuing for progression and technique with exercises. Able to progress and add several exs this date. Tightness and pain at end range motion with prone press ups. IFC to decrease pain and tightness, discomfort. Observation:   Able to abolish radicular numbness, decrease pain with prone exercises. Increased pain with standing exercises.      Test measurements:      Exercises:

## 2018-07-16 ENCOUNTER — OFFICE VISIT (OUTPATIENT)
Dept: OPHTHALMOLOGY | Age: 68
End: 2018-07-16
Payer: MEDICARE

## 2018-07-16 DIAGNOSIS — H26.492 POSTERIOR CAPSULAR OPACIFICATION VISUALLY SIGNIFICANT, LEFT: Primary | ICD-10-CM

## 2018-07-16 PROCEDURE — 99213 OFFICE O/P EST LOW 20 MIN: CPT | Performed by: OPHTHALMOLOGY

## 2018-07-16 PROCEDURE — 66821 AFTER CATARACT LASER SURGERY: CPT | Performed by: OPHTHALMOLOGY

## 2018-07-16 RX ORDER — PHENYLEPHRINE HCL 2.5 %
1 DROPS OPHTHALMIC (EYE) ONCE
Status: COMPLETED | OUTPATIENT
Start: 2018-07-16 | End: 2018-07-16

## 2018-07-16 RX ORDER — BENOXINATE HCL/FLUORESCEIN SOD 0.4%-0.25%
1 DROPS OPHTHALMIC (EYE) ONCE
Status: CANCELLED | OUTPATIENT
Start: 2018-07-16 | End: 2018-07-16

## 2018-07-16 RX ORDER — PREDNISOLONE ACETATE 10 MG/ML
1 SUSPENSION/ DROPS OPHTHALMIC ONCE
Status: COMPLETED | OUTPATIENT
Start: 2018-07-16 | End: 2018-07-16

## 2018-07-16 RX ORDER — BRIMONIDINE TARTRATE 2 MG/ML
1 SOLUTION/ DROPS OPHTHALMIC ONCE
Status: COMPLETED | OUTPATIENT
Start: 2018-07-16 | End: 2018-07-16

## 2018-07-16 RX ORDER — TROPICAMIDE 10 MG/ML
1 SOLUTION/ DROPS OPHTHALMIC ONCE
Status: COMPLETED | OUTPATIENT
Start: 2018-07-16 | End: 2018-07-16

## 2018-07-16 RX ADMIN — Medication 1 DROP: at 17:16

## 2018-07-16 RX ADMIN — TROPICAMIDE 1 DROP: 10 SOLUTION/ DROPS OPHTHALMIC at 17:16

## 2018-07-16 RX ADMIN — BRIMONIDINE TARTRATE 1 DROP: 2 SOLUTION/ DROPS OPHTHALMIC at 16:59

## 2018-07-16 RX ADMIN — PREDNISOLONE ACETATE 1 DROP: 10 SUSPENSION/ DROPS OPHTHALMIC at 17:16

## 2018-07-16 ASSESSMENT — VISUAL ACUITY
OS_CC+: 1
METHOD: SNELLEN - LINEAR
OS_PH_CC: 20/40
CORRECTION_TYPE: GLASSES
OS_CC: 20/50
OS_PH_CC+: 1

## 2018-07-16 ASSESSMENT — SLIT LAMP EXAM - LIDS: COMMENTS: NO EDEMA, NO ERYTHEMA

## 2018-07-16 ASSESSMENT — TONOMETRY
OD_IOP_MMHG: 17
OS_IOP_MMHG: 18

## 2018-07-16 NOTE — PROGRESS NOTES
Grisel Lou is a 75 y.o. female here for a complete eye exam.      Chief Complaint   Patient presents with    Other       HPI     Patient has been referred to office by Dr Blade Camargo for a possible YAG in OS. Last OV 10/26/16  HGA1-C 6.8 05/18/18  NIDDM x 5 years          ROS      Main Ophthalmology Exam     Slit Lamp Exam       Right Left    Lids/Lashes  No edema, no erythema    Conjunctiva/Sclera  White and quiet    Cornea  Clear    Anterior Chamber  Deep and quiet    Iris  Round and reactive    Lens  PCIOL with 3+ PCO                   Tonometry     Tonometry (3:56 PM)       Right Left    Pressure 17 18              Visual Acuity     Visual Acuity (Snellen - Linear)       Right Left    Dist cc 20/20 20/50 1    Dist ph cc  20/40 1    Correction:  Glasses               Not recorded          Not recorded          Not recorded         Not recorded          Past Medical History:   Diagnosis Date    CAD (coronary artery disease)     Cerebrovascular accident (CVA) due to thrombosis of precerebral artery ([facility] [address] 12/19/2017    Hyperlipidemia     Hypertension     Interstitial cystitis     History of.  Obesity     Weight 176 lb, height 5 ft, BMI 35, 2/28/2013.  Plantar fasciitis, bilateral     History of.  Seasonal allergies     Trigger finger, left     History of triggering, left long finger.     Type 2 diabetes mellitus (HCC)           Current Outpatient Prescriptions:     clopidogrel (PLAVIX) 75 MG tablet, Take 1 tablet by mouth daily, Disp: 90 tablet, Rfl: 3    amLODIPine (NORVASC) 5 MG tablet, Take 1 tablet by mouth daily, Disp: 90 tablet, Rfl: 1    simvastatin (ZOCOR) 20 MG tablet, TAKE ONE TABLET BY MOUTH NIGHTLY, Disp: 90 tablet, Rfl: 1    glimepiride (AMARYL) 1 MG tablet, TAKE ONE-HALF TABLET BY MOUTH TWICE DAILY, Disp: 90 tablet, Rfl: 1    metFORMIN (GLUCOPHAGE) 500 MG tablet, TAKE 1 TABLET BY MOUTH TWO TIMES A DAY WITH MEALS, Disp: 180 tablet, Rfl: 1    Cholecalciferol (VITAMIN D3) 53802 contact lens with Goniosol solution was placed  on the operative eye. YAG Laser Data:    Energy: 3.2  Number of Spots: 28    The patient tolerated the procedure well and was discharged  in stable medical condition.         Electronically signed by Max Robison MD on 7/16/2018 at 4:42 PM

## 2018-07-18 DIAGNOSIS — E55.9 VITAMIN D DEFICIENCY: ICD-10-CM

## 2018-07-18 RX ORDER — CHOLECALCIFEROL (VITAMIN D3) 1250 MCG
1 CAPSULE ORAL WEEKLY
Qty: 13 CAPSULE | Refills: 0 | Status: SHIPPED | OUTPATIENT
Start: 2018-07-18 | End: 2018-09-06 | Stop reason: SDUPTHER

## 2018-07-19 ENCOUNTER — HOSPITAL ENCOUNTER (OUTPATIENT)
Dept: PHYSICAL THERAPY | Age: 68
Setting detail: THERAPIES SERIES
Discharge: HOME | End: 2018-07-19
Payer: MEDICARE

## 2018-07-19 PROCEDURE — 97110 THERAPEUTIC EXERCISES: CPT | Performed by: PHYSICAL THERAPY ASSISTANT

## 2018-07-19 PROCEDURE — G8979 MOBILITY GOAL STATUS: HCPCS

## 2018-07-19 PROCEDURE — G8978 MOBILITY CURRENT STATUS: HCPCS

## 2018-07-19 PROCEDURE — G0283 ELEC STIM OTHER THAN WOUND: HCPCS | Performed by: PHYSICAL THERAPY ASSISTANT

## 2018-07-19 NOTE — PROGRESS NOTES
Frequency/Duration:7/19/18-8/15/18  # Days per week: [x] 1 day # Weeks: [] 1 week [x] 4 weeks      [x] 2 days? [] 2 weeks [] 5 weeks      [] 3 days   [] 3 weeks [] 6 weeks     Rehab Potential: [] Excellent [x] Good [] Fair  [] Poor     Goal Status:  [] Achieved [x] Partially Achieved  [] Not Achieved     Patient Status: [] Continue per initial plan of Care     [] Patient now discharged     [x] Additional visits requested, Please re-certify for additional visits:      Requested frequency/duration: 1-2 X/week for weeks    Electronically signed by:  Dieter Linares PT    If you have any questions or concerns, please don't hesitate to call.   Thank you for your referral.    Physician Signature:________________________________Date:__________________  By signing above, therapists plan is approved by physician

## 2018-07-19 NOTE — FLOWSHEET NOTE
LEFS: 37/64 = 42% (IE: 30/80 = 63%)    Hip strength flexion: 4+-5/5    Abd: 5/5       Knee flexion: 4-4+/5 on right, 4+/5 L   Extension: 5/5         Exercises:   Exercise/Equipment Resistance/Repetitions Other comments        Supine Ham Stretch  HEP    Supine Piriformis Stretch  HEP    Seated Long Sitting HS Stretch  HEP        Counter Exs     Standing Ham Stretch     Standing ITB Stretch  Manual   Prone Lying      Prone prop 5'    Prone knee flexion 10x2     Prone hip ext 15x Adv   Prone pressup  10x2    Prone hip IR/ER 10x5''                        Abd Bracing  5\" x 10     Pelvic Tilts  5\" x 10     Bridging  5\" 12x Wide/narrow Adv   Standing Trunk Ext  10x    Standing Hip Ext/Abd  10x    Abd Brace with march     LTR  10x5''    Sciatic Flossing     [x] Provided verbal/tactile cueing for activities related to strengthening, flexibility, endurance, ROM. (23954)  [] Provided verbal/tactile cueing for activities related to improving balance, coordination, kinesthetic sense, posture, motor skill, proprioception. (35880)    Therapeutic Activities:     [] Therapeutic activities, direct (one-on-one) patient contact (use of dynamic activities to improve functional performance). (03414)    Gait:   [] Provided training and instruction to the patient for ambulation re-education. (37934)    Self-Care/ADL's  [] Self-care/home management training and compensatory training, meal preparation, safety procedures, and instructions in use of assistive technology devices/adaptive equipment, direct one-on-one contact. (30454)    Home Exercise Program:    Reviewed with understanding noted.    [x] Reviewed/Progressed HEP activities related to strengthening, flexibility, endurance, ROM. (17975)  [] Reviewed/Progressed HEP activities related to improving balance, coordination, kinesthetic sense, posture, motor skill, proprioception.  (54241)    Manual Treatments:  Therapeutic Cupping   [x] Provided manual therapy to mobilize soft tissue/joints for the purpose of modulating pain, promoting relaxation,  increasing ROM, reducing/eliminating soft tissue swelling/inflammation/restriction, improving soft tissue extensibility. (70404)    Service Based Modalities:  15' IFC to the lumbar spine to decrease pain and muscle tightness. Timed Code Treatment Minutes:  50' BOOM    Total Treatment Minutes: 61'    Treatment/Activity Tolerance:  [x] Patient tolerated treatment well [] Patient limited by fatique  [] Patient limited by pain  [] Patient limited by other medical complications  [] Other:     Prognosis: [x] Good [] Fair  [] Poor    Patient Requires Follow-up: [x] Yes  [] No    Goals:  Short term goals  Time Frame for Short term goals: 3weeks   Short term goal 1: Indpendent in HEP     Long term goals  Time Frame for Long term goals : 6weeks  Long term goal 1: Improve Right LE strength to 4+/5 to allow for ease with stairs. (See above)  Long term goal 2: Patient to be able to asced/descend stairs with recipricol gait pattern. (Able to perform but with some unsteadiness noted)  Long term goal 3: Patient to be able to return to amb up to 1 mile without incresae in knee pain. (Walking ~30 min without increase in pain, ~.5-.75 mile)  Long term goal 4: Patient to be able to squat and reach item on the floor without incresae in knee pain. (Able to squat to floor to retrieve items but needs to hold stationary item to achieve)    Plan:   [x] Continue per plan of care [] Alter current plan (see comments)  [] Plan of care initiated [] Hold pending MD visit [] Discharge    Plan for Next Session: Progress Core strengthening. Monitor tolerance and symptoms    Electronically signed by:   Siobhan Pavon

## 2018-07-20 ENCOUNTER — NURSE ONLY (OUTPATIENT)
Dept: LAB | Age: 68
End: 2018-07-20
Payer: MEDICARE

## 2018-07-20 DIAGNOSIS — E53.8 B12 DEFICIENCY: Primary | ICD-10-CM

## 2018-07-20 PROCEDURE — 96372 THER/PROPH/DIAG INJ SC/IM: CPT | Performed by: FAMILY MEDICINE

## 2018-07-20 RX ADMIN — CYANOCOBALAMIN 1000 MCG: 1000 INJECTION INTRAMUSCULAR; SUBCUTANEOUS at 11:42

## 2018-08-09 ENCOUNTER — HOSPITAL ENCOUNTER (OUTPATIENT)
Dept: PHYSICAL THERAPY | Age: 68
Setting detail: THERAPIES SERIES
Discharge: HOME | End: 2018-08-09
Payer: MEDICARE

## 2018-08-14 ENCOUNTER — CARE COORDINATION (OUTPATIENT)
Dept: FAMILY MEDICINE CLINIC | Age: 68
End: 2018-08-14

## 2018-08-14 NOTE — CARE COORDINATION
Ambulatory Care Coordination Note  8/14/2018  CM Risk Score: 6  Joshua Mortality Risk Score: 2.78    ACC: Patty Najrea RN    Summary Note: Luba Ritchie was referred to amb CC from IP CC. She was recently hospitalized for slurred speech, stroke-like symptoms, HTN  She has Diabetes type II- not on insulin- controlled, CAD- stent x 2- 6/2017, Obesity, Hyperlipidemia  She is following with neurology and cardiology.        Plan of Care : F/U on eye issues                           F/U on neurology visit                           Continue assessment, education, and support.                            F/U next month and if appropriate- move to surveillance program.     Per chart review- she was in PT. Unable to reach by phone- left message requesting return call. Lab Results   Component Value Date    LABA1C 6.8 (H) 05/18/2018    LABA1C 6.4 (H) 03/23/2018    LABA1C 6.1 (H) 10/12/2017     Lab Results   Component Value Date     05/18/2018     03/23/2018     10/12/2017          Care Coordination Interventions    Program Enrollment:  Complex Care  Referral from Primary Care Provider:  No  Suggested Interventions and Community Resources  Diabetes Education:  Completed  Home Health Services:  Completed (Comment: [address] [facility] )  Physical Therapy:  Completed (Comment: completed in hospital )  Transportation Support:  Completed         Goals Addressed     None          Prior to Admission medications    Medication Sig Start Date End Date Taking?  Authorizing Provider   Cholecalciferol (VITAMIN D3) 71605 units CAPS Take 1 capsule by mouth once a week 7/18/18   Carmen Lundy MD   clopidogrel (PLAVIX) 75 MG tablet Take 1 tablet by mouth daily 6/19/18   Gabe Kirby DO   cyanocobalamin 1000 MCG/ML injection Inject 2 mLs into the muscle every 7 days For 4 weeks, then monthly 3/26/18 3/98/68  Zane Burch MD   amLODIPine (NORVASC) 5 MG tablet Take 1 tablet by mouth daily 3/6/18   Hao Carrillo

## 2018-08-20 ENCOUNTER — NURSE ONLY (OUTPATIENT)
Dept: LAB | Age: 68
End: 2018-08-20
Payer: MEDICARE

## 2018-08-20 DIAGNOSIS — E53.8 B12 DEFICIENCY: Primary | ICD-10-CM

## 2018-08-20 PROCEDURE — 96372 THER/PROPH/DIAG INJ SC/IM: CPT | Performed by: FAMILY MEDICINE

## 2018-08-20 RX ADMIN — CYANOCOBALAMIN 1000 MCG: 1000 INJECTION INTRAMUSCULAR; SUBCUTANEOUS at 14:25

## 2018-08-30 ENCOUNTER — HOSPITAL ENCOUNTER (OUTPATIENT)
Dept: LAB | Age: 68
Setting detail: SPECIMEN
Discharge: HOME | End: 2018-08-30
Payer: MEDICARE

## 2018-08-30 DIAGNOSIS — I10 ESSENTIAL HYPERTENSION: ICD-10-CM

## 2018-08-30 DIAGNOSIS — E78.2 MIXED HYPERLIPIDEMIA: ICD-10-CM

## 2018-08-30 LAB
ALBUMIN SERPL-MCNC: 4.4 G/DL (ref 3.5–5.2)
ALBUMIN/GLOBULIN RATIO: 1.4 (ref 1–2.5)
ALP BLD-CCNC: 125 U/L (ref 35–104)
ALT SERPL-CCNC: 35 U/L (ref 5–33)
ANION GAP SERPL CALCULATED.3IONS-SCNC: 14 MMOL/L (ref 9–17)
AST SERPL-CCNC: 25 U/L
BILIRUB SERPL-MCNC: 0.2 MG/DL (ref 0.3–1.2)
BUN BLDV-MCNC: 13 MG/DL (ref 8–23)
BUN/CREAT BLD: 17 (ref 9–20)
CALCIUM SERPL-MCNC: 9.4 MG/DL (ref 8.6–10.4)
CHLORIDE BLD-SCNC: 101 MMOL/L (ref 98–107)
CHOLESTEROL, FASTING: 184 MG/DL
CHOLESTEROL/HDL RATIO: 3.6
CO2: 27 MMOL/L (ref 20–31)
CREAT SERPL-MCNC: 0.75 MG/DL (ref 0.5–0.9)
ESTIMATED AVERAGE GLUCOSE: 154 MG/DL
GFR AFRICAN AMERICAN: >60 ML/MIN
GFR NON-AFRICAN AMERICAN: >60 ML/MIN
GFR SERPL CREATININE-BSD FRML MDRD: ABNORMAL ML/MIN/{1.73_M2}
GFR SERPL CREATININE-BSD FRML MDRD: ABNORMAL ML/MIN/{1.73_M2}
GLUCOSE FASTING: 135 MG/DL (ref 70–99)
HBA1C MFR BLD: 7 % (ref 4.8–5.9)
HDLC SERPL-MCNC: 51 MG/DL
LDL CHOLESTEROL: 77 MG/DL (ref 0–130)
POTASSIUM SERPL-SCNC: 3.8 MMOL/L (ref 3.7–5.3)
SODIUM BLD-SCNC: 142 MMOL/L (ref 135–144)
TOTAL PROTEIN: 7.5 G/DL (ref 6.4–8.3)
TRIGLYCERIDE, FASTING: 281 MG/DL
VLDLC SERPL CALC-MCNC: ABNORMAL MG/DL (ref 1–30)

## 2018-08-30 PROCEDURE — 36415 COLL VENOUS BLD VENIPUNCTURE: CPT

## 2018-08-30 PROCEDURE — 80061 LIPID PANEL: CPT

## 2018-08-30 PROCEDURE — 83036 HEMOGLOBIN GLYCOSYLATED A1C: CPT

## 2018-08-30 PROCEDURE — 80053 COMPREHEN METABOLIC PANEL: CPT

## 2018-09-06 ENCOUNTER — OFFICE VISIT (OUTPATIENT)
Dept: FAMILY MEDICINE CLINIC | Age: 68
End: 2018-09-06
Payer: MEDICARE

## 2018-09-06 VITALS
HEART RATE: 80 BPM | BODY MASS INDEX: 37.41 KG/M2 | DIASTOLIC BLOOD PRESSURE: 82 MMHG | RESPIRATION RATE: 12 BRPM | WEIGHT: 173.4 LBS | HEIGHT: 57 IN | SYSTOLIC BLOOD PRESSURE: 120 MMHG

## 2018-09-06 DIAGNOSIS — E78.2 MIXED HYPERLIPIDEMIA: ICD-10-CM

## 2018-09-06 DIAGNOSIS — Z00.00 ROUTINE GENERAL MEDICAL EXAMINATION AT A HEALTH CARE FACILITY: Primary | ICD-10-CM

## 2018-09-06 DIAGNOSIS — E55.9 VITAMIN D DEFICIENCY: ICD-10-CM

## 2018-09-06 DIAGNOSIS — I25.10 CORONARY ARTERY DISEASE INVOLVING NATIVE CORONARY ARTERY OF NATIVE HEART WITHOUT ANGINA PECTORIS: ICD-10-CM

## 2018-09-06 DIAGNOSIS — G89.29 CHRONIC PAIN OF RIGHT KNEE: ICD-10-CM

## 2018-09-06 DIAGNOSIS — Z23 FLU VACCINE NEED: ICD-10-CM

## 2018-09-06 DIAGNOSIS — I10 ESSENTIAL HYPERTENSION: ICD-10-CM

## 2018-09-06 DIAGNOSIS — E11.9 TYPE 2 DIABETES MELLITUS WITHOUT COMPLICATION, WITHOUT LONG-TERM CURRENT USE OF INSULIN (HCC): ICD-10-CM

## 2018-09-06 DIAGNOSIS — M25.561 CHRONIC PAIN OF RIGHT KNEE: ICD-10-CM

## 2018-09-06 PROCEDURE — 90662 IIV NO PRSV INCREASED AG IM: CPT | Performed by: FAMILY MEDICINE

## 2018-09-06 PROCEDURE — 3017F COLORECTAL CA SCREEN DOC REV: CPT | Performed by: FAMILY MEDICINE

## 2018-09-06 PROCEDURE — 3045F PR MOST RECENT HEMOGLOBIN A1C LEVEL 7.0-9.0%: CPT | Performed by: FAMILY MEDICINE

## 2018-09-06 PROCEDURE — 1036F TOBACCO NON-USER: CPT | Performed by: FAMILY MEDICINE

## 2018-09-06 PROCEDURE — 1090F PRES/ABSN URINE INCON ASSESS: CPT | Performed by: FAMILY MEDICINE

## 2018-09-06 PROCEDURE — G8427 DOCREV CUR MEDS BY ELIG CLIN: HCPCS | Performed by: FAMILY MEDICINE

## 2018-09-06 PROCEDURE — 4040F PNEUMOC VAC/ADMIN/RCVD: CPT | Performed by: FAMILY MEDICINE

## 2018-09-06 PROCEDURE — G0439 PPPS, SUBSEQ VISIT: HCPCS | Performed by: FAMILY MEDICINE

## 2018-09-06 PROCEDURE — 2022F DILAT RTA XM EVC RTNOPTHY: CPT | Performed by: FAMILY MEDICINE

## 2018-09-06 PROCEDURE — G8417 CALC BMI ABV UP PARAM F/U: HCPCS | Performed by: FAMILY MEDICINE

## 2018-09-06 PROCEDURE — 99214 OFFICE O/P EST MOD 30 MIN: CPT | Performed by: FAMILY MEDICINE

## 2018-09-06 PROCEDURE — G0008 ADMIN INFLUENZA VIRUS VAC: HCPCS | Performed by: FAMILY MEDICINE

## 2018-09-06 PROCEDURE — G8598 ASA/ANTIPLAT THER USED: HCPCS | Performed by: FAMILY MEDICINE

## 2018-09-06 PROCEDURE — 1101F PT FALLS ASSESS-DOCD LE1/YR: CPT | Performed by: FAMILY MEDICINE

## 2018-09-06 PROCEDURE — 1123F ACP DISCUSS/DSCN MKR DOCD: CPT | Performed by: FAMILY MEDICINE

## 2018-09-06 PROCEDURE — G8399 PT W/DXA RESULTS DOCUMENT: HCPCS | Performed by: FAMILY MEDICINE

## 2018-09-06 RX ORDER — CHOLECALCIFEROL (VITAMIN D3) 1250 MCG
1 CAPSULE ORAL WEEKLY
Qty: 13 CAPSULE | Refills: 1 | Status: SHIPPED | OUTPATIENT
Start: 2018-09-06 | End: 2019-01-15 | Stop reason: ALTCHOICE

## 2018-09-06 RX ORDER — SIMVASTATIN 40 MG
40 TABLET ORAL NIGHTLY
Qty: 90 TABLET | Refills: 1 | Status: SHIPPED | OUTPATIENT
Start: 2018-09-06 | End: 2019-01-15 | Stop reason: SDUPTHER

## 2018-09-06 RX ORDER — GLIMEPIRIDE 1 MG/1
0.5 TABLET ORAL 2 TIMES DAILY
Qty: 90 TABLET | Refills: 1 | Status: SHIPPED | OUTPATIENT
Start: 2018-09-06 | End: 2019-01-15 | Stop reason: SDUPTHER

## 2018-09-06 RX ORDER — AMLODIPINE BESYLATE 5 MG/1
5 TABLET ORAL DAILY
Qty: 90 TABLET | Refills: 1 | Status: SHIPPED | OUTPATIENT
Start: 2018-09-06 | End: 2019-01-15 | Stop reason: SDUPTHER

## 2018-09-06 RX ORDER — NITROGLYCERIN 0.4 MG/1
0.4 TABLET SUBLINGUAL EVERY 5 MIN PRN
Qty: 25 TABLET | Refills: 0 | Status: SHIPPED | OUTPATIENT
Start: 2018-09-06 | End: 2020-06-12 | Stop reason: SDUPTHER

## 2018-09-06 ASSESSMENT — LIFESTYLE VARIABLES
HOW OFTEN DURING THE LAST YEAR HAVE YOU FAILED TO DO WHAT WAS NORMALLY EXPECTED FROM YOU BECAUSE OF DRINKING: 0
HOW OFTEN DO YOU HAVE SIX OR MORE DRINKS ON ONE OCCASION: 0
HOW OFTEN DURING THE LAST YEAR HAVE YOU HAD A FEELING OF GUILT OR REMORSE AFTER DRINKING: 0
HAVE YOU OR SOMEONE ELSE BEEN INJURED AS A RESULT OF YOUR DRINKING: 0
AUDIT TOTAL SCORE: 0
HAS A RELATIVE, FRIEND, DOCTOR, OR ANOTHER HEALTH PROFESSIONAL EXPRESSED CONCERN ABOUT YOUR DRINKING OR SUGGESTED YOU CUT DOWN: 0
HOW MANY STANDARD DRINKS CONTAINING ALCOHOL DO YOU HAVE ON A TYPICAL DAY: 0
HOW OFTEN DURING THE LAST YEAR HAVE YOU NEEDED AN ALCOHOLIC DRINK FIRST THING IN THE MORNING TO GET YOURSELF GOING AFTER A NIGHT OF HEAVY DRINKING: 0
HOW OFTEN DURING THE LAST YEAR HAVE YOU FOUND THAT YOU WERE NOT ABLE TO STOP DRINKING ONCE YOU HAD STARTED: 0
HOW OFTEN DURING THE LAST YEAR HAVE YOU BEEN UNABLE TO REMEMBER WHAT HAPPENED THE NIGHT BEFORE BECAUSE YOU HAD BEEN DRINKING: 0
HOW OFTEN DO YOU HAVE A DRINK CONTAINING ALCOHOL: 0
AUDIT-C TOTAL SCORE: 0

## 2018-09-06 ASSESSMENT — PATIENT HEALTH QUESTIONNAIRE - PHQ9
SUM OF ALL RESPONSES TO PHQ QUESTIONS 1-9: 0
SUM OF ALL RESPONSES TO PHQ QUESTIONS 1-9: 0

## 2018-09-06 ASSESSMENT — ANXIETY QUESTIONNAIRES: GAD7 TOTAL SCORE: 0

## 2018-09-06 NOTE — PROGRESS NOTES
Have you had an allergic reaction to the flu (influenza) shot? No  Are you allergic to eggs or any component of the flu vaccine? No  Do you have a history of Guillain-Shelby Syndrome (GBS), which is paralysis after receiving the flu vaccine? No  Are you feeling well today? Yes  Flu vaccine given as ordered. Patient tolerated it well. No questions re: VIS information.

## 2018-09-06 NOTE — PATIENT INSTRUCTIONS
\"120 over 80\") is ideal for an adult. High blood pressure is 130/80 or higher. You have high blood pressure if your top number is 130 or higher or your bottom number is 80 or higher, or both. What happens when you have high blood pressure? · Blood flows through your arteries with too much force. Over time, this damages the walls of your arteries. But you can't feel it. High blood pressure usually doesn't cause symptoms. · Fat and calcium start to build up in your arteries. This buildup is called plaque. Plaque makes your arteries narrower and stiffer. Blood can't flow through them as easily. · This lack of good blood flow starts to damage some of the organs in your body. This can lead to problems such as coronary artery disease and heart attack, heart failure, stroke, kidney failure, and eye damage. How can you prevent high blood pressure? · Stay at a healthy weight. · Try to limit how much sodium you eat to less than 2,300 milligrams (mg) a day. If you limit your sodium to 1,500 mg a day, you can lower your blood pressure even more. ¨ Buy foods that are labeled \"unsalted,\" \"sodium-free,\" or \"low-sodium. \" Foods labeled \"reduced-sodium\" and \"light sodium\" may still have too much sodium. ¨ Flavor your food with garlic, lemon juice, onion, vinegar, herbs, and spices instead of salt. Do not use soy sauce, steak sauce, onion salt, garlic salt, mustard, or ketchup on your food. ¨ Use less salt (or none) when recipes call for it. You can often use half the salt a recipe calls for without losing flavor. · Be physically active. Get at least 30 minutes of exercise on most days of the week. Walking is a good choice. You also may want to do other activities, such as running, swimming, cycling, or playing tennis or team sports. · Limit alcohol to 2 drinks a day for men and 1 drink a day for women. · Eat plenty of fruits, vegetables, and low-fat dairy products. Eat less saturated and total fats.   How is high blood pressure treated? · Your doctor will suggest making lifestyle changes. For example, your doctor may ask you to eat healthy foods, quit smoking, lose extra weight, and be more active. · If lifestyle changes don't help enough or your blood pressure is very high, you will have to take medicine every day. Follow-up care is a key part of your treatment and safety. Be sure to make and go to all appointments, and call your doctor if you are having problems. It's also a good idea to know your test results and keep a list of the medicines you take. Where can you learn more? Go to [email or web address].[email or web address] org and sign in to your [employer] account. Enter P501 in the stickK box to learn more about \"Learning About High Blood Pressure. \"     If you do not have an account, please click on the \"Sign Up Now\" link. Current as of: May 10, 2017  Content Version: 11.7  © 3308-3414 [employer]. Care instructions adapted under license by [facility] ([facility]). If you have questions about a medical condition or this instruction, always ask your healthcare professional. [place] [address] any warranty or liability for your use of this information. Patient Education        Learning About How to Make a Home Safe  Learning About How to Make a Home Safe  You can help protect the person in your care by making the home safe. Here are some general tips for how to lower the chance of getting injured in the home. · Pad sharp corners on furniture and counter tops. · Keep objects that are used often within easy reach. · Install handrails around the toilet and in the shower. Use a tub mat to prevent slipping. · Use a shower chair or bath bench when the person bathes. · Provide good lighting inside and outside the home. Put night-lights in bedrooms, hallways, and bathrooms. Have light at the top and bottom of stairways. · Have a first aid kit.   It is also important to be aware of safe

## 2018-09-07 ENCOUNTER — CARE COORDINATION (OUTPATIENT)
Dept: FAMILY MEDICINE CLINIC | Age: 68
End: 2018-09-07

## 2018-09-07 NOTE — CARE COORDINATION
Lab Results   Component Value Date     08/30/2018     05/18/2018     03/23/2018       Care Coordination Interventions    Program Enrollment:  Complex Care  Referral from Primary Care Provider:  No  Suggested Interventions and Community Resources  Diabetes Education:  Completed  Home Health Services:  Completed (Comment: [facility] )  Physical Therapy:  Completed (Comment: completed in hospital )  Transportation Support:  Completed         Goals Addressed        Patient Stated     Patient Stated (pt-stated)                  Elvie Burdick stated she will follow up with ortho for her Rt. Knee issues. Barriers: none  Plan for overcoming my barriers: N/A  Confidence: 9/10  Anticipated Goal Completion Date: 11/30/2018            Prior to Admission medications    Medication Sig Start Date End Date Taking? Authorizing Provider   Multiple Vitamins-Minerals (MULTIVITAMIN PO) Take 1 tablet by mouth daily    Historical Provider, MD   amLODIPine (NORVASC) 5 MG tablet Take 1 tablet by mouth daily 9/6/18   Socorro Berger MD   Cholecalciferol (VITAMIN D3) 42329 units CAPS Take 1 capsule by mouth once a week 9/6/18   Socorro Berger MD   simvastatin (ZOCOR) 40 MG tablet Take 1 tablet by mouth nightly 9/6/18   Socorro Berger MD   glimepiride (AMARYL) 1 MG tablet Take 0.5 tablets by mouth 2 times daily 9/6/18   Socorro Berger MD   metFORMIN (GLUCOPHAGE) 500 MG tablet Take 1 tablet by mouth 2 times daily (with meals) 9/6/18   Marietta Vasquez MD   nitroGLYCERIN (NITROSTAT) 0.4 MG SL tablet Place 1 tablet under the tongue every 5 minutes as needed for Chest pain up to max of 3 total doses.  If no relief after 1 dose, call 911. 9/6/18   Socorro Berger MD   clopidogrel (PLAVIX) 75 MG tablet Take 1 tablet by mouth daily 6/19/18   Gabe Kirby DO   cyanocobalamin 1000 MCG/ML injection Inject 2 mLs into the muscle every 7 days For 4 weeks, then monthly 3/26/18 1/70/52  Bob Carey MD   guaiFENesin-codeine (CHERATUSSIN AC) 100-10 MG/5ML syrup Take 5 mLs by mouth 4 times daily as needed for Cough . 11/16/17   Christiano Carroll MD   NONFORMULARY 5,000 mcg daily OTC Biotin 1000mcg daily     Historical Provider, MD   aspirin 81 MG tablet Take 81 mg by mouth daily.     Historical Provider, MD       Future Appointments  Date Time Provider Paco Laura   9/18/2018 10:00 AM Annemarie Johnson MD [facility]   10/5/2018 1:00 PM Carin Paul MD [facility]   1/15/2019 11:00 AM Christiano Carroll MD [facility]

## 2018-09-18 ENCOUNTER — OFFICE VISIT (OUTPATIENT)
Dept: ORTHOPEDIC SURGERY | Age: 68
End: 2018-09-18
Payer: MEDICARE

## 2018-09-18 VITALS
SYSTOLIC BLOOD PRESSURE: 134 MMHG | DIASTOLIC BLOOD PRESSURE: 60 MMHG | BODY MASS INDEX: 36.68 KG/M2 | HEIGHT: 57 IN | WEIGHT: 170 LBS | HEART RATE: 86 BPM

## 2018-09-18 DIAGNOSIS — M25.561 RIGHT KNEE PAIN, UNSPECIFIED CHRONICITY: Primary | ICD-10-CM

## 2018-09-18 PROCEDURE — G8598 ASA/ANTIPLAT THER USED: HCPCS | Performed by: PHYSICIAN ASSISTANT

## 2018-09-18 PROCEDURE — G8427 DOCREV CUR MEDS BY ELIG CLIN: HCPCS | Performed by: PHYSICIAN ASSISTANT

## 2018-09-18 PROCEDURE — 3017F COLORECTAL CA SCREEN DOC REV: CPT | Performed by: PHYSICIAN ASSISTANT

## 2018-09-18 PROCEDURE — 99213 OFFICE O/P EST LOW 20 MIN: CPT | Performed by: PHYSICIAN ASSISTANT

## 2018-09-18 PROCEDURE — G8417 CALC BMI ABV UP PARAM F/U: HCPCS | Performed by: PHYSICIAN ASSISTANT

## 2018-09-18 PROCEDURE — 4040F PNEUMOC VAC/ADMIN/RCVD: CPT | Performed by: PHYSICIAN ASSISTANT

## 2018-09-18 PROCEDURE — 1036F TOBACCO NON-USER: CPT | Performed by: PHYSICIAN ASSISTANT

## 2018-09-18 PROCEDURE — 1101F PT FALLS ASSESS-DOCD LE1/YR: CPT | Performed by: PHYSICIAN ASSISTANT

## 2018-09-18 PROCEDURE — 1123F ACP DISCUSS/DSCN MKR DOCD: CPT | Performed by: PHYSICIAN ASSISTANT

## 2018-09-18 PROCEDURE — 1090F PRES/ABSN URINE INCON ASSESS: CPT | Performed by: PHYSICIAN ASSISTANT

## 2018-09-18 PROCEDURE — G8399 PT W/DXA RESULTS DOCUMENT: HCPCS | Performed by: PHYSICIAN ASSISTANT

## 2018-09-19 ENCOUNTER — NURSE ONLY (OUTPATIENT)
Dept: LAB | Age: 68
End: 2018-09-19
Payer: MEDICARE

## 2018-09-19 DIAGNOSIS — E53.8 B12 DEFICIENCY: Primary | ICD-10-CM

## 2018-09-19 PROCEDURE — 96372 THER/PROPH/DIAG INJ SC/IM: CPT | Performed by: FAMILY MEDICINE

## 2018-09-19 RX ADMIN — CYANOCOBALAMIN 1000 MCG: 1000 INJECTION INTRAMUSCULAR; SUBCUTANEOUS at 11:44

## 2018-09-20 ENCOUNTER — HOSPITAL ENCOUNTER (OUTPATIENT)
Dept: MRI IMAGING | Age: 68
Discharge: HOME | End: 2018-09-22
Payer: MEDICARE

## 2018-09-20 DIAGNOSIS — M25.561 RIGHT KNEE PAIN, UNSPECIFIED CHRONICITY: ICD-10-CM

## 2018-09-20 PROCEDURE — 73721 MRI JNT OF LWR EXTRE W/O DYE: CPT

## 2018-09-21 DIAGNOSIS — E78.2 MIXED HYPERLIPIDEMIA: ICD-10-CM

## 2018-09-21 RX ORDER — SIMVASTATIN 20 MG
TABLET ORAL
Qty: 90 TABLET | Refills: 1 | OUTPATIENT
Start: 2018-09-21

## 2018-09-25 DIAGNOSIS — Z01.818 PRE-OP EVALUATION: Primary | ICD-10-CM

## 2018-09-27 ENCOUNTER — NURSE ONLY (OUTPATIENT)
Dept: ORTHOPEDIC SURGERY | Age: 68
End: 2018-09-27

## 2018-09-27 DIAGNOSIS — M25.561 RIGHT KNEE PAIN, UNSPECIFIED CHRONICITY: Primary | ICD-10-CM

## 2018-10-05 ENCOUNTER — OFFICE VISIT (OUTPATIENT)
Dept: CARDIOLOGY | Age: 68
End: 2018-10-05
Payer: MEDICARE

## 2018-10-05 VITALS
BODY MASS INDEX: 37.11 KG/M2 | DIASTOLIC BLOOD PRESSURE: 80 MMHG | SYSTOLIC BLOOD PRESSURE: 150 MMHG | WEIGHT: 172 LBS | HEART RATE: 76 BPM | HEIGHT: 57 IN

## 2018-10-05 DIAGNOSIS — I25.118 CORONARY ARTERY DISEASE OF NATIVE ARTERY OF NATIVE HEART WITH STABLE ANGINA PECTORIS (HCC): ICD-10-CM

## 2018-10-05 DIAGNOSIS — R07.9 CHEST PAIN, UNSPECIFIED TYPE: ICD-10-CM

## 2018-10-05 DIAGNOSIS — I10 ESSENTIAL HYPERTENSION: Primary | ICD-10-CM

## 2018-10-05 PROCEDURE — 93000 ELECTROCARDIOGRAM COMPLETE: CPT | Performed by: INTERNAL MEDICINE

## 2018-10-05 PROCEDURE — 99213 OFFICE O/P EST LOW 20 MIN: CPT | Performed by: INTERNAL MEDICINE

## 2018-10-05 RX ORDER — ISOSORBIDE MONONITRATE 30 MG/1
15 TABLET, EXTENDED RELEASE ORAL DAILY
Qty: 15 TABLET | Refills: 3 | Status: SHIPPED | OUTPATIENT
Start: 2018-10-05 | End: 2019-01-27 | Stop reason: SDUPTHER

## 2018-10-05 ASSESSMENT — ENCOUNTER SYMPTOMS
SHORTNESS OF BREATH: 1
CONSTIPATION: 0
ABDOMINAL PAIN: 0
CHEST TIGHTNESS: 0
VOMITING: 0

## 2018-10-05 NOTE — PROGRESS NOTES
Today's Date: 10/5/2018  Patient's Name: Luzmaria Fulton  Patient's age: 70 y.o., 1950    Subjective: The patient is a 70y.o. year old, , female is in the office for follow up  Does complain of Jaw pain, lasted for few seconds and resolve, not related to exertion, no Chest pain , does have some SOB with exertion, stable as before, no dizziness or syncope. Past Medical History:   has a past medical history of CAD (coronary artery disease); Cerebrovascular accident (CVA) due to thrombosis of precerebral artery ([facility] [address] Hyperlipidemia; Hypertension; Interstitial cystitis; Obesity; Plantar fasciitis, bilateral; Seasonal allergies; Trigger finger, left; and Type 2 diabetes mellitus ([facility] [address] Past Surgical History:   has a past surgical history that includes Nasal septum surgery (3/9/2005); Cholecystectomy, laparoscopic (6/9/2000); Cystocopy (3/1996); Ovary removal (Right, 7/1993); laparoscopy (3/1979); Hysterectomy, total abdominal (1980); Colonoscopy (3/11/2015); Cataract removal with implant (Left, 10/20/2015); Cataract removal with implant (Right, 12/08/2015); Yag capsulotomy (Right, 04/2016); Cardiac catheterization (06/20/2017); Coronary angioplasty with stent (06/20/2017); Appendectomy; and eye surgery. Home Medications:  Prior to Admission medications    Medication Sig Start Date End Date Taking?  Authorizing Provider   Multiple Vitamins-Minerals (MULTIVITAMIN PO) Take 1 tablet by mouth daily    Historical Provider, MD   amLODIPine (NORVASC) 5 MG tablet Take 1 tablet by mouth daily 9/6/18   Paolo Dupree MD   Cholecalciferol (VITAMIN D3) 20313 units CAPS Take 1 capsule by mouth once a week 9/6/18   Paolo Dupree MD   simvastatin (ZOCOR) 40 MG tablet Take 1 tablet by mouth nightly 9/6/18   Paolo Dupree MD   glimepiride (AMARYL) 1 MG tablet Take 0.5 tablets by mouth 2 times daily 9/6/18   Paolo Dupree MD   metFORMIN (GLUCOPHAGE) 500 MG tablet Take 1 tablet by mouth 2 times daily (with

## 2018-10-05 NOTE — PATIENT INSTRUCTIONS
Nuclear Stress Test      Please report to the [facility] Cardiopulmonary Testing Department located in the basement. Check in at the PHYSICAL THERAPY window. Central Scheduling will call you to schedule this test. If you do not receive a call within 48 hours, please call to schedule at [phone]. Please allow 3 hours to complete this test.     >>  Nothing to eat or drink for FOUR (4) HOURS prior to arrival time. >>  No caffeine for 24 hours prior to test. This includes decaffeinated beverages,  chocolate, tea and cola drinks. >>  If you are diabetic, check with your Primary Care Provider regarding changes in your insulin or diabetic pills on test day.     >>  Please bring your prescription medications with you on Stress Day. ~  Take regular medications with sips of water. Patient Education          nitroglycerin (oral/sublingual)  Pronunciation:  ALEX ward GLI ser in (OR al/sub LORE gwal)  Brand:  GoNitro, Nitrolingual, Nitromist, Nitrostat, Nitro-Time  What is the most important information I should know about nitroglycerin? You should not use this medicine if you are also using medicine to treat pulmonary arterial hypertension ([address]). Do not take erectile dysfunction medicine (Viagra, Cialis, Levitra and others) while you are taking nitroglycerin. You should not use sublingual nitroglycerin if you have severe anemia, increased pressure inside your skull, or symptoms of circulation problems or shock (pale skin, cold sweat, irregular heartbeats, sudden weakness or feeling like you might pass out). Seek emergency medical attention if you have early symptoms of a heart attack (chest pain or pressure, pain spreading to your jaw or shoulder, sweating, general ill feeling). What is nitroglycerin? Nitroglycerin is a nitrate that dilates (widens) blood vessels, making it easier for blood to flow through them and easier for the heart to pump.   Nitroglycerin is used to treat spray away from open flame or high heat, such as in a car on a hot day. The canister may explode if it gets too hot. What happens if I miss a dose? Since nitroglycerin is used when needed, you may not be on a dosing schedule. If you are on a schedule, use the missed dose as soon as you remember. Skip the missed dose if your next dose is less than 2 hours away. Do not take extra medicine to make up the missed dose. What happens if I overdose? Seek emergency medical attention or call the Poison Help line at 1-400.694.2269. An overdose of nitroglycerin can be fatal.  Overdose symptoms may include a severe throbbing headache, confusion, fever, fast or pounding heartbeats, dizziness, vision problems, vomiting, bloody diarrhea, trouble breathing, cold or clammy skin, fainting, and seizures. What should I avoid while taking nitroglycerin? This medicine may impair your thinking or reactions. Be careful if you drive or do anything that requires you to be alert. Avoid getting up too fast from a sitting or lying position, or you may feel dizzy. Get up slowly and steady yourself to prevent a fall. Avoid drinking alcohol. Alcohol may increase certain side effects of nitroglycerin (dizziness, drowsiness, feeling light-headed, or fainting). What are the possible side effects of nitroglycerin? Get emergency medical help if you have signs of an allergic reaction: hives; difficult breathing; swelling of your face, lips, tongue, or throat. Seek emergency medical attention if you have symptoms of a heart attack, such as:  · chest pain or pressure;  · pain spreading to your jaw or shoulder; or  · nausea, sweating, general ill feeling. Nitroglycerin can cause severe headaches. These headaches may gradually become less severe as you continue to use nitroglycerin. Do not stop taking this medicine. Ask your doctor before using any headache pain medication.   Call your doctor at once if you have:  · severe or throbbing headaches

## 2018-10-09 NOTE — DISCHARGE SUMMARY
Physical Therapy  [facility]  Rehabilitation and Sports Medicine     [x] [place]                    Phone: [phone]             Fax: [phone]      [] [place]                   Phone: [phone]             Fax: [phone]     Physical Therapy Discharge Note  Date: 2018                                                   Patient Name:  Sultana Marroquin                 :  1950                     MRN: [id number]  Restrictions/Precautions:     Medical/Treatment Diagnosis Information:   · Diagnosis: M76.899 (ICD-10-CM) - Hamstring tendonitis  ·    Insurance/Certification information:  PT Insurance Information: Medicare/[employer] AND YOU     Physician Information:  Referring Practitioner: Conrad Fairbanks PA-C     Plan of care signed (Y/N):  Y     Visit# / total visits: 8/10 (2nd POC)      Pain OFSZU:      2-    G-Code (if applicable):                                             Date G-Code Applied:  2018  PT G-Codes  Functional Assessment Tool Used: LEFS  Score: 42%  Functional Limitation: Mobility: Walking and moving around  Mobility: Walking and Moving Around Current Status (): At least 40 percent but less than 60 percent impaired, limited or restricted  Mobility: Walking and Moving Around Goal Status ():  At least 20 percent but less than 40 percent impaired, limited or restricted      Time Period for Report: 18-18   Cancels/No-shows to date:  0     Plan of Care/Treatment to date:  [x] Therapeutic Exercise             [x] Modalities:  [x] Therapeutic Activity                           [x] Ultrasound                  [x] Electrical Stimulation  [x] Gait Training                                      [] Cervical Traction    [] Lumbar Traction  [x] Neuromuscular Re-education                        [x] Cold/hotpack  [] Iontophoresis  [x] Instruction in HEP                                          Other:  [x] Manual Therapy []    [] Aquatic Therapy                                                         []                    ?                   Subjective:    Patient noting pain of 3-4/10 this date.  Improvement since last session. Notes slowly progressing walking schedule. No numbness over last several days. Pt. Relates increased regularity of HEP.     · Objective: Discussed need to perform HEP regularly and advised patient to decrease walking time. Progress slowly. BOOM complete per flow chart to facilitate motion and decrease pain and discomfort to allow ease with daily activities and ambulation. Verbal cuing for progression and technique with exercises. Able to progress and add several exs this date.  Tightness and pain at end range motion with prone press ups.  IFC to decrease pain and tightness, discomfort. Pt issues heel lift on right foot with improvement in gait quality noted.      · Observation:       · Increased pain with standing exercises.      · Test measurements:       · LEFS: 37/64 = 42% (IE: 30/80 = 63%)  ·    · Hip strength flexion: 4+-5/5  ·                         Abd: 5/5      · Knee flexion: 4-4+/5 on right, 4+/5 L  ·             Extension: 5/5            Assessment:Patient noting decreasing pain, but conitues to have difficulty with long distance walking, weakness of the left knee. Patient noting improvement with improved function as per the LEFS since eval.       Plan:    DC PT Services, patient cancelled several visits due to cost, patient last seen on 7/19/18. Goals:    Short term goals  Time Frame for Short term goals: 3weeks   Short term goal 1: Indpendent in HEP (met)     Long term goals  Time Frame for Long term goals : 6weeks  Long term goal 1: Improve Right LE strength to 4+/5 to allow for ease with stairs. (See above-not met ongoing)  Long term goal 2: Patient to be able to asced/descend stairs with recipricol gait pattern.  (Able to perform but with some unsteadiness

## 2018-10-11 ENCOUNTER — CARE COORDINATION (OUTPATIENT)
Dept: CARE COORDINATION | Age: 68
End: 2018-10-11

## 2018-10-17 ENCOUNTER — TELEPHONE (OUTPATIENT)
Dept: FAMILY MEDICINE CLINIC | Age: 68
End: 2018-10-17

## 2018-10-18 ENCOUNTER — HOSPITAL ENCOUNTER (OUTPATIENT)
Dept: NUCLEAR MEDICINE | Age: 68
Discharge: HOME | End: 2018-10-20
Payer: MEDICARE

## 2018-10-18 ENCOUNTER — HOSPITAL ENCOUNTER (OUTPATIENT)
Dept: NON INVASIVE DIAGNOSTICS | Age: 68
Discharge: HOME | End: 2018-10-18
Payer: MEDICARE

## 2018-10-18 ENCOUNTER — HOSPITAL ENCOUNTER (OUTPATIENT)
Dept: NUCLEAR MEDICINE | Age: 68
End: 2018-10-18
Payer: MEDICARE

## 2018-10-18 DIAGNOSIS — I25.9 CHEST PAIN DUE TO MYOCARDIAL ISCHEMIA, UNSPECIFIED ISCHEMIC CHEST PAIN TYPE: ICD-10-CM

## 2018-10-18 DIAGNOSIS — I25.118 CORONARY ARTERY DISEASE OF NATIVE ARTERY OF NATIVE HEART WITH STABLE ANGINA PECTORIS (HCC): ICD-10-CM

## 2018-10-18 PROCEDURE — 78452 HT MUSCLE IMAGE SPECT MULT: CPT

## 2018-10-18 PROCEDURE — 3430000000 HC RX DIAGNOSTIC RADIOPHARMACEUTICAL: Performed by: INTERNAL MEDICINE

## 2018-10-18 PROCEDURE — 6360000002 HC RX W HCPCS: Performed by: INTERNAL MEDICINE

## 2018-10-18 PROCEDURE — 93017 CV STRESS TEST TRACING ONLY: CPT

## 2018-10-18 PROCEDURE — A9500 TC99M SESTAMIBI: HCPCS | Performed by: INTERNAL MEDICINE

## 2018-10-18 PROCEDURE — 93018 CV STRESS TEST I&R ONLY: CPT | Performed by: INTERNAL MEDICINE

## 2018-10-18 RX ORDER — AMINOPHYLLINE DIHYDRATE 25 MG/ML
100 INJECTION, SOLUTION INTRAVENOUS ONCE
Status: COMPLETED | OUTPATIENT
Start: 2018-10-18 | End: 2018-10-18

## 2018-10-18 RX ADMIN — TETRAKIS(2-METHOXYISOBUTYLISOCYANIDE)COPPER(I) TETRAFLUOROBORATE 30 MILLICURIE: 1 INJECTION, POWDER, LYOPHILIZED, FOR SOLUTION INTRAVENOUS at 10:56

## 2018-10-18 RX ADMIN — REGADENOSON 0.4 MG: 0.08 INJECTION, SOLUTION INTRAVENOUS at 10:44

## 2018-10-18 RX ADMIN — TETRAKIS(2-METHOXYISOBUTYLISOCYANIDE)COPPER(I) TETRAFLUOROBORATE 10 MILLICURIE: 1 INJECTION, POWDER, LYOPHILIZED, FOR SOLUTION INTRAVENOUS at 10:55

## 2018-10-18 RX ADMIN — AMINOPHYLLINE 100 MG: 25 INJECTION, SOLUTION INTRAVENOUS at 10:52

## 2018-10-18 NOTE — TELEPHONE ENCOUNTER
Patient scheduled for stress test 10/18/18 at 09:40.  Note from 10/17/18 was not routed until this am(10/18/18)

## 2018-10-19 ENCOUNTER — NURSE ONLY (OUTPATIENT)
Dept: LAB | Age: 68
End: 2018-10-19
Payer: MEDICARE

## 2018-10-19 DIAGNOSIS — E53.8 B12 DEFICIENCY: Primary | ICD-10-CM

## 2018-10-19 PROCEDURE — 96372 THER/PROPH/DIAG INJ SC/IM: CPT | Performed by: FAMILY MEDICINE

## 2018-10-19 RX ADMIN — CYANOCOBALAMIN 1000 MCG: 1000 INJECTION INTRAMUSCULAR; SUBCUTANEOUS at 15:59

## 2018-10-22 ENCOUNTER — TELEPHONE (OUTPATIENT)
Dept: CARDIOLOGY | Age: 68
End: 2018-10-22

## 2018-10-22 NOTE — TELEPHONE ENCOUNTER
Notified patient of Stress  results. Instructed patient to keep upcoming appointment with cardiologist and to call our office for any questions. Patient also instructed to utilize the E.D. for any emergent health issues that may arise. Needs cardiac clearance for knee surgery. Surgery pending clearance.

## 2018-10-24 NOTE — PROCEDURES
[address]                [address] [place] [id number]                             CARDIAC STRESS TEST    PATIENT NAME: Pam Beck                   :         1950  MED REC NO:   [id number]                             ROOM:  ACCOUNT NO:   [de-identified]                           ADMIT DATE:  10/18/2018  PROVIDER:     Vinh Arteaga    DATE OF STUDY:  10/18/2018    STRESS TEST    Ordering Provider: Graciela Jones MD    Primary Care Provider: Estrellita Dangelo MD    Patient's Age: 70     Height: 4 feet 9 inches  Weight: 172 pounds    INDICATION:  Chest pain, CAD    Lexiscan 0.4 mg injected over 10 seconds. IV Cardiolite injected 20 seconds post Lexiscan injection. Heart Rate: 69 Resting blood pressure:  169/77              HR   BP  1 min          106  175/80  2 min  3 min          93   169/70  4 min  5 min          90   170/77  6 min  7 min          92   184/69  8 min  9 min          85   166/77  11 min    83   167/75  13 min    77   163/74    Symptoms:  Chest Pain: Yes  Nausea: Yes  Headache:  Yes  Shortness of breath: Yes  Other: No    Aminophyllin given: 100 mg    Aminophyllin wasted: 400 mg    Resting EKG:  Normal sinus rhythm, poor R progression in anterior  leads    Arrhythmias: None    EKG changes:  1 mm horizontal ST depression in leads II, III, aVF, V5,  V6        INTERPRETATION:  1. Ischemic ECG changes seen. 2. Cardiolite results to follow.     Nuclear Myocardial Perfusion Imaging (SPECT)    TESTING METHOD  STRESS:   Lexiscan  AGENT:    Cardiolite  REST:     Injection Date:  10/18/2018  Time:  0930   Amount:  11.9 mCi  STRESS:   Injection Date:  10/18/2018  Time:  1040  Amount:  33.2 mCi  IMAGE TIME:    Rest:  1000  Stress:  1130    EF:  74%  TID:  1.05  LHR:  0.42    FINDINGS:  Ischemia (Reversible Defect): No  Infarction (Irreversible Defect):  No  Ejection fraction Calculated: Normal, 74%  Segmental wall motion:   Normal  Diastolic LV

## 2018-11-06 ENCOUNTER — ANESTHESIA (OUTPATIENT)
Dept: OPERATING ROOM | Age: 68
End: 2018-11-06
Payer: MEDICARE

## 2018-11-06 ENCOUNTER — ANESTHESIA EVENT (OUTPATIENT)
Dept: OPERATING ROOM | Age: 68
End: 2018-11-06
Payer: MEDICARE

## 2018-11-06 ENCOUNTER — HOSPITAL ENCOUNTER (OUTPATIENT)
Age: 68
Setting detail: OUTPATIENT SURGERY
Discharge: HOME | End: 2018-11-06
Attending: ORTHOPAEDIC SURGERY | Admitting: ORTHOPAEDIC SURGERY
Payer: MEDICARE

## 2018-11-06 VITALS
RESPIRATION RATE: 3 BRPM | SYSTOLIC BLOOD PRESSURE: 150 MMHG | DIASTOLIC BLOOD PRESSURE: 65 MMHG | OXYGEN SATURATION: 94 % | TEMPERATURE: 97.1 F

## 2018-11-06 VITALS
TEMPERATURE: 98.5 F | RESPIRATION RATE: 16 BRPM | BODY MASS INDEX: 37.32 KG/M2 | HEART RATE: 70 BPM | HEIGHT: 57 IN | SYSTOLIC BLOOD PRESSURE: 121 MMHG | DIASTOLIC BLOOD PRESSURE: 60 MMHG | OXYGEN SATURATION: 96 % | WEIGHT: 173 LBS

## 2018-11-06 DIAGNOSIS — Z98.890 H/O MAJOR ORTHOPEDIC SURGERY: Primary | ICD-10-CM

## 2018-11-06 LAB — GLUCOSE BLD-MCNC: 149 MG/DL (ref 65–105)

## 2018-11-06 PROCEDURE — 3700000001 HC ADD 15 MINUTES (ANESTHESIA): Performed by: ORTHOPAEDIC SURGERY

## 2018-11-06 PROCEDURE — 82947 ASSAY GLUCOSE BLOOD QUANT: CPT

## 2018-11-06 PROCEDURE — 01400 ANES OPN/ARTHRS KNEE JT NOS: CPT | Performed by: NURSE ANESTHETIST, CERTIFIED REGISTERED

## 2018-11-06 PROCEDURE — 3600000004 HC SURGERY LEVEL 4 BASE: Performed by: ORTHOPAEDIC SURGERY

## 2018-11-06 PROCEDURE — 6360000002 HC RX W HCPCS: Performed by: NURSE ANESTHETIST, CERTIFIED REGISTERED

## 2018-11-06 PROCEDURE — 3700000000 HC ANESTHESIA ATTENDED CARE: Performed by: ORTHOPAEDIC SURGERY

## 2018-11-06 PROCEDURE — 2580000003 HC RX 258: Performed by: ORTHOPAEDIC SURGERY

## 2018-11-06 PROCEDURE — 6360000002 HC RX W HCPCS

## 2018-11-06 PROCEDURE — 6370000000 HC RX 637 (ALT 250 FOR IP): Performed by: ORTHOPAEDIC SURGERY

## 2018-11-06 PROCEDURE — 6370000000 HC RX 637 (ALT 250 FOR IP): Performed by: NURSE ANESTHETIST, CERTIFIED REGISTERED

## 2018-11-06 PROCEDURE — 2709999900 HC NON-CHARGEABLE SUPPLY: Performed by: ORTHOPAEDIC SURGERY

## 2018-11-06 PROCEDURE — 3600000014 HC SURGERY LEVEL 4 ADDTL 15MIN: Performed by: ORTHOPAEDIC SURGERY

## 2018-11-06 PROCEDURE — 7100000010 HC PHASE II RECOVERY - FIRST 15 MIN: Performed by: ORTHOPAEDIC SURGERY

## 2018-11-06 PROCEDURE — 7100000001 HC PACU RECOVERY - ADDTL 15 MIN: Performed by: ORTHOPAEDIC SURGERY

## 2018-11-06 PROCEDURE — 2500000003 HC RX 250 WO HCPCS

## 2018-11-06 PROCEDURE — 29881 ARTHRS KNE SRG MNISECTMY M/L: CPT | Performed by: ORTHOPAEDIC SURGERY

## 2018-11-06 PROCEDURE — 7100000011 HC PHASE II RECOVERY - ADDTL 15 MIN: Performed by: ORTHOPAEDIC SURGERY

## 2018-11-06 PROCEDURE — 7100000000 HC PACU RECOVERY - FIRST 15 MIN: Performed by: ORTHOPAEDIC SURGERY

## 2018-11-06 PROCEDURE — 2500000003 HC RX 250 WO HCPCS: Performed by: ORTHOPAEDIC SURGERY

## 2018-11-06 RX ORDER — HYDROCODONE BITARTRATE AND ACETAMINOPHEN 5; 325 MG/1; MG/1
1 TABLET ORAL EVERY 6 HOURS PRN
Status: DISCONTINUED | OUTPATIENT
Start: 2018-11-06 | End: 2018-11-06 | Stop reason: HOSPADM

## 2018-11-06 RX ORDER — ACETAMINOPHEN 325 MG/1
TABLET ORAL PRN
Status: DISCONTINUED | OUTPATIENT
Start: 2018-11-06 | End: 2018-11-06 | Stop reason: SDUPTHER

## 2018-11-06 RX ORDER — OXYCODONE HYDROCHLORIDE 5 MG/1
TABLET ORAL PRN
Status: DISCONTINUED | OUTPATIENT
Start: 2018-11-06 | End: 2018-11-06 | Stop reason: SDUPTHER

## 2018-11-06 RX ORDER — TRIAMCINOLONE ACETONIDE 40 MG/ML
INJECTION, SUSPENSION INTRA-ARTICULAR; INTRAMUSCULAR PRN
Status: DISCONTINUED | OUTPATIENT
Start: 2018-11-06 | End: 2018-11-06 | Stop reason: HOSPADM

## 2018-11-06 RX ORDER — SODIUM CHLORIDE, SODIUM LACTATE, POTASSIUM CHLORIDE, CALCIUM CHLORIDE 600; 310; 30; 20 MG/100ML; MG/100ML; MG/100ML; MG/100ML
INJECTION, SOLUTION INTRAVENOUS CONTINUOUS PRN
Status: DISCONTINUED | OUTPATIENT
Start: 2018-11-06 | End: 2018-11-06

## 2018-11-06 RX ORDER — KETOROLAC TROMETHAMINE 30 MG/ML
INJECTION, SOLUTION INTRAMUSCULAR; INTRAVENOUS PRN
Status: DISCONTINUED | OUTPATIENT
Start: 2018-11-06 | End: 2018-11-06 | Stop reason: SDUPTHER

## 2018-11-06 RX ORDER — BUPIVACAINE HYDROCHLORIDE AND EPINEPHRINE 5; 5 MG/ML; UG/ML
INJECTION, SOLUTION EPIDURAL; INTRACAUDAL; PERINEURAL PRN
Status: DISCONTINUED | OUTPATIENT
Start: 2018-11-06 | End: 2018-11-06 | Stop reason: HOSPADM

## 2018-11-06 RX ORDER — MIDAZOLAM HYDROCHLORIDE 1 MG/ML
INJECTION INTRAMUSCULAR; INTRAVENOUS PRN
Status: DISCONTINUED | OUTPATIENT
Start: 2018-11-06 | End: 2018-11-06 | Stop reason: SDUPTHER

## 2018-11-06 RX ORDER — GABAPENTIN 800 MG/1
TABLET ORAL PRN
Status: DISCONTINUED | OUTPATIENT
Start: 2018-11-06 | End: 2018-11-06 | Stop reason: SDUPTHER

## 2018-11-06 RX ORDER — ONDANSETRON 2 MG/ML
INJECTION INTRAMUSCULAR; INTRAVENOUS PRN
Status: DISCONTINUED | OUTPATIENT
Start: 2018-11-06 | End: 2018-11-06 | Stop reason: SDUPTHER

## 2018-11-06 RX ORDER — HYDROCODONE BITARTRATE AND ACETAMINOPHEN 5; 325 MG/1; MG/1
1-2 TABLET ORAL EVERY 4 HOURS PRN
Qty: 50 TABLET | Refills: 0 | Status: SHIPPED | OUTPATIENT
Start: 2018-11-06 | End: 2018-11-13

## 2018-11-06 RX ORDER — SODIUM CHLORIDE, SODIUM LACTATE, POTASSIUM CHLORIDE, CALCIUM CHLORIDE 600; 310; 30; 20 MG/100ML; MG/100ML; MG/100ML; MG/100ML
INJECTION, SOLUTION INTRAVENOUS CONTINUOUS
Status: DISCONTINUED | OUTPATIENT
Start: 2018-11-06 | End: 2018-11-06 | Stop reason: HOSPADM

## 2018-11-06 RX ORDER — PROPOFOL 10 MG/ML
INJECTION, EMULSION INTRAVENOUS PRN
Status: DISCONTINUED | OUTPATIENT
Start: 2018-11-06 | End: 2018-11-06 | Stop reason: SDUPTHER

## 2018-11-06 RX ORDER — FENTANYL CITRATE 50 UG/ML
INJECTION, SOLUTION INTRAMUSCULAR; INTRAVENOUS PRN
Status: DISCONTINUED | OUTPATIENT
Start: 2018-11-06 | End: 2018-11-06 | Stop reason: SDUPTHER

## 2018-11-06 RX ADMIN — SODIUM CHLORIDE, POTASSIUM CHLORIDE, SODIUM LACTATE AND CALCIUM CHLORIDE: 600; 310; 30; 20 INJECTION, SOLUTION INTRAVENOUS at 08:55

## 2018-11-06 RX ADMIN — OXYCODONE HYDROCHLORIDE 5 MG: 5 TABLET ORAL at 08:55

## 2018-11-06 RX ADMIN — HYDROCODONE BITARTRATE AND ACETAMINOPHEN 1 TABLET: 5; 325 TABLET ORAL at 10:42

## 2018-11-06 RX ADMIN — PHENYLEPHRINE HYDROCHLORIDE 200 MCG: 10 INJECTION INTRAVENOUS at 09:22

## 2018-11-06 RX ADMIN — PHENYLEPHRINE HYDROCHLORIDE 200 MCG: 10 INJECTION INTRAVENOUS at 09:18

## 2018-11-06 RX ADMIN — ACETAMINOPHEN 975 MG: 325 TABLET ORAL at 08:55

## 2018-11-06 RX ADMIN — PROPOFOL 100 MG: 10 INJECTION, EMULSION INTRAVENOUS at 09:02

## 2018-11-06 RX ADMIN — GABAPENTIN 800 MG: 800 TABLET, FILM COATED ORAL at 08:55

## 2018-11-06 RX ADMIN — SODIUM CHLORIDE, POTASSIUM CHLORIDE, SODIUM LACTATE AND CALCIUM CHLORIDE: 600; 310; 30; 20 INJECTION, SOLUTION INTRAVENOUS at 09:21

## 2018-11-06 RX ADMIN — ONDANSETRON 4 MG: 2 INJECTION INTRAMUSCULAR; INTRAVENOUS at 09:13

## 2018-11-06 RX ADMIN — PHENYLEPHRINE HYDROCHLORIDE 200 MCG: 10 INJECTION INTRAVENOUS at 09:20

## 2018-11-06 RX ADMIN — SODIUM CHLORIDE, POTASSIUM CHLORIDE, SODIUM LACTATE AND CALCIUM CHLORIDE: 600; 310; 30; 20 INJECTION, SOLUTION INTRAVENOUS at 11:12

## 2018-11-06 RX ADMIN — MIDAZOLAM HYDROCHLORIDE 2 MG: 1 INJECTION, SOLUTION INTRAMUSCULAR; INTRAVENOUS at 08:55

## 2018-11-06 RX ADMIN — KETOROLAC TROMETHAMINE 30 MG: 30 INJECTION, SOLUTION INTRAMUSCULAR at 09:36

## 2018-11-06 RX ADMIN — SODIUM CHLORIDE, POTASSIUM CHLORIDE, SODIUM LACTATE AND CALCIUM CHLORIDE: 600; 310; 30; 20 INJECTION, SOLUTION INTRAVENOUS at 08:31

## 2018-11-06 RX ADMIN — FENTANYL CITRATE 100 MCG: 50 INJECTION, SOLUTION INTRAMUSCULAR; INTRAVENOUS at 09:00

## 2018-11-06 ASSESSMENT — PULMONARY FUNCTION TESTS
PIF_VALUE: 20
PIF_VALUE: 12
PIF_VALUE: 21
PIF_VALUE: 22
PIF_VALUE: 20
PIF_VALUE: 20
PIF_VALUE: 2
PIF_VALUE: 2
PIF_VALUE: 21
PIF_VALUE: 20
PIF_VALUE: 1
PIF_VALUE: 4
PIF_VALUE: 4
PIF_VALUE: 20
PIF_VALUE: 2
PIF_VALUE: 16
PIF_VALUE: 2
PIF_VALUE: 20
PIF_VALUE: 22
PIF_VALUE: 20
PIF_VALUE: 22
PIF_VALUE: 20
PIF_VALUE: 9
PIF_VALUE: 21
PIF_VALUE: 15
PIF_VALUE: 1
PIF_VALUE: 1
PIF_VALUE: 21
PIF_VALUE: 16
PIF_VALUE: 21
PIF_VALUE: 16
PIF_VALUE: 12
PIF_VALUE: 22
PIF_VALUE: 22
PIF_VALUE: 20
PIF_VALUE: 21
PIF_VALUE: 22
PIF_VALUE: 20
PIF_VALUE: 20
PIF_VALUE: 22
PIF_VALUE: 22
PIF_VALUE: 21
PIF_VALUE: 12

## 2018-11-06 ASSESSMENT — PAIN DESCRIPTION - LOCATION
LOCATION: KNEE

## 2018-11-06 ASSESSMENT — PAIN SCALES - GENERAL
PAINLEVEL_OUTOF10: 0
PAINLEVEL_OUTOF10: 10
PAINLEVEL_OUTOF10: 0
PAINLEVEL_OUTOF10: 0
PAINLEVEL_OUTOF10: 10
PAINLEVEL_OUTOF10: 9
PAINLEVEL_OUTOF10: 8
PAINLEVEL_OUTOF10: 0
PAINLEVEL_OUTOF10: 0
PAINLEVEL_OUTOF10: 4
PAINLEVEL_OUTOF10: 0
PAINLEVEL_OUTOF10: 0
PAINLEVEL_OUTOF10: 4
PAINLEVEL_OUTOF10: 0

## 2018-11-06 ASSESSMENT — PAIN DESCRIPTION - PAIN TYPE
TYPE: ACUTE PAIN
TYPE: ACUTE PAIN

## 2018-11-06 ASSESSMENT — PAIN DESCRIPTION - ORIENTATION
ORIENTATION: RIGHT

## 2018-11-06 ASSESSMENT — PAIN - FUNCTIONAL ASSESSMENT: PAIN_FUNCTIONAL_ASSESSMENT: 0-10

## 2018-11-06 NOTE — ANESTHESIA POSTPROCEDURE EVALUATION
Department of Anesthesiology  Postprocedure Note    Patient: Priti Hardy  MRN: [id number]  YOB: 1950  Date of evaluation: 11/6/2018  Time:  10:15 AM     Procedure Summary     Date:  11/06/18 Room / Location:  [address] [place] [address] 02 / [facility] OR    Anesthesia Start:  5318 Anesthesia Stop:  Nicholas Sol    Procedure:  Right KNEE ARTHROSCOPY PARTIAL MEDIAL MENISECTOMY AND PLICA INCISION (Right ) Diagnosis:  (right knee pain)    Surgeon:  Shayna Dunne MD Responsible Provider:  EMY Maldonado CRNA    Anesthesia Type:  general ASA Status:  3          Anesthesia Type: general    Shaina Phase I: Shaina Score: 9    Shaina Phase II: Shaina Score: 9    Last vitals: Reviewed and per EMR flowsheets.        Anesthesia Post Evaluation    Patient location during evaluation: PACU  Patient participation: complete - patient participated  Level of consciousness: awake and alert  Pain score: 0  Airway patency: patent  Nausea & Vomiting: no nausea and no vomiting  Complications: no  Cardiovascular status: blood pressure returned to baseline and hemodynamically stable  Respiratory status: acceptable, spontaneous ventilation and room air  Hydration status: euvolemic

## 2018-11-06 NOTE — H&P
successful PTCA with drug-eluting stents in both lesions, Dr. Isak Freeman, On license of [facility], [facility]    CYSTOSCOPY  3/1996    Bladder biopsy, urethral dilatation.  EYE SURGERY      HYSTERECTOMY, TOTAL ABDOMINAL  1980    LAPAROSCOPY  3/1979    NASAL SEPTUM SURGERY  3/9/2005    Nasoseptal reconstruction.  OVARY REMOVAL Right 7/1993    Laparotomy.  YAG CAPSULOTOMY Right 04/2016    Dr Amilcar Oliveira       Medications Prior to Admission:   Prior to Admission medications    Medication Sig Start Date End Date Taking? Authorizing Provider   isosorbide mononitrate (IMDUR) 30 MG extended release tablet Take 0.5 tablets by mouth daily 10/5/18  Yes Kita Nino MD   amLODIPine (NORVASC) 5 MG tablet Take 1 tablet by mouth daily 9/6/18  Yes Mariya Blanco MD   simvastatin (ZOCOR) 40 MG tablet Take 1 tablet by mouth nightly 9/6/18  Yes Mariya Blanco MD   glimepiride (AMARYL) 1 MG tablet Take 0.5 tablets by mouth 2 times daily 9/6/18  Yes Mariya Blanco MD   metFORMIN (GLUCOPHAGE) 500 MG tablet Take 1 tablet by mouth 2 times daily (with meals) 9/6/18  Yes Mariya Blanco MD   Multiple Vitamins-Minerals (MULTIVITAMIN PO) Take 1 tablet by mouth daily    Historical Provider, MD   Cholecalciferol (VITAMIN D3) 51768 units CAPS Take 1 capsule by mouth once a week 9/6/18   Mariya Blanco MD   nitroGLYCERIN (NITROSTAT) 0.4 MG SL tablet Place 1 tablet under the tongue every 5 minutes as needed for Chest pain up to max of 3 total doses. If no relief after 1 dose, call 911. 9/6/18   Mariya Blanco MD   clopidogrel (PLAVIX) 75 MG tablet Take 1 tablet by mouth daily 6/19/18   Gabe Kirby DO   guaiFENesin-codeine (CHERATUSSIN AC) 100-10 MG/5ML syrup Take 5 mLs by mouth 4 times daily as needed for Cough . 11/16/17   Mariya Blanco MD   NONFORMULARY 5,000 mcg daily OTC Biotin 1000mcg daily     Historical Provider, MD   aspirin 81 MG tablet Take 81 mg by mouth daily.     Historical Provider, MD    Scheduled Meds:  Continuous Infusions:  

## 2018-11-06 NOTE — ANESTHESIA PRE PROCEDURE
Continuous Anjel Elizabeth MD           Allergies: Allergies   Allergen Reactions    Iv Dye [Iodides] Shortness Of Breath    Sulfa Antibiotics Shortness Of Breath    Ace Inhibitors Nausea Only     Difficulty swallowing the Lisinopril. Gagging. Nausea.  Cozaar [Losartan] Nausea Only    Ezetimibe-Simvastatin     Lipitor Other (See Comments)     Muscle aches.  Lopressor [Metoprolol]      leg pain    Penicillins Swelling       Problem List:    Patient Active Problem List   Diagnosis Code    Mixed hyperlipidemia E78.2    Obesity (BMI 30-39. 9) E66.9    Interstitial cystitis N30.10    History of seasonal allergies Z88.9    Trigger finger, left M65.30    High risk medication use Z79.899    Uncontrolled type 2 diabetes mellitus without complication, without long-term current use of insulin ([facility]) E11.65    Vitamin D deficiency E55.9    Essential hypertension I10    Atypical chest pain R07.89    Hypokalemia E87.6    Paresthesias R20.2    S/P drug eluting coronary stent placement-RCA 6/20/17 - Dr. 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

## 2018-11-13 ENCOUNTER — HOSPITAL ENCOUNTER (OUTPATIENT)
Dept: INTERVENTIONAL RADIOLOGY/VASCULAR | Age: 68
Discharge: HOME | End: 2018-11-15
Payer: MEDICARE

## 2018-11-13 ENCOUNTER — OFFICE VISIT (OUTPATIENT)
Dept: ORTHOPEDIC SURGERY | Age: 68
End: 2018-11-13

## 2018-11-13 VITALS — BODY MASS INDEX: 37.11 KG/M2 | HEIGHT: 57 IN | WEIGHT: 172 LBS

## 2018-11-13 DIAGNOSIS — M79.661 RIGHT CALF PAIN: Primary | ICD-10-CM

## 2018-11-13 DIAGNOSIS — M79.661 RIGHT CALF PAIN: ICD-10-CM

## 2018-11-13 PROCEDURE — 99024 POSTOP FOLLOW-UP VISIT: CPT | Performed by: PHYSICIAN ASSISTANT

## 2018-11-13 PROCEDURE — 93971 EXTREMITY STUDY: CPT

## 2018-11-16 ENCOUNTER — CARE COORDINATION (OUTPATIENT)
Dept: CARE COORDINATION | Age: 68
End: 2018-11-16

## 2018-11-20 ENCOUNTER — NURSE ONLY (OUTPATIENT)
Dept: LAB | Age: 68
End: 2018-11-20
Payer: MEDICARE

## 2018-11-20 DIAGNOSIS — E53.8 B12 DEFICIENCY: Primary | ICD-10-CM

## 2018-11-20 PROCEDURE — 96372 THER/PROPH/DIAG INJ SC/IM: CPT | Performed by: PSYCHIATRY & NEUROLOGY

## 2018-11-20 RX ADMIN — CYANOCOBALAMIN 1000 MCG: 1000 INJECTION INTRAMUSCULAR; SUBCUTANEOUS at 15:52

## 2018-11-23 ENCOUNTER — CARE COORDINATION (OUTPATIENT)
Dept: CARE COORDINATION | Age: 68
End: 2018-11-23

## 2018-11-23 NOTE — CARE COORDINATION
General Assessment    Do you have any symptoms that are causing concern?:  No         Diabetes Assessment    Medic Alert ID:  No  Meal Planning:  Avoidance of concentrated sweets   How often do you test your blood sugar?:  Daily   Do you have barriers with adherence to non-pharmacologic self-management interventions? (Nutrition/Exercise/Self-Monitoring):  No   Have you ever had to go to the ED for symptoms of low blood sugar?:  No       No patient-reported symptoms          Care Coordination Interventions    Program Enrollment:  Complex Care  Referral from Primary Care Provider:  No  Suggested Interventions and Community Resources  Diabetes Education:  Completed  Home Health Services:  Completed (Comment: Rahul Ferguson )  Physical Therapy:  Completed (Comment: completed in hospital )  Transportation Support:  Completed         Goals Addressed             Most Recent       Patient Stated     Patient Stated (pt-stated)   On track (11/23/2018)             Zachery Palacio stated she will follow up with ortho for her Rt. Knee issues. Barriers: none  Plan for overcoming my barriers: N/A  Confidence: 9/10  Anticipated Goal Completion Date: 11/30/2018    10/11/2018- awaiting cardiac clearance for knee surgery- scheduled for stress test            Prior to Admission medications    Medication Sig Start Date End Date Taking?  Authorizing Provider   isosorbide mononitrate (IMDUR) 30 MG extended release tablet Take 0.5 tablets by mouth daily 10/5/18   Vandana Kumari MD   Multiple Vitamins-Minerals (MULTIVITAMIN PO) Take 1 tablet by mouth daily    Historical Provider, MD   amLODIPine (NORVASC) 5 MG tablet Take 1 tablet by mouth daily 9/6/18   Izzy Braswell MD   Cholecalciferol (VITAMIN D3) 26645 units CAPS Take 1 capsule by mouth once a week 9/6/18   Izzy Braswell MD   simvastatin (ZOCOR) 40 MG tablet Take 1 tablet by mouth nightly 9/6/18   Izzy Braswell MD   glimepiride (AMARYL) 1 MG tablet Take 0.5 tablets by

## 2018-11-23 NOTE — PATIENT INSTRUCTIONS
Patient Education        Influenza (Flu): Care Instructions  Your Care Instructions    Influenza (flu) is an infection in the lungs and breathing passages. It is caused by the influenza virus. There are different strains, or types, of the flu virus from year to year. Unlike the common cold, the flu comes on suddenly and the symptoms, such as a cough, congestion, fever, chills, fatigue, aches, and pains, are more severe. These symptoms may last up to 10 days. Although the flu can make you feel very sick, it usually doesn't cause serious health problems. Home treatment is usually all you need for flu symptoms. But your doctor may prescribe antiviral medicine to prevent other health problems, such as pneumonia, from developing. Older people and those who have a long-term health condition, such as lung disease, are most at risk for having pneumonia or other health problems. Follow-up care is a key part of your treatment and safety. Be sure to make and go to all appointments, and call your doctor if you are having problems. It's also a good idea to know your test results and keep a list of the medicines you take. How can you care for yourself at home? · Get plenty of rest.  · Drink plenty of fluids, enough so that your urine is light yellow or clear like water. If you have kidney, heart, or liver disease and have to limit fluids, talk with your doctor before you increase the amount of fluids you drink. · Take an over-the-counter pain medicine if needed, such as acetaminophen (Tylenol), ibuprofen (Advil, Motrin), or naproxen (Aleve), to relieve fever, headache, and muscle aches. Read and follow all instructions on the label. No one younger than 20 should take aspirin. It has been linked to Reye syndrome, a serious illness. · Do not smoke. Smoking can make the flu worse. If you need help quitting, talk to your doctor about stop-smoking programs and medicines.  These can increase your chances of quitting for your doctor if:    · You begin to get better and then get worse.     · You are not getting better after 1 week. Where can you learn more? Go to [email or web address] and sign in to your [employer] account. Enter B284 in the [employer] box to learn more about \"Influenza (Flu): Care Instructions. \"     If you do not have an account, please click on the \"Sign Up Now\" link. Current as of: December 6, 2017  Content Version: 11.8  © 5613-2170 Healthwise, Incorporated. Care instructions adapted under license by [facility] ([facility]). If you have questions about a medical condition or this instruction, always ask your healthcare professional. [address] any warranty or liability for your use of this information.

## 2018-11-27 ENCOUNTER — OFFICE VISIT (OUTPATIENT)
Dept: ORTHOPEDIC SURGERY | Age: 68
End: 2018-11-27

## 2018-11-27 VITALS
SYSTOLIC BLOOD PRESSURE: 140 MMHG | HEIGHT: 57 IN | HEART RATE: 84 BPM | DIASTOLIC BLOOD PRESSURE: 80 MMHG | BODY MASS INDEX: 37.11 KG/M2 | WEIGHT: 172 LBS

## 2018-11-27 DIAGNOSIS — G89.29 CHRONIC PAIN OF RIGHT KNEE: Primary | ICD-10-CM

## 2018-11-27 DIAGNOSIS — M25.561 CHRONIC PAIN OF RIGHT KNEE: Primary | ICD-10-CM

## 2018-11-27 DIAGNOSIS — S83.241D ACUTE MEDIAL MENISCUS TEAR OF RIGHT KNEE, SUBSEQUENT ENCOUNTER: ICD-10-CM

## 2018-11-27 DIAGNOSIS — M23.51 RECURRENT RIGHT KNEE INSTABILITY: ICD-10-CM

## 2018-11-27 PROCEDURE — 99024 POSTOP FOLLOW-UP VISIT: CPT | Performed by: PHYSICIAN ASSISTANT

## 2018-12-05 ENCOUNTER — HOSPITAL ENCOUNTER (OUTPATIENT)
Dept: INTERVENTIONAL RADIOLOGY/VASCULAR | Age: 68
Discharge: HOME | End: 2018-12-07
Payer: MEDICARE

## 2018-12-05 ENCOUNTER — HOSPITAL ENCOUNTER (OUTPATIENT)
Dept: PHYSICAL THERAPY | Age: 68
Setting detail: THERAPIES SERIES
Discharge: HOME | End: 2018-12-05
Payer: MEDICARE

## 2018-12-05 DIAGNOSIS — M79.661 RIGHT CALF PAIN: ICD-10-CM

## 2018-12-05 DIAGNOSIS — M79.661 RIGHT CALF PAIN: Primary | ICD-10-CM

## 2018-12-05 PROCEDURE — 93971 EXTREMITY STUDY: CPT

## 2018-12-05 PROCEDURE — G8978 MOBILITY CURRENT STATUS: HCPCS

## 2018-12-05 PROCEDURE — 97162 PT EVAL MOD COMPLEX 30 MIN: CPT

## 2018-12-05 PROCEDURE — G8979 MOBILITY GOAL STATUS: HCPCS

## 2018-12-05 ASSESSMENT — PAIN DESCRIPTION - DESCRIPTORS: DESCRIPTORS: ACHING

## 2018-12-05 ASSESSMENT — PAIN DESCRIPTION - ORIENTATION: ORIENTATION: RIGHT

## 2018-12-05 ASSESSMENT — PAIN DESCRIPTION - LOCATION: LOCATION: KNEE;LEG

## 2018-12-05 ASSESSMENT — PAIN DESCRIPTION - PROGRESSION: CLINICAL_PROGRESSION: GRADUALLY WORSENING

## 2018-12-05 ASSESSMENT — PAIN SCALES - GENERAL: PAINLEVEL_OUTOF10: 8

## 2018-12-05 ASSESSMENT — PAIN DESCRIPTION - ONSET: ONSET: AWAKENED FROM SLEEP

## 2018-12-05 ASSESSMENT — PAIN DESCRIPTION - FREQUENCY: FREQUENCY: CONTINUOUS

## 2018-12-05 ASSESSMENT — PAIN DESCRIPTION - PAIN TYPE: TYPE: ACUTE PAIN

## 2018-12-05 NOTE — FLOWSHEET NOTE
Physical Therapy Daily Treatment Note    Date:  2018    Patient Name:  Mei Zuleta    :  1950  MRN: [id number]  Restrictions/Precautions:     Medical/Treatment Diagnosis Information:   · Diagnosis: M25.561, G89.29 (ICD-10-CM) - Chronic pain of right knee, M23.51 (ICD-10-CM) - Recurrent right knee instability  ·    Insurance/Certification information:  PT Insurance Information: [employer]/EQUIT[employer] AND YOU  Physician Information:  Referring Practitioner: Halima Kiran MD  Plan of care signed (Y/N):  n  Visit# / total visits: 1 /10  Pain level: /10     G-Code (if applicable):      Date G-Code Applied:  18  PT G-Codes  Functional Assessment Tool Used: Therapist Discretion   Functional Limitation: Mobility: Walking and moving around  Mobility: Walking and Moving Around Current Status (): At least 40 percent but less than 60 percent impaired, limited or restricted  Mobility: Walking and Moving Around Goal Status (): At least 1 percent but less than 20 percent impaired, limited or restricted    Time In: 230  Time Out:330    Progress Note: [x]  Yes  []  No  Next due by: Visit #10      Subjective:   See Eval   Objective: See Eval   Observation:   Test measurements:      Exercises:   Exercise/Equipment Resistance/Repetitions Other comments   Bike     Nustep      HR/TR     Marching      3 way hip     Squats     Steps          QS      SAQ     SLR      Heel Slides     Prone knee hang           [] Provided verbal/tactile cueing for activities related to strengthening, flexibility, endurance, ROM. (20928)  [] Provided verbal/tactile cueing for activities related to improving balance, coordination, kinesthetic sense, posture, motor skill, proprioception. (06907)    Therapeutic Activities:     [] Therapeutic activities, direct (one-on-one) patient contact (use of dynamic activities to improve functional performance).  (81443)    Gait:   [] Provided training and instruction to the patient for

## 2018-12-06 ENCOUNTER — TELEPHONE (OUTPATIENT)
Dept: ORTHOPEDIC SURGERY | Age: 68
End: 2018-12-06

## 2018-12-06 DIAGNOSIS — M79.604 PAIN OF RIGHT LOWER EXTREMITY: ICD-10-CM

## 2018-12-06 DIAGNOSIS — M25.561 ACUTE PAIN OF RIGHT KNEE: Primary | ICD-10-CM

## 2018-12-06 RX ORDER — METHYLPREDNISOLONE 4 MG/1
4 TABLET ORAL SEE ADMIN INSTRUCTIONS
Qty: 1 KIT | Refills: 0 | Status: SHIPPED | OUTPATIENT
Start: 2018-12-06 | End: 2018-12-12

## 2018-12-07 ENCOUNTER — HOSPITAL ENCOUNTER (OUTPATIENT)
Dept: PHYSICAL THERAPY | Age: 68
Setting detail: THERAPIES SERIES
Discharge: HOME | End: 2018-12-07
Payer: MEDICARE

## 2018-12-07 PROCEDURE — G0283 ELEC STIM OTHER THAN WOUND: HCPCS

## 2018-12-07 PROCEDURE — 97110 THERAPEUTIC EXERCISES: CPT

## 2018-12-07 NOTE — FLOWSHEET NOTE
device  Long term goal 3: Improve LE strength to 4+/5 to assist with sit to stand and amb. Long term goal 4: Patient to be able to stand x 30min to improve tolerance to cooking.   Long term goal 5: Right knee AROM 0-120 to assist with deep knee squat          Plan:   [] Continue per plan of care [] Alter current plan (see comments)  [x] Plan of care initiated [] Hold pending MD visit [] Discharge  Plan for Next Session:  Progress supine and standing exs     Electronically signed by:  Tyshawn Rodríguez

## 2018-12-11 ENCOUNTER — OFFICE VISIT (OUTPATIENT)
Dept: ORTHOPEDIC SURGERY | Age: 68
End: 2018-12-11

## 2018-12-11 ENCOUNTER — HOSPITAL ENCOUNTER (OUTPATIENT)
Dept: PHYSICAL THERAPY | Age: 68
Setting detail: THERAPIES SERIES
Discharge: HOME | End: 2018-12-11
Payer: MEDICARE

## 2018-12-11 VITALS
BODY MASS INDEX: 37.11 KG/M2 | HEART RATE: 76 BPM | WEIGHT: 172 LBS | HEIGHT: 57 IN | DIASTOLIC BLOOD PRESSURE: 68 MMHG | SYSTOLIC BLOOD PRESSURE: 132 MMHG

## 2018-12-11 DIAGNOSIS — Z98.890 H/O ARTHROSCOPY OF RIGHT KNEE: Primary | ICD-10-CM

## 2018-12-11 PROCEDURE — 99024 POSTOP FOLLOW-UP VISIT: CPT | Performed by: PHYSICIAN ASSISTANT

## 2018-12-11 PROCEDURE — G0283 ELEC STIM OTHER THAN WOUND: HCPCS | Performed by: PHYSICAL THERAPY ASSISTANT

## 2018-12-11 PROCEDURE — 97110 THERAPEUTIC EXERCISES: CPT | Performed by: PHYSICAL THERAPY ASSISTANT

## 2018-12-11 NOTE — FLOWSHEET NOTE
Physical Therapy Daily Treatment Note    Date:  2018    Patient Name:  Catherine Osman   \"KANDI\"  :  1950  MRN: [id number]     Restrictions/Precautions:       Medical/Treatment Diagnosis Information:    Diagnosis: M25.561, G89.29 (ICD-10-CM) - Chronic pain of right knee, M23.51 (ICD-10-CM) - Recurrent right knee instability    Insurance/Certification information:  PT Insurance Information: [employer] AND YOU  Physician Information:  Referring Practitioner: Arturo Khan MD  Plan of care signed (Y/N):  y  Visit# / total visits: 3/10  Pain level: /10     G-Code (if applicable):      Date G-Code Applied:  18  PT G-Codes  Functional Assessment Tool Used: Therapist Discretion   Functional Limitation: Mobility: Walking and moving around  Mobility: Walking and Moving Around Current Status (): At least 40 percent but less than 60 percent impaired, limited or restricted  Mobility: Walking and Moving Around Goal Status (): At least 1 percent but less than 20 percent impaired, limited or restricted    Time In: 1015  Time Out:1115    Progress Note: []  Yes  []  No  Next due by: Visit #10      Subjective:   Patient states pain this date rate d 1/10 through anterior and posterior knee. Feels improvement since start of steroid pack (Prednisone) 3 days prior. Relates poor compliance with HEP. Objective: BOOM per flowsheet to decrease pain, increase AROM and strength. Reviewed HEP and updated this date and encouraged increased use. Ended with IFC and CP to the right knee to decrease pain and swelling. Observation: Slight gait deviation remains.    Test measurements:  Knee ext: 0 deg    Exercises:   Exercise/Equipment Resistance/Repetitions Other comments   Bike     Nustep  8' L4    HR/TR 10x    Marching  10x    3 way hip 10x    Squats 10x    Steps          Lunge 10x  Fwd             QS 10x     SAQ 10x    SLR  10x    Heel Slides 10x    Prone knee hang      Supine knee ext stretch  Heel on Long term goal 1: Indpendent in HEP   Long term goal 2: Amb with normal gait pattern and no assitive device  Long term goal 3: Improve LE strength to 4+/5 to assist with sit to stand and amb. Long term goal 4: Patient to be able to stand x 30min to improve tolerance to cooking. Long term goal 5: Right knee AROM 0-120 to assist with deep knee squat    Plan:   [x] Continue per plan of care [] Alter current plan (see comments)  [] Plan of care initiated [] Hold pending MD visit [] Discharge  Plan for Next Session:  Monitor tolerance to exercises, progress as able. Electronically signed by:   Jemal Morales

## 2018-12-12 NOTE — PROGRESS NOTES
[facility] [address]  [facility]  Dept: Dewey Yates: [phone]    Date of Service:  12/11/2018    Kalpana Wong  YOB: 1950  MRN: [id number]      SUBJECTIVE:  Kalpana Wong is a 70 y.o. who comes to us today for followup of right knee operative arthroscopy. She is just over a month out from that. She was having tons of swelling. She has had 2 separate Dopplers done, negative for DVTs both times. She states the swelling is now improved. She is getting ready to finally start physical therapy. She is ambulating without any assistive devices. Pain is better. She does have crepitus noted at times. She is here today for reevaluation. She states that the knee wants to give out on her, she feels like, at times although that is improving. On exam, this is a 70 y.o. in no acute distress, alert, pleasant, cooperative. She walks with a right-sided limp. Right knee was inspected. The anterior-based incisions are well healed. There is no erythema. No increased warmth. No cellulitis. Really no joint effusion. Knee motion is full extension; flexion is 120 degrees here today. Grossly, she has good stability to varus and valgus and anterior and posterior stresses. She has minimal tenderness to palpation. Things are improving significantly. IMPRESSION:  Improving right knee pain status post operative arthroscopy. PLAN:  Continue with physical therapy as she really has not done all that much at this point yet. I think this is going to help her out tremendously for improving her quad strength and range of motion. We will see her back here as needed.       Dang Ospina am personally transcribing for Disha Renteria 12/12/2018 at 10:25 AM.    I, Alessia Hicks PA-C, personally performed the services described in this document as transcribed by Lori Hunt, and it is both accurate and complete.     Electronically signed by Vignesh Rios PA-C on 12/12/2018 at 11:00 AM

## 2018-12-13 ENCOUNTER — HOSPITAL ENCOUNTER (OUTPATIENT)
Dept: PHYSICAL THERAPY | Age: 68
Setting detail: THERAPIES SERIES
Discharge: HOME | End: 2018-12-13
Payer: MEDICARE

## 2018-12-13 PROCEDURE — 97110 THERAPEUTIC EXERCISES: CPT | Performed by: PHYSICAL THERAPY ASSISTANT

## 2018-12-13 PROCEDURE — G0283 ELEC STIM OTHER THAN WOUND: HCPCS | Performed by: PHYSICAL THERAPY ASSISTANT

## 2018-12-13 NOTE — FLOWSHEET NOTE
Physical Therapy Daily Treatment Note    Date:  2018    Patient Name:  Deana Woodall   \"KANDI\"  :  1950  MRN: [id number]     Restrictions/Precautions:       Medical/Treatment Diagnosis Information:    Diagnosis: M25.561, G89.29 (ICD-10-CM) - Chronic pain of right knee, M23.51 (ICD-10-CM) - Recurrent right knee instability    Insurance/Certification information:  PT Insurance Information: [employer]/EQUIT[employer] AND YOU  Physician Information:  Referring Practitioner: Karyn Mak MD  Plan of care signed (Y/N):  y  Visit# / total visits: 3/10  Pain level: /10     G-Code (if applicable):      Date G-Code Applied:  18  PT G-Codes  Functional Assessment Tool Used: Therapist Discretion   Functional Limitation: Mobility: Walking and moving around  Mobility: Walking and Moving Around Current Status (): At least 40 percent but less than 60 percent impaired, limited or restricted  Mobility: Walking and Moving Around Goal Status (): At least 1 percent but less than 20 percent impaired, limited or restricted    Time In: 1004  Time Out:1053    Progress Note: []  Yes  []  No  Next due by: Visit #10      Subjective:   Patient states pain this date rated 0-1/10 through anterior and posterior knee. Notes improved compliance with HEP. Difficulty with squats and forward hip kicks. No additional c/o. Objective: BOOM per flowsheet to decrease pain, increase AROM and strength. Reviewed HEP and updated this date and encouraged increased use. Ended with IFC. Verbal cuing for progression and technique with exercises. Observation: Slight gait deviation remains.      Test measurements:  Knee ext: 0 deg    Exercises:   Exercise/Equipment Resistance/Repetitions Other comments   Bike     Nustep  8' L4    HR/TR 10x    Marching  10x    3 way hip 10x    Squats 10x    Steps          Lunge 10x  Fwd             QS 10x     SAQ 10x    SLR  10x    Heel Slides 10x    Prone knee hang      Supine knee ext stretch  Heel on Pillow 3'    [x] Provided verbal/tactile cueing for activities related to strengthening, flexibility, endurance, ROM. (17231)  [] Provided verbal/tactile cueing for activities related to improving balance, coordination, kinesthetic sense, posture, motor skill, proprioception. (95856)    Therapeutic Activities:     [] Therapeutic activities, direct (one-on-one) patient contact (use of dynamic activities to improve functional performance). (28589)    Gait:   [] Provided training and instruction to the patient for ambulation re-education. (99548)    Self-Care/ADL's  [] Self-care/home management training and compensatory training, meal preparation, safety procedures, and instructions in use of assistive technology devices/adaptive equipment, direct one-on-one contact. (14998)    Home Exercise Program:     [x] Reviewed/Progressed HEP activities related to strengthening, flexibility, endurance, ROM. (06226)  [] Reviewed/Progressed HEP activities related to improving balance, coordination, kinesthetic sense, posture, motor skill, proprioception.  (86170)    Manual Treatments:    [] Provided manual therapy to mobilize soft tissue/joints for the purpose of modulating pain, promoting relaxation,  increasing ROM, reducing/eliminating soft tissue swelling/inflammation/restriction, improving soft tissue extensibility. (48753)    Service Based Modalities:  15' IFC to the right knee to decrease pain and inflammation.       Timed Code Treatment Minutes:  29' ex     Total Treatment Minutes:   52'  Treatment/Activity Tolerance:  [] Patient tolerated treatment well [] Patient limited by fatique  [x] Patient limited by pain  [] Patient limited by other medical complications  [] Other:     Prognosis: [] Good [x] Fair  [] Poor    Patient Requires Follow-up: [x] Yes  [] No      Goals:  Short term goals  Time Frame for Short term goals: 3 weeks   Short term goal 1: Initiate HEP     Long term goals  Time Frame for Long term goals : 6weeks Long term goal 1: Indpendent in HEP   Long term goal 2: Amb with normal gait pattern and no assitive device  Long term goal 3: Improve LE strength to 4+/5 to assist with sit to stand and amb. Long term goal 4: Patient to be able to stand x 30min to improve tolerance to cooking. Long term goal 5: Right knee AROM 0-120 to assist with deep knee squat    Plan:   [x] Continue per plan of care [] Alter current plan (see comments)  [] Plan of care initiated [] Hold pending MD visit [] Discharge    Plan for Next Session:  Monitor tolerance to exercises, progress as able. Electronically signed by:   Dileep Newsome

## 2018-12-17 ENCOUNTER — HOSPITAL ENCOUNTER (OUTPATIENT)
Dept: PHYSICAL THERAPY | Age: 68
Setting detail: THERAPIES SERIES
Discharge: HOME | End: 2018-12-17
Payer: MEDICARE

## 2018-12-17 PROCEDURE — 97110 THERAPEUTIC EXERCISES: CPT

## 2018-12-19 ENCOUNTER — HOSPITAL ENCOUNTER (OUTPATIENT)
Dept: PHYSICAL THERAPY | Age: 68
Setting detail: THERAPIES SERIES
Discharge: HOME | End: 2018-12-19
Payer: MEDICARE

## 2018-12-19 NOTE — PROGRESS NOTES
Physical Therapy       Outpatient Physical Therapy    [x] [place]  Phone: [phone]  Fax: [phone]      [] [place]  Phone: [phone]  Fax: [phone]    Physical Therapy  Cancellation/No-show Note  Patient Name:  Ac Sen  :  1950   Date:  2018  Cancelled visits to date: 0  No-shows to date: 0    For today's appointment patient:  [x]  Cancelled  []  Rescheduled appointment  []  No-show     Reason given by patient:  []  Patient ill  [x]  Conflicting appointment  []  No transportation    []  Conflict with work  []  No reason given  []  Other:     Comments:      Electronically signed by: Chepe Zepeda PT

## 2018-12-20 ENCOUNTER — HOSPITAL ENCOUNTER (OUTPATIENT)
Dept: PHYSICAL THERAPY | Age: 68
Setting detail: THERAPIES SERIES
Discharge: HOME | End: 2018-12-20
Payer: MEDICARE

## 2018-12-20 ENCOUNTER — NURSE ONLY (OUTPATIENT)
Dept: LAB | Age: 68
End: 2018-12-20

## 2018-12-20 DIAGNOSIS — E53.8 B12 DEFICIENCY: Primary | ICD-10-CM

## 2018-12-20 PROCEDURE — 97110 THERAPEUTIC EXERCISES: CPT

## 2018-12-20 NOTE — FLOWSHEET NOTE
Requires Follow-up: [x] Yes  [] No      Goals:  Short term goals  Time Frame for Short term goals: 3 weeks   Short term goal 1: Initiate HEP     Long term goals  Time Frame for Long term goals : 6weeks   Long term goal 1: Indpendent in HEP   Long term goal 2: Amb with normal gait pattern and no assitive device  Long term goal 3: Improve LE strength to 4+/5 to assist with sit to stand and amb. Long term goal 4: Patient to be able to stand x 30min to improve tolerance to cooking. Long term goal 5: Right knee AROM 0-120 to assist with deep knee squat    Plan:   [x] Continue per plan of care [] Alter current plan (see comments)  [] Plan of care initiated [] Hold pending MD visit [] Discharge    Plan for Next Session:  Monitor tolerance to exercises, progress strength and normalize gait as able.      Electronically signed by:  Juan Giles

## 2018-12-26 ENCOUNTER — HOSPITAL ENCOUNTER (OUTPATIENT)
Dept: PHYSICAL THERAPY | Age: 68
Setting detail: THERAPIES SERIES
Discharge: HOME | End: 2018-12-26
Payer: MEDICARE

## 2018-12-31 ENCOUNTER — CARE COORDINATION (OUTPATIENT)
Dept: CARE COORDINATION | Age: 68
End: 2018-12-31

## 2019-01-11 ENCOUNTER — HOSPITAL ENCOUNTER (OUTPATIENT)
Dept: LAB | Age: 69
Discharge: HOME | End: 2019-01-11
Payer: MEDICARE

## 2019-01-11 DIAGNOSIS — E78.2 MIXED HYPERLIPIDEMIA: ICD-10-CM

## 2019-01-11 DIAGNOSIS — I10 ESSENTIAL HYPERTENSION: ICD-10-CM

## 2019-01-11 DIAGNOSIS — E55.9 VITAMIN D DEFICIENCY: ICD-10-CM

## 2019-01-11 DIAGNOSIS — E11.9 TYPE 2 DIABETES MELLITUS WITHOUT COMPLICATION, WITHOUT LONG-TERM CURRENT USE OF INSULIN (HCC): ICD-10-CM

## 2019-01-11 LAB
ALBUMIN SERPL-MCNC: 4.7 G/DL (ref 3.5–5.2)
ALBUMIN/GLOBULIN RATIO: 1.6 (ref 1–2.5)
ALP BLD-CCNC: 126 U/L (ref 35–104)
ALT SERPL-CCNC: 35 U/L (ref 5–33)
ANION GAP SERPL CALCULATED.3IONS-SCNC: 17 MMOL/L (ref 9–17)
AST SERPL-CCNC: 29 U/L
BILIRUB SERPL-MCNC: 0.4 MG/DL (ref 0.3–1.2)
BUN BLDV-MCNC: 11 MG/DL (ref 8–23)
BUN/CREAT BLD: 14 (ref 9–20)
CALCIUM SERPL-MCNC: 9.9 MG/DL (ref 8.6–10.4)
CHLORIDE BLD-SCNC: 100 MMOL/L (ref 98–107)
CHOLESTEROL/HDL RATIO: 3.7
CHOLESTEROL: 183 MG/DL
CO2: 25 MMOL/L (ref 20–31)
CREAT SERPL-MCNC: 0.77 MG/DL (ref 0.5–0.9)
ESTIMATED AVERAGE GLUCOSE: 166 MG/DL
GFR AFRICAN AMERICAN: >60 ML/MIN
GFR NON-AFRICAN AMERICAN: >60 ML/MIN
GFR SERPL CREATININE-BSD FRML MDRD: ABNORMAL ML/MIN/{1.73_M2}
GFR SERPL CREATININE-BSD FRML MDRD: ABNORMAL ML/MIN/{1.73_M2}
GLUCOSE BLD-MCNC: 133 MG/DL (ref 70–99)
HBA1C MFR BLD: 7.4 % (ref 4.8–5.9)
HDLC SERPL-MCNC: 49 MG/DL
LDL CHOLESTEROL: 83 MG/DL (ref 0–130)
POTASSIUM SERPL-SCNC: 3.6 MMOL/L (ref 3.7–5.3)
SODIUM BLD-SCNC: 142 MMOL/L (ref 135–144)
TOTAL PROTEIN: 7.6 G/DL (ref 6.4–8.3)
TRIGL SERPL-MCNC: 257 MG/DL
VLDLC SERPL CALC-MCNC: ABNORMAL MG/DL (ref 1–30)

## 2019-01-11 PROCEDURE — 83036 HEMOGLOBIN GLYCOSYLATED A1C: CPT

## 2019-01-11 PROCEDURE — 82306 VITAMIN D 25 HYDROXY: CPT

## 2019-01-11 PROCEDURE — 80061 LIPID PANEL: CPT

## 2019-01-11 PROCEDURE — 36415 COLL VENOUS BLD VENIPUNCTURE: CPT

## 2019-01-11 PROCEDURE — 80053 COMPREHEN METABOLIC PANEL: CPT

## 2019-01-12 LAB — VITAMIN D 25-HYDROXY: 86.1 NG/ML (ref 30–100)

## 2019-01-15 ENCOUNTER — CARE COORDINATION (OUTPATIENT)
Dept: CARE COORDINATION | Age: 69
End: 2019-01-15

## 2019-01-15 ENCOUNTER — HOSPITAL ENCOUNTER (OUTPATIENT)
Dept: LAB | Age: 69
Discharge: HOME | End: 2019-01-15
Payer: MEDICARE

## 2019-01-15 ENCOUNTER — OFFICE VISIT (OUTPATIENT)
Dept: FAMILY MEDICINE CLINIC | Age: 69
End: 2019-01-15
Payer: MEDICARE

## 2019-01-15 VITALS
RESPIRATION RATE: 16 BRPM | WEIGHT: 171.8 LBS | HEIGHT: 57 IN | SYSTOLIC BLOOD PRESSURE: 140 MMHG | BODY MASS INDEX: 37.07 KG/M2 | DIASTOLIC BLOOD PRESSURE: 64 MMHG | HEART RATE: 86 BPM

## 2019-01-15 DIAGNOSIS — E55.9 VITAMIN D DEFICIENCY: ICD-10-CM

## 2019-01-15 DIAGNOSIS — Z12.31 ENCOUNTER FOR SCREENING MAMMOGRAM FOR BREAST CANCER: ICD-10-CM

## 2019-01-15 DIAGNOSIS — E78.2 MIXED HYPERLIPIDEMIA: ICD-10-CM

## 2019-01-15 DIAGNOSIS — I10 ESSENTIAL HYPERTENSION: ICD-10-CM

## 2019-01-15 DIAGNOSIS — E11.9 TYPE 2 DIABETES MELLITUS WITHOUT COMPLICATION, WITHOUT LONG-TERM CURRENT USE OF INSULIN (HCC): Primary | ICD-10-CM

## 2019-01-15 DIAGNOSIS — E11.9 TYPE 2 DIABETES MELLITUS WITHOUT COMPLICATION, WITHOUT LONG-TERM CURRENT USE OF INSULIN (HCC): ICD-10-CM

## 2019-01-15 LAB
CREATININE URINE: 132.9 MG/DL (ref 28–217)
MICROALBUMIN/CREAT 24H UR: <12 MG/L
MICROALBUMIN/CREAT UR-RTO: NORMAL MCG/MG CREAT

## 2019-01-15 PROCEDURE — 99214 OFFICE O/P EST MOD 30 MIN: CPT | Performed by: FAMILY MEDICINE

## 2019-01-15 PROCEDURE — 1036F TOBACCO NON-USER: CPT | Performed by: FAMILY MEDICINE

## 2019-01-15 PROCEDURE — 82043 UR ALBUMIN QUANTITATIVE: CPT

## 2019-01-15 PROCEDURE — G8399 PT W/DXA RESULTS DOCUMENT: HCPCS | Performed by: FAMILY MEDICINE

## 2019-01-15 PROCEDURE — 3045F PR MOST RECENT HEMOGLOBIN A1C LEVEL 7.0-9.0%: CPT | Performed by: FAMILY MEDICINE

## 2019-01-15 PROCEDURE — G8417 CALC BMI ABV UP PARAM F/U: HCPCS | Performed by: FAMILY MEDICINE

## 2019-01-15 PROCEDURE — 1090F PRES/ABSN URINE INCON ASSESS: CPT | Performed by: FAMILY MEDICINE

## 2019-01-15 PROCEDURE — 3017F COLORECTAL CA SCREEN DOC REV: CPT | Performed by: FAMILY MEDICINE

## 2019-01-15 PROCEDURE — 1101F PT FALLS ASSESS-DOCD LE1/YR: CPT | Performed by: FAMILY MEDICINE

## 2019-01-15 PROCEDURE — 82570 ASSAY OF URINE CREATININE: CPT

## 2019-01-15 PROCEDURE — G8482 FLU IMMUNIZE ORDER/ADMIN: HCPCS | Performed by: FAMILY MEDICINE

## 2019-01-15 PROCEDURE — G8598 ASA/ANTIPLAT THER USED: HCPCS | Performed by: FAMILY MEDICINE

## 2019-01-15 PROCEDURE — G8427 DOCREV CUR MEDS BY ELIG CLIN: HCPCS | Performed by: FAMILY MEDICINE

## 2019-01-15 PROCEDURE — 2022F DILAT RTA XM EVC RTNOPTHY: CPT | Performed by: FAMILY MEDICINE

## 2019-01-15 PROCEDURE — 1123F ACP DISCUSS/DSCN MKR DOCD: CPT | Performed by: FAMILY MEDICINE

## 2019-01-15 PROCEDURE — 4040F PNEUMOC VAC/ADMIN/RCVD: CPT | Performed by: FAMILY MEDICINE

## 2019-01-15 RX ORDER — CHOLECALCIFEROL (VITAMIN D3) 1250 MCG
1 CAPSULE ORAL WEEKLY
Qty: 13 CAPSULE | Refills: 1 | Status: SHIPPED | OUTPATIENT
Start: 2019-01-15 | End: 2019-01-16 | Stop reason: ALTCHOICE

## 2019-01-15 RX ORDER — GLIMEPIRIDE 1 MG/1
TABLET ORAL
Qty: 135 TABLET | Refills: 1 | Status: SHIPPED | OUTPATIENT
Start: 2019-01-15 | End: 2019-05-02 | Stop reason: SDUPTHER

## 2019-01-15 RX ORDER — AMLODIPINE BESYLATE 10 MG/1
10 TABLET ORAL DAILY
Qty: 90 TABLET | Refills: 1 | Status: SHIPPED | OUTPATIENT
Start: 2019-01-15 | End: 2019-05-02 | Stop reason: SDUPTHER

## 2019-01-15 RX ORDER — SIMVASTATIN 40 MG
40 TABLET ORAL NIGHTLY
Qty: 90 TABLET | Refills: 1 | Status: SHIPPED | OUTPATIENT
Start: 2019-01-15 | End: 2019-05-02 | Stop reason: SDUPTHER

## 2019-01-21 ENCOUNTER — NURSE ONLY (OUTPATIENT)
Dept: LAB | Age: 69
End: 2019-01-21
Payer: MEDICARE

## 2019-01-21 DIAGNOSIS — E53.8 B12 DEFICIENCY: Primary | ICD-10-CM

## 2019-01-21 PROCEDURE — 96372 THER/PROPH/DIAG INJ SC/IM: CPT | Performed by: PSYCHIATRY & NEUROLOGY

## 2019-01-21 RX ADMIN — CYANOCOBALAMIN 1000 MCG: 1000 INJECTION INTRAMUSCULAR; SUBCUTANEOUS at 14:37

## 2019-01-27 DIAGNOSIS — I25.118 CORONARY ARTERY DISEASE OF NATIVE ARTERY OF NATIVE HEART WITH STABLE ANGINA PECTORIS (HCC): ICD-10-CM

## 2019-01-28 RX ORDER — ISOSORBIDE MONONITRATE 30 MG/1
TABLET, EXTENDED RELEASE ORAL
Qty: 45 TABLET | Refills: 3 | Status: SHIPPED | OUTPATIENT
Start: 2019-01-28 | End: 2020-01-27

## 2019-02-13 ENCOUNTER — CARE COORDINATION (OUTPATIENT)
Dept: CARE COORDINATION | Age: 69
End: 2019-02-13

## 2019-03-05 ENCOUNTER — OFFICE VISIT (OUTPATIENT)
Dept: CARDIOLOGY | Age: 69
End: 2019-03-05
Payer: MEDICARE

## 2019-03-05 VITALS
HEART RATE: 85 BPM | WEIGHT: 169 LBS | SYSTOLIC BLOOD PRESSURE: 144 MMHG | HEIGHT: 57 IN | DIASTOLIC BLOOD PRESSURE: 80 MMHG | BODY MASS INDEX: 36.46 KG/M2

## 2019-03-05 DIAGNOSIS — R07.9 CHEST PAIN, UNSPECIFIED TYPE: ICD-10-CM

## 2019-03-05 DIAGNOSIS — R06.02 SOB (SHORTNESS OF BREATH): ICD-10-CM

## 2019-03-05 DIAGNOSIS — Z95.5 H/O HEART ARTERY STENT: ICD-10-CM

## 2019-03-05 DIAGNOSIS — E78.5 HYPERLIPIDEMIA, UNSPECIFIED HYPERLIPIDEMIA TYPE: ICD-10-CM

## 2019-03-05 DIAGNOSIS — I25.118 CORONARY ARTERY DISEASE OF NATIVE ARTERY OF NATIVE HEART WITH STABLE ANGINA PECTORIS (HCC): Primary | ICD-10-CM

## 2019-03-05 DIAGNOSIS — I10 ESSENTIAL HYPERTENSION: ICD-10-CM

## 2019-03-05 PROCEDURE — 1123F ACP DISCUSS/DSCN MKR DOCD: CPT | Performed by: INTERNAL MEDICINE

## 2019-03-05 PROCEDURE — G8427 DOCREV CUR MEDS BY ELIG CLIN: HCPCS | Performed by: INTERNAL MEDICINE

## 2019-03-05 PROCEDURE — G8417 CALC BMI ABV UP PARAM F/U: HCPCS | Performed by: INTERNAL MEDICINE

## 2019-03-05 PROCEDURE — 99213 OFFICE O/P EST LOW 20 MIN: CPT | Performed by: INTERNAL MEDICINE

## 2019-03-05 PROCEDURE — G8482 FLU IMMUNIZE ORDER/ADMIN: HCPCS | Performed by: INTERNAL MEDICINE

## 2019-03-05 PROCEDURE — G8598 ASA/ANTIPLAT THER USED: HCPCS | Performed by: INTERNAL MEDICINE

## 2019-03-05 PROCEDURE — 4040F PNEUMOC VAC/ADMIN/RCVD: CPT | Performed by: INTERNAL MEDICINE

## 2019-03-05 PROCEDURE — 93000 ELECTROCARDIOGRAM COMPLETE: CPT | Performed by: INTERNAL MEDICINE

## 2019-03-05 PROCEDURE — G8399 PT W/DXA RESULTS DOCUMENT: HCPCS | Performed by: INTERNAL MEDICINE

## 2019-03-05 PROCEDURE — 1036F TOBACCO NON-USER: CPT | Performed by: INTERNAL MEDICINE

## 2019-03-05 PROCEDURE — 3017F COLORECTAL CA SCREEN DOC REV: CPT | Performed by: INTERNAL MEDICINE

## 2019-03-05 PROCEDURE — 1090F PRES/ABSN URINE INCON ASSESS: CPT | Performed by: INTERNAL MEDICINE

## 2019-03-05 PROCEDURE — 1101F PT FALLS ASSESS-DOCD LE1/YR: CPT | Performed by: INTERNAL MEDICINE

## 2019-03-07 ENCOUNTER — OFFICE VISIT (OUTPATIENT)
Dept: PRIMARY CARE CLINIC | Age: 69
End: 2019-03-07
Payer: MEDICARE

## 2019-03-07 ENCOUNTER — NURSE ONLY (OUTPATIENT)
Dept: LAB | Age: 69
End: 2019-03-07
Payer: MEDICARE

## 2019-03-07 VITALS
OXYGEN SATURATION: 98 % | WEIGHT: 175.6 LBS | RESPIRATION RATE: 18 BRPM | BODY MASS INDEX: 37.88 KG/M2 | TEMPERATURE: 98.1 F | HEART RATE: 81 BPM | SYSTOLIC BLOOD PRESSURE: 136 MMHG | DIASTOLIC BLOOD PRESSURE: 74 MMHG | HEIGHT: 57 IN

## 2019-03-07 DIAGNOSIS — H65.03 BILATERAL ACUTE SEROUS OTITIS MEDIA, RECURRENCE NOT SPECIFIED: Primary | ICD-10-CM

## 2019-03-07 DIAGNOSIS — E53.8 B12 DEFICIENCY: Primary | ICD-10-CM

## 2019-03-07 PROCEDURE — 99214 OFFICE O/P EST MOD 30 MIN: CPT | Performed by: FAMILY MEDICINE

## 2019-03-07 PROCEDURE — 1036F TOBACCO NON-USER: CPT | Performed by: FAMILY MEDICINE

## 2019-03-07 PROCEDURE — 3017F COLORECTAL CA SCREEN DOC REV: CPT | Performed by: FAMILY MEDICINE

## 2019-03-07 PROCEDURE — 1123F ACP DISCUSS/DSCN MKR DOCD: CPT | Performed by: FAMILY MEDICINE

## 2019-03-07 PROCEDURE — 1101F PT FALLS ASSESS-DOCD LE1/YR: CPT | Performed by: FAMILY MEDICINE

## 2019-03-07 PROCEDURE — 1090F PRES/ABSN URINE INCON ASSESS: CPT | Performed by: FAMILY MEDICINE

## 2019-03-07 PROCEDURE — G8482 FLU IMMUNIZE ORDER/ADMIN: HCPCS | Performed by: FAMILY MEDICINE

## 2019-03-07 PROCEDURE — G8598 ASA/ANTIPLAT THER USED: HCPCS | Performed by: FAMILY MEDICINE

## 2019-03-07 PROCEDURE — G8427 DOCREV CUR MEDS BY ELIG CLIN: HCPCS | Performed by: FAMILY MEDICINE

## 2019-03-07 PROCEDURE — G8417 CALC BMI ABV UP PARAM F/U: HCPCS | Performed by: FAMILY MEDICINE

## 2019-03-07 PROCEDURE — G8399 PT W/DXA RESULTS DOCUMENT: HCPCS | Performed by: FAMILY MEDICINE

## 2019-03-07 PROCEDURE — 4040F PNEUMOC VAC/ADMIN/RCVD: CPT | Performed by: FAMILY MEDICINE

## 2019-03-07 PROCEDURE — 96372 THER/PROPH/DIAG INJ SC/IM: CPT | Performed by: PSYCHIATRY & NEUROLOGY

## 2019-03-07 RX ORDER — FLUTICASONE PROPIONATE 50 MCG
1 SPRAY, SUSPENSION (ML) NASAL DAILY
Qty: 1 BOTTLE | Refills: 0 | Status: SHIPPED | OUTPATIENT
Start: 2019-03-07 | End: 2021-10-26 | Stop reason: SDUPTHER

## 2019-03-07 RX ORDER — AZITHROMYCIN 250 MG/1
TABLET, FILM COATED ORAL
Qty: 1 PACKET | Refills: 0 | Status: SHIPPED | OUTPATIENT
Start: 2019-03-07 | End: 2019-03-11

## 2019-03-07 RX ADMIN — CYANOCOBALAMIN 1000 MCG: 1000 INJECTION INTRAMUSCULAR; SUBCUTANEOUS at 13:47

## 2019-03-13 ENCOUNTER — OFFICE VISIT (OUTPATIENT)
Dept: PRIMARY CARE CLINIC | Age: 69
End: 2019-03-13
Payer: MEDICARE

## 2019-03-13 VITALS
HEART RATE: 92 BPM | HEIGHT: 57 IN | BODY MASS INDEX: 36.89 KG/M2 | DIASTOLIC BLOOD PRESSURE: 72 MMHG | TEMPERATURE: 98.3 F | WEIGHT: 171 LBS | OXYGEN SATURATION: 96 % | SYSTOLIC BLOOD PRESSURE: 132 MMHG

## 2019-03-13 DIAGNOSIS — R05.9 COUGH: ICD-10-CM

## 2019-03-13 DIAGNOSIS — J06.9 VIRAL UPPER RESPIRATORY TRACT INFECTION: Primary | ICD-10-CM

## 2019-03-13 PROCEDURE — 1036F TOBACCO NON-USER: CPT | Performed by: NURSE PRACTITIONER

## 2019-03-13 PROCEDURE — 3017F COLORECTAL CA SCREEN DOC REV: CPT | Performed by: NURSE PRACTITIONER

## 2019-03-13 PROCEDURE — 4040F PNEUMOC VAC/ADMIN/RCVD: CPT | Performed by: NURSE PRACTITIONER

## 2019-03-13 PROCEDURE — G8417 CALC BMI ABV UP PARAM F/U: HCPCS | Performed by: NURSE PRACTITIONER

## 2019-03-13 PROCEDURE — 1123F ACP DISCUSS/DSCN MKR DOCD: CPT | Performed by: NURSE PRACTITIONER

## 2019-03-13 PROCEDURE — G8598 ASA/ANTIPLAT THER USED: HCPCS | Performed by: NURSE PRACTITIONER

## 2019-03-13 PROCEDURE — G8399 PT W/DXA RESULTS DOCUMENT: HCPCS | Performed by: NURSE PRACTITIONER

## 2019-03-13 PROCEDURE — G8427 DOCREV CUR MEDS BY ELIG CLIN: HCPCS | Performed by: NURSE PRACTITIONER

## 2019-03-13 PROCEDURE — G8482 FLU IMMUNIZE ORDER/ADMIN: HCPCS | Performed by: NURSE PRACTITIONER

## 2019-03-13 PROCEDURE — 99213 OFFICE O/P EST LOW 20 MIN: CPT | Performed by: NURSE PRACTITIONER

## 2019-03-13 PROCEDURE — 1101F PT FALLS ASSESS-DOCD LE1/YR: CPT | Performed by: NURSE PRACTITIONER

## 2019-03-13 PROCEDURE — 1090F PRES/ABSN URINE INCON ASSESS: CPT | Performed by: NURSE PRACTITIONER

## 2019-03-13 RX ORDER — BENZONATATE 200 MG/1
200 CAPSULE ORAL 3 TIMES DAILY PRN
Qty: 30 CAPSULE | Refills: 0 | Status: SHIPPED | OUTPATIENT
Start: 2019-03-13 | End: 2019-03-20

## 2019-03-13 ASSESSMENT — ENCOUNTER SYMPTOMS
GASTROINTESTINAL NEGATIVE: 1
RHINORRHEA: 1
SORE THROAT: 1
WHEEZING: 0
SHORTNESS OF BREATH: 0
COUGH: 1
SINUS PRESSURE: 1

## 2019-03-13 ASSESSMENT — PATIENT HEALTH QUESTIONNAIRE - PHQ9
SUM OF ALL RESPONSES TO PHQ QUESTIONS 1-9: 0
SUM OF ALL RESPONSES TO PHQ9 QUESTIONS 1 & 2: 0
2. FEELING DOWN, DEPRESSED OR HOPELESS: 0
1. LITTLE INTEREST OR PLEASURE IN DOING THINGS: 0
SUM OF ALL RESPONSES TO PHQ QUESTIONS 1-9: 0

## 2019-04-05 ENCOUNTER — OFFICE VISIT (OUTPATIENT)
Dept: CARDIOLOGY | Age: 69
End: 2019-04-05
Payer: MEDICARE

## 2019-04-05 VITALS
HEIGHT: 57 IN | DIASTOLIC BLOOD PRESSURE: 71 MMHG | HEART RATE: 78 BPM | BODY MASS INDEX: 36.91 KG/M2 | WEIGHT: 171.08 LBS | SYSTOLIC BLOOD PRESSURE: 129 MMHG

## 2019-04-05 DIAGNOSIS — I25.118 CORONARY ARTERY DISEASE OF NATIVE ARTERY OF NATIVE HEART WITH STABLE ANGINA PECTORIS (HCC): Primary | ICD-10-CM

## 2019-04-05 PROCEDURE — G8417 CALC BMI ABV UP PARAM F/U: HCPCS | Performed by: INTERNAL MEDICINE

## 2019-04-05 PROCEDURE — G8598 ASA/ANTIPLAT THER USED: HCPCS | Performed by: INTERNAL MEDICINE

## 2019-04-05 PROCEDURE — 99213 OFFICE O/P EST LOW 20 MIN: CPT | Performed by: INTERNAL MEDICINE

## 2019-04-05 PROCEDURE — G8427 DOCREV CUR MEDS BY ELIG CLIN: HCPCS | Performed by: INTERNAL MEDICINE

## 2019-04-05 PROCEDURE — 3017F COLORECTAL CA SCREEN DOC REV: CPT | Performed by: INTERNAL MEDICINE

## 2019-04-05 PROCEDURE — 4040F PNEUMOC VAC/ADMIN/RCVD: CPT | Performed by: INTERNAL MEDICINE

## 2019-04-05 PROCEDURE — 1123F ACP DISCUSS/DSCN MKR DOCD: CPT | Performed by: INTERNAL MEDICINE

## 2019-04-05 PROCEDURE — G8399 PT W/DXA RESULTS DOCUMENT: HCPCS | Performed by: INTERNAL MEDICINE

## 2019-04-05 PROCEDURE — 1090F PRES/ABSN URINE INCON ASSESS: CPT | Performed by: INTERNAL MEDICINE

## 2019-04-05 PROCEDURE — 1036F TOBACCO NON-USER: CPT | Performed by: INTERNAL MEDICINE

## 2019-04-05 NOTE — PROGRESS NOTES
Today's Date: 4/5/2019  Patient Name: Destinee Perry  Patient's age: 70 y.o., 1950          The patient is a 70 y.o.  female is in the office for f/u, she was supposed to have a stress test but failed to have it. She has GARCIA and occasional chest pain. Past Medical History:   has a past medical history of CAD (coronary artery disease), Cerebrovascular accident (CVA) due to thrombosis of precerebral artery ([address] Hyperlipidemia, Hypertension, Interstitial cystitis, Obesity, Plantar fasciitis, bilateral, Seasonal allergies, Trigger finger, left, and Type 2 diabetes mellitus ([address] Past Surgical History:   has a past surgical history that includes Nasal septum surgery (3/9/2005); Cholecystectomy, laparoscopic (6/9/2000); Cystocopy (3/1996); Ovary removal (Right, 7/1993); laparoscopy (3/1979); Hysterectomy, total abdominal (1980); Colonoscopy (3/11/2015); Cataract removal with implant (Left, 10/20/2015); Cataract removal with implant (Right, 12/08/2015); Yag capsulotomy (Right, 04/2016); Cardiac catheterization (06/20/2017); Coronary angioplasty with stent (06/20/2017); Appendectomy; eye surgery; and pr knee scope,diagnostic (Right, 11/6/2018). Home Medications:    Prior to Admission medications    Medication Sig Start Date End Date Taking?  Authorizing Provider   fluticasone (FLONASE) 50 MCG/ACT nasal spray 1 spray by Nasal route daily 3/7/19  Yes Malachi Hdez MD   isosorbide mononitrate (IMDUR) 30 MG extended release tablet TAKE 1/2 (ONE-HALF) TABLET BY MOUTH ONCE DAILY 1/28/19  Yes Navid Kirby DO   amLODIPine (NORVASC) 10 MG tablet Take 1 tablet by mouth daily 1/15/19  Yes Malachi Hdez MD   simvastatin (ZOCOR) 40 MG tablet Take 1 tablet by mouth nightly 1/15/19  Yes Malachi Hdez MD   glimepiride (AMARYL) 1 MG tablet Take 1 tablet in the morning with breakfast.  Take one-half tablet in the evening with the evening meal. 1/15/19  Yes Malachi Hdez MD   metFORMIN present  Extremities:  ·  No Cyanosis or Clubbing  ·  Lower extremity edema: No  Skin: Warm and dry    Cardiac data:    EKG: Sinus  Rhythm   WITHIN NORMAL LIMITS    Labs:     CBC: No results for input(s): WBC, HGB, HCT, PLT in the last 72 hours. BMP: No results for input(s): NA, K, CO2, BUN, CREATININE, LABGLOM, GLUCOSE in the last 72 hours. PT/INR: No results for input(s): PROTIME, INR in the last 72 hours. FASTING LIPID PANEL:  Lab Results   Component Value Date    HDL 49 01/11/2019    TRIG 257 01/11/2019     LIVER PROFILE:No results for input(s): AST, ALT, LABALBU in the last 72 hours. IMPRESSION:    GARCIA, POSSIBLE RECURRENT ANGINA  CAD S/P RCA STENT 2017  HTN  HLP  DM II  OBESITY  Patient Active Problem List   Diagnosis    Mixed hyperlipidemia    Obesity (BMI 30-39. 9)    Interstitial cystitis    History of seasonal allergies    Trigger finger, left    High risk medication use    Uncontrolled type 2 diabetes mellitus without complication, without long-term current use of insulin (formerly [facility])    Vitamin D deficiency    Essential hypertension    Atypical chest pain    Hypokalemia    Paresthesias    S/P drug eluting coronary stent placement-RCA 6/20/17 - Dr. Lucy Nicholson    Coronary artery disease involving native coronary artery of native heart without angina pectoris    Stroke-like symptoms    TIA (transient ischemic attack)    Dizziness    Chronic fatigue    Slurred speech    Right sided weakness    Cerebrovascular accident (CVA) due to thrombosis of precerebral artery (HCC)    Polyneuropathy associated with underlying disease ([address]    Muscle spasms of both lower extremities    Right tibial neuropathy    Neuropathy of left peroneal nerve    B12 deficiency    Gait difficulty    Balance problem    Posterior capsular opacification visually significant, left    Chest pain due to myocardial ischemia    H/O major orthopedic surgery       RECOMMENDATIONS:  STRESS TEST  PRN SL NTG  FURTHER

## 2019-04-05 NOTE — PATIENT INSTRUCTIONS
Nuclear Stress Test      Please allow 3 hours to complete this test.    Central Scheduling will call you to schedule this test. If you do not receive a call within 48 hours, please call to schedule at [phone]. Please report to the [facility] Cardiopulmonary Testing Department located in the [facility]. Check in at the PHYSICAL THERAPY window. >>  Nothing to eat or drink except water for FOUR (4) HOURS prior to arrival time. >>  No caffeine for 24 hours prior to test. This includes decaffeinated beverages,  chocolate, tea and cola drinks. >>  If you are diabetic, check with your Primary Care Provider regarding changes in your insulin or diabetic pills on test day.    >>  No smoking for 12 hours before the test.     >> Take regular medications.     >> Hold the following medications prior to day of stress test:     >>  If you are walking on the treadmill, wear comfortable clothes and shoes. Please do not apply lotion or oils to skin on the day of stress test.     +++ Pregnancy +++   If you are a woman of child bearing age, you will need to complete a blood test prior to your stress test.   Please notify the nurse if you think you might be pregnant.

## 2019-04-08 ENCOUNTER — NURSE ONLY (OUTPATIENT)
Dept: LAB | Age: 69
End: 2019-04-08
Payer: MEDICARE

## 2019-04-08 DIAGNOSIS — E53.8 B12 DEFICIENCY: Primary | ICD-10-CM

## 2019-04-08 PROCEDURE — 96372 THER/PROPH/DIAG INJ SC/IM: CPT | Performed by: PSYCHIATRY & NEUROLOGY

## 2019-04-08 RX ADMIN — CYANOCOBALAMIN 1000 MCG: 1000 INJECTION INTRAMUSCULAR; SUBCUTANEOUS at 13:27

## 2019-04-18 ENCOUNTER — HOSPITAL ENCOUNTER (OUTPATIENT)
Dept: NON INVASIVE DIAGNOSTICS | Age: 69
Discharge: HOME | End: 2019-04-18
Payer: MEDICARE

## 2019-04-18 ENCOUNTER — TELEPHONE (OUTPATIENT)
Dept: CARDIOLOGY | Age: 69
End: 2019-04-18

## 2019-04-18 ENCOUNTER — HOSPITAL ENCOUNTER (OUTPATIENT)
Dept: NUCLEAR MEDICINE | Age: 69
End: 2019-04-18
Payer: MEDICARE

## 2019-04-18 ENCOUNTER — HOSPITAL ENCOUNTER (OUTPATIENT)
Dept: NUCLEAR MEDICINE | Age: 69
Discharge: HOME | End: 2019-04-20
Payer: MEDICARE

## 2019-04-18 DIAGNOSIS — I25.118 CORONARY ARTERY DISEASE OF NATIVE ARTERY OF NATIVE HEART WITH STABLE ANGINA PECTORIS (HCC): ICD-10-CM

## 2019-04-18 DIAGNOSIS — R07.9 CHEST PAIN, UNSPECIFIED TYPE: ICD-10-CM

## 2019-04-18 DIAGNOSIS — I10 ESSENTIAL HYPERTENSION: ICD-10-CM

## 2019-04-18 PROCEDURE — 93017 CV STRESS TEST TRACING ONLY: CPT

## 2019-04-18 PROCEDURE — 78452 HT MUSCLE IMAGE SPECT MULT: CPT

## 2019-04-18 PROCEDURE — 3430000000 HC RX DIAGNOSTIC RADIOPHARMACEUTICAL: Performed by: INTERNAL MEDICINE

## 2019-04-18 PROCEDURE — 6360000002 HC RX W HCPCS: Performed by: INTERNAL MEDICINE

## 2019-04-18 PROCEDURE — A9500 TC99M SESTAMIBI: HCPCS | Performed by: INTERNAL MEDICINE

## 2019-04-18 RX ADMIN — TETRAKIS(2-METHOXYISOBUTYLISOCYANIDE)COPPER(I) TETRAFLUOROBORATE 30 MILLICURIE: 1 INJECTION, POWDER, LYOPHILIZED, FOR SOLUTION INTRAVENOUS at 11:47

## 2019-04-18 RX ADMIN — REGADENOSON 0.4 MG: 0.08 INJECTION, SOLUTION INTRAVENOUS at 10:54

## 2019-04-18 RX ADMIN — TETRAKIS(2-METHOXYISOBUTYLISOCYANIDE)COPPER(I) TETRAFLUOROBORATE 10 MILLICURIE: 1 INJECTION, POWDER, LYOPHILIZED, FOR SOLUTION INTRAVENOUS at 11:47

## 2019-04-18 NOTE — PROGRESS NOTES
Patient Name:  Blas Pfeiffer MRN:  [id number]   :  1950  Age:  70 y.o. Sex: female   Ordering Provider: Johan Montgomery MD   Primary Care Provider:  Lillie Peguero MD     Indications: Chest Pain     Medications:     Current Outpatient Medications:     fluticasone (FLONASE) 50 MCG/ACT nasal spray, 1 spray by Nasal route daily, Disp: 1 Bottle, Rfl: 0    isosorbide mononitrate (IMDUR) 30 MG extended release tablet, TAKE 1/2 (ONE-HALF) TABLET BY MOUTH ONCE DAILY, Disp: 45 tablet, Rfl: 3    amLODIPine (NORVASC) 10 MG tablet, Take 1 tablet by mouth daily, Disp: 90 tablet, Rfl: 1    simvastatin (ZOCOR) 40 MG tablet, Take 1 tablet by mouth nightly, Disp: 90 tablet, Rfl: 1    glimepiride (AMARYL) 1 MG tablet, Take 1 tablet in the morning with breakfast.  Take one-half tablet in the evening with the evening meal., Disp: 135 tablet, Rfl: 1    metFORMIN (GLUCOPHAGE) 500 MG tablet, Take 1 tablet by mouth 2 times daily (with meals), Disp: 180 tablet, Rfl: 1    Multiple Vitamins-Minerals (MULTIVITAMIN PO), Take 1 tablet by mouth daily, Disp: , Rfl:     nitroGLYCERIN (NITROSTAT) 0.4 MG SL tablet, Place 1 tablet under the tongue every 5 minutes as needed for Chest pain up to max of 3 total doses. If no relief after 1 dose, call 911., Disp: 25 tablet, Rfl: 0    clopidogrel (PLAVIX) 75 MG tablet, Take 1 tablet by mouth daily, Disp: 90 tablet, Rfl: 3    NONFORMULARY, 5,000 mcg daily OTC Biotin 1000mcg daily , Disp: , Rfl:     aspirin 81 MG tablet, Take 81 mg by mouth daily. , Disp: , Rfl:     Current Facility-Administered Medications:     regadenoson (LEXISCAN) injection 0.4 mg, 0.4 mg, Intravenous, Once, Michael Blanton MD    cyanocobalamin injection 2,000 mcg, 2,000 mcg, Intramuscular, Q30 Days, Trina Allen MD    Facility-Administered Medications Ordered in Other Encounters:     technetium sestamibi (CARDIOLITE) injection 30 millicurie, 30 millicurie, Intravenous, ONCE PRN, MD Christie Turner technetium sestamibi (CARDIOLITE) injection 10 millicurie, 10 millicurie, Intravenous, ONCE PRN, Olu Alaniz MD      ----------------------------------------------------------------------------------------------------------                Lexiscan 0.4 mg injected over 10 seconds. IV Cardiolite injected 20 seconds post Lexiscan injection. Heart Rate:  77  Resting Blood Pressure:  184/79   ----------------------------------------------------------------------------------------------------------     HR BP   1 min 108 173/81   2 min     3 min 104 168/75   4 min     5 min 100 174/77   6 min     7 min 93 165/77   8 min     9 min 88 155/68   10 min       Symptoms:  Chest Pain:  No      Nausea:  Yes     Headache:  No    Shortness of Breath:  No     Other:  Jaw pain      Electronically signed by Chana Edmondson RN on 4/18/19 at 10:47 AM    ----------------------------------------------------------------------------------------------------------  Resting EKG:  NSR    Arrhythmias:  NONE     EKG Changes:  NONE       Interpretation: NO ISCHEMIC ECG CHANGES, SEE NUCLEAR REPORT       Supervising Physician:  YFN Hardy MD

## 2019-04-25 ENCOUNTER — TELEPHONE (OUTPATIENT)
Dept: FAMILY MEDICINE CLINIC | Age: 69
End: 2019-04-25

## 2019-04-25 ENCOUNTER — OFFICE VISIT (OUTPATIENT)
Dept: CARDIOLOGY | Age: 69
End: 2019-04-25
Payer: MEDICARE

## 2019-04-25 VITALS
HEART RATE: 88 BPM | BODY MASS INDEX: 37.11 KG/M2 | WEIGHT: 172 LBS | SYSTOLIC BLOOD PRESSURE: 124 MMHG | DIASTOLIC BLOOD PRESSURE: 62 MMHG | HEIGHT: 57 IN

## 2019-04-25 DIAGNOSIS — E53.8 B12 DEFICIENCY: Primary | ICD-10-CM

## 2019-04-25 DIAGNOSIS — I25.118 CORONARY ARTERY DISEASE OF NATIVE ARTERY OF NATIVE HEART WITH STABLE ANGINA PECTORIS (HCC): Primary | ICD-10-CM

## 2019-04-25 PROCEDURE — G8399 PT W/DXA RESULTS DOCUMENT: HCPCS | Performed by: INTERNAL MEDICINE

## 2019-04-25 PROCEDURE — 1036F TOBACCO NON-USER: CPT | Performed by: INTERNAL MEDICINE

## 2019-04-25 PROCEDURE — G8427 DOCREV CUR MEDS BY ELIG CLIN: HCPCS | Performed by: INTERNAL MEDICINE

## 2019-04-25 PROCEDURE — 3017F COLORECTAL CA SCREEN DOC REV: CPT | Performed by: INTERNAL MEDICINE

## 2019-04-25 PROCEDURE — 4040F PNEUMOC VAC/ADMIN/RCVD: CPT | Performed by: INTERNAL MEDICINE

## 2019-04-25 PROCEDURE — 1090F PRES/ABSN URINE INCON ASSESS: CPT | Performed by: INTERNAL MEDICINE

## 2019-04-25 PROCEDURE — 99213 OFFICE O/P EST LOW 20 MIN: CPT | Performed by: INTERNAL MEDICINE

## 2019-04-25 PROCEDURE — 1123F ACP DISCUSS/DSCN MKR DOCD: CPT | Performed by: INTERNAL MEDICINE

## 2019-04-25 PROCEDURE — G8417 CALC BMI ABV UP PARAM F/U: HCPCS | Performed by: INTERNAL MEDICINE

## 2019-04-25 PROCEDURE — G8598 ASA/ANTIPLAT THER USED: HCPCS | Performed by: INTERNAL MEDICINE

## 2019-04-25 NOTE — TELEPHONE ENCOUNTER
Lab results from 3/23/2018 were reviewed:  Vitamin B12 < 150 pg/mL (low)  Folate >20.0 (normal)    Yes, she should continue Vitamin B12 injections 2000 mcg IM monthly.

## 2019-04-25 NOTE — TELEPHONE ENCOUNTER
Last B12 injection was 03/07/19,original order was from Dr Yaw Reynaga (97/98/58). She no longer is seeing him. Last Vitamin B12 and folate drawn 03/23/19. Please advise.

## 2019-04-25 NOTE — PROGRESS NOTES
Today's Date: 4/25/2019  Patient Name: Chelsea Knapp  Patient's age: 70 y.o., 1950          The patient is a 70 y.o.  female is in the office for f/u after a negative stress test on 4/18/2019. She has GARCIA but no recent ANGINA. Past Medical History:   has a past medical history of CAD (coronary artery disease), Cerebrovascular accident (CVA) due to thrombosis of precerebral artery ([address] Hyperlipidemia, Hypertension, Interstitial cystitis, Obesity, Plantar fasciitis, bilateral, Seasonal allergies, Trigger finger, left, and Type 2 diabetes mellitus ([address] Past Surgical History:   has a past surgical history that includes Nasal septum surgery (3/9/2005); Cholecystectomy, laparoscopic (6/9/2000); Cystocopy (3/1996); Ovary removal (Right, 7/1993); laparoscopy (3/1979); Hysterectomy, total abdominal (1980); Colonoscopy (3/11/2015); Cataract removal with implant (Left, 10/20/2015); Cataract removal with implant (Right, 12/08/2015); Yag capsulotomy (Right, 04/2016); Cardiac catheterization (06/20/2017); Coronary angioplasty with stent (06/20/2017); Appendectomy; eye surgery; and pr knee scope,diagnostic (Right, 11/6/2018). Home Medications:    Prior to Admission medications    Medication Sig Start Date End Date Taking?  Authorizing Provider   fluticasone (FLONASE) 50 MCG/ACT nasal spray 1 spray by Nasal route daily 3/7/19   Rich Vasquez MD   isosorbide mononitrate (IMDUR) 30 MG extended release tablet TAKE 1/2 (ONE-HALF) TABLET BY MOUTH ONCE DAILY 1/28/19   Navid Kirby DO   amLODIPine (NORVASC) 10 MG tablet Take 1 tablet by mouth daily 1/15/19   Eulogio Duong MD   simvastatin (ZOCOR) 40 MG tablet Take 1 tablet by mouth nightly 1/15/19   Eulogio Duong MD   glimepiride (AMARYL) 1 MG tablet Take 1 tablet in the morning with breakfast.  Take one-half tablet in the evening with the evening meal. 1/15/19   Eulogio Duong MD   metFORMIN (GLUCOPHAGE) 500 MG tablet Take 1 tablet by mouth 2 times daily (with meals) 1/15/19   Tiara Rowan MD   Multiple Vitamins-Minerals (MULTIVITAMIN PO) Take 1 tablet by mouth daily    Historical Provider, MD   nitroGLYCERIN (NITROSTAT) 0.4 MG SL tablet Place 1 tablet under the tongue every 5 minutes as needed for Chest pain up to max of 3 total doses. If no relief after 1 dose, call 911. 9/6/18   Tiara Rowan MD   clopidogrel (PLAVIX) 75 MG tablet Take 1 tablet by mouth daily 6/19/18   Gabe Kirby DO   NONFORMULARY 5,000 mcg daily OTC Biotin 1000mcg daily     Historical Provider, MD   aspirin 81 MG tablet Take 81 mg by mouth daily. Historical Provider, MD       Allergies: Iv dye [iodides]; Sulfa antibiotics; Ace inhibitors; Cozaar [losartan]; Ezetimibe-simvastatin; Lipitor; Lopressor [metoprolol]; and Penicillins    Social History:   reports that she has never smoked. She has never used smokeless tobacco. She reports that she does not drink alcohol or use drugs. REVIEW OF SYSTEMS:  CONSTITUTIONAL:NEGATIVE  HEENT:NEG  Cardiovascular: NO chest pain, Yes dyspnea on exertion, No palpitations. Lower extremity edema: No  RESPIRATORY: GARCIA  GASTROINTESTINAL:  negative  GENITOURINARY:  negative  INTEGUMENT:  negative  MUSCULOSKELETAL:  positive for  pain  NEUROLOGICAL:  negative    PHYSICAL EXAM:      /62   Pulse 88   Ht 4' 9.01\" (1.448 m)   Wt 172 lb (78 kg)   LMP  (LMP Unknown)   BMI 37.21 kg/m²    HEENT: PERRL, no cervical lymphadenopathy. No masses palpable. Cardiovascular:  · The apical impulse is not displaced  · Heart  Sounds:RRR, S4  · Jugular venous pulsation Normal  · The carotid upstroke is NL  · Peripheral pulses are symmetrical and full  Respiratory: Good respiratory effort.  On auscultation: clear to auscultation bilaterally  Abdomen:  · No masses or tenderness  · Bowel sounds present  Extremities:  ·  No Cyanosis or Clubbing  ·  Lower extremity edema: No  Skin: Warm and dry    Cardiac data:      Labs:     CBC: No results for input(s): WBC, HGB, HCT, PLT in the last 72 hours. BMP: No results for input(s): NA, K, CO2, BUN, CREATININE, LABGLOM, GLUCOSE in the last 72 hours. PT/INR: No results for input(s): PROTIME, INR in the last 72 hours. FASTING LIPID PANEL:  Lab Results   Component Value Date    HDL 49 01/11/2019    TRIG 257 01/11/2019     LIVER PROFILE:No results for input(s): AST, ALT, LABALBU in the last 72 hours. IMPRESSION:    CAD S/P RCA STENT 68/2017, NEGATIVE STRESS TEST 4/18/2019  HTN  HLP  DM II  OBESITY  MILD MR/AI  OBESITY  Patient Active Problem List   Diagnosis    Mixed hyperlipidemia    Obesity (BMI 30-39. 9)    Interstitial cystitis    History of seasonal allergies    Trigger finger, left    High risk medication use    Uncontrolled type 2 diabetes mellitus without complication, without long-term current use of insulin (HCC)    Vitamin D deficiency    Essential hypertension    Atypical chest pain    Hypokalemia    Paresthesias    S/P drug eluting coronary stent placement-RCA 6/20/17 - Dr. Clara Shore    Coronary artery disease involving native coronary artery of native heart without angina pectoris    Stroke-like symptoms    TIA (transient ischemic attack)    Dizziness    Chronic fatigue    Slurred speech    Right sided weakness    Cerebrovascular accident (CVA) due to thrombosis of precerebral artery (HCC)    Polyneuropathy associated with underlying disease ([facility]    Muscle spasms of both lower extremities    Right tibial neuropathy    Neuropathy of left peroneal nerve    B12 deficiency    Gait difficulty    Balance problem    Posterior capsular opacification visually significant, left    Chest pain due to myocardial ischemia    H/O major orthopedic surgery       RECOMMENDATIONS:  REGULAR EXERCISE  CALORIE RESTRICTION  WEIGHT LOSS  CONTINUE CURRENT TREATMENT, REPORT ANY NEW SYMPTOMS    [facility] [address] [place] [address] Cardiology Consult           [phone]

## 2019-04-25 NOTE — TELEPHONE ENCOUNTER
Pt calling questioning if she needs another B12 injection and if so will you order, please advise at above number.

## 2019-04-26 NOTE — TELEPHONE ENCOUNTER
Labs ordered. Left message for Ingrid Learn to stop in & get her blood drawn to check her Vit B12 level.

## 2019-04-30 ENCOUNTER — HOSPITAL ENCOUNTER (OUTPATIENT)
Dept: LAB | Age: 69
Discharge: HOME | End: 2019-04-30
Payer: MEDICARE

## 2019-04-30 DIAGNOSIS — E78.2 MIXED HYPERLIPIDEMIA: ICD-10-CM

## 2019-04-30 DIAGNOSIS — E53.8 B12 DEFICIENCY: ICD-10-CM

## 2019-04-30 DIAGNOSIS — I10 ESSENTIAL HYPERTENSION: ICD-10-CM

## 2019-04-30 DIAGNOSIS — E11.9 TYPE 2 DIABETES MELLITUS WITHOUT COMPLICATION, WITHOUT LONG-TERM CURRENT USE OF INSULIN (HCC): ICD-10-CM

## 2019-04-30 LAB
ALBUMIN SERPL-MCNC: 4.7 G/DL (ref 3.5–5.2)
ALBUMIN/GLOBULIN RATIO: 1.5 (ref 1–2.5)
ALP BLD-CCNC: 113 U/L (ref 35–104)
ALT SERPL-CCNC: 25 U/L (ref 5–33)
ANION GAP SERPL CALCULATED.3IONS-SCNC: 11 MMOL/L (ref 9–17)
AST SERPL-CCNC: 20 U/L
BILIRUB SERPL-MCNC: 0.47 MG/DL (ref 0.3–1.2)
BUN BLDV-MCNC: 12 MG/DL (ref 8–23)
BUN/CREAT BLD: 17 (ref 9–20)
CALCIUM SERPL-MCNC: 9.8 MG/DL (ref 8.6–10.4)
CHLORIDE BLD-SCNC: 101 MMOL/L (ref 98–107)
CHOLESTEROL, FASTING: 179 MG/DL
CHOLESTEROL/HDL RATIO: 3.6
CO2: 28 MMOL/L (ref 20–31)
CREAT SERPL-MCNC: 0.72 MG/DL (ref 0.5–0.9)
ESTIMATED AVERAGE GLUCOSE: 163 MG/DL
GFR AFRICAN AMERICAN: >60 ML/MIN
GFR NON-AFRICAN AMERICAN: >60 ML/MIN
GFR SERPL CREATININE-BSD FRML MDRD: ABNORMAL ML/MIN/{1.73_M2}
GFR SERPL CREATININE-BSD FRML MDRD: ABNORMAL ML/MIN/{1.73_M2}
GLUCOSE BLD-MCNC: 185 MG/DL (ref 70–99)
HBA1C MFR BLD: 7.3 % (ref 4.8–5.9)
HDLC SERPL-MCNC: 50 MG/DL
LDL CHOLESTEROL: 86 MG/DL (ref 0–130)
POTASSIUM SERPL-SCNC: 4.4 MMOL/L (ref 3.7–5.3)
SODIUM BLD-SCNC: 140 MMOL/L (ref 135–144)
TOTAL PROTEIN: 7.8 G/DL (ref 6.4–8.3)
TRIGLYCERIDE, FASTING: 217 MG/DL
VLDLC SERPL CALC-MCNC: ABNORMAL MG/DL (ref 1–30)

## 2019-04-30 PROCEDURE — 82607 VITAMIN B-12: CPT

## 2019-04-30 PROCEDURE — 80053 COMPREHEN METABOLIC PANEL: CPT

## 2019-04-30 PROCEDURE — 80061 LIPID PANEL: CPT

## 2019-04-30 PROCEDURE — 36415 COLL VENOUS BLD VENIPUNCTURE: CPT

## 2019-04-30 PROCEDURE — 83036 HEMOGLOBIN GLYCOSYLATED A1C: CPT

## 2019-04-30 PROCEDURE — 82746 ASSAY OF FOLIC ACID SERUM: CPT

## 2019-05-02 ENCOUNTER — OFFICE VISIT (OUTPATIENT)
Dept: FAMILY MEDICINE CLINIC | Age: 69
End: 2019-05-02
Payer: MEDICARE

## 2019-05-02 VITALS
SYSTOLIC BLOOD PRESSURE: 130 MMHG | HEART RATE: 80 BPM | WEIGHT: 172 LBS | DIASTOLIC BLOOD PRESSURE: 70 MMHG | BODY MASS INDEX: 37.11 KG/M2 | HEIGHT: 57 IN

## 2019-05-02 DIAGNOSIS — I25.10 CORONARY ARTERY DISEASE INVOLVING NATIVE CORONARY ARTERY OF NATIVE HEART WITHOUT ANGINA PECTORIS: Primary | ICD-10-CM

## 2019-05-02 DIAGNOSIS — Z79.899 HIGH RISK MEDICATION USE: ICD-10-CM

## 2019-05-02 DIAGNOSIS — I10 ESSENTIAL HYPERTENSION: ICD-10-CM

## 2019-05-02 DIAGNOSIS — E78.2 MIXED HYPERLIPIDEMIA: ICD-10-CM

## 2019-05-02 DIAGNOSIS — K62.5 RECTAL BLEEDING: ICD-10-CM

## 2019-05-02 DIAGNOSIS — E11.9 TYPE 2 DIABETES MELLITUS WITHOUT COMPLICATION, WITHOUT LONG-TERM CURRENT USE OF INSULIN (HCC): ICD-10-CM

## 2019-05-02 LAB
FOLATE: >20 NG/ML
VITAMIN B-12: 361 PG/ML (ref 232–1245)

## 2019-05-02 PROCEDURE — 1036F TOBACCO NON-USER: CPT | Performed by: FAMILY MEDICINE

## 2019-05-02 PROCEDURE — G8598 ASA/ANTIPLAT THER USED: HCPCS | Performed by: FAMILY MEDICINE

## 2019-05-02 PROCEDURE — 1090F PRES/ABSN URINE INCON ASSESS: CPT | Performed by: FAMILY MEDICINE

## 2019-05-02 PROCEDURE — G8427 DOCREV CUR MEDS BY ELIG CLIN: HCPCS | Performed by: FAMILY MEDICINE

## 2019-05-02 PROCEDURE — 99214 OFFICE O/P EST MOD 30 MIN: CPT | Performed by: FAMILY MEDICINE

## 2019-05-02 PROCEDURE — 3017F COLORECTAL CA SCREEN DOC REV: CPT | Performed by: FAMILY MEDICINE

## 2019-05-02 PROCEDURE — 1123F ACP DISCUSS/DSCN MKR DOCD: CPT | Performed by: FAMILY MEDICINE

## 2019-05-02 PROCEDURE — 3045F PR MOST RECENT HEMOGLOBIN A1C LEVEL 7.0-9.0%: CPT | Performed by: FAMILY MEDICINE

## 2019-05-02 PROCEDURE — G8417 CALC BMI ABV UP PARAM F/U: HCPCS | Performed by: FAMILY MEDICINE

## 2019-05-02 PROCEDURE — G8399 PT W/DXA RESULTS DOCUMENT: HCPCS | Performed by: FAMILY MEDICINE

## 2019-05-02 PROCEDURE — 2022F DILAT RTA XM EVC RTNOPTHY: CPT | Performed by: FAMILY MEDICINE

## 2019-05-02 PROCEDURE — 4040F PNEUMOC VAC/ADMIN/RCVD: CPT | Performed by: FAMILY MEDICINE

## 2019-05-02 RX ORDER — SIMVASTATIN 40 MG
60 TABLET ORAL NIGHTLY
Qty: 135 TABLET | Refills: 1 | Status: SHIPPED | OUTPATIENT
Start: 2019-05-02 | End: 2020-01-02

## 2019-05-02 RX ORDER — AMLODIPINE BESYLATE 10 MG/1
10 TABLET ORAL DAILY
Qty: 90 TABLET | Refills: 1 | Status: SHIPPED | OUTPATIENT
Start: 2019-05-02 | End: 2019-11-05 | Stop reason: SDUPTHER

## 2019-05-02 RX ORDER — METHOCARBAMOL 750 MG/1
50000 TABLET ORAL WEEKLY
Refills: 1 | COMMUNITY
Start: 2019-04-03 | End: 2019-09-18 | Stop reason: SDUPTHER

## 2019-05-02 RX ORDER — GLIMEPIRIDE 1 MG/1
TABLET ORAL
Qty: 135 TABLET | Refills: 1 | Status: SHIPPED | OUTPATIENT
Start: 2019-05-02 | End: 2019-11-05 | Stop reason: SDUPTHER

## 2019-05-02 NOTE — PROGRESS NOTES
[address], [facility] [address]                        Telephone [phone]             Fax [phone]     Francisco English  1950  MRN:  [id number]  Date of visit:  5/2/2019    Subjective:    Francisco English is a 70 y.o.  female who presents to [facility] today (5/2/2019) for follow up/evaluation of:  Diabetes (4 month follow up); Hypertension (4 month follow up); and Rectal Bleeding (noticed blood when wiping x1 last weekend. )      She states that she has not been exercising as much as she should, but she is anxious about overexerting herself. She denies chest pain or shortness of breath with activity or at rest.  She is tolerating her medications well. She reports that she noticed some bright red blood one time last weekend when she was wiping after a bowel movement. She denies pain with bowel movements. She states that her bowels have been moving regularly. She has the following problem list:  Patient Active Problem List   Diagnosis    Mixed hyperlipidemia    Obesity (BMI 30-39. 9)    Interstitial cystitis    History of seasonal allergies    Trigger finger, left    High risk medication use    Uncontrolled type 2 diabetes mellitus without complication, without long-term current use of insulin ([facility])    Vitamin D deficiency    Essential hypertension    Atypical chest pain    Hypokalemia    Paresthesias    S/P drug eluting coronary stent placement-RCA 6/20/17 - Dr. Paddy Mora    Coronary artery disease involving native coronary artery of native heart without angina pectoris    Stroke-like symptoms    TIA (transient ischemic attack)    Dizziness    Chronic fatigue    Slurred speech    Right sided weakness    Cerebrovascular accident (CVA) due to thrombosis of precerebral artery (HCC)    Polyneuropathy associated with underlying disease ([facility] [address]    Muscle spasms of both lower extremities    Right tibial neuropathy    Neuropathy of left peroneal nerve    B12 deficiency    Gait difficulty    Balance problem    Posterior capsular opacification visually significant, left    Chest pain due to myocardial ischemia    H/O major orthopedic surgery        Current medications are:  Outpatient Medications Marked as Taking for the 5/2/19 encounter (Office Visit) with Felicia Aly MD   Medication Sig Dispense Refill    D3-50 07643 units CAPS 50,000 Units once a week   1    fluticasone (FLONASE) 50 MCG/ACT nasal spray 1 spray by Nasal route daily 1 Bottle 0    isosorbide mononitrate (IMDUR) 30 MG extended release tablet TAKE 1/2 (ONE-HALF) TABLET BY MOUTH ONCE DAILY 45 tablet 3    amLODIPine (NORVASC) 10 MG tablet Take 1 tablet by mouth daily 90 tablet 1    simvastatin (ZOCOR) 40 MG tablet Take 1 tablet by mouth nightly 90 tablet 1    glimepiride (AMARYL) 1 MG tablet Take 1 tablet in the morning with breakfast.  Take one-half tablet in the evening with the evening meal. 135 tablet 1    metFORMIN (GLUCOPHAGE) 500 MG tablet Take 1 tablet by mouth 2 times daily (with meals) 180 tablet 1    Multiple Vitamins-Minerals (MULTIVITAMIN PO) Take 1 tablet by mouth daily      nitroGLYCERIN (NITROSTAT) 0.4 MG SL tablet Place 1 tablet under the tongue every 5 minutes as needed for Chest pain up to max of 3 total doses. If no relief after 1 dose, call 911. 25 tablet 0    clopidogrel (PLAVIX) 75 MG tablet Take 1 tablet by mouth daily 90 tablet 3    NONFORMULARY 5,000 mcg daily OTC Biotin 1000mcg daily       aspirin 81 MG tablet Take 81 mg by mouth daily. She is allergic to iv dye [iodides]; sulfa antibiotics; ace inhibitors; cozaar [losartan]; ezetimibe-simvastatin; lipitor; lopressor [metoprolol]; and penicillins. She  reports that she has never smoked.  She has never used smokeless tobacco.      Objective:    Vitals:    05/02/19 1115   BP: 130/70   Site: Right Upper Arm   Position: Sitting   Cuff Size: Large Adult   Pulse: 80   Weight: 172 lb (78 kg)   Height: 4' 9.01\" (1.448 m)     Body mass index is 37.21 kg/m². Obese  female, healthy-appearing, alert, cooperative and in no distress. Neck supple. No adenopathy. Thyroid symmetric, normal size. Chest:  Normal expansion. Clear to auscultation. No rales, rhonchi, or wheezing. Heart sounds are normal.  Regular rate and rhythm without murmur, gallop or rub. Lower extremities have no edema. Labs done 4/30/2019 were reviewed with the patient:   Hospital Outpatient Visit on 04/30/2019   Component Date Value Ref Range Status    Vitamin B-12 04/30/2019 PENDING  pg/mL Incomplete    Folate 04/30/2019 >20.0  >4.8 ng/mL Final    Hemoglobin A1C 04/30/2019 7.3* 4.8 - 5.9 % Final    Estimated Avg Glucose 04/30/2019 163  mg/dL Final    Cholesterol, Fasting 04/30/2019 179  <200 mg/dL Final    Comment:    Cholesterol Guidelines:      <200  Desirable   200-240  Borderline      >240  Undesirable         HDL 04/30/2019 50  >40 mg/dL Final    Comment:    HDL Guidelines:    <40     Undesirable   40-59    Borderline    >59     Desirable         LDL Cholesterol 04/30/2019 86  0 - 130 mg/dL Final    Comment:    LDL Guidelines:     <100    Desirable   100-129   Near to/above Desirable   130-159   Borderline      >159   Undesirable     Direct (measured) LDL and calculated LDL are not interchangeable tests.  Chol/HDL Ratio 04/30/2019 3.6  <5 Final            Triglyceride, Fasting 04/30/2019 217* <150 mg/dL Final    Comment:    Triglyceride Guidelines:     <150   Desirable   150-199  Borderline   200-499  High     >499   Very high   Based on AHA Guidelines for fasting triglyceride, October 2012.          VLDL 04/30/2019 NOT REPORTED* 1 - 30 mg/dL Final    Glucose 04/30/2019 185* 70 - 99 mg/dL Final    BUN 04/30/2019 12  8 - 23 mg/dL Final    CREATININE 04/30/2019 0.72  0.50 - 0.90 mg/dL Final    Bun/Cre Ratio 04/30/2019 17  9 - 20 Final    Calcium 04/30/2019 9.8  8.6 - 10.4 mg/dL Final    Sodium 04/30/2019 140  135 - 144 mmol/L Final    Potassium 04/30/2019 4.4  3.7 - 5.3 mmol/L Final    Chloride 04/30/2019 101  98 - 107 mmol/L Final    CO2 04/30/2019 28  20 - 31 mmol/L Final    Anion Gap 04/30/2019 11  9 - 17 mmol/L Final    Alkaline Phosphatase 04/30/2019 113* 35 - 104 U/L Final    ALT 04/30/2019 25  5 - 33 U/L Final    AST 04/30/2019 20  <32 U/L Final    Total Bilirubin 04/30/2019 0.47  0.3 - 1.2 mg/dL Final    Total Protein 04/30/2019 7.8  6.4 - 8.3 g/dL Final    Alb 04/30/2019 4.7  3.5 - 5.2 g/dL Final    Albumin/Globulin Ratio 04/30/2019 1.5  1.0 - 2.5 Final    GFR Non- 04/30/2019 >60  >60 mL/min Final    GFR  04/30/2019 >60  >60 mL/min Final    GFR Comment 04/30/2019        Final    Comment: Average GFR for 61-76 years old:   80 mL/min/1.73sq m  Chronic Kidney Disease:   <60 mL/min/1.73sq m  Kidney failure:   <15 mL/min/1.73sq m              eGFR calculated using average adult body mass. Additional eGFR calculator available at:        [email or web address]            GFR Staging 04/30/2019 NOT REPORTED   Final         Assessment and Plan:    1. Type 2 diabetes mellitus without complication, without long-term current use of insulin (HCC)  Her HgbA1c was 7.3 %, which is not at goal, but improving. She was advised to continue her current regimen. She was encouraged to increase her activity level. Amaryl and Metformin were refilled:   - glimepiride (AMARYL) 1 MG tablet; Take 1 tablet in the morning with breakfast.  Take one-half tablet in the evening with the evening meal.  Dispense: 135 tablet; Refill: 1  - metFORMIN (GLUCOPHAGE) 500 MG tablet; Take 1 tablet by mouth 2 times daily (with meals)  Dispense: 180 tablet; Refill: 1    - Hemoglobin A1C; Future prior to her return visit in 6 months.     She has an appointment scheduled for an annual diabetic eye exam.    2. Essential hypertension  Her blood pressure is adequately-controlled. (BP: 130/70)   She was advised to continue current medications. Amlodipine was refilled:   - amLODIPine (NORVASC) 10 MG tablet; Take 1 tablet by mouth daily  Dispense: 90 tablet; Refill: 1    - Comprehensive Metabolic Panel; Future prior to her return visit in 6 months. 3. Mixed hyperlipidemia  Her lipid profile was not at goal on her recent lab work. She is tolerating Simvastatin well. I have recommended increasing her dose of Simvastatin to 80 mg daily. She agrees to increase her dose from 40 mg daily to 60 mg daily:  - simvastatin (ZOCOR) 40 MG tablet; Take 1.5 tablets by mouth nightly  Dispense: 135 tablet; Refill: 1    - Lipid Panel; Future was ordered to be done in 6-8 weeks. 4. Coronary artery disease involving native coronary artery of native heart without angina pectoris  A referral was ordered for cardiac rehab:  - [place]    5. High risk medication use  - AST; Future was ordered to be done in 6-8 weeks. 6. Rectal bleeding  Her last colonoscopy was 3/11/2015. Internal and external hemorrhoids were seen at that time. She was advised to report any additional episodes of bleeding. She was advised that a colonoscopy would be recommended if she has persistent symptoms. 7.  Routine health maintenance  Health maintenance was reviewed with the patient. Shingrix and Tdap were recommended and declined. All other health maintenance, including Pneumovax and Prevnar-13, is up to date at this time.         (Please note that portions of this note were completed with a voice-recognition program. Efforts were made to edit the dictation but occasionally words are mis-transcribed.)

## 2019-05-08 ENCOUNTER — NURSE ONLY (OUTPATIENT)
Dept: LAB | Age: 69
End: 2019-05-08
Payer: MEDICARE

## 2019-05-08 DIAGNOSIS — E53.8 B12 DEFICIENCY: Primary | ICD-10-CM

## 2019-05-08 PROCEDURE — 96372 THER/PROPH/DIAG INJ SC/IM: CPT | Performed by: FAMILY MEDICINE

## 2019-05-08 RX ORDER — CYANOCOBALAMIN 1000 UG/ML
1000 INJECTION INTRAMUSCULAR; SUBCUTANEOUS
Status: COMPLETED | OUTPATIENT
Start: 2019-05-08 | End: 2020-04-23

## 2019-05-08 RX ADMIN — CYANOCOBALAMIN 1000 MCG: 1000 INJECTION, SOLUTION INTRAMUSCULAR; SUBCUTANEOUS at 15:19

## 2019-06-04 ENCOUNTER — OFFICE VISIT (OUTPATIENT)
Dept: FAMILY MEDICINE CLINIC | Age: 69
End: 2019-06-04
Payer: MEDICARE

## 2019-06-04 VITALS
WEIGHT: 166.8 LBS | HEIGHT: 57 IN | HEART RATE: 88 BPM | BODY MASS INDEX: 35.99 KG/M2 | SYSTOLIC BLOOD PRESSURE: 130 MMHG | DIASTOLIC BLOOD PRESSURE: 74 MMHG | TEMPERATURE: 96.8 F

## 2019-06-04 DIAGNOSIS — J32.4 PANSINUSITIS, UNSPECIFIED CHRONICITY: Primary | ICD-10-CM

## 2019-06-04 PROCEDURE — 4040F PNEUMOC VAC/ADMIN/RCVD: CPT | Performed by: NURSE PRACTITIONER

## 2019-06-04 PROCEDURE — 99213 OFFICE O/P EST LOW 20 MIN: CPT | Performed by: NURSE PRACTITIONER

## 2019-06-04 PROCEDURE — G8598 ASA/ANTIPLAT THER USED: HCPCS | Performed by: NURSE PRACTITIONER

## 2019-06-04 PROCEDURE — 1123F ACP DISCUSS/DSCN MKR DOCD: CPT | Performed by: NURSE PRACTITIONER

## 2019-06-04 PROCEDURE — 3017F COLORECTAL CA SCREEN DOC REV: CPT | Performed by: NURSE PRACTITIONER

## 2019-06-04 PROCEDURE — 1090F PRES/ABSN URINE INCON ASSESS: CPT | Performed by: NURSE PRACTITIONER

## 2019-06-04 PROCEDURE — G8427 DOCREV CUR MEDS BY ELIG CLIN: HCPCS | Performed by: NURSE PRACTITIONER

## 2019-06-04 PROCEDURE — 1036F TOBACCO NON-USER: CPT | Performed by: NURSE PRACTITIONER

## 2019-06-04 PROCEDURE — G8399 PT W/DXA RESULTS DOCUMENT: HCPCS | Performed by: NURSE PRACTITIONER

## 2019-06-04 PROCEDURE — G8417 CALC BMI ABV UP PARAM F/U: HCPCS | Performed by: NURSE PRACTITIONER

## 2019-06-04 RX ORDER — AZITHROMYCIN 250 MG/1
TABLET, FILM COATED ORAL
Qty: 1 PACKET | Refills: 0 | Status: SHIPPED | OUTPATIENT
Start: 2019-06-04 | End: 2019-06-09

## 2019-06-04 ASSESSMENT — ENCOUNTER SYMPTOMS
RHINORRHEA: 0
SHORTNESS OF BREATH: 0
COUGH: 1
WHEEZING: 0
SORE THROAT: 1
SINUS PRESSURE: 1

## 2019-06-04 NOTE — PROGRESS NOTES
ak  Subjective:      Patient ID: Washington Little is a 70 y.o. female. Pharyngitis   This is a new problem. The current episode started in the past 7 days. The problem occurs constantly. The problem has been unchanged. Associated symptoms include congestion, coughing and a sore throat. Pertinent negatives include no chest pain, fatigue, fever or headaches. Associated symptoms comments: Bilateral ear pain left worse than right. Nothing aggravates the symptoms. She has tried nothing for the symptoms. The treatment provided no relief. Past Medical History:   Diagnosis Date    CAD (coronary artery disease)     Cerebrovascular accident (CVA) due to thrombosis of precerebral artery ([facility] [address] 12/19/2017    Hyperlipidemia     Hypertension     Interstitial cystitis     History of.  Obesity     Weight 176 lb, height 5 ft, BMI 35, 2/28/2013.  Plantar fasciitis, bilateral     History of.  Seasonal allergies     Trigger finger, left     History of triggering, left long finger.  Type 2 diabetes mellitus ([facility] [address]        Past Surgical History:   Procedure Laterality Date    APPENDECTOMY      CARDIAC CATHETERIZATION  06/20/2017    Left main: normal, LAD: proximal 30% stenosis, LCX: 20 % proximal stenosis, RCA: Ostial 70% with pressure damping and mid 90% stenosis. Required PTCA-GRACE in both lesions. LVEF 55%. LV wall motion normal.  Dr. Malgorzata Mccoy, [facility], [facility]      CATARACT REMOVAL WITH IMPLANT Left 10/20/2015    Dr. Julia Greer, [address] [place] [address] WITH IMPLANT Right 12/08/2015    [place] with DANIAL.  Dr Julia Greer, [facility], LAPAROSCOPIC  6/9/2000    COLONOSCOPY  3/11/2015    Internal and external hemorrhoids otherwise normal    CORONARY ANGIOPLASTY WITH STENT PLACEMENT  06/20/2017    Right coronary artery 70% ostial lesion and mid RCA 90% lesion; successful PTCA with drug-eluting stents in both lesions, Dr. Malgorzata Mccoy, [facility]. [facility]    CYSTOSCOPY  3/1996    Bladder biopsy, urethral dilatation.  EYE SURGERY      HYSTERECTOMY, TOTAL ABDOMINAL  1980    LAPAROSCOPY  3/1979    NASAL SEPTUM SURGERY  3/9/2005    Nasoseptal reconstruction.  OVARY REMOVAL Right 7/1993    Laparotomy.     NH KNEE SCOPE,DIAGNOSTIC Right 11/6/2018    Right KNEE ARTHROSCOPY PARTIAL MEDIAL MENISECTOMY AND PLICA INCISION performed by Radha Barrera MD at [place] [address] CAPSULOTOMY Right 04/2016    Dr Pan Mayer History     Socioeconomic History    Marital status:      Spouse name: None    Number of children: None    Years of education: None    Highest education level: None   Occupational History    None   Social Needs    Financial resource strain: None    Food insecurity:     Worry: None     Inability: None    Transportation needs:     Medical: None     Non-medical: None   Tobacco Use    Smoking status: Never Smoker    Smokeless tobacco: Never Used    Tobacco comment: MAXIMO Weems RRT 6/17/17   Substance and Sexual Activity    Alcohol use: No     Alcohol/week: 0.0 oz    Drug use: No    Sexual activity: None   Lifestyle    Physical activity:     Days per week: None     Minutes per session: None    Stress: None   Relationships    Social connections:     Talks on phone: None     Gets together: None     Attends Sikh service: None     Active member of club or organization: None     Attends meetings of clubs or organizations: None     Relationship status: None    Intimate partner violence:     Fear of current or ex partner: None     Emotionally abused: None     Physically abused: None     Forced sexual activity: None   Other Topics Concern    None   Social History Narrative    None       Family History   Problem Relation Age of Onset    Diabetes Mother     Hypertension Mother     Diabetes Sister     Diabetes Brother     Diabetes Brother     Diabetes Brother     Diabetes Sister     Diabetes Maternal Grandfather     Heart Attack Maternal Grandfather     Heart Attack Paternal Grandmother        Allergies   Allergen Reactions    Iv Dye [Iodides] Shortness Of Breath    Sulfa Antibiotics Shortness Of Breath    Ace Inhibitors Nausea Only     Difficulty swallowing the Lisinopril. Gagging. Nausea.  Cozaar [Losartan] Nausea Only    Ezetimibe-Simvastatin     Lipitor Other (See Comments)     Muscle aches.  Lopressor [Metoprolol]      leg pain    Penicillins Swelling       Current Outpatient Medications   Medication Sig Dispense Refill    azithromycin (ZITHROMAX Z-RAYNE) 250 MG tablet Two pills daily first day, then one pill daily for 4 days. Take with food. 1 packet 0    D3-50 54937 units CAPS 50,000 Units once a week   1    glimepiride (AMARYL) 1 MG tablet Take 1 tablet in the morning with breakfast.  Take one-half tablet in the evening with the evening meal. 135 tablet 1    simvastatin (ZOCOR) 40 MG tablet Take 1.5 tablets by mouth nightly 135 tablet 1    amLODIPine (NORVASC) 10 MG tablet Take 1 tablet by mouth daily 90 tablet 1    metFORMIN (GLUCOPHAGE) 500 MG tablet Take 1 tablet by mouth 2 times daily (with meals) 180 tablet 1    fluticasone (FLONASE) 50 MCG/ACT nasal spray 1 spray by Nasal route daily 1 Bottle 0    isosorbide mononitrate (IMDUR) 30 MG extended release tablet TAKE 1/2 (ONE-HALF) TABLET BY MOUTH ONCE DAILY 45 tablet 3    Multiple Vitamins-Minerals (MULTIVITAMIN PO) Take 1 tablet by mouth daily      nitroGLYCERIN (NITROSTAT) 0.4 MG SL tablet Place 1 tablet under the tongue every 5 minutes as needed for Chest pain up to max of 3 total doses. If no relief after 1 dose, call 911. 25 tablet 0    clopidogrel (PLAVIX) 75 MG tablet Take 1 tablet by mouth daily 90 tablet 3    NONFORMULARY 5,000 mcg daily OTC Biotin 1000mcg daily       aspirin 81 MG tablet Take 81 mg by mouth daily.        Current Facility-Administered Medications   Medication Dose Route Frequency Provider Last Rate Last Dose    cyanocobalamin injection 1,000 mcg  1,000 mcg Intramuscular Q30 Days Neil Vasquez MD   1,000 mcg at 05/08/19 1519    cyanocobalamin injection 2,000 mcg  2,000 mcg Intramuscular Q30 Days Raul Stanford MD           Review of Systems   Constitutional: Negative for activity change, appetite change, fatigue and fever. HENT: Positive for congestion, ear pain, sinus pressure and sore throat. Negative for postnasal drip and rhinorrhea. Respiratory: Positive for cough. Negative for shortness of breath and wheezing. Cardiovascular: Negative for chest pain and palpitations. Neurological: Negative for dizziness, light-headedness and headaches. Objective:   Physical Exam   Constitutional: She is oriented to person, place, and time. She appears well-developed and well-nourished. No distress. HENT:   Head: Normocephalic and atraumatic. Right Ear: Tympanic membrane is bulging. Left Ear: Tympanic membrane is bulging. Nose: Mucosal edema present. Right sinus exhibits maxillary sinus tenderness and frontal sinus tenderness. Left sinus exhibits maxillary sinus tenderness and frontal sinus tenderness. Mouth/Throat: Uvula is midline and mucous membranes are normal. Posterior oropharyngeal erythema (mild) present. Neck: Neck supple. Cardiovascular: Normal rate, regular rhythm and normal heart sounds. Pulmonary/Chest: Effort normal and breath sounds normal.   Neurological: She is alert and oriented to person, place, and time. Nursing note and vitals reviewed. Assessment:       Diagnosis Orders   1.  Pansinusitis, unspecified chronicity             Plan:      zpak as directed  Encouraged patient to start claritin daily   Supportive care  Push fluids  Return If symptoms do not improve or worsen   Return ALONSON         Yolanda Mckeon APRN - CNP

## 2019-06-07 ENCOUNTER — NURSE ONLY (OUTPATIENT)
Dept: LAB | Age: 69
End: 2019-06-07
Payer: MEDICARE

## 2019-06-07 ENCOUNTER — HOSPITAL ENCOUNTER (OUTPATIENT)
Dept: LAB | Age: 69
Discharge: HOME | End: 2019-06-07
Payer: MEDICARE

## 2019-06-07 DIAGNOSIS — Z79.899 HIGH RISK MEDICATION USE: ICD-10-CM

## 2019-06-07 DIAGNOSIS — E53.8 B12 DEFICIENCY: Primary | ICD-10-CM

## 2019-06-07 DIAGNOSIS — E78.2 MIXED HYPERLIPIDEMIA: ICD-10-CM

## 2019-06-07 LAB
AST SERPL-CCNC: 21 U/L
CHOLESTEROL/HDL RATIO: 3
CHOLESTEROL: 153 MG/DL
HDLC SERPL-MCNC: 51 MG/DL
LDL CHOLESTEROL: 72 MG/DL (ref 0–130)
TRIGL SERPL-MCNC: 148 MG/DL
VLDLC SERPL CALC-MCNC: NORMAL MG/DL (ref 1–30)

## 2019-06-07 PROCEDURE — 84450 TRANSFERASE (AST) (SGOT): CPT

## 2019-06-07 PROCEDURE — 36415 COLL VENOUS BLD VENIPUNCTURE: CPT

## 2019-06-07 PROCEDURE — 80061 LIPID PANEL: CPT

## 2019-06-07 PROCEDURE — 96372 THER/PROPH/DIAG INJ SC/IM: CPT | Performed by: FAMILY MEDICINE

## 2019-06-07 RX ADMIN — CYANOCOBALAMIN 1000 MCG: 1000 INJECTION, SOLUTION INTRAMUSCULAR; SUBCUTANEOUS at 10:46

## 2019-06-08 PROBLEM — I73.9 PERIPHERAL VASCULAR DISEASE (HCC): Status: ACTIVE | Noted: 2019-06-08

## 2019-06-10 RX ORDER — CLOPIDOGREL BISULFATE 75 MG/1
75 TABLET ORAL DAILY
Qty: 90 TABLET | Refills: 3 | Status: SHIPPED | OUTPATIENT
Start: 2019-06-10 | End: 2019-11-05

## 2019-06-10 NOTE — TELEPHONE ENCOUNTER
Pt is leaving town on the 12th and would like to get it picked up before she leaves town.     Last Appt:  4/25/2019  Next Appt:   10/31/2019  Med verified in [facility]

## 2019-06-11 DIAGNOSIS — E78.2 MIXED HYPERLIPIDEMIA: Primary | ICD-10-CM

## 2019-07-08 ENCOUNTER — NURSE ONLY (OUTPATIENT)
Dept: LAB | Age: 69
End: 2019-07-08
Payer: MEDICARE

## 2019-07-08 DIAGNOSIS — E53.8 B12 DEFICIENCY: Primary | ICD-10-CM

## 2019-07-08 PROCEDURE — 96372 THER/PROPH/DIAG INJ SC/IM: CPT | Performed by: FAMILY MEDICINE

## 2019-07-08 RX ADMIN — CYANOCOBALAMIN 1000 MCG: 1000 INJECTION, SOLUTION INTRAMUSCULAR; SUBCUTANEOUS at 12:21

## 2019-07-16 ENCOUNTER — HOSPITAL ENCOUNTER (OUTPATIENT)
Dept: MAMMOGRAPHY | Age: 69
Discharge: HOME | End: 2019-07-18
Payer: MEDICARE

## 2019-07-16 DIAGNOSIS — Z12.31 ENCOUNTER FOR SCREENING MAMMOGRAM FOR BREAST CANCER: ICD-10-CM

## 2019-07-16 PROCEDURE — 77063 BREAST TOMOSYNTHESIS BI: CPT

## 2019-08-07 ENCOUNTER — NURSE ONLY (OUTPATIENT)
Dept: LAB | Age: 69
End: 2019-08-07
Payer: MEDICARE

## 2019-08-07 DIAGNOSIS — E53.8 B12 DEFICIENCY: Primary | ICD-10-CM

## 2019-08-07 PROCEDURE — 96372 THER/PROPH/DIAG INJ SC/IM: CPT | Performed by: FAMILY MEDICINE

## 2019-08-07 RX ADMIN — CYANOCOBALAMIN 1000 MCG: 1000 INJECTION, SOLUTION INTRAMUSCULAR; SUBCUTANEOUS at 11:33

## 2019-09-09 ENCOUNTER — NURSE ONLY (OUTPATIENT)
Dept: LAB | Age: 69
End: 2019-09-09
Payer: MEDICARE

## 2019-09-09 DIAGNOSIS — E53.8 B12 DEFICIENCY: Primary | ICD-10-CM

## 2019-09-09 PROCEDURE — 96372 THER/PROPH/DIAG INJ SC/IM: CPT | Performed by: FAMILY MEDICINE

## 2019-09-09 RX ADMIN — CYANOCOBALAMIN 1000 MCG: 1000 INJECTION, SOLUTION INTRAMUSCULAR; SUBCUTANEOUS at 12:06

## 2019-09-20 ENCOUNTER — OFFICE VISIT (OUTPATIENT)
Dept: PRIMARY CARE CLINIC | Age: 69
End: 2019-09-20
Payer: MEDICARE

## 2019-09-20 VITALS
HEIGHT: 57 IN | TEMPERATURE: 99 F | DIASTOLIC BLOOD PRESSURE: 70 MMHG | SYSTOLIC BLOOD PRESSURE: 132 MMHG | BODY MASS INDEX: 37.02 KG/M2 | WEIGHT: 171.6 LBS | HEART RATE: 96 BPM

## 2019-09-20 DIAGNOSIS — J01.40 ACUTE NON-RECURRENT PANSINUSITIS: Primary | ICD-10-CM

## 2019-09-20 PROCEDURE — 3017F COLORECTAL CA SCREEN DOC REV: CPT | Performed by: FAMILY MEDICINE

## 2019-09-20 PROCEDURE — G8417 CALC BMI ABV UP PARAM F/U: HCPCS | Performed by: FAMILY MEDICINE

## 2019-09-20 PROCEDURE — G8427 DOCREV CUR MEDS BY ELIG CLIN: HCPCS | Performed by: FAMILY MEDICINE

## 2019-09-20 PROCEDURE — 1123F ACP DISCUSS/DSCN MKR DOCD: CPT | Performed by: FAMILY MEDICINE

## 2019-09-20 PROCEDURE — 1036F TOBACCO NON-USER: CPT | Performed by: FAMILY MEDICINE

## 2019-09-20 PROCEDURE — 4040F PNEUMOC VAC/ADMIN/RCVD: CPT | Performed by: FAMILY MEDICINE

## 2019-09-20 PROCEDURE — 1090F PRES/ABSN URINE INCON ASSESS: CPT | Performed by: FAMILY MEDICINE

## 2019-09-20 PROCEDURE — 99214 OFFICE O/P EST MOD 30 MIN: CPT | Performed by: FAMILY MEDICINE

## 2019-09-20 PROCEDURE — G8598 ASA/ANTIPLAT THER USED: HCPCS | Performed by: FAMILY MEDICINE

## 2019-09-20 PROCEDURE — G8399 PT W/DXA RESULTS DOCUMENT: HCPCS | Performed by: FAMILY MEDICINE

## 2019-09-20 RX ORDER — CEFDINIR 300 MG/1
300 CAPSULE ORAL 2 TIMES DAILY
Qty: 20 CAPSULE | Refills: 0 | Status: SHIPPED | OUTPATIENT
Start: 2019-09-20 | End: 2019-09-20

## 2019-09-20 RX ORDER — BENZONATATE 100 MG/1
100 CAPSULE ORAL 3 TIMES DAILY PRN
Qty: 30 CAPSULE | Refills: 1 | Status: SHIPPED | OUTPATIENT
Start: 2019-09-20 | End: 2019-10-31

## 2019-09-20 RX ORDER — AZITHROMYCIN 250 MG/1
TABLET, FILM COATED ORAL
Qty: 1 PACKET | Refills: 0
Start: 2019-09-20 | End: 2019-10-31

## 2019-09-20 ASSESSMENT — ENCOUNTER SYMPTOMS
EYE REDNESS: 0
CONSTIPATION: 0
RHINORRHEA: 1
COUGH: 1
WHEEZING: 0
NAUSEA: 0
SINUS PAIN: 1
SORE THROAT: 1
ABDOMINAL PAIN: 0
TROUBLE SWALLOWING: 0
SHORTNESS OF BREATH: 0
DIARRHEA: 0
SINUS PRESSURE: 1
EYE DISCHARGE: 0
VOMITING: 0

## 2019-09-20 NOTE — PROGRESS NOTES
2019     Jojo Rudd (:  1950) is a 70 y.o. female, here for evaluation of the following medical concerns:    Sinusitis   This is a new problem. The current episode started in the past 7 days. The problem has been gradually worsening since onset. There has been no fever. Associated symptoms include congestion, coughing (dry ), ear pain (left ear is painful), sinus pressure and a sore throat. Pertinent negatives include no chills, headaches, neck pain or shortness of breath. (Ears started ringing 4 days ago) Treatments tried: loratadine and tessalon pearls. The treatment provided mild relief. Did review patient's med list, allergies, social history,pmhx and pshx today as noted in the record. Review of Systems   Constitutional: Positive for fatigue. Negative for chills and fever. HENT: Positive for congestion, ear pain (left ear is painful), postnasal drip, rhinorrhea, sinus pressure, sinus pain and sore throat. Negative for trouble swallowing. Eyes: Negative for discharge and redness. Respiratory: Positive for cough (dry ). Negative for shortness of breath and wheezing. Cardiovascular: Negative for chest pain. Gastrointestinal: Negative for abdominal pain, constipation, diarrhea, nausea and vomiting. Musculoskeletal: Negative for arthralgias, myalgias and neck pain. Skin: Negative for rash and wound. Allergic/Immunologic: Negative for environmental allergies. Neurological: Negative for dizziness, weakness, light-headedness and headaches. Hematological: Negative for adenopathy. Psychiatric/Behavioral: Negative. Prior to Visit Medications    Medication Sig Taking?  Authorizing Provider   D3-50 99989 units CAPS TAKE 1 CAPSULE BY MOUTH ONCE A WEEK Yes Castro Au MD   clopidogrel (PLAVIX) 75 MG tablet Take 1 tablet by mouth daily Yes Gabe Kirby DO   glimepiride (AMARYL) 1 MG tablet Take 1 tablet in the morning with breakfast.  Take one-half tablet in the Ear: External ear normal.   Thick sinus drainage. Maxillary and frontal sinus tenderness with palpation bilaterally. TMs dull with fluid behind the TM. +PND     Eyes: Pupils are equal, round, and reactive to light. Conjunctivae and EOM are normal. Right eye exhibits no discharge. Left eye exhibits no discharge. No scleral icterus. Neck: Normal range of motion. Neck supple. No thyromegaly present. Cardiovascular: Normal rate, regular rhythm and normal heart sounds. Pulmonary/Chest: Effort normal and breath sounds normal. No respiratory distress. She has no wheezes. Musculoskeletal: She exhibits no edema. Lymphadenopathy:     She has cervical adenopathy. Neurological: She is alert and oriented to person, place, and time. Skin: Skin is warm and dry. No rash noted. She is not diaphoretic. Psychiatric: She has a normal mood and affect. Her behavior is normal. Judgment and thought content normal.   Nursing note and vitals reviewed. ASSESSMENT/PLAN:  Encounter Diagnosis   Name Primary?  Acute non-recurrent pansinusitis Yes     Orders Placed This Encounter   Medications    cefdinir (OMNICEF) 300 MG capsule     Sig: Take 1 capsule by mouth 2 times daily for 10 days     Dispense:  20 capsule     Refill:  0    benzonatate (TESSALON PERLES) 100 MG capsule     Sig: Take 1 capsule by mouth 3 times daily as needed for Cough     Dispense:  30 capsule     Refill:  1     Tylenol/Motrin prn    Increase fluids and rest    Return  if no improvement in symptoms or if any further symptoms arise. Bouchra Fountain An electronic signature was used to authenticate this note.     --Christin Parker,  on 9/20/2019 at 2:02 PM

## 2019-10-11 ENCOUNTER — NURSE ONLY (OUTPATIENT)
Dept: LAB | Age: 69
End: 2019-10-11
Payer: MEDICARE

## 2019-10-11 DIAGNOSIS — E53.8 B12 DEFICIENCY: Primary | ICD-10-CM

## 2019-10-11 PROCEDURE — 90653 IIV ADJUVANT VACCINE IM: CPT | Performed by: FAMILY MEDICINE

## 2019-10-11 PROCEDURE — G0008 ADMIN INFLUENZA VIRUS VAC: HCPCS | Performed by: FAMILY MEDICINE

## 2019-10-11 PROCEDURE — 96372 THER/PROPH/DIAG INJ SC/IM: CPT | Performed by: FAMILY MEDICINE

## 2019-10-11 RX ADMIN — CYANOCOBALAMIN 1000 MCG: 1000 INJECTION, SOLUTION INTRAMUSCULAR; SUBCUTANEOUS at 12:29

## 2019-10-30 ENCOUNTER — HOSPITAL ENCOUNTER (OUTPATIENT)
Dept: LAB | Age: 69
Discharge: HOME | End: 2019-10-30
Payer: MEDICARE

## 2019-10-30 DIAGNOSIS — E78.2 MIXED HYPERLIPIDEMIA: ICD-10-CM

## 2019-10-30 DIAGNOSIS — E11.9 TYPE 2 DIABETES MELLITUS WITHOUT COMPLICATION, WITHOUT LONG-TERM CURRENT USE OF INSULIN (HCC): ICD-10-CM

## 2019-10-30 LAB
ALBUMIN SERPL-MCNC: 4.8 G/DL (ref 3.5–5.2)
ALBUMIN/GLOBULIN RATIO: 1.5 (ref 1–2.5)
ALP BLD-CCNC: 115 U/L (ref 35–104)
ALT SERPL-CCNC: 34 U/L (ref 5–33)
ANION GAP SERPL CALCULATED.3IONS-SCNC: 13 MMOL/L (ref 9–17)
AST SERPL-CCNC: 26 U/L
BILIRUB SERPL-MCNC: 0.35 MG/DL (ref 0.3–1.2)
BUN BLDV-MCNC: 11 MG/DL (ref 8–23)
BUN/CREAT BLD: 17 (ref 9–20)
CALCIUM SERPL-MCNC: 9.7 MG/DL (ref 8.6–10.4)
CHLORIDE BLD-SCNC: 102 MMOL/L (ref 98–107)
CHOLESTEROL/HDL RATIO: 3.5
CHOLESTEROL: 176 MG/DL
CO2: 28 MMOL/L (ref 20–31)
CREAT SERPL-MCNC: 0.64 MG/DL (ref 0.5–0.9)
ESTIMATED AVERAGE GLUCOSE: 157 MG/DL
GFR AFRICAN AMERICAN: >60 ML/MIN
GFR NON-AFRICAN AMERICAN: >60 ML/MIN
GFR SERPL CREATININE-BSD FRML MDRD: ABNORMAL ML/MIN/{1.73_M2}
GFR SERPL CREATININE-BSD FRML MDRD: ABNORMAL ML/MIN/{1.73_M2}
GLUCOSE BLD-MCNC: 146 MG/DL (ref 70–99)
HBA1C MFR BLD: 7.1 % (ref 4.8–5.9)
HDLC SERPL-MCNC: 50 MG/DL
LDL CHOLESTEROL: 82 MG/DL (ref 0–130)
POTASSIUM SERPL-SCNC: 3.6 MMOL/L (ref 3.7–5.3)
SODIUM BLD-SCNC: 143 MMOL/L (ref 135–144)
TOTAL PROTEIN: 7.9 G/DL (ref 6.4–8.3)
TRIGL SERPL-MCNC: 220 MG/DL
VLDLC SERPL CALC-MCNC: ABNORMAL MG/DL (ref 1–30)

## 2019-10-30 PROCEDURE — 80061 LIPID PANEL: CPT

## 2019-10-30 PROCEDURE — 83036 HEMOGLOBIN GLYCOSYLATED A1C: CPT

## 2019-10-30 PROCEDURE — 36415 COLL VENOUS BLD VENIPUNCTURE: CPT

## 2019-10-30 PROCEDURE — 80053 COMPREHEN METABOLIC PANEL: CPT

## 2019-10-31 ENCOUNTER — TELEPHONE (OUTPATIENT)
Dept: CARDIOLOGY | Age: 69
End: 2019-10-31

## 2019-10-31 ENCOUNTER — OFFICE VISIT (OUTPATIENT)
Dept: CARDIOLOGY | Age: 69
End: 2019-10-31
Payer: MEDICARE

## 2019-10-31 VITALS
HEIGHT: 57 IN | DIASTOLIC BLOOD PRESSURE: 80 MMHG | WEIGHT: 170 LBS | BODY MASS INDEX: 36.68 KG/M2 | HEART RATE: 81 BPM | SYSTOLIC BLOOD PRESSURE: 150 MMHG

## 2019-10-31 DIAGNOSIS — I25.118 CORONARY ARTERY DISEASE OF NATIVE ARTERY OF NATIVE HEART WITH STABLE ANGINA PECTORIS (HCC): Primary | ICD-10-CM

## 2019-10-31 PROCEDURE — 4040F PNEUMOC VAC/ADMIN/RCVD: CPT | Performed by: INTERNAL MEDICINE

## 2019-10-31 PROCEDURE — G8417 CALC BMI ABV UP PARAM F/U: HCPCS | Performed by: INTERNAL MEDICINE

## 2019-10-31 PROCEDURE — 1090F PRES/ABSN URINE INCON ASSESS: CPT | Performed by: INTERNAL MEDICINE

## 2019-10-31 PROCEDURE — G8399 PT W/DXA RESULTS DOCUMENT: HCPCS | Performed by: INTERNAL MEDICINE

## 2019-10-31 PROCEDURE — G8427 DOCREV CUR MEDS BY ELIG CLIN: HCPCS | Performed by: INTERNAL MEDICINE

## 2019-10-31 PROCEDURE — G8598 ASA/ANTIPLAT THER USED: HCPCS | Performed by: INTERNAL MEDICINE

## 2019-10-31 PROCEDURE — 1036F TOBACCO NON-USER: CPT | Performed by: INTERNAL MEDICINE

## 2019-10-31 PROCEDURE — 99214 OFFICE O/P EST MOD 30 MIN: CPT | Performed by: INTERNAL MEDICINE

## 2019-10-31 PROCEDURE — 3017F COLORECTAL CA SCREEN DOC REV: CPT | Performed by: INTERNAL MEDICINE

## 2019-10-31 PROCEDURE — 1123F ACP DISCUSS/DSCN MKR DOCD: CPT | Performed by: INTERNAL MEDICINE

## 2019-10-31 PROCEDURE — 93000 ELECTROCARDIOGRAM COMPLETE: CPT | Performed by: INTERNAL MEDICINE

## 2019-10-31 PROCEDURE — G8482 FLU IMMUNIZE ORDER/ADMIN: HCPCS | Performed by: INTERNAL MEDICINE

## 2019-11-05 ENCOUNTER — OFFICE VISIT (OUTPATIENT)
Dept: FAMILY MEDICINE CLINIC | Age: 69
End: 2019-11-05
Payer: MEDICARE

## 2019-11-05 VITALS
HEIGHT: 57 IN | WEIGHT: 171.96 LBS | BODY MASS INDEX: 37.1 KG/M2 | HEART RATE: 78 BPM | SYSTOLIC BLOOD PRESSURE: 136 MMHG | DIASTOLIC BLOOD PRESSURE: 76 MMHG | RESPIRATION RATE: 16 BRPM

## 2019-11-05 VITALS
DIASTOLIC BLOOD PRESSURE: 62 MMHG | HEART RATE: 78 BPM | SYSTOLIC BLOOD PRESSURE: 132 MMHG | BODY MASS INDEX: 37.11 KG/M2 | RESPIRATION RATE: 16 BRPM | HEIGHT: 57 IN | WEIGHT: 172 LBS

## 2019-11-05 DIAGNOSIS — E78.2 MIXED HYPERLIPIDEMIA: ICD-10-CM

## 2019-11-05 DIAGNOSIS — E11.9 TYPE 2 DIABETES MELLITUS WITHOUT COMPLICATION, WITHOUT LONG-TERM CURRENT USE OF INSULIN (HCC): Primary | ICD-10-CM

## 2019-11-05 DIAGNOSIS — Z00.00 ROUTINE GENERAL MEDICAL EXAMINATION AT A HEALTH CARE FACILITY: Primary | ICD-10-CM

## 2019-11-05 DIAGNOSIS — E55.9 VITAMIN D DEFICIENCY: ICD-10-CM

## 2019-11-05 DIAGNOSIS — I10 ESSENTIAL HYPERTENSION: ICD-10-CM

## 2019-11-05 PROCEDURE — G8482 FLU IMMUNIZE ORDER/ADMIN: HCPCS | Performed by: FAMILY MEDICINE

## 2019-11-05 PROCEDURE — 1036F TOBACCO NON-USER: CPT | Performed by: FAMILY MEDICINE

## 2019-11-05 PROCEDURE — G8427 DOCREV CUR MEDS BY ELIG CLIN: HCPCS | Performed by: FAMILY MEDICINE

## 2019-11-05 PROCEDURE — G8598 ASA/ANTIPLAT THER USED: HCPCS | Performed by: FAMILY MEDICINE

## 2019-11-05 PROCEDURE — 3017F COLORECTAL CA SCREEN DOC REV: CPT | Performed by: FAMILY MEDICINE

## 2019-11-05 PROCEDURE — 99214 OFFICE O/P EST MOD 30 MIN: CPT | Performed by: FAMILY MEDICINE

## 2019-11-05 PROCEDURE — 4040F PNEUMOC VAC/ADMIN/RCVD: CPT | Performed by: FAMILY MEDICINE

## 2019-11-05 PROCEDURE — G8417 CALC BMI ABV UP PARAM F/U: HCPCS | Performed by: FAMILY MEDICINE

## 2019-11-05 PROCEDURE — G8399 PT W/DXA RESULTS DOCUMENT: HCPCS | Performed by: FAMILY MEDICINE

## 2019-11-05 PROCEDURE — G0439 PPPS, SUBSEQ VISIT: HCPCS | Performed by: FAMILY MEDICINE

## 2019-11-05 PROCEDURE — 2022F DILAT RTA XM EVC RTNOPTHY: CPT | Performed by: FAMILY MEDICINE

## 2019-11-05 PROCEDURE — 1090F PRES/ABSN URINE INCON ASSESS: CPT | Performed by: FAMILY MEDICINE

## 2019-11-05 PROCEDURE — 1123F ACP DISCUSS/DSCN MKR DOCD: CPT | Performed by: FAMILY MEDICINE

## 2019-11-05 RX ORDER — GLIMEPIRIDE 1 MG/1
TABLET ORAL
Qty: 135 TABLET | Refills: 1 | Status: SHIPPED | OUTPATIENT
Start: 2019-11-05 | End: 2020-05-06 | Stop reason: SDUPTHER

## 2019-11-05 RX ORDER — ROSUVASTATIN CALCIUM 40 MG/1
40 TABLET, COATED ORAL DAILY
Qty: 30 TABLET | Refills: 1 | Status: SHIPPED | OUTPATIENT
Start: 2019-11-05 | End: 2020-01-07 | Stop reason: SINTOL

## 2019-11-05 RX ORDER — AMLODIPINE BESYLATE 10 MG/1
10 TABLET ORAL DAILY
Qty: 90 TABLET | Refills: 1 | Status: SHIPPED | OUTPATIENT
Start: 2019-11-05 | End: 2020-05-06 | Stop reason: SDUPTHER

## 2019-11-05 RX ORDER — SIMVASTATIN 40 MG
60 TABLET ORAL NIGHTLY
Qty: 135 TABLET | Refills: 1 | Status: CANCELLED | OUTPATIENT
Start: 2019-11-05

## 2019-11-05 ASSESSMENT — PATIENT HEALTH QUESTIONNAIRE - PHQ9
SUM OF ALL RESPONSES TO PHQ QUESTIONS 1-9: 0
SUM OF ALL RESPONSES TO PHQ QUESTIONS 1-9: 0
SUM OF ALL RESPONSES TO PHQ9 QUESTIONS 1 & 2: 0
SUM OF ALL RESPONSES TO PHQ QUESTIONS 1-9: 0
SUM OF ALL RESPONSES TO PHQ QUESTIONS 1-9: 0
2. FEELING DOWN, DEPRESSED OR HOPELESS: 0
1. LITTLE INTEREST OR PLEASURE IN DOING THINGS: 0

## 2019-11-11 ENCOUNTER — NURSE ONLY (OUTPATIENT)
Dept: LAB | Age: 69
End: 2019-11-11
Payer: MEDICARE

## 2019-11-11 ENCOUNTER — TELEPHONE (OUTPATIENT)
Dept: FAMILY MEDICINE CLINIC | Age: 69
End: 2019-11-11

## 2019-11-11 DIAGNOSIS — E53.8 B12 DEFICIENCY: Primary | ICD-10-CM

## 2019-11-11 DIAGNOSIS — E78.2 MIXED HYPERLIPIDEMIA: Primary | ICD-10-CM

## 2019-11-11 PROCEDURE — 96372 THER/PROPH/DIAG INJ SC/IM: CPT | Performed by: FAMILY MEDICINE

## 2019-11-11 RX ADMIN — CYANOCOBALAMIN 1000 MCG: 1000 INJECTION, SOLUTION INTRAMUSCULAR; SUBCUTANEOUS at 10:47

## 2019-12-11 ENCOUNTER — NURSE ONLY (OUTPATIENT)
Dept: LAB | Age: 69
End: 2019-12-11
Payer: MEDICARE

## 2019-12-11 DIAGNOSIS — E53.8 B12 DEFICIENCY: Primary | ICD-10-CM

## 2019-12-11 PROBLEM — G63 POLYNEUROPATHY ASSOCIATED WITH UNDERLYING DISEASE (HCC): Status: RESOLVED | Noted: 2018-03-20 | Resolved: 2019-12-11

## 2019-12-11 PROBLEM — H26.492 POSTERIOR CAPSULAR OPACIFICATION VISUALLY SIGNIFICANT, LEFT: Status: RESOLVED | Noted: 2018-07-16 | Resolved: 2019-12-11

## 2019-12-11 PROBLEM — Z95.5 S/P DRUG ELUTING CORONARY STENT PLACEMENT: Status: RESOLVED | Noted: 2017-06-20 | Resolved: 2019-12-11

## 2019-12-11 PROCEDURE — 96372 THER/PROPH/DIAG INJ SC/IM: CPT | Performed by: FAMILY MEDICINE

## 2019-12-11 RX ADMIN — CYANOCOBALAMIN 1000 MCG: 1000 INJECTION, SOLUTION INTRAMUSCULAR; SUBCUTANEOUS at 16:15

## 2019-12-29 ENCOUNTER — HOSPITAL ENCOUNTER (EMERGENCY)
Age: 69
Discharge: HOME | End: 2019-12-29
Attending: SPECIALIST
Payer: MEDICARE

## 2019-12-29 VITALS
DIASTOLIC BLOOD PRESSURE: 65 MMHG | SYSTOLIC BLOOD PRESSURE: 152 MMHG | HEART RATE: 85 BPM | RESPIRATION RATE: 16 BRPM | TEMPERATURE: 97.8 F | OXYGEN SATURATION: 98 %

## 2019-12-29 LAB
-: NORMAL
ABSOLUTE EOS #: 0.11 K/UL (ref 0–0.4)
ABSOLUTE IMMATURE GRANULOCYTE: 0 K/UL (ref 0–0.3)
ABSOLUTE LYMPH #: 5.6 K/UL (ref 1–4.8)
ABSOLUTE MONO #: 0.45 K/UL (ref 0.1–1.2)
AMORPHOUS: NORMAL
ANION GAP SERPL CALCULATED.3IONS-SCNC: 15 MMOL/L (ref 9–17)
BACTERIA: NORMAL
BASOPHILS # BLD: 0 % (ref 0–1)
BASOPHILS ABSOLUTE: 0 K/UL (ref 0–0.2)
BILIRUBIN URINE: NEGATIVE
BUN BLDV-MCNC: 12 MG/DL (ref 8–23)
BUN/CREAT BLD: 13 (ref 9–20)
CALCIUM SERPL-MCNC: 9.6 MG/DL (ref 8.6–10.4)
CASTS UA: NORMAL /LPF (ref 0–2)
CHLORIDE BLD-SCNC: 102 MMOL/L (ref 98–107)
CO2: 25 MMOL/L (ref 20–31)
COLOR: ABNORMAL
COMMENT UA: ABNORMAL
CREAT SERPL-MCNC: 0.91 MG/DL (ref 0.5–0.9)
CRYSTALS, UA: NORMAL /HPF
DIFFERENTIAL TYPE: ABNORMAL
EOSINOPHILS RELATIVE PERCENT: 1 % (ref 1–7)
EPITHELIAL CELLS UA: NORMAL /HPF (ref 0–5)
GFR AFRICAN AMERICAN: >60 ML/MIN
GFR NON-AFRICAN AMERICAN: >60 ML/MIN
GFR SERPL CREATININE-BSD FRML MDRD: ABNORMAL ML/MIN/{1.73_M2}
GFR SERPL CREATININE-BSD FRML MDRD: ABNORMAL ML/MIN/{1.73_M2}
GLUCOSE BLD-MCNC: 207 MG/DL (ref 70–99)
GLUCOSE URINE: ABNORMAL
HCT VFR BLD CALC: 38.7 % (ref 36.3–47.1)
HEMOGLOBIN: 12.8 G/DL (ref 11.9–15.1)
IMMATURE GRANULOCYTES: 0 %
KETONES, URINE: NEGATIVE
LEUKOCYTE ESTERASE, URINE: NEGATIVE
LYMPHOCYTES # BLD: 50 % (ref 16–46)
MCH RBC QN AUTO: 25.9 PG (ref 25.2–33.5)
MCHC RBC AUTO-ENTMCNC: 33.1 G/DL (ref 25.2–33.5)
MCV RBC AUTO: 78.3 FL (ref 82.6–102.9)
MONOCYTES # BLD: 4 % (ref 4–11)
MORPHOLOGY: ABNORMAL
MUCUS: NORMAL
NITRITE, URINE: NEGATIVE
NRBC AUTOMATED: 0 PER 100 WBC
OTHER OBSERVATIONS UA: NORMAL
PDW BLD-RTO: 14.2 % (ref 11.8–14.4)
PH UA: 7 (ref 5–6)
PLATELET # BLD: 250 K/UL (ref 138–453)
PLATELET ESTIMATE: ABNORMAL
PMV BLD AUTO: 11.1 FL (ref 8.1–13.5)
POC OCCULT BLOOD, FECAL: NEGATIVE
POTASSIUM SERPL-SCNC: 4 MMOL/L (ref 3.7–5.3)
PROTEIN UA: NEGATIVE
RBC # BLD: 4.94 M/UL (ref 3.95–5.11)
RBC # BLD: ABNORMAL 10*6/UL
RBC UA: NORMAL /HPF (ref 0–4)
RENAL EPITHELIAL, UA: NORMAL /HPF
SEG NEUTROPHILS: 45 % (ref 43–77)
SEGMENTED NEUTROPHILS ABSOLUTE COUNT: 5.04 K/UL (ref 1.8–7.7)
SODIUM BLD-SCNC: 142 MMOL/L (ref 135–144)
SPECIFIC GRAVITY UA: 1.01 (ref 1.01–1.02)
TRICHOMONAS: NORMAL
TURBIDITY: ABNORMAL
URINE HGB: NEGATIVE
UROBILINOGEN, URINE: NORMAL
WBC # BLD: 11.2 K/UL (ref 3.5–11.3)
WBC # BLD: ABNORMAL 10*3/UL
WBC UA: NORMAL /HPF (ref 0–4)
YEAST: NORMAL

## 2019-12-29 PROCEDURE — 96374 THER/PROPH/DIAG INJ IV PUSH: CPT

## 2019-12-29 PROCEDURE — 81001 URINALYSIS AUTO W/SCOPE: CPT

## 2019-12-29 PROCEDURE — 85025 COMPLETE CBC W/AUTO DIFF WBC: CPT

## 2019-12-29 PROCEDURE — 2580000003 HC RX 258: Performed by: SPECIALIST

## 2019-12-29 PROCEDURE — 6360000002 HC RX W HCPCS: Performed by: SPECIALIST

## 2019-12-29 PROCEDURE — 99284 EMERGENCY DEPT VISIT MOD MDM: CPT

## 2019-12-29 PROCEDURE — 80048 BASIC METABOLIC PNL TOTAL CA: CPT

## 2019-12-29 PROCEDURE — 96375 TX/PRO/DX INJ NEW DRUG ADDON: CPT

## 2019-12-29 PROCEDURE — C9113 INJ PANTOPRAZOLE SODIUM, VIA: HCPCS | Performed by: SPECIALIST

## 2019-12-29 PROCEDURE — 82272 OCCULT BLD FECES 1-3 TESTS: CPT

## 2019-12-29 RX ORDER — SODIUM CHLORIDE 9 MG/ML
INJECTION, SOLUTION INTRAVENOUS CONTINUOUS
Status: DISCONTINUED | OUTPATIENT
Start: 2019-12-29 | End: 2019-12-29 | Stop reason: HOSPADM

## 2019-12-29 RX ORDER — SODIUM CHLORIDE 9 MG/ML
10 INJECTION INTRAVENOUS ONCE
Status: COMPLETED | OUTPATIENT
Start: 2019-12-29 | End: 2019-12-29

## 2019-12-29 RX ORDER — PANTOPRAZOLE SODIUM 40 MG/10ML
40 INJECTION, POWDER, LYOPHILIZED, FOR SOLUTION INTRAVENOUS ONCE
Status: COMPLETED | OUTPATIENT
Start: 2019-12-29 | End: 2019-12-29

## 2019-12-29 RX ORDER — ONDANSETRON 4 MG/1
4 TABLET, ORALLY DISINTEGRATING ORAL EVERY 8 HOURS PRN
Qty: 12 TABLET | Refills: 0 | Status: SHIPPED | OUTPATIENT
Start: 2019-12-29 | End: 2020-05-06

## 2019-12-29 RX ORDER — PANTOPRAZOLE SODIUM 20 MG/1
40 TABLET, DELAYED RELEASE ORAL DAILY
Qty: 20 TABLET | Refills: 0 | Status: ON HOLD | OUTPATIENT
Start: 2019-12-29 | End: 2020-03-05

## 2019-12-29 RX ORDER — ONDANSETRON 2 MG/ML
4 INJECTION INTRAMUSCULAR; INTRAVENOUS ONCE
Status: COMPLETED | OUTPATIENT
Start: 2019-12-29 | End: 2019-12-29

## 2019-12-29 RX ORDER — 0.9 % SODIUM CHLORIDE 0.9 %
1000 INTRAVENOUS SOLUTION INTRAVENOUS ONCE
Status: COMPLETED | OUTPATIENT
Start: 2019-12-29 | End: 2019-12-29

## 2019-12-29 RX ADMIN — ONDANSETRON 4 MG: 2 INJECTION INTRAMUSCULAR; INTRAVENOUS at 01:16

## 2019-12-29 RX ADMIN — SODIUM CHLORIDE 1000 ML: 9 INJECTION, SOLUTION INTRAVENOUS at 01:17

## 2019-12-29 RX ADMIN — PANTOPRAZOLE SODIUM 40 MG: 40 INJECTION, POWDER, FOR SOLUTION INTRAVENOUS at 01:16

## 2019-12-29 RX ADMIN — SODIUM CHLORIDE 100 ML/HR: 9 INJECTION, SOLUTION INTRAVENOUS at 01:17

## 2019-12-29 RX ADMIN — Medication 10 ML: at 01:25

## 2019-12-29 ASSESSMENT — ENCOUNTER SYMPTOMS
CONSTIPATION: 1
BLOOD IN STOOL: 0
COUGH: 0
VOMITING: 1
CHEST TIGHTNESS: 0
BACK PAIN: 0
SHORTNESS OF BREATH: 0
ABDOMINAL PAIN: 1
NAUSEA: 1

## 2019-12-29 NOTE — ED PROVIDER NOTES
[address]      Pt Name: Davide Spicer  MRN: [id number]  [place] 1950  Date of evaluation: 12/29/2019      CHIEF COMPLAINT       Chief Complaint   Patient presents with    Emesis     once tonight         HISTORY OF PRESENT ILLNESS    Davide Spicer is a 78 y.o. female who presents to the emergency department complaining of mid abdominal discomfort associated with nausea and vomiting. Patient started having some stomach ache at about 9:30 PM for which she ate some peppermint to calm her stomach down and was able to go to sleep. She slept for 2 hours and woke up to go to the bathroom to urinate at about 11:45 PM when she felt nauseated and vomited twice which contained small amount of blood in the vomitus. She felt that it was acid reflux causing the nausea and vomiting. Abdominal discomfort has significantly decreased since then. She feels like she has been constipated although her last bowel movement was yesterday. She denies any melena or hematochezia. She denies any lightheadedness, dizziness, chest pain, shortness of breath, palpitations or diaphoresis. She takes baby aspirin daily. She used to be on Plavix in the past because of history of coronary artery disease and a coronary stent placement but Plavix has been discontinued 2 weeks ago. She does not take any NSAIDs, prednisone or does not drink any alcoholic beverages. She denies any history of peptic ulcer disease or gastritis. There are no exacerbating or relieving factors and patient has not taken any medications for the above symptoms. REVIEW OF SYSTEMS       Review of Systems   Constitutional: Negative for chills, diaphoresis and fever. Respiratory: Negative for cough, chest tightness and shortness of breath. Cardiovascular: Negative for chest pain, palpitations and leg swelling. Gastrointestinal: Positive for abdominal pain, constipation, nausea and vomiting. meals), Disp-180 tablet, R-1Normal      rosuvastatin (CRESTOR) 40 MG tablet Take 1 tablet by mouth daily, Disp-30 tablet, R-1Normal      simvastatin (ZOCOR) 40 MG tablet Take 1.5 tablets by mouth nightly, Disp-135 tablet, R-1Normal      fluticasone (FLONASE) 50 MCG/ACT nasal spray 1 spray by Nasal route daily, Disp-1 Bottle, R-0Normal      isosorbide mononitrate (IMDUR) 30 MG extended release tablet TAKE 1/2 (ONE-HALF) TABLET BY MOUTH ONCE DAILY, Disp-45 tablet, R-3Please consider 90 day supplies to promote better adherenceNormal      Multiple Vitamins-Minerals (MULTIVITAMIN PO) Take 1 tablet by mouth dailyHistorical Med      nitroGLYCERIN (NITROSTAT) 0.4 MG SL tablet Place 1 tablet under the tongue every 5 minutes as needed for Chest pain up to max of 3 total doses. If no relief after 1 dose, call 911., Disp-25 tablet, R-0Normal      NONFORMULARY 5,000 mcg daily OTC Biotin 1000mcg daily Historical Med      aspirin 81 MG tablet Take 81 mg by mouth daily. ALLERGIES     is allergic to iv dye [iodides]; sulfa antibiotics; ace inhibitors; cozaar [losartan]; ezetimibe-simvastatin; lipitor; lopressor [metoprolol]; and penicillins. FAMILY HISTORY     She indicated that her mother is . She indicated that the status of her maternal grandfather is unknown. She indicated that the status of her paternal grandmother is unknown.     family history includes Diabetes in her brother, brother, brother, maternal grandfather, mother, sister, and sister; Heart Attack in her maternal grandfather and paternal grandmother; Hypertension in her mother. SOCIAL HISTORY      reports that she has never smoked. She has never used smokeless tobacco. She reports that she does not drink alcohol or use drugs. PHYSICAL EXAM     INITIAL VITALS:  tympanic temperature is 97.8 °F (36.6 °C). Her blood pressure is 152/65 (abnormal) and her pulse is 85. Her respiration is 16 and oxygen saturation is 98%.       Physical Exam  Vitals signs and nursing note reviewed. Constitutional:       Appearance: She is well-developed. HENT:      Head: Normocephalic and atraumatic. Nose: Nose normal.   Eyes:      Pupils: Pupils are equal, round, and reactive to light. Neck:      Musculoskeletal: Normal range of motion and neck supple. Cardiovascular:      Rate and Rhythm: Normal rate and regular rhythm. Heart sounds: Normal heart sounds. No murmur. Pulmonary:      Effort: Pulmonary effort is normal. No respiratory distress. Breath sounds: Normal breath sounds. Abdominal:      General: Bowel sounds are normal. There is no distension or abdominal bruit. Palpations: Abdomen is soft. There is no pulsatile mass. Tenderness: There is no tenderness. Skin:     General: Skin is warm and dry. Neurological:      General: No focal deficit present. Mental Status: She is alert and oriented to person, place, and time. DIFFERENTIAL DIAGNOSIS/ MDM:     Gastritis, peptic ulcer disease, Malika-Melgar tear, esophageal varices    DIAGNOSTIC RESULTS     EKG: All EKG's are interpreted by the Emergency Department Physician who either signs or Co-signs this chart in the absence of a cardiologist.    None obtained      RADIOLOGY:   I directly visualized the following  images and reviewed the radiologist interpretations:    No results found.            ED BEDSIDE ULTRASOUND:       LABS:  Labs Reviewed   CBC WITH AUTO DIFFERENTIAL - Abnormal; Notable for the following components:       Result Value    MCV 78.3 (*)     Lymphocytes 50 (*)     Absolute Lymph # 5.60 (*)     All other components within normal limits   BASIC METABOLIC PANEL W/ REFLEX TO MG FOR LOW K - Abnormal; Notable for the following components:    Glucose 207 (*)     CREATININE 0.91 (*)     All other components within normal limits   URINE RT REFLEX TO CULTURE - Abnormal; Notable for the following components:    Glucose, Ur 1+ (*)     pH, UA 7.0 (*) All other components within normal limits   MICROSCOPIC URINALYSIS   POC FECAL OCCULT BLOOD       I reviewed all the lab results, patient has blood sugar of 207 and creatinine 0.91, rest of the labs are within normal range    EMERGENCY DEPARTMENT COURSE:   Vitals:    Vitals:    12/29/19 0035 12/29/19 0044 12/29/19 0047 12/29/19 0050   BP: (!) 167/77 (!) 143/64 (!) 157/70 (!) 152/65   Pulse:       Resp:       Temp:       TempSrc:       SpO2:         -------------------------  BP: (!) 152/65, Temp: 97.8 °F (36.6 °C), Pulse: 85, Resp: 16    Orders Placed This Encounter   Medications    0.9 % sodium chloride bolus    0.9 % sodium chloride infusion    AND Linked Order Group     pantoprazole (PROTONIX) injection 40 mg     sodium chloride (PF) 0.9 % injection 10 mL    ondansetron (ZOFRAN) injection 4 mg    ondansetron (ZOFRAN ODT) 4 MG disintegrating tablet     Sig: Take 1 tablet by mouth every 8 hours as needed for Nausea     Dispense:  12 tablet     Refill:  0    pantoprazole (PROTONIX) 20 MG tablet     Sig: Take 2 tablets by mouth daily     Dispense:  20 tablet     Refill:  0       During the emergency department course, patient was given normal saline bolus followed by infusion as well as Zofran 4 mg IV push for the nausea and Protonix 40 mg slow IV push. Patient is feeling much better and her nausea is resolved. She continues to be hemodynamically stable throughout the ED course. She declined a rectal examination and give a stool specimen which is Hemoccult negative. She can be worked up as an outpatient. Plan is to discharge the patient with instructions to take Zofran ODT as needed for the nausea, Protonix 40 mg a day, follow-up with primary care physician as well as general surgeon Dr. Daniels Left whom she has seen before, she is advised to call tomorrow morning for appointment, return if worse. I have reviewed the disposition diagnosis with the patient and or their family/guardian.  I have answered their questions and given discharge instructions. They voiced understanding of these instructions and did not have any further questions or complaints. Re-evaluation Notes    Patient is feeling much better and resting comfortably. Her symptoms are all resolved. CRITICAL CARE:   None        CONSULTS:      PROCEDURES:  None    FINAL IMPRESSION      1. Upper gastrointestinal bleeding          DISPOSITION/PLAN   DISPOSITION Decision To Discharge    Condition on Disposition    Stable    PATIENT REFERRED TO:  Romel Isabel MD  [address] [place]  [phone]    Call in 1 day  For reevaluation of current symptoms    Glory Fang MD  [address] [facility]  [phone]    Call in 1 day  For reevaluation of current symptoms    [address] [facility] [address]. [phone]    If symptoms worsen      DISCHARGE MEDICATIONS:  Discharge Medication List as of 12/29/2019  2:18 AM      START taking these medications    Details   ondansetron (ZOFRAN ODT) 4 MG disintegrating tablet Take 1 tablet by mouth every 8 hours as needed for Nausea, Disp-12 tablet, R-0Print      pantoprazole (PROTONIX) 20 MG tablet Take 2 tablets by mouth daily, Disp-20 tablet, R-0Print             (Please note that portions of this note were completed with a voice recognition program.  Efforts were made to edit the dictations but occasionally words are mis-transcribed.)    Torres MD, F.A.C.E.P.   Attending Emergency Physician                         Johnny Moe MD  12/29/19 0012

## 2020-01-02 ENCOUNTER — OFFICE VISIT (OUTPATIENT)
Dept: FAMILY MEDICINE CLINIC | Age: 70
End: 2020-01-02
Payer: MEDICARE

## 2020-01-02 ENCOUNTER — HOSPITAL ENCOUNTER (OUTPATIENT)
Dept: LAB | Age: 70
Discharge: HOME | End: 2020-01-02
Payer: MEDICARE

## 2020-01-02 VITALS
SYSTOLIC BLOOD PRESSURE: 132 MMHG | WEIGHT: 171.2 LBS | HEART RATE: 86 BPM | HEIGHT: 57 IN | DIASTOLIC BLOOD PRESSURE: 80 MMHG | RESPIRATION RATE: 16 BRPM | BODY MASS INDEX: 36.93 KG/M2

## 2020-01-02 LAB
ABSOLUTE EOS #: 0.13 K/UL (ref 0–0.44)
ABSOLUTE IMMATURE GRANULOCYTE: <0.03 K/UL (ref 0–0.3)
ABSOLUTE LYMPH #: 3.88 K/UL (ref 1.1–3.7)
ABSOLUTE MONO #: 0.61 K/UL (ref 0.1–1.2)
ALBUMIN SERPL-MCNC: 4.9 G/DL (ref 3.5–5.2)
ALBUMIN/GLOBULIN RATIO: 1.7 (ref 1–2.5)
ALP BLD-CCNC: 105 U/L (ref 35–104)
ALT SERPL-CCNC: 30 U/L (ref 5–33)
ANION GAP SERPL CALCULATED.3IONS-SCNC: 15 MMOL/L (ref 9–17)
AST SERPL-CCNC: 22 U/L
BASOPHILS # BLD: 0 % (ref 0–2)
BASOPHILS ABSOLUTE: 0.04 K/UL (ref 0–0.2)
BILIRUB SERPL-MCNC: 0.28 MG/DL (ref 0.3–1.2)
BUN BLDV-MCNC: 15 MG/DL (ref 8–23)
BUN/CREAT BLD: 21 (ref 9–20)
CALCIUM SERPL-MCNC: 9.8 MG/DL (ref 8.6–10.4)
CHLORIDE BLD-SCNC: 99 MMOL/L (ref 98–107)
CO2: 26 MMOL/L (ref 20–31)
CREAT SERPL-MCNC: 0.7 MG/DL (ref 0.5–0.9)
DIFFERENTIAL TYPE: ABNORMAL
EOSINOPHILS RELATIVE PERCENT: 1 % (ref 1–4)
GFR AFRICAN AMERICAN: >60 ML/MIN
GFR NON-AFRICAN AMERICAN: >60 ML/MIN
GFR SERPL CREATININE-BSD FRML MDRD: ABNORMAL ML/MIN/{1.73_M2}
GFR SERPL CREATININE-BSD FRML MDRD: ABNORMAL ML/MIN/{1.73_M2}
GLUCOSE BLD-MCNC: 124 MG/DL (ref 70–99)
HCT VFR BLD CALC: 39.6 % (ref 36.3–47.1)
HEMOGLOBIN: 12.9 G/DL (ref 11.9–15.1)
IMMATURE GRANULOCYTES: 0 %
LYMPHOCYTES # BLD: 39 % (ref 24–43)
MCH RBC QN AUTO: 25.9 PG (ref 25.2–33.5)
MCHC RBC AUTO-ENTMCNC: 32.6 G/DL (ref 25.2–33.5)
MCV RBC AUTO: 79.5 FL (ref 82.6–102.9)
MONOCYTES # BLD: 6 % (ref 3–12)
NRBC AUTOMATED: 0 PER 100 WBC
PDW BLD-RTO: 14.4 % (ref 11.8–14.4)
PLATELET # BLD: 260 K/UL (ref 138–453)
PLATELET ESTIMATE: ABNORMAL
PMV BLD AUTO: 11.2 FL (ref 8.1–13.5)
POTASSIUM SERPL-SCNC: 3.6 MMOL/L (ref 3.7–5.3)
RBC # BLD: 4.98 M/UL (ref 3.95–5.11)
RBC # BLD: ABNORMAL 10*6/UL
SEG NEUTROPHILS: 53 % (ref 36–65)
SEGMENTED NEUTROPHILS ABSOLUTE COUNT: 5.21 K/UL (ref 1.5–8.1)
SODIUM BLD-SCNC: 140 MMOL/L (ref 135–144)
THYROXINE, FREE: 1.19 NG/DL (ref 0.93–1.7)
TOTAL PROTEIN: 7.8 G/DL (ref 6.4–8.3)
TSH SERPL DL<=0.05 MIU/L-ACNC: 1.82 MIU/L (ref 0.3–5)
WBC # BLD: 9.9 K/UL (ref 3.5–11.3)
WBC # BLD: ABNORMAL 10*3/UL

## 2020-01-02 PROCEDURE — G8427 DOCREV CUR MEDS BY ELIG CLIN: HCPCS | Performed by: FAMILY MEDICINE

## 2020-01-02 PROCEDURE — 80053 COMPREHEN METABOLIC PANEL: CPT

## 2020-01-02 PROCEDURE — 84439 ASSAY OF FREE THYROXINE: CPT

## 2020-01-02 PROCEDURE — 1090F PRES/ABSN URINE INCON ASSESS: CPT | Performed by: FAMILY MEDICINE

## 2020-01-02 PROCEDURE — 99214 OFFICE O/P EST MOD 30 MIN: CPT | Performed by: FAMILY MEDICINE

## 2020-01-02 PROCEDURE — 85025 COMPLETE CBC W/AUTO DIFF WBC: CPT

## 2020-01-02 PROCEDURE — 84443 ASSAY THYROID STIM HORMONE: CPT

## 2020-01-02 PROCEDURE — G8399 PT W/DXA RESULTS DOCUMENT: HCPCS | Performed by: FAMILY MEDICINE

## 2020-01-02 PROCEDURE — 36415 COLL VENOUS BLD VENIPUNCTURE: CPT

## 2020-01-02 PROCEDURE — 1123F ACP DISCUSS/DSCN MKR DOCD: CPT | Performed by: FAMILY MEDICINE

## 2020-01-02 PROCEDURE — 1036F TOBACCO NON-USER: CPT | Performed by: FAMILY MEDICINE

## 2020-01-02 PROCEDURE — G8417 CALC BMI ABV UP PARAM F/U: HCPCS | Performed by: FAMILY MEDICINE

## 2020-01-02 PROCEDURE — 3017F COLORECTAL CA SCREEN DOC REV: CPT | Performed by: FAMILY MEDICINE

## 2020-01-02 PROCEDURE — G8482 FLU IMMUNIZE ORDER/ADMIN: HCPCS | Performed by: FAMILY MEDICINE

## 2020-01-02 PROCEDURE — 4040F PNEUMOC VAC/ADMIN/RCVD: CPT | Performed by: FAMILY MEDICINE

## 2020-01-02 NOTE — PROGRESS NOTES
0.50 - 0.90 mg/dL Final    Bun/Cre Ratio 12/29/2019 13  9 - 20 Final    Calcium 12/29/2019 9.6  8.6 - 10.4 mg/dL Final    Sodium 12/29/2019 142  135 - 144 mmol/L Final    Potassium 12/29/2019 4.0  3.7 - 5.3 mmol/L Final    Chloride 12/29/2019 102  98 - 107 mmol/L Final    CO2 12/29/2019 25  20 - 31 mmol/L Final    Anion Gap 12/29/2019 15  9 - 17 mmol/L Final    GFR Non- 12/29/2019 >60  >60 mL/min Final    GFR  12/29/2019 >60  >60 mL/min Final    GFR Comment 12/29/2019        Final    Comment: Average GFR for 79or more years old:   76 mL/min/1.73sq m  Chronic Kidney Disease:   <60 mL/min/1.73sq m  Kidney failure:   <15 mL/min/1.73sq m        The equation has not been validated in patients older than 79, but an MDRD-derived eGFR may   still be a useful tool for providers caring for patients older than 70. GFR is a calculated value that has proven clinically to  be a more effective measure of   kidney function when reported with serum creatinine.             GFR Staging 12/29/2019 NOT REPORTED   Final    Color, UA 12/29/2019 NOT REPORTED  YELLOW Final    Turbidity UA 12/29/2019 NOT REPORTED  CLEAR Final    Glucose, Ur 12/29/2019 1+* NEGATIVE Final    Bilirubin Urine 12/29/2019 NEGATIVE  NEGATIVE Final    Ketones, Urine 12/29/2019 NEGATIVE  NEGATIVE Final    Specific Gravity, UA 12/29/2019 1.010  1.010 - 1.025 Final    Urine Hgb 12/29/2019 NEGATIVE  NEGATIVE Final    pH, UA 12/29/2019 7.0* 5.0 - 6.0 Final    Protein, UA 12/29/2019 NEGATIVE  NEGATIVE Final    Urobilinogen, Urine 12/29/2019 Normal  Normal Final    Nitrite, Urine 12/29/2019 NEGATIVE  NEGATIVE Final    Leukocyte Esterase, Urine 12/29/2019 NEGATIVE  NEGATIVE Final    Urinalysis Comments 12/29/2019 NOT REPORTED   Final    - 12/29/2019        Final    WBC, UA 12/29/2019 None  0 - 4 /HPF Final    RBC, UA 12/29/2019 0 TO 4  0 - 4 /HPF Final    Casts UA 12/29/2019 NOT REPORTED  0 - 2 /LPF Final    Crystals UA 12/29/2019 NOT REPORTED  None /HPF Final    Epithelial Cells UA 12/29/2019 0 TO 4  0 - 5 /HPF Final    Renal Epithelial, Urine 12/29/2019 NOT REPORTED  0 /HPF Final    Bacteria, UA 12/29/2019 None  None Final    Mucus, UA 12/29/2019 NOT REPORTED  None Final    Trichomonas, UA 12/29/2019 NOT REPORTED  None Final    Amorphous, UA 12/29/2019 NOT REPORTED  None Final    Other Observations UA 12/29/2019 NOT REPORTED  NOT REQ. Final    Yeast, UA 12/29/2019 NOT REPORTED  None Final    POC Occult Blood, Fecal 12/29/2019 NEGATIVE  NEGATIVE Final         Assessment and Plan:    1. Upper GI bleed  - Ambulatory referral to General Surgery    2. Fatigue, unspecified type  Labs were ordered to be done today:  - CBC Auto Differential; Future  - Comprehensive Metabolic Panel; Future  - TSH without Reflex; Future  - T4, Free; Future    She will be contacted when the results are available.       (Please note that portions of this note were completed with a voice-recognition program. Efforts were made to edit the dictation but occasionally words are mis-transcribed.)

## 2020-01-07 ENCOUNTER — TELEPHONE (OUTPATIENT)
Dept: FAMILY MEDICINE CLINIC | Age: 70
End: 2020-01-07

## 2020-01-07 RX ORDER — PRAVASTATIN SODIUM 20 MG
20 TABLET ORAL DAILY
Qty: 30 TABLET | Refills: 5 | Status: SHIPPED | OUTPATIENT
Start: 2020-01-07 | End: 2020-05-06 | Stop reason: SDUPTHER

## 2020-01-07 NOTE — TELEPHONE ENCOUNTER
Patient has questions in regards to new cholesterol medication and would like to speak to RR's Nurse.

## 2020-01-10 ENCOUNTER — NURSE ONLY (OUTPATIENT)
Dept: LAB | Age: 70
End: 2020-01-10
Payer: MEDICARE

## 2020-01-10 PROCEDURE — 96372 THER/PROPH/DIAG INJ SC/IM: CPT

## 2020-01-10 RX ADMIN — CYANOCOBALAMIN 1000 MCG: 1000 INJECTION, SOLUTION INTRAMUSCULAR; SUBCUTANEOUS at 16:36

## 2020-01-15 ENCOUNTER — INITIAL CONSULT (OUTPATIENT)
Dept: SURGERY | Age: 70
End: 2020-01-15
Payer: MEDICARE

## 2020-01-15 ENCOUNTER — TELEPHONE (OUTPATIENT)
Dept: SURGERY | Age: 70
End: 2020-01-15

## 2020-01-15 VITALS
OXYGEN SATURATION: 98 % | TEMPERATURE: 97.7 F | SYSTOLIC BLOOD PRESSURE: 130 MMHG | HEART RATE: 77 BPM | BODY MASS INDEX: 36.24 KG/M2 | WEIGHT: 168 LBS | HEIGHT: 57 IN | DIASTOLIC BLOOD PRESSURE: 70 MMHG

## 2020-01-15 PROCEDURE — G8399 PT W/DXA RESULTS DOCUMENT: HCPCS | Performed by: SURGERY

## 2020-01-15 PROCEDURE — 4040F PNEUMOC VAC/ADMIN/RCVD: CPT | Performed by: SURGERY

## 2020-01-15 PROCEDURE — 1123F ACP DISCUSS/DSCN MKR DOCD: CPT | Performed by: SURGERY

## 2020-01-15 PROCEDURE — G8482 FLU IMMUNIZE ORDER/ADMIN: HCPCS | Performed by: SURGERY

## 2020-01-15 PROCEDURE — 1090F PRES/ABSN URINE INCON ASSESS: CPT | Performed by: SURGERY

## 2020-01-15 PROCEDURE — 99203 OFFICE O/P NEW LOW 30 MIN: CPT | Performed by: SURGERY

## 2020-01-15 PROCEDURE — G8427 DOCREV CUR MEDS BY ELIG CLIN: HCPCS | Performed by: SURGERY

## 2020-01-15 PROCEDURE — 99215 OFFICE O/P EST HI 40 MIN: CPT

## 2020-01-15 PROCEDURE — G8417 CALC BMI ABV UP PARAM F/U: HCPCS | Performed by: SURGERY

## 2020-01-15 PROCEDURE — 1036F TOBACCO NON-USER: CPT | Performed by: SURGERY

## 2020-01-15 PROCEDURE — 3017F COLORECTAL CA SCREEN DOC REV: CPT | Performed by: SURGERY

## 2020-01-15 NOTE — PROGRESS NOTES
daily 90 tablet 1    metFORMIN (GLUCOPHAGE) 500 MG tablet Take 1 tablet by mouth 2 times daily (with meals) 180 tablet 1    isosorbide mononitrate (IMDUR) 30 MG extended release tablet TAKE 1/2 (ONE-HALF) TABLET BY MOUTH ONCE DAILY 45 tablet 3    Multiple Vitamins-Minerals (MULTIVITAMIN PO) Take 1 tablet by mouth daily      NONFORMULARY 5,000 mcg daily OTC Biotin 1000mcg daily       aspirin 81 MG tablet Take 81 mg by mouth daily.  ondansetron (ZOFRAN ODT) 4 MG disintegrating tablet Take 1 tablet by mouth every 8 hours as needed for Nausea (Patient not taking: Reported on 1/15/2020) 12 tablet 0    fluticasone (FLONASE) 50 MCG/ACT nasal spray 1 spray by Nasal route daily (Patient not taking: Reported on 1/15/2020) 1 Bottle 0    nitroGLYCERIN (NITROSTAT) 0.4 MG SL tablet Place 1 tablet under the tongue every 5 minutes as needed for Chest pain up to max of 3 total doses. If no relief after 1 dose, call 911. (Patient not taking: Reported on 1/15/2020) 25 tablet 0     Current Facility-Administered Medications   Medication Dose Route Frequency Provider Last Rate Last Dose    cyanocobalamin injection 1,000 mcg  1,000 mcg Intramuscular Q30 Days Nanda Bowels MD Christina   1,000 mcg at 01/10/20 1636    cyanocobalamin injection 2,000 mcg  2,000 mcg Intramuscular Q30 Days Jennifer Seymour MD           Allergies   Allergen Reactions    Iv Dye [Iodides] Shortness Of Breath    Sulfa Antibiotics Shortness Of Breath    Ace Inhibitors Nausea Only     Difficulty swallowing the Lisinopril. Gagging. Nausea.  Cozaar [Losartan] Nausea Only    Ezetimibe-Simvastatin     Lipitor Other (See Comments)     Muscle aches.     Lopressor [Metoprolol]      leg pain    Penicillins Swelling       Family History   Problem Relation Age of Onset    Diabetes Mother     Hypertension Mother     Diabetes Sister     Diabetes Brother     Diabetes Brother     Diabetes Brother     Diabetes Sister     Diabetes Maternal Grandfather     Heart Attack Maternal Grandfather     Heart Attack Paternal Grandmother        Social History     Socioeconomic History    Marital status:      Spouse name: Not on file    Number of children: Not on file    Years of education: Not on file    Highest education level: Not on file   Occupational History    Not on file   Social Needs    Financial resource strain: Not on file    Food insecurity:     Worry: Not on file     Inability: Not on file    Transportation needs:     Medical: Not on file     Non-medical: Not on file   Tobacco Use    Smoking status: Never Smoker    Smokeless tobacco: Never Used    Tobacco comment: Destinyhiro Matt RRT 6/17/17   Substance and Sexual Activity    Alcohol use: No     Alcohol/week: 0.0 standard drinks    Drug use: No    Sexual activity: Not on file   Lifestyle    Physical activity:     Days per week: Not on file     Minutes per session: Not on file    Stress: Not on file   Relationships    Social connections:     Talks on phone: Not on file     Gets together: Not on file     Attends Congregational service: Not on file     Active member of club or organization: Not on file     Attends meetings of clubs or organizations: Not on file     Relationship status: Not on file    Intimate partner violence:     Fear of current or ex partner: Not on file     Emotionally abused: Not on file     Physically abused: Not on file     Forced sexual activity: Not on file   Other Topics Concern    Not on file   Social History Narrative    Not on file       ROS:   Review of Systems - General ROS: negative  Psychological ROS: negative  Ophthalmic ROS: negative  ENT ROS: negative  Allergy and Immunology ROS: negative  Hematological and Lymphatic ROS: negative  Endocrine ROS: negative  Respiratory ROS: no cough, shortness of breath, or wheezing  Cardiovascular ROS: no chest pain or dyspnea on exertion  Gastrointestinal ROS: per HPI  Genito-Urinary ROS: no dysuria, trouble voiding, or

## 2020-01-16 NOTE — TELEPHONE ENCOUNTER
I will defer Aspirin dosing to cardiology. She may hold Metformin for 48 hours prior to the procedure. She may hold Amaryl on the morning of the procedure.

## 2020-01-20 NOTE — TELEPHONE ENCOUNTER
Patient rescheduled per her request for Feb 12th, 2020 for EGD. Surgery center called and advised patient ill and was rescheduled from January 23, 2020 to Feb 12th, 2020 for EGD.

## 2020-01-20 NOTE — TELEPHONE ENCOUNTER
Patient called and states she is coughing with a sore throat and congestion (denies fever) onset x 2 days ago. Patient is scheduled for surgery on 01/23/2020. Please advise if patient is ok to go on with the surgery.

## 2020-01-22 ENCOUNTER — TELEPHONE (OUTPATIENT)
Dept: SURGERY | Age: 70
End: 2020-01-22

## 2020-01-27 RX ORDER — ISOSORBIDE MONONITRATE 30 MG/1
TABLET, EXTENDED RELEASE ORAL
Qty: 45 TABLET | Refills: 3 | Status: SHIPPED | OUTPATIENT
Start: 2020-01-27 | End: 2020-06-12

## 2020-02-07 ENCOUNTER — TELEPHONE (OUTPATIENT)
Dept: SURGERY | Age: 70
End: 2020-02-07

## 2020-02-11 ENCOUNTER — TELEPHONE (OUTPATIENT)
Dept: SURGERY | Age: 70
End: 2020-02-11

## 2020-02-11 NOTE — H&P
Subjective   Aditya Dickey is a 78 y.o. female who presents today for evaluation of recent bout of hematemesis with associated epigastric pain. Patient reports that over the holidays she had a day where she was feeling nauseous and ended up having a couple bouts of emesis and states when she threw up that it was red blood. She came to the ER for evaluation and had work-up that showed no significant abnormalities and lab work are otherwise and was discharged home with instructions to follow-up with her PCP. Since that episode she states that she has had no further episodes of emesis or hematemesis but that she is continued to have some intermittent nausea as well as intermittent epigastric pain. She does not relate this to any specific activities but does state that sometimes when she eats it seems to make this worse. She has been on blood thinners recently due to a previous cardiac stent but has since discontinued those at the instruction of her cardiologist.  Denies any symptoms of reflux. She does drink several cups of coffee a day and occasional pop and some higher fat/carbohydrate type foods. Denies any alcohol use. No history of tobacco use. She was referred to general surgery due to the pain in the recent hematemesis for further evaluation.     Past Medical History        Past Medical History:   Diagnosis Date    CAD (coronary artery disease)      Cerebrovascular accident (CVA) due to thrombosis of precerebral artery ([address] 12/19/2017    History of seasonal allergies      Hyperlipidemia      Hypertension      Interstitial cystitis       History of.  Obesity       Weight 176 lb, height 5 ft, BMI 35, 2/28/2013.  Plantar fasciitis, bilateral       History of.     Polyneuropathy associated with underlying disease ([address] 3/20/2018    S/P drug eluting coronary stent placement-RCA 6/20/17 - Dr. Nancy Corley 6/20/2017    Seasonal allergies      Trigger finger, left       History of triggering, left long finger.  Type 2 diabetes mellitus ([facility]              Past Surgical History         Past Surgical History:   Procedure Laterality Date    APPENDECTOMY        CARDIAC CATHETERIZATION   06/20/2017     Left main: normal, LAD: proximal 30% stenosis, LCX: 20 % proximal stenosis, RCA: Ostial 70% with pressure damping and mid 90% stenosis. Required PTCA-GRACE in both lesions. LVEF 55%. LV wall motion normal.  Dr. Wilver Gonsalves, [facility], [facility]      CATARACT REMOVAL WITH IMPLANT Left 10/20/2015     Dr. Uzair Bright, [address] IMPLANT Right 12/08/2015     Phaco with DANIAL. Dr Uzair Bright, [address] [facility], [address]   6/9/2000    COLONOSCOPY   3/11/2015     Internal and external hemorrhoids otherwise normal    CORONARY ANGIOPLASTY WITH STENT PLACEMENT   06/20/2017     Right coronary artery 70% ostial lesion and mid RCA 90% lesion; successful PTCA with drug-eluting stents in both lesions, Dr. Wilver Gonsalves, [address]   3/1996     Bladder biopsy, urethral dilatation.  EYE SURGERY        HYSTERECTOMY, TOTAL ABDOMINAL   1980    LAPAROSCOPY   3/1979    NASAL SEPTUM SURGERY   3/9/2005     Nasoseptal reconstruction.  OVARY REMOVAL Right 7/1993     Laparotomy.     CT KNEE SCOPE,DIAGNOSTIC Right 11/6/2018     Right KNEE ARTHROSCOPY PARTIAL MEDIAL MENISECTOMY AND PLICA INCISION performed by Rajesh Bolaños MD at [facility], [place] [address] CAPSULOTOMY Right 04/2016     Dr Uzair Bright            Current Facility-Administered Medications   Current Outpatient Medications   Medication Sig Dispense Refill    pravastatin (PRAVACHOL) 20 MG tablet Take 1 tablet by mouth daily 30 tablet 5    pantoprazole (PROTONIX) 20 MG tablet Take 2 tablets by mouth daily 20 tablet 0    vitamin D (D3-50) 52926 UNIT CAPS TAKE 1 CAPSULE BY MOUTH ONCE A WEEK 4 capsule 5    glimepiride (AMARYL) 1 MG tablet Take 1 tablet in the morning with breakfast.  Take one-half tablet in the evening with the evening meal. 135 tablet 1    amLODIPine (NORVASC) 10 MG tablet Take 1 tablet by mouth daily 90 tablet 1    metFORMIN (GLUCOPHAGE) 500 MG tablet Take 1 tablet by mouth 2 times daily (with meals) 180 tablet 1    isosorbide mononitrate (IMDUR) 30 MG extended release tablet TAKE 1/2 (ONE-HALF) TABLET BY MOUTH ONCE DAILY 45 tablet 3    Multiple Vitamins-Minerals (MULTIVITAMIN PO) Take 1 tablet by mouth daily        NONFORMULARY 5,000 mcg daily OTC Biotin 1000mcg daily         aspirin 81 MG tablet Take 81 mg by mouth daily.        ondansetron (ZOFRAN ODT) 4 MG disintegrating tablet Take 1 tablet by mouth every 8 hours as needed for Nausea (Patient not taking: Reported on 1/15/2020) 12 tablet 0    fluticasone (FLONASE) 50 MCG/ACT nasal spray 1 spray by Nasal route daily (Patient not taking: Reported on 1/15/2020) 1 Bottle 0    nitroGLYCERIN (NITROSTAT) 0.4 MG SL tablet Place 1 tablet under the tongue every 5 minutes as needed for Chest pain up to max of 3 total doses. If no relief after 1 dose, call 911. (Patient not taking: Reported on 1/15/2020) 25 tablet 0                Current Facility-Administered Medications   Medication Dose Route Frequency Provider Last Rate Last Dose    cyanocobalamin injection 1,000 mcg  1,000 mcg Intramuscular Q30 Days Marina Vasquez MD   1,000 mcg at 01/10/20 1636    cyanocobalamin injection 2,000 mcg  2,000 mcg Intramuscular Q30 Days Windy Yadav MD                      Allergies   Allergen Reactions    Iv Dye [Iodides] Shortness Of Breath    Sulfa Antibiotics Shortness Of Breath    Ace Inhibitors Nausea Only       Difficulty swallowing the Lisinopril. Gagging. Nausea.  Cozaar [Losartan] Nausea Only    Ezetimibe-Simvastatin      Lipitor Other (See Comments)       Muscle aches.     Lopressor [Metoprolol]         leg pain    Penicillins Swelling         Family History         Family History   Problem Physical.    Electronically signed by Vangie Sylvester DO on 2/11/2020 at 2:57 PM

## 2020-02-14 ENCOUNTER — NURSE ONLY (OUTPATIENT)
Dept: LAB | Age: 70
End: 2020-02-14
Payer: MEDICARE

## 2020-02-14 PROCEDURE — 96372 THER/PROPH/DIAG INJ SC/IM: CPT

## 2020-02-14 RX ADMIN — CYANOCOBALAMIN 1000 MCG: 1000 INJECTION, SOLUTION INTRAMUSCULAR; SUBCUTANEOUS at 14:30

## 2020-03-04 NOTE — H&P
Signed             Show:Clear all  []Manual[x]Template[x]Copied    Added by:  [x]Franc Caceres Client, DO    []Akiko for details  Sherre Curling a 78 y.o. female who presents today for evaluation of recent bout of hematemesis with associated epigastric pain.  Patient reports that over the holidays she had a day where she was feeling nauseous and ended up having a couple bouts of emesis and states when she threw up that it was red blood.  She came to the ER for evaluation and had work-up that showed no significant abnormalities and lab work are otherwise and was discharged home with instructions to follow-up with her PCP. Clifforda Guard that episode she states that she has had no further episodes of emesis or hematemesis but that she is continued to have some intermittent nausea as well as intermittent epigastric pain.  She does not relate this to any specific activities but does state that sometimes when she eats it seems to make this worse.  She has been on blood thinners recently due to a previous cardiac stent but has since discontinued those at the instruction of her cardiologist. Nadine Claude any symptoms of reflux.  She does drink several cups of coffee a day and occasional pop and some higher fat/carbohydrate type foods.  Denies any alcohol use.  No history of tobacco use.  She was referred to general surgery due to the pain in the recent hematemesis for further evaluation.     Past Medical History           Past Medical History:   Diagnosis Date    CAD (coronary artery disease)      Cerebrovascular accident (CVA) due to thrombosis of precerebral artery ([facility] 12/19/2017    History of seasonal allergies      Hyperlipidemia      Hypertension      Interstitial cystitis       History of.  Obesity       Weight 176 lb, height 5 ft, BMI 35, 2/28/2013.  Plantar fasciitis, bilateral       History of.     Polyneuropathy associated with underlying disease ([address] 3/20/2018    S/P drug eluting coronary stent placement-RCA 6/20/17 - Dr. Cameron Gomez 6/20/2017    Seasonal allergies      Trigger finger, left       History of triggering, left long finger.  Type 2 diabetes mellitus (HCC)              Past Surgical History             Past Surgical History:   Procedure Laterality Date    APPENDECTOMY        CARDIAC CATHETERIZATION   06/20/2017     Left main: normal, LAD: proximal 30% stenosis, LCX: 20 % proximal stenosis, RCA: Ostial 70% with pressure damping and mid 90% stenosis.  Required PTCA-GRACE in both lesions.  LVEF 55%.  LV wall motion normal.  Dr. David Gastelum, [facility]      CATARACT REMOVAL WITH IMPLANT Left 10/20/2015     Dr. Edison Lagos, [facility]    CATARACT REMOVAL WITH IMPLANT Right 12/08/2015     [place] with DANIAL. Dr Edison Lagos, [address] [place] [address].[place] [address]   6/9/2000    COLONOSCOPY   3/11/2015     Internal and external hemorrhoids otherwise normal    CORONARY ANGIOPLASTY WITH STENT PLACEMENT   06/20/2017     Right coronary artery 70% ostial lesion and mid RCA 90% lesion; successful PTCA with drug-eluting stents in both lesions, Dr. David Gastelum, [address] [place] [address]   3/1996     Bladder biopsy, urethral dilatation.  EYE SURGERY        HYSTERECTOMY, TOTAL ABDOMINAL   1980    LAPAROSCOPY   3/1979    NASAL SEPTUM SURGERY   3/9/2005     Nasoseptal reconstruction.  OVARY REMOVAL Right 7/1993     Laparotomy.     MS KNEE SCOPE,DIAGNOSTIC Right 11/6/2018     Right KNEE ARTHROSCOPY PARTIAL MEDIAL MENISECTOMY AND PLICA INCISION performed by Jaelyn Bailey MD at [address] [place] [address] Right 04/2016     Dr Edison Lagos            Current Facility-Administered Medications          Current Outpatient Medications   Medication Sig Dispense Refill    pravastatin (PRAVACHOL) 20 MG tablet Take 1 tablet by mouth daily 30 tablet 5    pantoprazole (PROTONIX) 20 MG tablet Take 2 tablets by mouth daily 20 tablet 0    vitamin D (D3-50) 76934 UNIT CAPS TAKE 1 CAPSULE BY MOUTH ONCE A WEEK 4 capsule 5    glimepiride (AMARYL) 1 MG tablet Take 1 tablet in the morning with breakfast.  Take one-half tablet in the evening with the evening meal. 135 tablet 1    amLODIPine (NORVASC) 10 MG tablet Take 1 tablet by mouth daily 90 tablet 1    metFORMIN (GLUCOPHAGE) 500 MG tablet Take 1 tablet by mouth 2 times daily (with meals) 180 tablet 1    isosorbide mononitrate (IMDUR) 30 MG extended release tablet TAKE 1/2 (ONE-HALF) TABLET BY MOUTH ONCE DAILY 45 tablet 3    Multiple Vitamins-Minerals (MULTIVITAMIN PO) Take 1 tablet by mouth daily        NONFORMULARY 5,000 mcg daily OTC Biotin 1000mcg daily         aspirin 81 MG tablet Take 81 mg by mouth daily.        ondansetron (ZOFRAN ODT) 4 MG disintegrating tablet Take 1 tablet by mouth every 8 hours as needed for Nausea (Patient not taking: Reported on 1/15/2020) 12 tablet 0    fluticasone (FLONASE) 50 MCG/ACT nasal spray 1 spray by Nasal route daily (Patient not taking: Reported on 1/15/2020) 1 Bottle 0    nitroGLYCERIN (NITROSTAT) 0.4 MG SL tablet Place 1 tablet under the tongue every 5 minutes as needed for Chest pain up to max of 3 total doses. If no relief after 1 dose, call 911. (Patient not taking: Reported on 1/15/2020) 25 tablet 0                        Current Facility-Administered Medications   Medication Dose Route Frequency Provider Last Rate Last Dose    cyanocobalamin injection 1,000 mcg  1,000 mcg Intramuscular Q30 Days Erin Vasquez MD   1,000 mcg at 01/10/20 1636    cyanocobalamin injection 2,000 mcg  2,000 mcg Intramuscular Q30 Days Della Heard MD                          Allergies   Allergen Reactions    Iv Dye [Iodides] Shortness Of Breath    Sulfa Antibiotics Shortness Of Breath    Ace Inhibitors Nausea Only       Difficulty swallowing the Lisinopril.  Gagging.  Nausea.     Cozaar [Losartan] Nausea Only    Ezetimibe-Simvastatin      Lipitor Other (See Comments)       Muscle aches.  Lopressor [Metoprolol]         leg pain    Penicillins Swelling         Family History             Family History   Problem Relation Age of Onset    Diabetes Mother      Hypertension Mother      Diabetes Sister      Diabetes Brother      Diabetes Brother      Diabetes Brother      Diabetes Sister      Diabetes Maternal Grandfather      Heart Attack Maternal Grandfather      Heart Attack Paternal Grandmother              Social History                   Socioeconomic History    Marital status:        Spouse name: Not on file    Number of children: Not on file    Years of education: Not on file    Highest education level: Not on file   Occupational History    Not on file   Social Needs    Financial resource strain: Not on file    Food insecurity:       Worry: Not on file       Inability: Not on file    Transportation needs:       Medical: Not on file       Non-medical: Not on file   Tobacco Use    Smoking status: Never Smoker    Smokeless tobacco: Never Used    Tobacco comment: Maribell Oglesby RRT 6/17/17   Substance and Sexual Activity    Alcohol use:  No       Alcohol/week: 0.0 standard drinks    Drug use: No    Sexual activity: Not on file   Lifestyle    Physical activity:       Days per week: Not on file       Minutes per session: Not on file    Stress: Not on file   Relationships    Social connections:       Talks on phone: Not on file       Gets together: Not on file       Attends Buddhist service: Not on file       Active member of club or organization: Not on file       Attends meetings of clubs or organizations: Not on file       Relationship status: Not on file    Intimate partner violence:       Fear of current or ex partner: Not on file       Emotionally abused: Not on file       Physically abused: Not on file       Forced sexual activity: Not on file   Other Topics Concern    Not on file   Social History Narrative    Not on file

## 2020-03-05 ENCOUNTER — ANESTHESIA (OUTPATIENT)
Dept: OPERATING ROOM | Age: 70
End: 2020-03-05
Payer: MEDICARE

## 2020-03-05 ENCOUNTER — HOSPITAL ENCOUNTER (OUTPATIENT)
Age: 70
Setting detail: OUTPATIENT SURGERY
Discharge: HOME | End: 2020-03-05
Attending: SURGERY | Admitting: SURGERY
Payer: MEDICARE

## 2020-03-05 ENCOUNTER — ANESTHESIA EVENT (OUTPATIENT)
Dept: OPERATING ROOM | Age: 70
End: 2020-03-05
Payer: MEDICARE

## 2020-03-05 VITALS
WEIGHT: 166.4 LBS | HEIGHT: 57 IN | SYSTOLIC BLOOD PRESSURE: 149 MMHG | BODY MASS INDEX: 35.9 KG/M2 | RESPIRATION RATE: 16 BRPM | OXYGEN SATURATION: 98 % | HEART RATE: 70 BPM | TEMPERATURE: 97 F | DIASTOLIC BLOOD PRESSURE: 70 MMHG

## 2020-03-05 VITALS
OXYGEN SATURATION: 97 % | SYSTOLIC BLOOD PRESSURE: 117 MMHG | DIASTOLIC BLOOD PRESSURE: 57 MMHG | RESPIRATION RATE: 18 BRPM

## 2020-03-05 PROCEDURE — 43239 EGD BIOPSY SINGLE/MULTIPLE: CPT | Performed by: SURGERY

## 2020-03-05 PROCEDURE — 2580000003 HC RX 258: Performed by: SURGERY

## 2020-03-05 PROCEDURE — 2709999900 HC NON-CHARGEABLE SUPPLY: Performed by: SURGERY

## 2020-03-05 PROCEDURE — 88342 IMHCHEM/IMCYTCHM 1ST ANTB: CPT

## 2020-03-05 PROCEDURE — 3700000000 HC ANESTHESIA ATTENDED CARE: Performed by: SURGERY

## 2020-03-05 PROCEDURE — 3700000001 HC ADD 15 MINUTES (ANESTHESIA): Performed by: SURGERY

## 2020-03-05 PROCEDURE — 88341 IMHCHEM/IMCYTCHM EA ADD ANTB: CPT

## 2020-03-05 PROCEDURE — 7100000010 HC PHASE II RECOVERY - FIRST 15 MIN: Performed by: SURGERY

## 2020-03-05 PROCEDURE — 6360000002 HC RX W HCPCS: Performed by: NURSE ANESTHETIST, CERTIFIED REGISTERED

## 2020-03-05 PROCEDURE — 7100000011 HC PHASE II RECOVERY - ADDTL 15 MIN: Performed by: SURGERY

## 2020-03-05 PROCEDURE — 88360 TUMOR IMMUNOHISTOCHEM/MANUAL: CPT

## 2020-03-05 PROCEDURE — 43251 EGD REMOVE LESION SNARE: CPT | Performed by: SURGERY

## 2020-03-05 PROCEDURE — 88305 TISSUE EXAM BY PATHOLOGIST: CPT

## 2020-03-05 PROCEDURE — 2500000003 HC RX 250 WO HCPCS: Performed by: NURSE ANESTHETIST, CERTIFIED REGISTERED

## 2020-03-05 PROCEDURE — 3609013500 HC EGD REMOVAL TUMOR POLYP/OTHER LESION SNARE TECH: Performed by: SURGERY

## 2020-03-05 RX ORDER — SODIUM CHLORIDE, SODIUM LACTATE, POTASSIUM CHLORIDE, CALCIUM CHLORIDE 600; 310; 30; 20 MG/100ML; MG/100ML; MG/100ML; MG/100ML
INJECTION, SOLUTION INTRAVENOUS CONTINUOUS
Status: DISCONTINUED | OUTPATIENT
Start: 2020-03-05 | End: 2020-03-05 | Stop reason: HOSPADM

## 2020-03-05 RX ORDER — PROPOFOL 10 MG/ML
INJECTION, EMULSION INTRAVENOUS PRN
Status: DISCONTINUED | OUTPATIENT
Start: 2020-03-05 | End: 2020-03-05 | Stop reason: SDUPTHER

## 2020-03-05 RX ORDER — SODIUM CHLORIDE 0.9 % (FLUSH) 0.9 %
10 SYRINGE (ML) INJECTION PRN
Status: DISCONTINUED | OUTPATIENT
Start: 2020-03-05 | End: 2020-03-05 | Stop reason: HOSPADM

## 2020-03-05 RX ORDER — SUCRALFATE 1 G/1
1 TABLET ORAL 4 TIMES DAILY
Qty: 120 TABLET | Refills: 1 | Status: SHIPPED | OUTPATIENT
Start: 2020-03-05 | End: 2020-05-06

## 2020-03-05 RX ORDER — SODIUM CHLORIDE 0.9 % (FLUSH) 0.9 %
10 SYRINGE (ML) INJECTION EVERY 12 HOURS SCHEDULED
Status: DISCONTINUED | OUTPATIENT
Start: 2020-03-05 | End: 2020-03-05 | Stop reason: HOSPADM

## 2020-03-05 RX ORDER — LIDOCAINE HYDROCHLORIDE 40 MG/ML
INJECTION, SOLUTION RETROBULBAR; TOPICAL PRN
Status: DISCONTINUED | OUTPATIENT
Start: 2020-03-05 | End: 2020-03-05 | Stop reason: SDUPTHER

## 2020-03-05 RX ADMIN — SODIUM CHLORIDE, POTASSIUM CHLORIDE, SODIUM LACTATE AND CALCIUM CHLORIDE: 600; 310; 30; 20 INJECTION, SOLUTION INTRAVENOUS at 08:09

## 2020-03-05 RX ADMIN — PROPOFOL 80 MG: 10 INJECTION, EMULSION INTRAVENOUS at 08:45

## 2020-03-05 RX ADMIN — LIDOCAINE HYDROCHLORIDE 60 MG: 40 INJECTION, SOLUTION RETROBULBAR; TOPICAL at 08:43

## 2020-03-05 RX ADMIN — PROPOFOL 80 MG: 10 INJECTION, EMULSION INTRAVENOUS at 08:43

## 2020-03-05 RX ADMIN — PROPOFOL 40 MG: 10 INJECTION, EMULSION INTRAVENOUS at 08:50

## 2020-03-05 ASSESSMENT — PAIN - FUNCTIONAL ASSESSMENT: PAIN_FUNCTIONAL_ASSESSMENT: 0-10

## 2020-03-05 NOTE — ANESTHESIA PRE PROCEDURE
Department of Anesthesiology  Preprocedure Note       Name:  Caitlin Ivey   Age:  78 y.o.  :  1950                                          MRN:  [id number]         Date:  3/5/2020      Surgeon: Bob Oneill):  Inocencia Howard DO    Procedure: EGD (N/A )    Medications prior to admission:   Prior to Admission medications    Medication Sig Start Date End Date Taking? Authorizing Provider   isosorbide mononitrate (IMDUR) 30 MG extended release tablet TAKE 1/2 (ONE-HALF) TABLET BY MOUTH ONCE DAILY 20  Yes Navid Kirby DO   pravastatin (PRAVACHOL) 20 MG tablet Take 1 tablet by mouth daily 20  Yes Debbie Haider MD   vitamin D (D3-50) 99555 UNIT CAPS TAKE 1 CAPSULE BY MOUTH ONCE A WEEK 19  Yes Debbie Haider MD   glimepiride (AMARYL) 1 MG tablet Take 1 tablet in the morning with breakfast.  Take one-half tablet in the evening with the evening meal. 19  Yes Debbie Haider MD   amLODIPine (NORVASC) 10 MG tablet Take 1 tablet by mouth daily 19  Yes Debbie Haider MD   metFORMIN (GLUCOPHAGE) 500 MG tablet Take 1 tablet by mouth 2 times daily (with meals) 19  Yes Debbie Haider MD   Multiple Vitamins-Minerals (MULTIVITAMIN PO) Take 1 tablet by mouth daily   Yes Historical Provider, MD   NONFORMULARY 5,000 mcg daily OTC Biotin 1000mcg daily    Yes Historical Provider, MD   aspirin 81 MG tablet Take 81 mg by mouth daily. Yes Historical Provider, MD   ondansetron (ZOFRAN ODT) 4 MG disintegrating tablet Take 1 tablet by mouth every 8 hours as needed for Nausea  Patient not taking: Reported on 1/15/2020 12/29/19   Radha Anthony MD   fluticasone (FLONASE) 50 MCG/ACT nasal spray 1 spray by Nasal route daily  Patient not taking: Reported on 1/15/2020 3/7/19   Debbie Haider MD   nitroGLYCERIN (NITROSTAT) 0.4 MG SL tablet Place 1 tablet under the tongue every 5 minutes as needed for Chest pain up to max of 3 total doses. If no relief after 1 dose, call 911.   Patient not taking: Reported on

## 2020-03-05 NOTE — ANESTHESIA POSTPROCEDURE EVALUATION
Department of Anesthesiology  Postprocedure Note    Patient: Vic Contreras  MRN: [id number]  YOB: 1950  Date of evaluation: 3/5/2020  Time:  8:56 AM     Procedure Summary     Date:  03/05/20 Room / Location:  [address]    Anesthesia Start:  David Willis Anesthesia Stop:  2285    Procedure:  EGD POLYP SNARE (N/A ) Diagnosis:  (epigastric pain, hematemesis)    Surgeon:  Vangie Sylvester DO Responsible Provider:  EMY Dunne CRNA    Anesthesia Type:  TIVA ASA Status:  3          Anesthesia Type: No value filed. Shaina Phase I: Shaina Score: 10    Shaina Phase II:      Last vitals: Reviewed and per EMR flowsheets.        Anesthesia Post Evaluation    Patient location during evaluation: bedside  Patient participation: complete - patient participated  Level of consciousness: sleepy but conscious  Pain score: 0  Airway patency: patent  Nausea & Vomiting: no nausea and no vomiting  Complications: no  Cardiovascular status: blood pressure returned to baseline and hemodynamically stable  Respiratory status: acceptable  Hydration status: euvolemic

## 2020-03-05 NOTE — OP NOTE
[address]                 [address] [place] [address]                                OPERATIVE REPORT    PATIENT NAME: Melissa Ward                   :        1950  MED REC NO:   [id number]                             ROOM:  ACCOUNT NO:   [de-identified]                           ADMIT DATE: 2020  PROVIDER:     Felipe Wray    DATE OF PROCEDURE:  2020    SURGEON:  Dr. Felipe Wray. ASSISTANT:  None. PREOPERATIVE DIAGNOSES:  Hematemesis and epigastric pain. POSTOPERATIVE DIAGNOSES:  1. Hematemesis and epigastric pain. 2.  Gastritis. 3.  Polyps in antrum, body and fundus. 4.  Superficial ulcer in the lesser curve. PROCEDURE:  EGD with snare polypectomy and biopsies. ANESTHESIA:  MAC.    ESTIMATED BLOOD LOSS:  Minimal.    FLUIDS:  Per anesthesia record. COMPLICATIONS:  None. SPECIMEN:  1. Biopsy of gastric antrum. 2.  Polyp of antrum, removed with hot snare. 3.  Polyp in body, removed with cold forceps. 4.  Biopsy of superficial ulcer of lesser curve. 5.  Cold forceps polypectomy of fundus polyps. INDICATIONS FOR PROCEDURE:  The patient is a 78-year-old female who  presented to my office from her PCP with complaints of hematemesis and  epigastric pain. The patient had these symptoms over the holidays and  was scheduled for EGD but unfortunately had to postpone. Since last  being seen, she denies any new symptoms, but because of the presenting  symptoms, we decided to proceed with EGD. Prior to the date of the  procedure, risks, benefits and alternatives were explained and consent  was obtained. DESCRIPTION OF PROCEDURE:  The patient was brought to the endoscopy  suite, kept on preoperative gurney, and placed in left lateral decubitus  position. Monitoring devices were placed. MAC anesthesia was induced.    After induction of anesthesia, time-out was performed and correct  patient and procedure were

## 2020-03-06 ENCOUNTER — TELEPHONE (OUTPATIENT)
Dept: SURGERY | Age: 70
End: 2020-03-06

## 2020-03-06 NOTE — TELEPHONE ENCOUNTER
Patient is calling stating that she is having abdominal pain and pain under her right breast, she states that is started when she began taking her Carafate yesterday. She states it is worse when she lays down.      Home phone: [phone]  Cell phone: [phone]

## 2020-03-11 ENCOUNTER — TELEPHONE (OUTPATIENT)
Dept: SURGERY | Age: 70
End: 2020-03-11

## 2020-03-11 ENCOUNTER — HOSPITAL ENCOUNTER (EMERGENCY)
Age: 70
Discharge: HOME | End: 2020-03-11
Attending: SPECIALIST
Payer: MEDICARE

## 2020-03-11 ENCOUNTER — OFFICE VISIT (OUTPATIENT)
Dept: PRIMARY CARE CLINIC | Age: 70
End: 2020-03-11
Payer: MEDICARE

## 2020-03-11 ENCOUNTER — APPOINTMENT (OUTPATIENT)
Dept: GENERAL RADIOLOGY | Age: 70
End: 2020-03-11
Payer: MEDICARE

## 2020-03-11 VITALS
SYSTOLIC BLOOD PRESSURE: 115 MMHG | HEIGHT: 57 IN | WEIGHT: 168 LBS | TEMPERATURE: 97.7 F | BODY MASS INDEX: 36.24 KG/M2 | RESPIRATION RATE: 16 BRPM | HEART RATE: 62 BPM | OXYGEN SATURATION: 99 % | DIASTOLIC BLOOD PRESSURE: 55 MMHG

## 2020-03-11 VITALS
WEIGHT: 167.2 LBS | HEART RATE: 77 BPM | DIASTOLIC BLOOD PRESSURE: 68 MMHG | RESPIRATION RATE: 20 BRPM | BODY MASS INDEX: 36.07 KG/M2 | OXYGEN SATURATION: 97 % | TEMPERATURE: 98.7 F | SYSTOLIC BLOOD PRESSURE: 130 MMHG | HEIGHT: 57 IN

## 2020-03-11 LAB
ABSOLUTE EOS #: 0.13 K/UL (ref 0–0.44)
ABSOLUTE IMMATURE GRANULOCYTE: <0.03 K/UL (ref 0–0.3)
ABSOLUTE LYMPH #: 3.19 K/UL (ref 1.1–3.7)
ABSOLUTE MONO #: 0.56 K/UL (ref 0.1–1.2)
ALBUMIN SERPL-MCNC: 4.6 G/DL (ref 3.5–5.2)
ALBUMIN/GLOBULIN RATIO: 1.5 (ref 1–2.5)
ALP BLD-CCNC: 115 U/L (ref 35–104)
ALT SERPL-CCNC: 30 U/L (ref 5–33)
ANION GAP SERPL CALCULATED.3IONS-SCNC: 16 MMOL/L (ref 9–17)
AST SERPL-CCNC: 26 U/L
BASOPHILS # BLD: 0 % (ref 0–2)
BASOPHILS ABSOLUTE: <0.03 K/UL (ref 0–0.2)
BILIRUB SERPL-MCNC: 0.47 MG/DL (ref 0.3–1.2)
BUN BLDV-MCNC: 11 MG/DL (ref 8–23)
BUN/CREAT BLD: 18 (ref 9–20)
CALCIUM SERPL-MCNC: 9.8 MG/DL (ref 8.6–10.4)
CHLORIDE BLD-SCNC: 99 MMOL/L (ref 98–107)
CO2: 26 MMOL/L (ref 20–31)
CREAT SERPL-MCNC: 0.61 MG/DL (ref 0.5–0.9)
D DIMER: 219 NG/ML
DIFFERENTIAL TYPE: ABNORMAL
EKG ATRIAL RATE: 72 BPM
EKG P AXIS: -3 DEGREES
EKG P-R INTERVAL: 136 MS
EKG Q-T INTERVAL: 402 MS
EKG QRS DURATION: 78 MS
EKG QTC CALCULATION (BAZETT): 440 MS
EKG R AXIS: -7 DEGREES
EKG T AXIS: -9 DEGREES
EKG VENTRICULAR RATE: 72 BPM
EOSINOPHILS RELATIVE PERCENT: 2 % (ref 1–4)
GFR AFRICAN AMERICAN: >60 ML/MIN
GFR NON-AFRICAN AMERICAN: >60 ML/MIN
GFR SERPL CREATININE-BSD FRML MDRD: ABNORMAL ML/MIN/{1.73_M2}
GFR SERPL CREATININE-BSD FRML MDRD: ABNORMAL ML/MIN/{1.73_M2}
GLUCOSE BLD-MCNC: 101 MG/DL (ref 70–99)
HCT VFR BLD CALC: 41.5 % (ref 36.3–47.1)
HEMOGLOBIN: 13.3 G/DL (ref 11.9–15.1)
IMMATURE GRANULOCYTES: 0 %
LIPASE: 33 U/L (ref 13–60)
LYMPHOCYTES # BLD: 38 % (ref 24–43)
MAGNESIUM: 1.7 MG/DL (ref 1.6–2.6)
MCH RBC QN AUTO: 25.3 PG (ref 25.2–33.5)
MCHC RBC AUTO-ENTMCNC: 32 G/DL (ref 25.2–33.5)
MCV RBC AUTO: 79 FL (ref 82.6–102.9)
MONOCYTES # BLD: 7 % (ref 3–12)
NRBC AUTOMATED: 0 PER 100 WBC
PDW BLD-RTO: 14.7 % (ref 11.8–14.4)
PLATELET # BLD: 286 K/UL (ref 138–453)
PLATELET ESTIMATE: ABNORMAL
PMV BLD AUTO: 11.2 FL (ref 8.1–13.5)
POTASSIUM SERPL-SCNC: 3.2 MMOL/L (ref 3.7–5.3)
RBC # BLD: 5.25 M/UL (ref 3.95–5.11)
RBC # BLD: ABNORMAL 10*6/UL
SEG NEUTROPHILS: 53 % (ref 36–65)
SEGMENTED NEUTROPHILS ABSOLUTE COUNT: 4.47 K/UL (ref 1.5–8.1)
SODIUM BLD-SCNC: 141 MMOL/L (ref 135–144)
SURGICAL PATHOLOGY REPORT: NORMAL
TOTAL PROTEIN: 7.7 G/DL (ref 6.4–8.3)
TROPONIN INTERP: NORMAL
TROPONIN INTERP: NORMAL
TROPONIN T: NORMAL NG/ML
TROPONIN T: NORMAL NG/ML
TROPONIN, HIGH SENSITIVITY: <6 NG/L (ref 0–14)
TROPONIN, HIGH SENSITIVITY: <6 NG/L (ref 0–14)
WBC # BLD: 8.4 K/UL (ref 3.5–11.3)
WBC # BLD: ABNORMAL 10*3/UL

## 2020-03-11 PROCEDURE — 2580000003 HC RX 258: Performed by: SPECIALIST

## 2020-03-11 PROCEDURE — 84484 ASSAY OF TROPONIN QUANT: CPT

## 2020-03-11 PROCEDURE — 99285 EMERGENCY DEPT VISIT HI MDM: CPT

## 2020-03-11 PROCEDURE — 93005 ELECTROCARDIOGRAM TRACING: CPT | Performed by: SPECIALIST

## 2020-03-11 PROCEDURE — 6370000000 HC RX 637 (ALT 250 FOR IP): Performed by: SPECIALIST

## 2020-03-11 PROCEDURE — 85025 COMPLETE CBC W/AUTO DIFF WBC: CPT

## 2020-03-11 PROCEDURE — 85379 FIBRIN DEGRADATION QUANT: CPT

## 2020-03-11 PROCEDURE — 96374 THER/PROPH/DIAG INJ IV PUSH: CPT

## 2020-03-11 PROCEDURE — 83690 ASSAY OF LIPASE: CPT

## 2020-03-11 PROCEDURE — 6360000002 HC RX W HCPCS: Performed by: SPECIALIST

## 2020-03-11 PROCEDURE — 83735 ASSAY OF MAGNESIUM: CPT

## 2020-03-11 PROCEDURE — 80053 COMPREHEN METABOLIC PANEL: CPT

## 2020-03-11 PROCEDURE — 36415 COLL VENOUS BLD VENIPUNCTURE: CPT

## 2020-03-11 PROCEDURE — 71046 X-RAY EXAM CHEST 2 VIEWS: CPT

## 2020-03-11 PROCEDURE — 96375 TX/PRO/DX INJ NEW DRUG ADDON: CPT

## 2020-03-11 RX ORDER — 0.9 % SODIUM CHLORIDE 0.9 %
500 INTRAVENOUS SOLUTION INTRAVENOUS ONCE
Status: COMPLETED | OUTPATIENT
Start: 2020-03-11 | End: 2020-03-11

## 2020-03-11 RX ORDER — POTASSIUM CHLORIDE 20 MEQ/1
40 TABLET, EXTENDED RELEASE ORAL ONCE
Status: COMPLETED | OUTPATIENT
Start: 2020-03-11 | End: 2020-03-11

## 2020-03-11 RX ORDER — ONDANSETRON 2 MG/ML
4 INJECTION INTRAMUSCULAR; INTRAVENOUS ONCE
Status: COMPLETED | OUTPATIENT
Start: 2020-03-11 | End: 2020-03-11

## 2020-03-11 RX ORDER — ASPIRIN 81 MG/1
324 TABLET, CHEWABLE ORAL ONCE
Status: COMPLETED | OUTPATIENT
Start: 2020-03-11 | End: 2020-03-11

## 2020-03-11 RX ORDER — MORPHINE SULFATE 2 MG/ML
2 INJECTION, SOLUTION INTRAMUSCULAR; INTRAVENOUS ONCE
Status: COMPLETED | OUTPATIENT
Start: 2020-03-11 | End: 2020-03-11

## 2020-03-11 RX ADMIN — ONDANSETRON 4 MG: 2 INJECTION INTRAMUSCULAR; INTRAVENOUS at 16:18

## 2020-03-11 RX ADMIN — MORPHINE SULFATE 2 MG: 2 INJECTION, SOLUTION INTRAMUSCULAR; INTRAVENOUS at 16:11

## 2020-03-11 RX ADMIN — ASPIRIN 81 MG 324 MG: 81 TABLET ORAL at 13:48

## 2020-03-11 RX ADMIN — SODIUM CHLORIDE 500 ML: 9 INJECTION, SOLUTION INTRAVENOUS at 16:31

## 2020-03-11 RX ADMIN — POTASSIUM CHLORIDE 20 MEQ: 20 TABLET, EXTENDED RELEASE ORAL at 16:11

## 2020-03-11 ASSESSMENT — PAIN SCALES - GENERAL
PAINLEVEL_OUTOF10: 0
PAINLEVEL_OUTOF10: 6

## 2020-03-11 ASSESSMENT — PATIENT HEALTH QUESTIONNAIRE - PHQ9
1. LITTLE INTEREST OR PLEASURE IN DOING THINGS: 0
SUM OF ALL RESPONSES TO PHQ QUESTIONS 1-9: 0
SUM OF ALL RESPONSES TO PHQ9 QUESTIONS 1 & 2: 0
SUM OF ALL RESPONSES TO PHQ QUESTIONS 1-9: 0
2. FEELING DOWN, DEPRESSED OR HOPELESS: 0

## 2020-03-11 ASSESSMENT — PAIN DESCRIPTION - PAIN TYPE: TYPE: ACUTE PAIN

## 2020-03-11 ASSESSMENT — PAIN DESCRIPTION - FREQUENCY: FREQUENCY: INTERMITTENT

## 2020-03-11 ASSESSMENT — PAIN DESCRIPTION - ORIENTATION: ORIENTATION: RIGHT

## 2020-03-11 ASSESSMENT — PAIN DESCRIPTION - LOCATION: LOCATION: ABDOMEN

## 2020-03-11 ASSESSMENT — PAIN DESCRIPTION - DESCRIPTORS: DESCRIPTORS: SHARP

## 2020-03-11 NOTE — TELEPHONE ENCOUNTER
Pt has called this morning regarding pain under her right breast that comes and goes every 5 mins. Pt had called on 03/06/20 as well, with same concern. Dr. Elaine Alanis advised to continue taking the carafate. Pt was advised to visit the ER or family doctor and Dr. Valery Perez would be made aware, he would be in the office in the afternoon. Pt verbalized understanding.

## 2020-03-11 NOTE — ED PROVIDER NOTES
evening with the evening meal., Disp-135 tablet, R-1Normal      amLODIPine (NORVASC) 10 MG tablet Take 1 tablet by mouth daily, Disp-90 tablet, R-1Normal      metFORMIN (GLUCOPHAGE) 500 MG tablet Take 1 tablet by mouth 2 times daily (with meals), Disp-180 tablet, R-1Normal      fluticasone (FLONASE) 50 MCG/ACT nasal spray 1 spray by Nasal route daily, Disp-1 Bottle, R-0Normal      Multiple Vitamins-Minerals (MULTIVITAMIN PO) Take 1 tablet by mouth dailyHistorical Med      nitroGLYCERIN (NITROSTAT) 0.4 MG SL tablet Place 1 tablet under the tongue every 5 minutes as needed for Chest pain up to max of 3 total doses. If no relief after 1 dose, call 911., Disp-25 tablet, R-0Normal      NONFORMULARY 5,000 mcg daily OTC Biotin 1000mcg daily Historical Med      aspirin 81 MG tablet Take 81 mg by mouth daily. ALLERGIES     is allergic to iv dye [iodides]; penicillins; sulfa antibiotics; ace inhibitors; cozaar [losartan]; lipitor; lopressor [metoprolol]; and morphine. FAMILY HISTORY     She indicated that her mother is . She indicated that the status of her maternal grandfather is unknown. She indicated that the status of her paternal grandmother is unknown.     family history includes Diabetes in her brother, brother, brother, maternal grandfather, mother, sister, and sister; Heart Attack in her maternal grandfather and paternal grandmother; Hypertension in her mother. SOCIAL HISTORY      reports that she has never smoked. She has never used smokeless tobacco. She reports that she does not drink alcohol or use drugs. PHYSICAL EXAM     INITIAL VITALS:  height is 4' 9\" (1.448 m) and weight is 168 lb (76.2 kg). Her tympanic temperature is 97.7 °F (36.5 °C). Her blood pressure is 115/55 (abnormal) and her pulse is 62. Her respiration is 16 and oxygen saturation is 99%. Physical Exam  Vitals signs and nursing note reviewed. Constitutional:       Appearance: She is well-developed.    HENT: Head: Normocephalic and atraumatic. Nose: Nose normal.   Eyes:      Extraocular Movements: Extraocular movements intact. Pupils: Pupils are equal, round, and reactive to light. Neck:      Musculoskeletal: Normal range of motion and neck supple. Cardiovascular:      Rate and Rhythm: Normal rate and regular rhythm. Heart sounds: Normal heart sounds. No murmur. Pulmonary:      Effort: Pulmonary effort is normal. No respiratory distress. Breath sounds: Normal breath sounds. Abdominal:      General: Bowel sounds are normal. There is no distension. Palpations: Abdomen is soft. Tenderness: There is no abdominal tenderness. Skin:     General: Skin is warm and dry. Neurological:      General: No focal deficit present. Mental Status: She is alert and oriented to person, place, and time.           DIFFERENTIAL DIAGNOSIS/ MDM:     Bronchitis, Pneumonia, ACS, PE, Musculoskeletal pain, pneumothorax, thoracic aortic dissection, nonspecific chest pain, gastrointestinal in origin    HEART SCORE    Variable       Score   History  []Highly Suspicious      2     []Moderately Suspicious     1     [x]Slightly Suspicious      0     ECG   []Significant ST-depression     2     []Nonspecific Repolarization     1     [x]Normal       0     Age   [x]>72years old      3    []45-75 years old      3    []<39years old      0     Risk Factors  [x]>3 risk factors or history of atherosclerotic disease 2     []1 or 2 risk factors      1     []No risk factors      0     Troponin  []>3x normal limit      2     []1-3x normal limit      1     [x]< normal limit      0   Risk Factors: DM, current or recent (<one month) smoker, HTN, hyperlipidemia, family history of CAD or obesity     Total Score = 4    Score 0-3: 2.5% Major Acute Coronary Event over the next 6 weeks -> D/C home  Score 4-6: 20.3% MACE -> Admit for Observation  Score 7-10: 72.7% MACE ->[employer]  Chest Pain in the Emergency Room: A Multicenter Validation of the HEART Score. Eddie BE, Cesar AJ, Kirby IESHA, Manuel TP, [employer], Mast EG, Jose SH, [facility]. Crit Pathw Cardiol. 2010 Sep; 9(3): 164-169. DIAGNOSTIC RESULTS     EKG: All EKG's are interpreted by the Emergency Department Physician who either signs or Co-signs this chart in the absence of a cardiologist.    EKG Interpretation    Interpreted by emergency department physician    Rhythm: normal sinus   Rate: normal  Axis: normal  Conduction: normal  ST Segments: no acute change  T Waves: no acute change  Q Waves: no acute change    Clinical Impression: no acute change, but this is a nonspecific EKG. RADIOLOGY:   I directly visualized the following  images and reviewed the radiologist interpretations:    Xr Chest Standard (2 Vw)    Result Date: 3/11/2020  EXAMINATION: TWO XRAY VIEWS OF THE CHEST 3/11/2020 1:23 pm COMPARISON: 04/19/2018 HISTORY: ORDERING SYSTEM PROVIDED HISTORY: chest pain TECHNOLOGIST PROVIDED HISTORY: chest pain Reason for Exam: Right sided lower chest pain after endoscopy 3/5/2020, occasional cough Acuity: Acute Type of Exam: Initial FINDINGS: The lungs are without acute focal process. There is no effusion or pneumothorax. The cardiomediastinal silhouette is stable. The osseous structures are stable. No acute process.           ED BEDSIDE ULTRASOUND:       LABS:  Labs Reviewed   CBC WITH AUTO DIFFERENTIAL - Abnormal; Notable for the following components:       Result Value    RBC 5.25 (*)     MCV 79.0 (*)     RDW 14.7 (*)     All other components within normal limits   COMPREHENSIVE METABOLIC PANEL W/ REFLEX TO MG FOR LOW K - Abnormal; Notable for the following components:    Glucose 101 (*)     Potassium 3.2 (*)     Alkaline Phosphatase 115 (*)     All other components within normal limits   LIPASE   TROPONIN   D-DIMER, RAPID   MAGNESIUM   TROPONIN       I reviewed all the lab results, patient has hypokalemia, rest of the labs are unremarkable. D-dimer is negative and 2 sets of cardiac markers are normal    EMERGENCY DEPARTMENT COURSE:   Vitals:    Vitals:    03/11/20 1218 03/11/20 1630 03/11/20 1700   BP: (!) 146/63 (!) 122/56 (!) 115/55   Pulse: 80  62   Resp: 16     Temp: 97.7 °F (36.5 °C)     TempSrc: Tympanic     SpO2: 95% 96% 99%   Weight: 168 lb (76.2 kg)     Height: 4' 9\" (1.448 m)       -------------------------  BP: (!) 115/55, Temp: 97.7 °F (36.5 °C), Pulse: 62, Resp: 16    Orders Placed This Encounter   Medications    aspirin chewable tablet 324 mg    potassium chloride (KLOR-CON M) extended release tablet 40 mEq    morphine (PF) injection 2 mg    ondansetron (ZOFRAN) injection 4 mg    0.9 % sodium chloride bolus       During the emergency department course, patient was given aspirin 324 mg orally and potassium chloride 40 mEq orally. She was also given morphine 2 mg and Zofran 4 mg IV push. She was given normal saline 5 endodermal bolus as she started having nausea after morphine and not feeling well. After IV fluids patient felt much better. She prefers to go home. She was offered observation admission as her heart score is 4. Her pain is very atypical and that she prefers to go home. Plan is to discharge the patient with instructions drink plenty of oral fluids, continue current medications, follow-up with PCP, return if worse. The patient presents with chest pain that is not suggesting in nature of pulmonary emnbolus, aortic dissection, cardiac ischemia, aortic dissection, or other serious etiology. Given the extremely low risk of these diagnoses further testing and evaluation for these possibilites are not indicated at is time. The patient has been instructed to return if the symptpoms change or worsen in any way. I have reviewed the disposition diagnosis with the patient and or their family/guardian. I have answered their questions and given discharge instructions.   They voiced understanding of these instructions and did not have any further questions or complaints. I have reviewed the disposition diagnosis with the patient and or their family/guardian. I have answered their questions and given discharge instructions. They voiced understanding of these instructions and did not have any further questions or complaints. Re-evaluation Notes    Patient is feeling much better and resting comfortably prior to discharge. CRITICAL CARE:   None        CONSULTS:      PROCEDURES:  None    FINAL IMPRESSION      1. Right-sided chest pain          DISPOSITION/PLAN   DISPOSITION Decision To Discharge    Condition on Disposition    Stable    PATIENT REFERRED TO:  Valente Kendrick MD  [address] [place] [address] Everardo Duongn  [phone]    Call in 1 day  For reevaluation of current symptoms    [address] [facility]. [address].  [phone]    If symptoms worsen      DISCHARGE MEDICATIONS:  Discharge Medication List as of 3/11/2020  3:57 PM          (Please note that portions of this note were completed with a voice recognition program.  Efforts were made to edit the dictations but occasionally words are mis-transcribed.)    Lea MD, F.A.C.E.P.   Attending Emergency Physician                         Keith Alonzo MD  03/13/20 1038

## 2020-03-11 NOTE — FLOWSHEET NOTE
Assessment: Patient is waiting evaluation with  present. Intervention: Engaged in conversation. Patient expressed gratitude for visit and offer of continued prayers.     Plan: Chaplains remain available for spiritual and emotional support.     03/11/20 1301   Encounter Summary   Services provided to: Patient and family together   Referral/Consult From: [facility] Family members   Continue Visiting   (3/11/20)   Complexity of Encounter Moderate   Length of Encounter 15 minutes   Routine   Type Initial   Assessment Approachable   Intervention Sustaining presence/ Ministry of presence   Outcome Expressed gratitude;Engaged in conversation

## 2020-03-11 NOTE — PROGRESS NOTES
[facility]             [address], [facility] [place], [address]                        Telephone [phone]             Fax [phone]       Nasreen Wyatt  1950  MRN:  [id number]  Date of visit:  3/11/2020     Subjective:  Nasreen Wyatt is a 78 y.o.  female who presented to [facility] 3/11/2020 with complaints of:  Pain (Pt. is here for pain under her right breast.  States it comes and goes and started on Thursday right after the scope she had done. She did call Dr. Rik Jimenez who advised her to come into .)    She states that she has had intermittent episodes of sharp pain in the right upper quadrant radiating to her back since she had an EGD last week. A gastric ulcer was diagnosed on the EGD. She states that she has been taking Carafate. She has a history of cholecystectomy. She has a history of coronary artery disease. Objective:  Vitals:    03/11/20 1114   BP: 130/68   Pulse: 77   Resp: 20   Temp: 98.7 °F (37.1 °C)   SpO2: 97%         Assessment and Plan:  Right upper quadrant pain  She appears very uncomfortable. I discussed with the patient that she would benefit from evaluation at the emergency room. The patient was in agreement, and she was transported by Urgent Care staff to [facility] ER.

## 2020-03-13 ASSESSMENT — ENCOUNTER SYMPTOMS
SHORTNESS OF BREATH: 1
ABDOMINAL PAIN: 0
COUGH: 0
NAUSEA: 1

## 2020-03-16 ENCOUNTER — NURSE ONLY (OUTPATIENT)
Dept: LAB | Age: 70
End: 2020-03-16
Payer: MEDICARE

## 2020-03-16 PROCEDURE — 96372 THER/PROPH/DIAG INJ SC/IM: CPT

## 2020-03-16 RX ADMIN — CYANOCOBALAMIN 1000 MCG: 1000 INJECTION, SOLUTION INTRAMUSCULAR; SUBCUTANEOUS at 16:21

## 2020-03-19 ENCOUNTER — OFFICE VISIT (OUTPATIENT)
Dept: SURGERY | Age: 70
End: 2020-03-19
Payer: MEDICARE

## 2020-03-19 VITALS
DIASTOLIC BLOOD PRESSURE: 78 MMHG | TEMPERATURE: 98.3 F | HEART RATE: 76 BPM | WEIGHT: 169.4 LBS | BODY MASS INDEX: 36.55 KG/M2 | SYSTOLIC BLOOD PRESSURE: 126 MMHG | HEIGHT: 57 IN

## 2020-03-19 PROCEDURE — 99213 OFFICE O/P EST LOW 20 MIN: CPT | Performed by: SURGERY

## 2020-03-19 PROCEDURE — G8482 FLU IMMUNIZE ORDER/ADMIN: HCPCS | Performed by: SURGERY

## 2020-03-19 PROCEDURE — 1123F ACP DISCUSS/DSCN MKR DOCD: CPT | Performed by: SURGERY

## 2020-03-19 PROCEDURE — 1090F PRES/ABSN URINE INCON ASSESS: CPT | Performed by: SURGERY

## 2020-03-19 PROCEDURE — G8417 CALC BMI ABV UP PARAM F/U: HCPCS | Performed by: SURGERY

## 2020-03-19 PROCEDURE — 1036F TOBACCO NON-USER: CPT | Performed by: SURGERY

## 2020-03-19 PROCEDURE — G8427 DOCREV CUR MEDS BY ELIG CLIN: HCPCS | Performed by: SURGERY

## 2020-03-19 PROCEDURE — G8399 PT W/DXA RESULTS DOCUMENT: HCPCS | Performed by: SURGERY

## 2020-03-19 PROCEDURE — 4040F PNEUMOC VAC/ADMIN/RCVD: CPT | Performed by: SURGERY

## 2020-03-19 PROCEDURE — 3017F COLORECTAL CA SCREEN DOC REV: CPT | Performed by: SURGERY

## 2020-03-19 PROCEDURE — 99214 OFFICE O/P EST MOD 30 MIN: CPT

## 2020-03-19 RX ORDER — DIPHENHYDRAMINE HCL 50 MG
CAPSULE ORAL
Qty: 1 CAPSULE | Refills: 0 | Status: SHIPPED | OUTPATIENT
Start: 2020-03-19 | End: 2020-05-06

## 2020-03-19 RX ORDER — PREDNISONE 50 MG/1
TABLET ORAL
Qty: 3 TABLET | Refills: 0 | Status: SHIPPED | OUTPATIENT
Start: 2020-03-19 | End: 2020-05-06

## 2020-03-19 NOTE — PATIENT INSTRUCTIONS
Preventing the Spread of Coronavirus Disease 2019 in Homes and Residential Communities   For the most recent information go to [email or web address]    Prevention steps for People with confirmed or suspected COVID-19 (including persons under investigation) who do not need to be hospitalized  and   People with confirmed COVID-19 who were hospitalized and determined to be medically stable to go home    Your healthcare provider and public health staff will evaluate whether you can be cared for at home. If it is determined that you do not need to be hospitalized and can be isolated at home, you will be monitored by staff from your local or state health department. You should follow the prevention steps below until a healthcare provider or local or state health department says you can return to your normal activities. Stay home except to get medical care  People who are mildly ill with COVID-19 are able to isolate at home during their illness. You should restrict activities outside your home, except for getting medical care. Do not go to work, school, or public areas. Avoid using public transportation, ride-sharing, or taxis. Separate yourself from other people and animals in your home  People: As much as possible, you should stay in a specific room and away from other people in your home. Also, you should use a separate bathroom, if available. Animals: You should restrict contact with pets and other animals while you are sick with COVID-19, just like you would around other people. Although there have not been reports of pets or other animals becoming sick with COVID-19, it is still recommended that people sick with COVID-19 limit contact with animals until more information is known about the virus. When possible, have another member of your household care for your animals while you are sick.  If you are sick with COVID-19, avoid contact with your pet, including be washed thoroughly with soap and water. Clean all high-touch surfaces everyday  High touch surfaces include counters, tabletops, doorknobs, bathroom fixtures, toilets, phones, keyboards, tablets, and bedside tables. Also, clean any surfaces that may have blood, stool, or body fluids on them. Use a household cleaning spray or wipe, according to the label instructions. Labels contain instructions for safe and effective use of the cleaning product including precautions you should take when applying the product, such as wearing gloves and making sure you have good ventilation during use of the product. Monitor your symptoms  Seek prompt medical attention if your illness is worsening (e.g., difficulty breathing). Before seeking care, call your healthcare provider and tell them that you have, or are being evaluated for, COVID-19. Put on a facemask before you enter the facility. These steps will help the healthcare providers office to keep other people in the office or waiting room from getting infected or exposed. Ask your healthcare provider to call the local or [facility] health department. Persons who are placed under active monitoring or facilitated self-monitoring should follow instructions provided by their local health department or occupational health professionals, as appropriate. When working with your local health department check their available hours. If you have a medical emergency and need to call 911, notify the dispatch personnel that you have, or are being evaluated for COVID-19. If possible, put on a facemask before emergency medical services arrive. Discontinuing home isolation  Patients with confirmed COVID-19 should remain under home isolation precautions until the risk of secondary transmission to others is thought to be low.  The decision to discontinue home isolation precautions should be made on a case-by-case basis, in consultation with healthcare providers and state and [facility] health departments.

## 2020-03-19 NOTE — H&P
in the evening with the evening meal. 135 tablet 1    amLODIPine (NORVASC) 10 MG tablet Take 1 tablet by mouth daily 90 tablet 1    metFORMIN (GLUCOPHAGE) 500 MG tablet Take 1 tablet by mouth 2 times daily (with meals) 180 tablet 1    Multiple Vitamins-Minerals (MULTIVITAMIN PO) Take 1 tablet by mouth daily      NONFORMULARY 5,000 mcg daily OTC Biotin 1000mcg daily       aspirin 81 MG tablet Take 81 mg by mouth daily.  sucralfate (CARAFATE) 1 GM tablet Take 1 tablet by mouth 4 times daily (Patient not taking: Reported on 3/19/2020) 120 tablet 1    ondansetron (ZOFRAN ODT) 4 MG disintegrating tablet Take 1 tablet by mouth every 8 hours as needed for Nausea (Patient not taking: Reported on 1/15/2020) 12 tablet 0    fluticasone (FLONASE) 50 MCG/ACT nasal spray 1 spray by Nasal route daily (Patient not taking: Reported on 1/15/2020) 1 Bottle 0    nitroGLYCERIN (NITROSTAT) 0.4 MG SL tablet Place 1 tablet under the tongue every 5 minutes as needed for Chest pain up to max of 3 total doses. If no relief after 1 dose, call 911. (Patient not taking: Reported on 1/15/2020) 25 tablet 0     Current Facility-Administered Medications   Medication Dose Route Frequency Provider Last Rate Last Dose    cyanocobalamin injection 1,000 mcg  1,000 mcg Intramuscular Q30 Days Jesika Vasquez MD   1,000 mcg at 03/16/20 1621    cyanocobalamin injection 2,000 mcg  2,000 mcg Intramuscular Q30 Days An iFgueredo MD           Allergies   Allergen Reactions    Iv Dye [Iodides] Shortness Of Breath    Penicillins Swelling    Sulfa Antibiotics Shortness Of Breath    Ace Inhibitors Nausea Only     Difficulty swallowing the Lisinopril. Gagging. Nausea.  Cozaar [Losartan] Nausea Only    Lipitor Other (See Comments)     Muscle aches.     Lopressor [Metoprolol] Other (See Comments)     leg pain    Morphine Nausea Only     Chest pressure       Family History   Problem Relation Age of Onset    Diabetes Mother    Magali Toure negative  Respiratory ROS: no cough, shortness of breath, or wheezing  Cardiovascular ROS: no chest pain or dyspnea on exertion  Gastrointestinal ROS: per HPI  Genito-Urinary ROS: no dysuria, trouble voiding, or hematuria  Musculoskeletal ROS: negative      Objective   Vitals:    03/19/20 1015   BP: 126/78   Pulse: 76   Temp: 98.3 °F (36.8 °C)     General:in no apparent distress, well developed and well nourished, alert and oriented times 3  Eyes: No gross abnormalities. 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 At this point the patient is having symptoms of pain in the right upper quadrant which does raise my suspicion slightly for possible liver involvement but is not having any other symptoms to suggest widespread of neuroendocrine tumor. I am going to begin work-up with a CT scanning of chest abdomen and pelvis with contrast.  As the patient does have contrast allergy will plan for premedication to minimize risk of complication from the contrast allergy. I am also going to make referral to oncology to establish care and for possible additional testing. I will hold off on any sort of specialized scanning at this point (aka MRI, octreotide scan, etc) until after she has been evaluated by oncology and we can get their input on additional testing that may be required. 2.  I will plan to follow-up the results of the CT, will f/u oncology recommendations and will plan to see patient back in office in the next 1 to 2 weeks.     Electronically signed by Ivan Genao DO on 3/19/2020 at 11:48 AM      (Please

## 2020-03-20 ENCOUNTER — HOSPITAL ENCOUNTER (OUTPATIENT)
Dept: CT IMAGING | Age: 70
Discharge: HOME | End: 2020-03-22
Payer: MEDICARE

## 2020-03-20 PROCEDURE — 6360000004 HC RX CONTRAST MEDICATION: Performed by: SURGERY

## 2020-03-20 PROCEDURE — 74177 CT ABD & PELVIS W/CONTRAST: CPT

## 2020-03-20 RX ADMIN — IOPAMIDOL 100 ML: 755 INJECTION, SOLUTION INTRAVENOUS at 14:59

## 2020-03-23 ENCOUNTER — HOSPITAL ENCOUNTER (OUTPATIENT)
Dept: LAB | Age: 70
Discharge: HOME | End: 2020-03-23
Payer: MEDICARE

## 2020-03-23 ENCOUNTER — OFFICE VISIT (OUTPATIENT)
Dept: ONCOLOGY | Age: 70
End: 2020-03-23
Payer: MEDICARE

## 2020-03-23 VITALS
SYSTOLIC BLOOD PRESSURE: 130 MMHG | WEIGHT: 168.8 LBS | OXYGEN SATURATION: 97 % | HEART RATE: 79 BPM | HEIGHT: 57 IN | BODY MASS INDEX: 36.42 KG/M2 | TEMPERATURE: 98 F | RESPIRATION RATE: 16 BRPM | DIASTOLIC BLOOD PRESSURE: 60 MMHG

## 2020-03-23 LAB
FERRITIN: 34 UG/L (ref 13–150)
IRON SATURATION: 29 % (ref 20–55)
IRON: 110 UG/DL (ref 37–145)
TOTAL IRON BINDING CAPACITY: 373 UG/DL (ref 250–450)
UNSATURATED IRON BINDING CAPACITY: 263 UG/DL (ref 112–347)

## 2020-03-23 PROCEDURE — 84206 ASSAY OF PROINSULIN: CPT

## 2020-03-23 PROCEDURE — 82728 ASSAY OF FERRITIN: CPT

## 2020-03-23 PROCEDURE — G8482 FLU IMMUNIZE ORDER/ADMIN: HCPCS | Performed by: INTERNAL MEDICINE

## 2020-03-23 PROCEDURE — 99214 OFFICE O/P EST MOD 30 MIN: CPT | Performed by: INTERNAL MEDICINE

## 2020-03-23 PROCEDURE — 83540 ASSAY OF IRON: CPT

## 2020-03-23 PROCEDURE — G8417 CALC BMI ABV UP PARAM F/U: HCPCS | Performed by: INTERNAL MEDICINE

## 2020-03-23 PROCEDURE — G8427 DOCREV CUR MEDS BY ELIG CLIN: HCPCS | Performed by: INTERNAL MEDICINE

## 2020-03-23 PROCEDURE — 83550 IRON BINDING TEST: CPT

## 2020-03-23 PROCEDURE — 86316 IMMUNOASSAY TUMOR OTHER: CPT

## 2020-03-23 PROCEDURE — 82941 ASSAY OF GASTRIN: CPT

## 2020-03-23 PROCEDURE — 1090F PRES/ABSN URINE INCON ASSESS: CPT | Performed by: INTERNAL MEDICINE

## 2020-03-23 PROCEDURE — 99205 OFFICE O/P NEW HI 60 MIN: CPT | Performed by: INTERNAL MEDICINE

## 2020-03-23 PROCEDURE — 36415 COLL VENOUS BLD VENIPUNCTURE: CPT

## 2020-03-23 RX ORDER — OMEPRAZOLE 20 MG/1
20 CAPSULE, DELAYED RELEASE ORAL DAILY
Qty: 30 CAPSULE | Refills: 3 | Status: SHIPPED | OUTPATIENT
Start: 2020-03-23 | End: 2020-05-06

## 2020-03-23 NOTE — PATIENT INSTRUCTIONS
Referral to Dr Rocio Stewart for EUS (okay for 4-6 weeks)  Labs today  RTc after Dr Marianna Ortiz visit

## 2020-03-23 NOTE — PROGRESS NOTES
Writer called Dr. Janelle Basurto office to schedule. Nurse stated that would need to send referral along with dictation and demographics. They would call patient once they are scheduling appointments.

## 2020-03-24 LAB — GASTRIN: 422 PG/ML (ref 0–100)

## 2020-03-26 LAB — PROINSULIN: 50.9 PMOL/L

## 2020-03-27 LAB — CHROMOGRANIN A: 414 NG/ML (ref 0–160)

## 2020-04-06 ENCOUNTER — TELEPHONE (OUTPATIENT)
Dept: SURGERY | Age: 70
End: 2020-04-06

## 2020-04-23 ENCOUNTER — NURSE ONLY (OUTPATIENT)
Dept: LAB | Age: 70
End: 2020-04-23
Payer: MEDICARE

## 2020-04-23 RX ADMIN — CYANOCOBALAMIN 1000 MCG: 1000 INJECTION, SOLUTION INTRAMUSCULAR; SUBCUTANEOUS at 15:57

## 2020-05-01 ENCOUNTER — HOSPITAL ENCOUNTER (OUTPATIENT)
Dept: LAB | Age: 70
Discharge: HOME | End: 2020-05-01
Payer: MEDICARE

## 2020-05-01 LAB
ALBUMIN SERPL-MCNC: 4.7 G/DL (ref 3.5–5.2)
ALBUMIN/GLOBULIN RATIO: 1.6 (ref 1–2.5)
ALP BLD-CCNC: 120 U/L (ref 35–104)
ALT SERPL-CCNC: 32 U/L (ref 5–33)
ANION GAP SERPL CALCULATED.3IONS-SCNC: 15 MMOL/L (ref 9–17)
AST SERPL-CCNC: 25 U/L
BILIRUB SERPL-MCNC: 0.31 MG/DL (ref 0.3–1.2)
BUN BLDV-MCNC: 13 MG/DL (ref 8–23)
BUN/CREAT BLD: 18 (ref 9–20)
CALCIUM SERPL-MCNC: 9.6 MG/DL (ref 8.6–10.4)
CHLORIDE BLD-SCNC: 103 MMOL/L (ref 98–107)
CHOLESTEROL, FASTING: 220 MG/DL
CHOLESTEROL/HDL RATIO: 4.1
CO2: 26 MMOL/L (ref 20–31)
CREAT SERPL-MCNC: 0.71 MG/DL (ref 0.5–0.9)
ESTIMATED AVERAGE GLUCOSE: 157 MG/DL
GFR AFRICAN AMERICAN: >60 ML/MIN
GFR NON-AFRICAN AMERICAN: >60 ML/MIN
GFR SERPL CREATININE-BSD FRML MDRD: ABNORMAL ML/MIN/{1.73_M2}
GFR SERPL CREATININE-BSD FRML MDRD: ABNORMAL ML/MIN/{1.73_M2}
GLUCOSE BLD-MCNC: 155 MG/DL (ref 70–99)
HBA1C MFR BLD: 7.1 % (ref 4.8–5.9)
HDLC SERPL-MCNC: 54 MG/DL
LDL CHOLESTEROL: 112 MG/DL (ref 0–130)
POTASSIUM SERPL-SCNC: 3.8 MMOL/L (ref 3.7–5.3)
SODIUM BLD-SCNC: 144 MMOL/L (ref 135–144)
TOTAL PROTEIN: 7.7 G/DL (ref 6.4–8.3)
TRIGLYCERIDE, FASTING: 271 MG/DL
VITAMIN D 25-HYDROXY: 88.2 NG/ML (ref 30–100)
VLDLC SERPL CALC-MCNC: ABNORMAL MG/DL (ref 1–30)

## 2020-05-01 PROCEDURE — 80053 COMPREHEN METABOLIC PANEL: CPT

## 2020-05-01 PROCEDURE — 82306 VITAMIN D 25 HYDROXY: CPT

## 2020-05-01 PROCEDURE — 83036 HEMOGLOBIN GLYCOSYLATED A1C: CPT

## 2020-05-01 PROCEDURE — 36415 COLL VENOUS BLD VENIPUNCTURE: CPT

## 2020-05-01 PROCEDURE — 80061 LIPID PANEL: CPT

## 2020-05-06 ENCOUNTER — VIRTUAL VISIT (OUTPATIENT)
Dept: FAMILY MEDICINE CLINIC | Age: 70
End: 2020-05-06
Payer: MEDICARE

## 2020-05-06 ENCOUNTER — TELEPHONE (OUTPATIENT)
Dept: FAMILY MEDICINE CLINIC | Age: 70
End: 2020-05-06

## 2020-05-06 VITALS — DIASTOLIC BLOOD PRESSURE: 80 MMHG | HEART RATE: 79 BPM | SYSTOLIC BLOOD PRESSURE: 133 MMHG

## 2020-05-06 PROCEDURE — 99442 PR PHYS/QHP TELEPHONE EVALUATION 11-20 MIN: CPT | Performed by: FAMILY MEDICINE

## 2020-05-06 RX ORDER — METHOCARBAMOL 750 MG/1
TABLET ORAL
Qty: 4 CAPSULE | Refills: 5 | Status: SHIPPED | OUTPATIENT
Start: 2020-05-06 | End: 2020-11-20

## 2020-05-06 RX ORDER — GLIMEPIRIDE 1 MG/1
TABLET ORAL
Qty: 135 TABLET | Refills: 1 | Status: SHIPPED | OUTPATIENT
Start: 2020-05-06 | End: 2021-01-05

## 2020-05-06 RX ORDER — AMLODIPINE BESYLATE 10 MG/1
10 TABLET ORAL DAILY
Qty: 90 TABLET | Refills: 1 | Status: SHIPPED | OUTPATIENT
Start: 2020-05-06 | End: 2021-01-14

## 2020-05-06 RX ORDER — PRAVASTATIN SODIUM 40 MG
40 TABLET ORAL DAILY
Qty: 90 TABLET | Refills: 1 | Status: SHIPPED | OUTPATIENT
Start: 2020-05-06 | End: 2021-01-22

## 2020-05-06 ASSESSMENT — PATIENT HEALTH QUESTIONNAIRE - PHQ9
2. FEELING DOWN, DEPRESSED OR HOPELESS: 0
SUM OF ALL RESPONSES TO PHQ QUESTIONS 1-9: 0
1. LITTLE INTEREST OR PLEASURE IN DOING THINGS: 0
SUM OF ALL RESPONSES TO PHQ9 QUESTIONS 1 & 2: 0
SUM OF ALL RESPONSES TO PHQ QUESTIONS 1-9: 0

## 2020-05-06 NOTE — PROGRESS NOTES
calculated using average adult body mass. Additional eGFR calculator available at:        [employer]            GFR Staging 05/01/2020 NOT REPORTED   Final        Assessment and Plan:    1. Type 2 diabetes mellitus without complication, without long-term current use of insulin (HCC)  Her HgbA1c was 7.1 %, which is near goal.   She was advised to continue her current regimen. Amaryl and Metformin were refilled:   - glimepiride (AMARYL) 1 MG tablet; Take 1 tablet in the morning with breakfast.  Take one-half tablet in the evening with the evening meal.  Dispense: 135 tablet; Refill: 1  - metFORMIN (GLUCOPHAGE) 500 MG tablet; Take 1 tablet by mouth 2 times daily (with meals)  Dispense: 180 tablet; Refill: 1    - Hemoglobin A1C; Future prior to her return visit in 3 months. 2. Essential hypertension  Her blood pressure is adequately-controlled. (BP: 133/80(per patient))   She was advised to continue current medications. Amlodipine was refilled:   - amLODIPine (NORVASC) 10 MG tablet; Take 1 tablet by mouth daily  Dispense: 90 tablet; Refill: 1    - Comprehensive Metabolic Panel; Future prior to her return visit in 3 months. 3. Mixed hyperlipidemia  Her lipid profile was worse on her recent lab work. She was advised to increase her activity and avoid processed foods in her diet. She is tolerating Pravastatin well; this was refilled:   - pravastatin (PRAVACHOL) 40 MG tablet; Take 1 tablet by mouth daily  Dispense: 90 tablet; Refill: 1    - Lipid Panel; Future prior to her return visit in 3 months. 4. Vitamin D deficiency  Her 25-hydroxy Vitamin D level was within normal limits (88.2 ng/mL) on  her recent lab work. She was advised to continue with her current dose of Vitamin D. Vitamin D was refilled:  - vitamin D (D3-50) 57750 UNIT CAPS; TAKE 1 CAPSULE BY MOUTH ONCE A WEEK  Dispense: 4 capsule; Refill: 5    5.  Decreased hearing of left ear  I discussed audiology and

## 2020-05-06 NOTE — TELEPHONE ENCOUNTER
Patient states she is due for a new Vitamin B12 injection order. Dr Cedric Guevara previously ordered but she no longer sees him. Last vitamin B12 and folate level done 04/30/19. Please advise.

## 2020-05-07 ENCOUNTER — PRE-PROCEDURE TELEPHONE (OUTPATIENT)
Dept: PREADMISSION TESTING | Age: 70
End: 2020-05-07

## 2020-05-11 ENCOUNTER — HOSPITAL ENCOUNTER (OUTPATIENT)
Age: 70
Setting detail: SPECIMEN
Discharge: HOME | End: 2020-05-11
Payer: MEDICARE

## 2020-05-13 ENCOUNTER — HOSPITAL ENCOUNTER (OUTPATIENT)
Age: 70
Setting detail: OUTPATIENT SURGERY
Discharge: HOME | End: 2020-05-13
Attending: INTERNAL MEDICINE | Admitting: INTERNAL MEDICINE
Payer: MEDICARE

## 2020-05-13 ENCOUNTER — ANESTHESIA EVENT (OUTPATIENT)
Dept: ENDOSCOPY | Age: 70
End: 2020-05-13
Payer: MEDICARE

## 2020-05-13 ENCOUNTER — HOSPITAL ENCOUNTER (OUTPATIENT)
Dept: ULTRASOUND IMAGING | Age: 70
Setting detail: OUTPATIENT SURGERY
Discharge: HOME | End: 2020-05-15
Attending: INTERNAL MEDICINE
Payer: MEDICARE

## 2020-05-13 ENCOUNTER — ANESTHESIA (OUTPATIENT)
Dept: ENDOSCOPY | Age: 70
End: 2020-05-13
Payer: MEDICARE

## 2020-05-13 VITALS
DIASTOLIC BLOOD PRESSURE: 59 MMHG | TEMPERATURE: 97 F | HEIGHT: 57 IN | RESPIRATION RATE: 20 BRPM | WEIGHT: 167 LBS | SYSTOLIC BLOOD PRESSURE: 132 MMHG | BODY MASS INDEX: 36.03 KG/M2 | HEART RATE: 60 BPM | OXYGEN SATURATION: 95 %

## 2020-05-13 VITALS — OXYGEN SATURATION: 98 % | SYSTOLIC BLOOD PRESSURE: 87 MMHG | DIASTOLIC BLOOD PRESSURE: 43 MMHG

## 2020-05-13 LAB
GLUCOSE BLD-MCNC: 114 MG/DL (ref 65–105)
GLUCOSE BLD-MCNC: 117 MG/DL (ref 65–105)

## 2020-05-13 PROCEDURE — 3609013400 HC EGD REMOVAL LESION(S) BY HOT BIOPSY FORCEPS: Performed by: INTERNAL MEDICINE

## 2020-05-13 PROCEDURE — 7100000011 HC PHASE II RECOVERY - ADDTL 15 MIN: Performed by: INTERNAL MEDICINE

## 2020-05-13 PROCEDURE — 3700000001 HC ADD 15 MINUTES (ANESTHESIA): Performed by: INTERNAL MEDICINE

## 2020-05-13 PROCEDURE — 7100000010 HC PHASE II RECOVERY - FIRST 15 MIN: Performed by: INTERNAL MEDICINE

## 2020-05-13 PROCEDURE — 6360000002 HC RX W HCPCS: Performed by: NURSE ANESTHETIST, CERTIFIED REGISTERED

## 2020-05-13 PROCEDURE — 3609018500 HC EGD US SCOPE W/ADJACENT STRUCTURES: Performed by: INTERNAL MEDICINE

## 2020-05-13 PROCEDURE — 2709999900 HC NON-CHARGEABLE SUPPLY: Performed by: INTERNAL MEDICINE

## 2020-05-13 PROCEDURE — 2580000003 HC RX 258: Performed by: ANESTHESIOLOGY

## 2020-05-13 PROCEDURE — 88342 IMHCHEM/IMCYTCHM 1ST ANTB: CPT

## 2020-05-13 PROCEDURE — 3609155300 HC EGD W ENDOSCOPIC MUCOSAL RESECTION: Performed by: INTERNAL MEDICINE

## 2020-05-13 PROCEDURE — 88341 IMHCHEM/IMCYTCHM EA ADD ANTB: CPT

## 2020-05-13 PROCEDURE — 82947 ASSAY GLUCOSE BLOOD QUANT: CPT

## 2020-05-13 PROCEDURE — 2500000003 HC RX 250 WO HCPCS: Performed by: NURSE ANESTHETIST, CERTIFIED REGISTERED

## 2020-05-13 PROCEDURE — 76975 GI ENDOSCOPIC ULTRASOUND: CPT

## 2020-05-13 PROCEDURE — 88305 TISSUE EXAM BY PATHOLOGIST: CPT

## 2020-05-13 PROCEDURE — 3700000000 HC ANESTHESIA ATTENDED CARE: Performed by: INTERNAL MEDICINE

## 2020-05-13 PROCEDURE — 3609012400 HC EGD TRANSORAL BIOPSY SINGLE/MULTIPLE: Performed by: INTERNAL MEDICINE

## 2020-05-13 PROCEDURE — 2720000010 HC SURG SUPPLY STERILE: Performed by: INTERNAL MEDICINE

## 2020-05-13 RX ORDER — PROPOFOL 10 MG/ML
INJECTION, EMULSION INTRAVENOUS CONTINUOUS PRN
Status: DISCONTINUED | OUTPATIENT
Start: 2020-05-13 | End: 2020-05-13 | Stop reason: SDUPTHER

## 2020-05-13 RX ORDER — MIDAZOLAM HYDROCHLORIDE 1 MG/ML
2 INJECTION INTRAMUSCULAR; INTRAVENOUS
Status: DISCONTINUED | OUTPATIENT
Start: 2020-05-13 | End: 2020-05-13 | Stop reason: HOSPADM

## 2020-05-13 RX ORDER — SODIUM CHLORIDE 0.9 % (FLUSH) 0.9 %
10 SYRINGE (ML) INJECTION EVERY 12 HOURS SCHEDULED
Status: DISCONTINUED | OUTPATIENT
Start: 2020-05-13 | End: 2020-05-13 | Stop reason: HOSPADM

## 2020-05-13 RX ORDER — FENTANYL CITRATE 50 UG/ML
25 INJECTION, SOLUTION INTRAMUSCULAR; INTRAVENOUS ONCE
Status: DISCONTINUED | OUTPATIENT
Start: 2020-05-13 | End: 2020-05-13 | Stop reason: HOSPADM

## 2020-05-13 RX ORDER — GLYCOPYRROLATE 1 MG/5 ML
SYRINGE (ML) INTRAVENOUS PRN
Status: DISCONTINUED | OUTPATIENT
Start: 2020-05-13 | End: 2020-05-13 | Stop reason: SDUPTHER

## 2020-05-13 RX ORDER — PROPOFOL 10 MG/ML
INJECTION, EMULSION INTRAVENOUS PRN
Status: DISCONTINUED | OUTPATIENT
Start: 2020-05-13 | End: 2020-05-13 | Stop reason: SDUPTHER

## 2020-05-13 RX ORDER — LIDOCAINE HYDROCHLORIDE 10 MG/ML
INJECTION, SOLUTION EPIDURAL; INFILTRATION; INTRACAUDAL; PERINEURAL PRN
Status: DISCONTINUED | OUTPATIENT
Start: 2020-05-13 | End: 2020-05-13 | Stop reason: SDUPTHER

## 2020-05-13 RX ORDER — PANTOPRAZOLE SODIUM 40 MG/1
40 TABLET, DELAYED RELEASE ORAL
Qty: 90 TABLET | Refills: 0 | Status: SHIPPED | OUTPATIENT
Start: 2020-05-13 | End: 2021-03-30

## 2020-05-13 RX ORDER — PHENYLEPHRINE HYDROCHLORIDE 10 MG/ML
INJECTION INTRAVENOUS PRN
Status: DISCONTINUED | OUTPATIENT
Start: 2020-05-13 | End: 2020-05-13 | Stop reason: SDUPTHER

## 2020-05-13 RX ORDER — ONDANSETRON 2 MG/ML
4 INJECTION INTRAMUSCULAR; INTRAVENOUS
Status: DISCONTINUED | OUTPATIENT
Start: 2020-05-13 | End: 2020-05-13 | Stop reason: HOSPADM

## 2020-05-13 RX ORDER — FENTANYL CITRATE 50 UG/ML
50 INJECTION, SOLUTION INTRAMUSCULAR; INTRAVENOUS EVERY 5 MIN PRN
Status: DISCONTINUED | OUTPATIENT
Start: 2020-05-13 | End: 2020-05-13 | Stop reason: HOSPADM

## 2020-05-13 RX ORDER — SODIUM CHLORIDE, SODIUM LACTATE, POTASSIUM CHLORIDE, CALCIUM CHLORIDE 600; 310; 30; 20 MG/100ML; MG/100ML; MG/100ML; MG/100ML
INJECTION, SOLUTION INTRAVENOUS CONTINUOUS
Status: DISCONTINUED | OUTPATIENT
Start: 2020-05-13 | End: 2020-05-13 | Stop reason: HOSPADM

## 2020-05-13 RX ORDER — SODIUM CHLORIDE 0.9 % (FLUSH) 0.9 %
10 SYRINGE (ML) INJECTION PRN
Status: DISCONTINUED | OUTPATIENT
Start: 2020-05-13 | End: 2020-05-13 | Stop reason: HOSPADM

## 2020-05-13 RX ORDER — ONDANSETRON 2 MG/ML
4 INJECTION INTRAMUSCULAR; INTRAVENOUS ONCE
Status: DISCONTINUED | OUTPATIENT
Start: 2020-05-13 | End: 2020-05-13 | Stop reason: HOSPADM

## 2020-05-13 RX ADMIN — PROPOFOL 40 MG: 10 INJECTION, EMULSION INTRAVENOUS at 12:23

## 2020-05-13 RX ADMIN — SODIUM CHLORIDE, POTASSIUM CHLORIDE, SODIUM LACTATE AND CALCIUM CHLORIDE: 600; 310; 30; 20 INJECTION, SOLUTION INTRAVENOUS at 09:40

## 2020-05-13 RX ADMIN — LIDOCAINE HYDROCHLORIDE 50 MG: 10 INJECTION, SOLUTION EPIDURAL; INFILTRATION; INTRACAUDAL; PERINEURAL at 11:57

## 2020-05-13 RX ADMIN — Medication 0.2 MG: at 12:24

## 2020-05-13 RX ADMIN — PROPOFOL 20 MG: 10 INJECTION, EMULSION INTRAVENOUS at 12:21

## 2020-05-13 RX ADMIN — SODIUM CHLORIDE, POTASSIUM CHLORIDE, SODIUM LACTATE AND CALCIUM CHLORIDE: 600; 310; 30; 20 INJECTION, SOLUTION INTRAVENOUS at 12:55

## 2020-05-13 RX ADMIN — PROPOFOL 40 MCG/KG/MIN: 10 INJECTION, EMULSION INTRAVENOUS at 11:57

## 2020-05-13 RX ADMIN — PROPOFOL 35 MG: 10 INJECTION, EMULSION INTRAVENOUS at 11:57

## 2020-05-13 RX ADMIN — PROPOFOL 20 MG: 10 INJECTION, EMULSION INTRAVENOUS at 12:05

## 2020-05-13 RX ADMIN — PHENYLEPHRINE HYDROCHLORIDE 100 MCG: 10 INJECTION INTRAVENOUS at 12:49

## 2020-05-13 RX ADMIN — PROPOFOL 40 MG: 10 INJECTION, EMULSION INTRAVENOUS at 12:16

## 2020-05-13 ASSESSMENT — PULMONARY FUNCTION TESTS
PIF_VALUE: 0
PIF_VALUE: 1
PIF_VALUE: 0
PIF_VALUE: 1
PIF_VALUE: 0
PIF_VALUE: 1
PIF_VALUE: 1
PIF_VALUE: 0
PIF_VALUE: 1
PIF_VALUE: 0
PIF_VALUE: 1
PIF_VALUE: 0
PIF_VALUE: 1
PIF_VALUE: 0
PIF_VALUE: 0
PIF_VALUE: 1
PIF_VALUE: 0
PIF_VALUE: 1
PIF_VALUE: 0
PIF_VALUE: 1
PIF_VALUE: 1
PIF_VALUE: 0
PIF_VALUE: 1

## 2020-05-13 ASSESSMENT — PAIN - FUNCTIONAL ASSESSMENT: PAIN_FUNCTIONAL_ASSESSMENT: 0-10

## 2020-05-13 NOTE — ANESTHESIA PRE PROCEDURE
Department of Anesthesiology  Preprocedure Note       Name:  Alva Draper   Age:  78 y.o.  :  1950                                          MRN:  [id number]         Date:  2020      Surgeon: Do Mcbride):  Marisela Butt MD    Procedure: ** CASE IN O.R. WITH GI STAFF** ENDOSCOPIC ULTRASOUND- PATHOLOGY REQUESTED- PAT NOTIFIED (N/A )    Medications prior to admission:   Prior to Admission medications    Medication Sig Start Date End Date Taking? Authorizing Provider   pravastatin (PRAVACHOL) 40 MG tablet Take 1 tablet by mouth daily 20   Oxana Edwards MD   vitamin D (D3-50) 81860 UNIT CAPS TAKE 1 CAPSULE BY MOUTH ONCE A WEEK 20   Dewayne Vasquez MD   glimepiride (AMARYL) 1 MG tablet Take 1 tablet in the morning with breakfast.  Take one-half tablet in the evening with the evening meal. 20   Oxana Edwards MD   amLODIPine (NORVASC) 10 MG tablet Take 1 tablet by mouth daily 20   Oxana Edwards MD   metFORMIN (GLUCOPHAGE) 500 MG tablet Take 1 tablet by mouth 2 times daily (with meals) 20   Oxana Edwards MD   NONFORMULARY Artificial tear eye drops prn    Historical Provider, MD   isosorbide mononitrate (IMDUR) 30 MG extended release tablet TAKE 1/2 (ONE-HALF) TABLET BY MOUTH ONCE DAILY 20   Navid Kirby DO   fluticasone (FLONASE) 50 MCG/ACT nasal spray 1 spray by Nasal route daily  Patient not taking: Reported on 1/15/2020 3/7/19   Oxana Edwards MD   Multiple Vitamins-Minerals (MULTIVITAMIN PO) Take 1 tablet by mouth daily    Historical Provider, MD   nitroGLYCERIN (NITROSTAT) 0.4 MG SL tablet Place 1 tablet under the tongue every 5 minutes as needed for Chest pain up to max of 3 total doses. If no relief after 1 dose, call 911. 18   Oxana Edwards MD   NONFORMULARY 5,000 mcg daily OTC Biotin 1000mcg daily     Historical Provider, MD   aspirin 81 MG tablet Take 81 mg by mouth daily.     Historical Provider, MD       Current medications:    No current facility-administered medications for this visit. No current outpatient medications on file. Facility-Administered Medications Ordered in Other Visits   Medication Dose Route Frequency Provider Last Rate Last Dose    fentaNYL (SUBLIMAZE) injection 50 mcg  50 mcg Intravenous Q5 Min PRN Bette Sloan MD        ondansetron [facility]) injection 4 mg  4 mg Intravenous Once PRN Bette Sloan MD           Allergies: Allergies   Allergen Reactions    Iv Dye [Iodides] Shortness Of Breath    Penicillins Swelling    Sulfa Antibiotics Shortness Of Breath    Ace Inhibitors Nausea Only     Difficulty swallowing the Lisinopril. Gagging. Nausea.  Cozaar [Losartan] Nausea Only    Lipitor Other (See Comments)     Muscle aches.  Lopressor [Metoprolol] Other (See Comments)     leg pain    Morphine Nausea Only     Chest pressure       Problem List:    Patient Active Problem List   Diagnosis Code    Mixed hyperlipidemia E78.2    Obesity (BMI 30-39. 9) E66.9    Interstitial cystitis N30.10    Trigger finger, left M65.30    High risk medication use Z79.899    Type 2 diabetes mellitus, without long-term current use of insulin ([facility]) E11.9    Vitamin D deficiency E55.9    Essential hypertension I10    Atypical chest pain R07.89    Hypokalemia E87.6    Paresthesias R20.2    Coronary artery disease involving native coronary artery of native heart without angina pectoris I25.10    Cerebrovascular accident (CVA) due to thrombosis of precerebral artery ([facility]) I63.00    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    Obesity     Weight 176 lb, height 5 ft, BMI 35, 2/28/2013.  Plantar fasciitis, bilateral     History of.  Polyneuropathy associated with underlying disease ([facility] 3/20/2018    S/P drug eluting coronary stent placement-RCA 6/20/17 - Dr. Rick Pedraza 6/20/2017    Seasonal allergies     Trigger finger, left     History of triggering, left long finger.  Type 2 diabetes mellitus ([facility]        Past Surgical History:        Procedure Laterality Date    APPENDECTOMY      CARDIAC CATHETERIZATION  06/20/2017    Left main: normal, LAD: proximal 30% stenosis, LCX: 20 % proximal stenosis, RCA: Ostial 70% with pressure damping and mid 90% stenosis. Required PTCA-GRACE in both lesions. LVEF 55%. LV wall motion normal.  Dr. Marii Marte, [facility]      CATARACT REMOVAL WITH IMPLANT Left 10/20/2015    Dr. Sandra Orellana, [address] [place] [address] WITH IMPLANT Right 12/08/2015    [facility] with DANIAL. Dr Sandra Orellana, [address] [place] [address], LAPAROSCOPIC  6/9/2000    COLONOSCOPY  3/11/2015    Internal and external hemorrhoids otherwise normal    CORONARY ANGIOPLASTY WITH STENT PLACEMENT  06/20/2017    Right coronary artery 70% ostial lesion and mid RCA 90% lesion; successful PTCA with drug-eluting stents in both lesions, Dr. Marii Marte, [facility]    CYSTOSCOPY  3/1996    Bladder biopsy, urethral dilatation.  EYE SURGERY      HYSTERECTOMY, TOTAL ABDOMINAL  1980    LAPAROSCOPY  3/1979    NASAL SEPTUM SURGERY  3/9/2005    Nasoseptal reconstruction.  OVARY REMOVAL Right 7/1993    Laparotomy.     WA KNEE SCOPE,DIAGNOSTIC Right 11/6/2018    Right KNEE ARTHROSCOPY PARTIAL MEDIAL MENISECTOMY AND PLICA INCISION performed by Lady Yrn MD at [address] [place] [address] 3/5/2020    EGD POLYP SNARE ET COLD BX'S performed by Daniel Carson DO at [address] [facility] CAPSULOTOMY Right 04/2016    Dr El Werner

## 2020-05-13 NOTE — ANESTHESIA POSTPROCEDURE EVALUATION
Department of Anesthesiology  Postprocedure Note    Patient: Richard Gongora  MRN: [id number]  YOB: 1950  Date of evaluation: 5/13/2020  Time:  3:53 PM     Procedure Summary     Date:  05/13/20 Room / Location:  [address] [place] [address]    Anesthesia Start:  1153 Anesthesia Stop:  1322    Procedures:       ** CASE IN O.R. WITH GI STAFF** ENDOSCOPIC ULTRASOUND- PATHOLOGY REQUESTED- PAT NOTIFIED (N/A )      EGD BIOPSY in OR with GI staff      EGD POLYP HOT FORCEP/CAUTERY      EGD W/ EMR Diagnosis:  (NEUROENDOCRINE CARCINOMA OF STOMACH)    Surgeon:  Lilly Cole MD Responsible Provider:  Michelle Gamez MD    Anesthesia Type:  MAC, general ASA Status:  3          Anesthesia Type: MAC, general    Shaina Phase I: Shaina Score: 8    Shaina Phase II: Shaina Score: 10    Last vitals: Reviewed and per EMR flowsheets.        Anesthesia Post Evaluation    Patient location during evaluation: PACU  Patient participation: complete - patient participated  Level of consciousness: awake and alert  Pain score: 0  Airway patency: patent  Nausea & Vomiting: no nausea and no vomiting  Complications: no  Cardiovascular status: hemodynamically stable  Respiratory status: acceptable, spontaneous ventilation and room air  Hydration status: euvolemic

## 2020-05-15 LAB — SURGICAL PATHOLOGY REPORT: NORMAL

## 2020-06-12 ENCOUNTER — OFFICE VISIT (OUTPATIENT)
Dept: CARDIOLOGY | Age: 70
End: 2020-06-12
Payer: MEDICARE

## 2020-06-12 VITALS
HEIGHT: 57 IN | HEART RATE: 76 BPM | DIASTOLIC BLOOD PRESSURE: 66 MMHG | SYSTOLIC BLOOD PRESSURE: 138 MMHG | BODY MASS INDEX: 35.9 KG/M2 | WEIGHT: 166.4 LBS

## 2020-06-12 PROCEDURE — 4040F PNEUMOC VAC/ADMIN/RCVD: CPT | Performed by: INTERNAL MEDICINE

## 2020-06-12 PROCEDURE — G8417 CALC BMI ABV UP PARAM F/U: HCPCS | Performed by: INTERNAL MEDICINE

## 2020-06-12 PROCEDURE — G8427 DOCREV CUR MEDS BY ELIG CLIN: HCPCS | Performed by: INTERNAL MEDICINE

## 2020-06-12 PROCEDURE — 1036F TOBACCO NON-USER: CPT | Performed by: INTERNAL MEDICINE

## 2020-06-12 PROCEDURE — 99214 OFFICE O/P EST MOD 30 MIN: CPT | Performed by: INTERNAL MEDICINE

## 2020-06-12 PROCEDURE — 1090F PRES/ABSN URINE INCON ASSESS: CPT | Performed by: INTERNAL MEDICINE

## 2020-06-12 PROCEDURE — 93005 ELECTROCARDIOGRAM TRACING: CPT | Performed by: INTERNAL MEDICINE

## 2020-06-12 PROCEDURE — 1123F ACP DISCUSS/DSCN MKR DOCD: CPT | Performed by: INTERNAL MEDICINE

## 2020-06-12 PROCEDURE — G8399 PT W/DXA RESULTS DOCUMENT: HCPCS | Performed by: INTERNAL MEDICINE

## 2020-06-12 PROCEDURE — 99213 OFFICE O/P EST LOW 20 MIN: CPT | Performed by: INTERNAL MEDICINE

## 2020-06-12 PROCEDURE — 93010 ELECTROCARDIOGRAM REPORT: CPT | Performed by: INTERNAL MEDICINE

## 2020-06-12 PROCEDURE — 3017F COLORECTAL CA SCREEN DOC REV: CPT | Performed by: INTERNAL MEDICINE

## 2020-06-12 RX ORDER — NITROGLYCERIN 0.4 MG/1
0.4 TABLET SUBLINGUAL EVERY 5 MIN PRN
Qty: 15 TABLET | Refills: 3 | Status: SHIPPED | OUTPATIENT
Start: 2020-06-12 | End: 2021-01-22 | Stop reason: SDUPTHER

## 2020-06-12 RX ORDER — ISOSORBIDE MONONITRATE 30 MG/1
30 TABLET, EXTENDED RELEASE ORAL DAILY
Qty: 90 TABLET | Refills: 1 | Status: SHIPPED | OUTPATIENT
Start: 2020-06-12 | End: 2021-01-14

## 2020-06-12 NOTE — PROGRESS NOTES
Today's Date: 6/12/2020  Patient Name: Patricia Beasley  Patient's age: 78 y.o., 1950    CC: follow up for CAD    HPI:    Here today for follow up for CAD. Was dealing with some family issues. No cp, sob, orthopnea, pnd, le edema. Not too active. Past Medical History:   has a past medical history of CAD (coronary artery disease), Cerebrovascular accident (CVA) due to thrombosis of precerebral artery ([address] History of seasonal allergies, Hyperlipidemia, Hypertension, Interstitial cystitis, Obesity, Plantar fasciitis, bilateral, Polyneuropathy associated with underlying disease ([address] S/P drug eluting coronary stent placement-RCA 6/20/17 - Dr. Sheila Ocampo, Seasonal allergies, Trigger finger, left, and Type 2 diabetes mellitus ([address] Past Surgical History:   has a past surgical history that includes Nasal septum surgery (3/9/2005); Cholecystectomy, laparoscopic (6/9/2000); Cystocopy (3/1996); Ovary removal (Right, 7/1993); laparoscopy (3/1979); Hysterectomy, total abdominal (1980); Colonoscopy (3/11/2015); Cataract removal with implant (Left, 10/20/2015); Cataract removal with implant (Right, 12/08/2015); Yag capsulotomy (Right, 04/2016); Cardiac catheterization (06/20/2017); Coronary angioplasty with stent (06/20/2017); Appendectomy; eye surgery; pr knee scope,diagnostic (Right, 11/6/2018); Upper gastrointestinal endoscopy (N/A, 3/5/2020); Upper gastrointestinal endoscopy; Upper gastrointestinal endoscopy (05/13/2020); Endoscopic ultrasonography, GI (N/A, 5/13/2020); Upper gastrointestinal endoscopy (5/13/2020); Upper gastrointestinal endoscopy (5/13/2020); and Upper gastrointestinal endoscopy (5/13/2020). Home Medications:    Prior to Admission medications    Medication Sig Start Date End Date Taking?  Authorizing Provider   pantoprazole (PROTONIX) 40 MG tablet Take 1 tablet by mouth every morning (before breakfast) 5/13/20  Yes Chetan Moran MD   pravastatin (PRAVACHOL)

## 2020-06-23 ENCOUNTER — NURSE ONLY (OUTPATIENT)
Dept: LAB | Age: 70
End: 2020-06-23
Payer: MEDICARE

## 2020-06-23 PROCEDURE — 96372 THER/PROPH/DIAG INJ SC/IM: CPT

## 2020-06-23 RX ORDER — CYANOCOBALAMIN 1000 UG/ML
1000 INJECTION INTRAMUSCULAR; SUBCUTANEOUS
Status: SHIPPED | OUTPATIENT
Start: 2020-06-23 | End: 2021-06-18

## 2020-06-23 RX ADMIN — CYANOCOBALAMIN 1000 MCG: 1000 INJECTION, SOLUTION INTRAMUSCULAR; SUBCUTANEOUS at 16:05

## 2020-07-14 ENCOUNTER — TELEPHONE (OUTPATIENT)
Dept: CARDIOLOGY | Age: 70
End: 2020-07-14

## 2020-07-14 NOTE — TELEPHONE ENCOUNTER
Last Appt:  6/12/2020  Next Appt:   12/11/2020  Med verified in Epic    imdur 30 mg po qd   Pravastatin recently increased to 40 mg po qd    Pt called about the change in medications from her last office visit. Pt says that that wasn't her usual cardiologist and the changes made are causing her to have whole body pain to the point she can hardly walk. She states she can not go on like this and would like to speak with someone.

## 2020-07-14 NOTE — TELEPHONE ENCOUNTER
Writer spoke to patient. Patient stated that Dr Karthik Mascorro increased her pravastatin to 40mg back in may and ever since the increase she has had more body and muscle aches and leg cramps. She also stated that ever since the increase of the imdur she has been more fatigued. Pt stated that she took only 20mg of the pravastatin and half of the imdur starting on Sunday and has felt less pain and fatigued ever since. Pt reported her BP the past few days being 142/72 this morning and 135/67 around noon. Patient is wondering if she should only do the 20mg of pravastatin since he has felt better since only taking 20mg.

## 2020-08-21 ENCOUNTER — HOSPITAL ENCOUNTER (OUTPATIENT)
Dept: LAB | Age: 70
Discharge: HOME | End: 2020-08-21
Payer: MEDICARE

## 2020-08-21 ENCOUNTER — NURSE ONLY (OUTPATIENT)
Dept: LAB | Age: 70
End: 2020-08-21
Payer: MEDICARE

## 2020-08-21 LAB
ALBUMIN SERPL-MCNC: 4.6 G/DL (ref 3.5–5.2)
ALBUMIN/GLOBULIN RATIO: 1.5 (ref 1–2.5)
ALP BLD-CCNC: 120 U/L (ref 35–104)
ALT SERPL-CCNC: 37 U/L (ref 5–33)
ANION GAP SERPL CALCULATED.3IONS-SCNC: 11 MMOL/L (ref 9–17)
AST SERPL-CCNC: 25 U/L
BILIRUB SERPL-MCNC: 0.24 MG/DL (ref 0.3–1.2)
BUN BLDV-MCNC: 12 MG/DL (ref 8–23)
BUN/CREAT BLD: 17 (ref 9–20)
CALCIUM SERPL-MCNC: 9.7 MG/DL (ref 8.6–10.4)
CHLORIDE BLD-SCNC: 102 MMOL/L (ref 98–107)
CHOLESTEROL, FASTING: 207 MG/DL
CHOLESTEROL/HDL RATIO: 4.2
CHOLESTEROL/HDL RATIO: 4.2
CHOLESTEROL: 207 MG/DL
CO2: 26 MMOL/L (ref 20–31)
CREAT SERPL-MCNC: 0.7 MG/DL (ref 0.5–0.9)
ESTIMATED AVERAGE GLUCOSE: 157 MG/DL
GFR AFRICAN AMERICAN: >60 ML/MIN
GFR NON-AFRICAN AMERICAN: >60 ML/MIN
GFR SERPL CREATININE-BSD FRML MDRD: ABNORMAL ML/MIN/{1.73_M2}
GFR SERPL CREATININE-BSD FRML MDRD: ABNORMAL ML/MIN/{1.73_M2}
GLUCOSE BLD-MCNC: 159 MG/DL (ref 70–99)
HBA1C MFR BLD: 7.1 % (ref 4.8–5.9)
HDLC SERPL-MCNC: 49 MG/DL
HDLC SERPL-MCNC: 49 MG/DL
LDL CHOLESTEROL: 104 MG/DL (ref 0–130)
LDL CHOLESTEROL: 104 MG/DL (ref 0–130)
POTASSIUM SERPL-SCNC: 3.6 MMOL/L (ref 3.7–5.3)
SODIUM BLD-SCNC: 139 MMOL/L (ref 135–144)
TOTAL PROTEIN: 7.6 G/DL (ref 6.4–8.3)
TRIGL SERPL-MCNC: 269 MG/DL
TRIGLYCERIDE, FASTING: 269 MG/DL
VITAMIN B-12: 369 PG/ML (ref 232–1245)
VLDLC SERPL CALC-MCNC: ABNORMAL MG/DL (ref 1–30)
VLDLC SERPL CALC-MCNC: ABNORMAL MG/DL (ref 1–30)

## 2020-08-21 PROCEDURE — 83036 HEMOGLOBIN GLYCOSYLATED A1C: CPT

## 2020-08-21 PROCEDURE — 36415 COLL VENOUS BLD VENIPUNCTURE: CPT

## 2020-08-21 PROCEDURE — 80053 COMPREHEN METABOLIC PANEL: CPT

## 2020-08-21 PROCEDURE — 96372 THER/PROPH/DIAG INJ SC/IM: CPT

## 2020-08-21 PROCEDURE — 80061 LIPID PANEL: CPT

## 2020-08-21 PROCEDURE — 82607 VITAMIN B-12: CPT

## 2020-08-21 RX ADMIN — CYANOCOBALAMIN 1000 MCG: 1000 INJECTION, SOLUTION INTRAMUSCULAR; SUBCUTANEOUS at 09:22

## 2020-08-26 ENCOUNTER — OFFICE VISIT (OUTPATIENT)
Dept: FAMILY MEDICINE CLINIC | Age: 70
End: 2020-08-26
Payer: MEDICARE

## 2020-08-26 ENCOUNTER — TELEPHONE (OUTPATIENT)
Dept: FAMILY MEDICINE CLINIC | Age: 70
End: 2020-08-26

## 2020-08-26 VITALS
SYSTOLIC BLOOD PRESSURE: 136 MMHG | WEIGHT: 165 LBS | BODY MASS INDEX: 35.6 KG/M2 | DIASTOLIC BLOOD PRESSURE: 64 MMHG | TEMPERATURE: 98.6 F | OXYGEN SATURATION: 98 % | HEIGHT: 57 IN | HEART RATE: 80 BPM

## 2020-08-26 PROCEDURE — 3051F HG A1C>EQUAL 7.0%<8.0%: CPT | Performed by: FAMILY MEDICINE

## 2020-08-26 PROCEDURE — 3017F COLORECTAL CA SCREEN DOC REV: CPT | Performed by: FAMILY MEDICINE

## 2020-08-26 PROCEDURE — 1036F TOBACCO NON-USER: CPT | Performed by: FAMILY MEDICINE

## 2020-08-26 PROCEDURE — 1123F ACP DISCUSS/DSCN MKR DOCD: CPT | Performed by: FAMILY MEDICINE

## 2020-08-26 PROCEDURE — G8399 PT W/DXA RESULTS DOCUMENT: HCPCS | Performed by: FAMILY MEDICINE

## 2020-08-26 PROCEDURE — 99212 OFFICE O/P EST SF 10 MIN: CPT

## 2020-08-26 PROCEDURE — 4040F PNEUMOC VAC/ADMIN/RCVD: CPT | Performed by: FAMILY MEDICINE

## 2020-08-26 PROCEDURE — 99214 OFFICE O/P EST MOD 30 MIN: CPT | Performed by: FAMILY MEDICINE

## 2020-08-26 PROCEDURE — 1090F PRES/ABSN URINE INCON ASSESS: CPT | Performed by: FAMILY MEDICINE

## 2020-08-26 PROCEDURE — 2022F DILAT RTA XM EVC RTNOPTHY: CPT | Performed by: FAMILY MEDICINE

## 2020-08-26 PROCEDURE — G8427 DOCREV CUR MEDS BY ELIG CLIN: HCPCS | Performed by: FAMILY MEDICINE

## 2020-08-26 PROCEDURE — G8417 CALC BMI ABV UP PARAM F/U: HCPCS | Performed by: FAMILY MEDICINE

## 2020-08-26 RX ORDER — METHYLPREDNISOLONE 4 MG/1
TABLET ORAL
Qty: 1 KIT | Refills: 0 | Status: SHIPPED | OUTPATIENT
Start: 2020-08-26 | End: 2020-09-01

## 2020-08-26 RX ORDER — ROSUVASTATIN CALCIUM 10 MG/1
10 TABLET, COATED ORAL NIGHTLY
Qty: 30 TABLET | Refills: 3 | Status: SHIPPED | OUTPATIENT
Start: 2020-08-26 | End: 2020-11-23 | Stop reason: SDUPTHER

## 2020-08-26 NOTE — TELEPHONE ENCOUNTER
Writer spoke with [address] [place] [address], Medrol dose eladio will cost $1.00 and Crestor with [facility] will be $15.40 for 30 day supply. Patient notified and will get scripts.

## 2020-08-26 NOTE — PATIENT INSTRUCTIONS
cold water, can also help reduce swelling. But because heat alone may make pain and swelling worse, end a contrast bath with a soak in cold water. · Wear a night splint if your doctor suggests it. A night splint holds your foot with the toes pointed up and the foot and ankle at a 90-degree angle. This position gives the bottom of your foot a constant, gentle stretch. · Do simple exercises such as calf stretches and towel stretches 2 to 3 times each day, especially when you first get up in the morning. These can help the plantar fascia become more flexible. They also make the muscles that support your arch stronger. Hold these stretches for 15 to 30 seconds per stretch. Repeat 2 to 4 times. ? Stand about 1 foot from a wall. Place the palms of both hands against the wall at chest level. Lean forward against the wall, keeping one leg with the knee straight and heel on the ground while bending the knee of the other leg.  ? Sit down on the floor or a mat with your feet stretched in front of you. Roll up a towel lengthwise, and loop it over the ball of your foot. Holding the towel at both ends, gently pull the towel toward you to stretch your foot. · Wear shoes with good arch support. Athletic shoes or shoes with a well-cushioned sole are good choices. · Try heel cups or shoe inserts (orthotics) to help cushion your heel. You can buy these at many shoe stores. · Put on your shoes as soon as you get out of bed. Going barefoot or wearing slippers may make your pain worse. · Reach and stay at a good weight for your height. This puts less strain on your feet. When should you call for help? Call your doctor now or seek immediate medical care if:  · You have heel pain with fever, redness, or warmth in your heel. · You cannot put weight on the sore foot. Watch closely for changes in your health, and be sure to contact your doctor if:  · You have numbness or tingling in your heel.   · Your heel pain lasts more than 2 weeks.  Where can you learn more? Go to https://chpepiceweb.[email or web address] org and sign in to your [employer] account. Enter Z530 in the First[employer] box to learn more about \"Plantar Fasciitis: Care Instructions. \"     If you do not have an account, please click on the \"Sign Up Now\" link. Current as of: March 2, 2020               Content Version: 12.5  © 2006-2020 Context app. Care instructions adapted under license by [facility] ([facility]). If you have questions about a medical condition or this instruction, always ask your healthcare professional. [place] [address] any warranty or liability for your use of this information. Patient Education        Saline Nasal Washes: Care Instructions  Your Care Instructions     Saline nasal washes help keep the nasal passages open by washing out thick or dried mucus. This simple remedy can help relieve symptoms of allergies, sinusitis, and colds. It also can make the nose feel more comfortable by keeping the mucous membranes moist. You may notice a little burning sensation in your nose the first few times you use the solution, but this usually gets better in a few days. Follow-up care is a key part of your treatment and safety. Be sure to make and go to all appointments, and call your doctor if you are having problems. It's also a good idea to know your test results and keep a list of the medicines you take. How can you care for yourself at home? · You can buy premixed saline solution in a squeeze bottle or other sinus rinse products at a drugstore. Read and follow the instructions on the label. · You also can make your own saline solution by adding 1 teaspoon of salt and 1 teaspoon of baking soda to 2 cups of distilled water. · If you use a homemade solution, pour a small amount into a clean bowl. Using a rubber bulb syringe, squeeze the syringe and place the tip in the salt water.  Pull a small amount of the salt water into the syringe by relaxing your hand. · Sit down with your head tilted slightly back. Do not lie down. Put the tip of the bulb syringe or the squeeze bottle a little way into one of your nostrils. Gently drip or squirt a few drops into the nostril. Repeat with the other nostril. Some sneezing and gagging are normal at first.  · Gently blow your nose. · Wipe the syringe or bottle tip clean after each use. · Repeat this 2 or 3 times a day. · Use nasal washes gently if you have nosebleeds often. When should you call for help? Watch closely for changes in your health, and be sure to contact your doctor if:  · You often get nosebleeds. · You have problems doing the nasal washes. Where can you learn more? Go to https://Ushipepiceweb.[email or web address] org and sign in to your [employer] account. Enter [id number] in the [employer] box to learn more about \"Saline Nasal Washes: Care Instructions. \"     If you do not have an account, please click on the \"Sign Up Now\" link. Current as of: July 29, 2019               Content Version: 12.5  © 4656-8433 Healthwise, Incorporated. Care instructions adapted under license by [facility] ([facility]). If you have questions about a medical condition or this instruction, always ask your healthcare professional. [address] any warranty or liability for your use of this information.

## 2020-08-26 NOTE — PROGRESS NOTES
[address], [address] [place] [address] Drive                        Telephone [phone]             Fax [phone]     Claudeen Dupont  1950  MRN:  [id number]  Date of visit:  8/26/2020    Subjective:    Claudeen Dupont is a 78 y.o.  female who presents to [facility] today (8/26/2020) for follow up/evaluation of:  Diabetes (3 month follow up); Hypertension (3 month follow up); Foot Pain (bilateral foot pain x 3 months); Tinnitus (left ear x1 month ); and Sinus Problem (runny nose, cough x 2 months)      She states that she has been feeling well. She has some anxiety related to the Coronavirus Pandemic. She has been walking for exercise occasionally. She reports foot pain with activity. She denies chest pain or shortness of breath with activity or at rest.  She states that she tried taking a full tablet of Pravastatin, and she had increased muscle pains and muscle cramps. She is able to tolerate a half tablet of Pravastatin. She reports bilateral   Chief Complaint   Patient presents with    Diabetes     3 month follow up    Hypertension     3 month follow up    Foot Pain     bilateral foot pain x 3 months    Tinnitus     left ear x1 month     Sinus Problem     runny nose, cough x 2 months        She has the following problem list:  Patient Active Problem List   Diagnosis    Mixed hyperlipidemia    Obesity (BMI 30-39. 9)    Interstitial cystitis    Trigger finger, left    High risk medication use    Type 2 diabetes mellitus, without long-term current use of insulin ([facility])    Vitamin D deficiency    Essential hypertension    Atypical chest pain    Hypokalemia    Paresthesias    Coronary artery disease involving native coronary artery of native heart without angina pectoris    Cerebrovascular accident (CVA) due to thrombosis of precerebral artery ([facility]    Right tibial neuropathy    Neuropathy of left peroneal nerve    H/O major orthopedic surgery    Peripheral vascular disease ([address]    Hematemesis        Current medications are:  Outpatient Medications Marked as Taking for the 8/26/20 encounter (Office Visit) with Karey Orellana MD   Medication Sig Dispense Refill    ELDERBERRY PO Take by mouth      Ascorbic Acid (VITAMIN C PO) Take by mouth      isosorbide mononitrate (IMDUR) 30 MG extended release tablet Take 1 tablet by mouth daily 90 tablet 1    nitroGLYCERIN (NITROSTAT) 0.4 MG SL tablet Place 1 tablet under the tongue every 5 minutes as needed for Chest pain up to max of 3 total doses. If no relief after 1 dose, call 911. 15 tablet 3    pravastatin (PRAVACHOL) 40 MG tablet Take 1 tablet by mouth daily 90 tablet 1    vitamin D (D3-50) 55567 UNIT CAPS TAKE 1 CAPSULE BY MOUTH ONCE A WEEK 4 capsule 5    glimepiride (AMARYL) 1 MG tablet Take 1 tablet in the morning with breakfast.  Take one-half tablet in the evening with the evening meal. 135 tablet 1    amLODIPine (NORVASC) 10 MG tablet Take 1 tablet by mouth daily 90 tablet 1    metFORMIN (GLUCOPHAGE) 500 MG tablet Take 1 tablet by mouth 2 times daily (with meals) 180 tablet 1    NONFORMULARY Artificial tear eye drops prn      fluticasone (FLONASE) 50 MCG/ACT nasal spray 1 spray by Nasal route daily (Patient taking differently: 1 spray by Nasal route daily Indications: taking PRN ) 1 Bottle 0    Multiple Vitamins-Minerals (MULTIVITAMIN PO) Take 1 tablet by mouth daily      NONFORMULARY 5,000 mcg daily OTC Biotin 1000mcg daily       aspirin 81 MG tablet Take 81 mg by mouth daily. She is allergic to iv dye [iodides]; penicillins; sulfa antibiotics; ace inhibitors; cozaar [losartan]; lipitor; lopressor [metoprolol]; and morphine. She  reports that she has never smoked.  She has never used smokeless tobacco.      Objective:    Vitals:    08/26/20 1403   BP: 136/64   Site: Right Upper Arm   Position: Sitting   Cuff Size: Large Adult   Pulse: 80   Temp: 98.6 °F (37 °C)   TempSrc: Tympanic   SpO2: 98%   Weight: 165 lb (74.8 kg)   Height: 4' 9\" (1.448 m)     Body mass index is 35.71 kg/m². Obese  female, healthy-appearing, alert, cooperative and in no distress. Neck supple. No adenopathy. Thyroid symmetric, normal size. Chest:  Normal expansion. Clear to auscultation. No rales, rhonchi, or wheezing. Heart sounds are normal.  Regular rate and rhythm without murmur, gallop or rub. Lower extremities have no edema. Her feet were examined. There were no areas of redness, ulceration, or skin breakdown. There was normal sensation to light touch of the feet bilaterally. The pulses in the feet and ankles were normal.     Labs done 8/21/2020 were reviewed with the patient:   Hospital Outpatient Visit on 08/21/2020   Component Date Value Ref Range Status    Cholesterol, Fasting 08/21/2020 207* <200 mg/dL Final    Comment:    Cholesterol Guidelines:      <200  Desirable   200-240  Borderline      >240  Undesirable         HDL 08/21/2020 49  >40 mg/dL Final    Comment:    HDL Guidelines:    <40     Undesirable   40-59    Borderline    >59     Desirable         LDL Cholesterol 08/21/2020 104  0 - 130 mg/dL Final    Comment:    LDL Guidelines:     <100    Desirable   100-129   Near to/above Desirable   130-159   Borderline      >159   Undesirable     Direct (measured) LDL and calculated LDL are not interchangeable tests.  Chol/HDL Ratio 08/21/2020 4.2  <5 Final            Triglyceride, Fasting 08/21/2020 269* <150 mg/dL Final    Comment:    Triglyceride Guidelines:     <150   Desirable   150-199  Borderline   200-499  High     >499   Very high   Based on AHA Guidelines for fasting triglyceride, October 2012.          VLDL 08/21/2020 NOT REPORTED* 1 - 30 mg/dL Final   Hospital Outpatient Visit on 08/21/2020   Component Date Value Ref Range Status    Vitamin B-12 08/21/2020 369  232 - 1245 pg/mL Final    Hemoglobin A1C 08/21/2020 7.1* 4.8 - 5.9 % Final    Estimated Avg Glucose 08/21/2020 157  mg/dL Final    Glucose 08/21/2020 159* 70 - 99 mg/dL Final    BUN 08/21/2020 12  8 - 23 mg/dL Final    CREATININE 08/21/2020 0.70  0.50 - 0.90 mg/dL Final    Bun/Cre Ratio 08/21/2020 17  9 - 20 Final    Calcium 08/21/2020 9.7  8.6 - 10.4 mg/dL Final    Sodium 08/21/2020 139  135 - 144 mmol/L Final    Potassium 08/21/2020 3.6* 3.7 - 5.3 mmol/L Final    Chloride 08/21/2020 102  98 - 107 mmol/L Final    CO2 08/21/2020 26  20 - 31 mmol/L Final    Anion Gap 08/21/2020 11  9 - 17 mmol/L Final    Alkaline Phosphatase 08/21/2020 120* 35 - 104 U/L Final    ALT 08/21/2020 37* 5 - 33 U/L Final    AST 08/21/2020 25  <32 U/L Final    Total Bilirubin 08/21/2020 0.24* 0.3 - 1.2 mg/dL Final    Total Protein 08/21/2020 7.6  6.4 - 8.3 g/dL Final    Alb 08/21/2020 4.6  3.5 - 5.2 g/dL Final    Albumin/Globulin Ratio 08/21/2020 1.5  1.0 - 2.5 Final    GFR Non- 08/21/2020 >60  >60 mL/min Final    GFR  08/21/2020 >60  >60 mL/min Final    GFR Comment 08/21/2020        Final    Comment: Average GFR for 79or more years old:   76 mL/min/1.73sq m  Chronic Kidney Disease:   <60 mL/min/1.73sq m  Kidney failure:   <15 mL/min/1.73sq m              eGFR calculated using average adult body mass.  Additional eGFR calculator available at:        b-datum.br            GFR Staging 08/21/2020 NOT REPORTED   Final    Cholesterol 08/21/2020 207* <200 mg/dL Final    Comment:    Cholesterol Guidelines:      <200  Desirable   200-240  Borderline      >240  Undesirable         HDL 08/21/2020 49  >40 mg/dL Final    Comment:    HDL Guidelines:    <40     Undesirable   40-59    Borderline    >59     Desirable         LDL Cholesterol 08/21/2020 104  0 - 130 mg/dL Final    Comment:    LDL Guidelines:     <100    Desirable   100-129   Near to/above Desirable   130-159 Borderline      >159   Undesirable     Direct (measured) LDL and calculated LDL are not interchangeable tests.  Chol/HDL Ratio 08/21/2020 4.2  <5 Final            Triglycerides 08/21/2020 269* <150 mg/dL Final    Comment:    Triglyceride Guidelines:     <150   Desirable   150-199  Borderline   200-499  High     >499   Very high   Based on AHA Guidelines for fasting triglyceride, October 2012.  VLDL 08/21/2020 NOT REPORTED* 1 - 30 mg/dL Final         Assessment and Plan:    1. Type 2 diabetes mellitus without complication, without long-term current use of insulin (HCC)  Her HgbA1c was 7.1 %, which near goal.   She was advised to continue her current regimen. Labs were ordered to be done prior to her return visit in 6 months:  - Hemoglobin A1C; Future  - Microalbumin, Ur; Future    2. Essential hypertension  Her blood pressure is adequately-controlled. (BP: 136/64)   She was advised to continue current medications. She states that she does not need a refill today. - Comprehensive Metabolic Panel; Future prior to her return visit in 6 months. 3. Mixed hyperlipidemia  Her lipid profile was not at goal on her recent lab work. She has taken Crestor 40 mg previously, and she was unable to tolerate it. I recommended a trial of Crestor 10 mg daily:  - rosuvastatin (CRESTOR) 10 MG tablet; Take 1 tablet by mouth nightly  Dispense: 30 tablet; Refill: 3    Labs were ordered to be done in 6-8 weeks:  - AST; Future  - Lipid Panel; Future    - Lipid Panel; Future was also ordered to be done prior to her return visit in 6 months. 4. Peripheral vascular disease ([address]  She has had no recent symptoms. 5. Plantar fasciitis  I recommended a trial of a Medrol Dose Rayne:  - methylPREDNISolone (MEDROL, RAYNE,) 4 MG tablet; Take by mouth as directed per instructions on dose pack. Take with food. Dispense: 1 kit;  Refill: 0    Printed information regarding Plantar Fasciitis was provided to the patient with her after visit summary. She was advised to follow up if symptoms worsen or do not resolve. 6. Obesity (BMI 30-39. 9)  Her weight has been stable. As above, she has decreased activity due to foot pain. 7. Rhinitis  Likely allergic  I advised her to use an over the counter antihistamine, such as Loratadine. I also advised Flonase nasal spray and sinus rinses. Printed information regarding Saline Nasal Washes was provided to the patient with her after visit summary. She was advised to follow up if symptoms worsen or do not resolve. 8.  Routine health maintenance  Health maintenance was reviewed with the patient. Annual influenza vaccine was recommended. Shingrix was recommended, and a printed prescription for Shingrix was given to the patient. Tdap was recommended and declined. All other health maintenance, including Pneumovax and Prevnar-13, is up to date at this time.        (Please note that portions of this note were completed with a voice-recognition program. Efforts were made to edit the dictation but occasionally words are mis-transcribed.)

## 2020-08-26 NOTE — TELEPHONE ENCOUNTER
Pt calling stating she was seen today and prescribed 2 meds, states each medication has a $25 co pay and pt states she can't afford that, pt uses loaded pharmacy, please advise at above number.

## 2020-09-10 ENCOUNTER — HOSPITAL ENCOUNTER (OUTPATIENT)
Dept: MAMMOGRAPHY | Age: 70
Discharge: HOME | End: 2020-09-12
Payer: MEDICARE

## 2020-09-10 ENCOUNTER — HOSPITAL ENCOUNTER (OUTPATIENT)
Dept: LAB | Age: 70
Discharge: HOME | End: 2020-09-10
Payer: MEDICARE

## 2020-09-10 LAB
CREATININE URINE: 68.7 MG/DL (ref 28–217)
MICROALBUMIN/CREAT 24H UR: <12 MG/L
MICROALBUMIN/CREAT UR-RTO: NORMAL MCG/MG CREAT

## 2020-09-10 PROCEDURE — 77063 BREAST TOMOSYNTHESIS BI: CPT

## 2020-09-10 PROCEDURE — 82043 UR ALBUMIN QUANTITATIVE: CPT

## 2020-09-10 PROCEDURE — 82570 ASSAY OF URINE CREATININE: CPT

## 2020-09-21 ENCOUNTER — NURSE ONLY (OUTPATIENT)
Dept: LAB | Age: 70
End: 2020-09-21
Payer: MEDICARE

## 2020-09-21 PROCEDURE — G0008 ADMIN INFLUENZA VIRUS VAC: HCPCS | Performed by: FAMILY MEDICINE

## 2020-09-21 PROCEDURE — 90694 VACC AIIV4 NO PRSRV 0.5ML IM: CPT | Performed by: FAMILY MEDICINE

## 2020-09-21 PROCEDURE — 96372 THER/PROPH/DIAG INJ SC/IM: CPT

## 2020-09-21 RX ADMIN — CYANOCOBALAMIN 1000 MCG: 1000 INJECTION, SOLUTION INTRAMUSCULAR; SUBCUTANEOUS at 15:27

## 2020-10-16 ENCOUNTER — HOSPITAL ENCOUNTER (OUTPATIENT)
Dept: LAB | Age: 70
Discharge: HOME | End: 2020-10-16
Payer: MEDICARE

## 2020-10-16 LAB
AST SERPL-CCNC: 24 U/L
CHOLESTEROL/HDL RATIO: 3.3
CHOLESTEROL: 172 MG/DL
HDLC SERPL-MCNC: 52 MG/DL
LDL CHOLESTEROL: 77 MG/DL (ref 0–130)
TRIGL SERPL-MCNC: 214 MG/DL
VLDLC SERPL CALC-MCNC: ABNORMAL MG/DL (ref 1–30)

## 2020-10-16 PROCEDURE — 84450 TRANSFERASE (AST) (SGOT): CPT

## 2020-10-16 PROCEDURE — 36415 COLL VENOUS BLD VENIPUNCTURE: CPT

## 2020-10-16 PROCEDURE — 80061 LIPID PANEL: CPT

## 2020-10-16 NOTE — RESULT ENCOUNTER NOTE
Please advise the patient that her lipid panel is significantly improved. Is she tolerating Crestor?

## 2020-10-19 ENCOUNTER — TELEPHONE (OUTPATIENT)
Dept: FAMILY MEDICINE CLINIC | Age: 70
End: 2020-10-19

## 2020-10-19 NOTE — TELEPHONE ENCOUNTER
Patient called stating she has allergies, and clear mucous. She reports occasional sore throat. No fever, SOB, or changes in smell or taste. Neg covid screen. She was using Flonase PRN but is going to start using daily. Patient questioning if she can use Claritin or Zyrtec? Please advise.

## 2020-10-27 NOTE — TELEPHONE ENCOUNTER
Per nurse advise informed pt to take everything but amaryl and glucophage and to take these post procedure, advised pt to best of my knowledge since she did not have time to wait any longer for a call back. Pt

## 2020-10-28 ENCOUNTER — NURSE ONLY (OUTPATIENT)
Dept: LAB | Age: 70
End: 2020-10-28
Payer: MEDICARE

## 2020-10-28 PROCEDURE — 96372 THER/PROPH/DIAG INJ SC/IM: CPT

## 2020-10-28 RX ADMIN — CYANOCOBALAMIN 1000 MCG: 1000 INJECTION, SOLUTION INTRAMUSCULAR; SUBCUTANEOUS at 15:11

## 2020-11-23 RX ORDER — ROSUVASTATIN CALCIUM 10 MG/1
10 TABLET, COATED ORAL NIGHTLY
Qty: 30 TABLET | Refills: 3 | Status: SHIPPED | OUTPATIENT
Start: 2020-11-23 | End: 2021-03-30 | Stop reason: SDUPTHER

## 2020-12-04 ENCOUNTER — NURSE ONLY (OUTPATIENT)
Dept: LAB | Age: 70
End: 2020-12-04
Payer: MEDICARE

## 2020-12-04 PROCEDURE — 96372 THER/PROPH/DIAG INJ SC/IM: CPT

## 2020-12-04 RX ADMIN — CYANOCOBALAMIN 1000 MCG: 1000 INJECTION, SOLUTION INTRAMUSCULAR; SUBCUTANEOUS at 15:24

## 2020-12-11 ENCOUNTER — HOSPITAL ENCOUNTER (OUTPATIENT)
Age: 70
Setting detail: SPECIMEN
Discharge: HOME | End: 2020-12-11
Payer: MEDICARE

## 2020-12-11 ENCOUNTER — OFFICE VISIT (OUTPATIENT)
Dept: PRIMARY CARE CLINIC | Age: 70
End: 2020-12-11
Payer: MEDICARE

## 2020-12-11 VITALS
DIASTOLIC BLOOD PRESSURE: 75 MMHG | SYSTOLIC BLOOD PRESSURE: 128 MMHG | HEART RATE: 79 BPM | RESPIRATION RATE: 16 BRPM | OXYGEN SATURATION: 98 % | WEIGHT: 169.6 LBS | TEMPERATURE: 97.1 F | BODY MASS INDEX: 36.59 KG/M2 | HEIGHT: 57 IN

## 2020-12-11 PROCEDURE — U0003 INFECTIOUS AGENT DETECTION BY NUCLEIC ACID (DNA OR RNA); SEVERE ACUTE RESPIRATORY SYNDROME CORONAVIRUS 2 (SARS-COV-2) (CORONAVIRUS DISEASE [COVID-19]), AMPLIFIED PROBE TECHNIQUE, MAKING USE OF HIGH THROUGHPUT TECHNOLOGIES AS DESCRIBED BY CMS-2020-01-R: HCPCS

## 2020-12-11 PROCEDURE — G8484 FLU IMMUNIZE NO ADMIN: HCPCS | Performed by: NURSE PRACTITIONER

## 2020-12-11 PROCEDURE — 3017F COLORECTAL CA SCREEN DOC REV: CPT | Performed by: NURSE PRACTITIONER

## 2020-12-11 PROCEDURE — 99213 OFFICE O/P EST LOW 20 MIN: CPT | Performed by: NURSE PRACTITIONER

## 2020-12-11 PROCEDURE — 1036F TOBACCO NON-USER: CPT | Performed by: NURSE PRACTITIONER

## 2020-12-11 PROCEDURE — 1123F ACP DISCUSS/DSCN MKR DOCD: CPT | Performed by: NURSE PRACTITIONER

## 2020-12-11 PROCEDURE — 99212 OFFICE O/P EST SF 10 MIN: CPT

## 2020-12-11 PROCEDURE — G8399 PT W/DXA RESULTS DOCUMENT: HCPCS | Performed by: NURSE PRACTITIONER

## 2020-12-11 PROCEDURE — 4040F PNEUMOC VAC/ADMIN/RCVD: CPT | Performed by: NURSE PRACTITIONER

## 2020-12-11 PROCEDURE — 1090F PRES/ABSN URINE INCON ASSESS: CPT | Performed by: NURSE PRACTITIONER

## 2020-12-11 PROCEDURE — G8417 CALC BMI ABV UP PARAM F/U: HCPCS | Performed by: NURSE PRACTITIONER

## 2020-12-11 PROCEDURE — G8427 DOCREV CUR MEDS BY ELIG CLIN: HCPCS | Performed by: NURSE PRACTITIONER

## 2020-12-11 RX ORDER — AZITHROMYCIN 250 MG/1
250 TABLET, FILM COATED ORAL SEE ADMIN INSTRUCTIONS
Qty: 6 TABLET | Refills: 0 | Status: SHIPPED | OUTPATIENT
Start: 2020-12-11 | End: 2020-12-16

## 2020-12-11 ASSESSMENT — PATIENT HEALTH QUESTIONNAIRE - PHQ9
1. LITTLE INTEREST OR PLEASURE IN DOING THINGS: 0
SUM OF ALL RESPONSES TO PHQ9 QUESTIONS 1 & 2: 0
SUM OF ALL RESPONSES TO PHQ QUESTIONS 1-9: 0
2. FEELING DOWN, DEPRESSED OR HOPELESS: 0

## 2020-12-11 NOTE — PROGRESS NOTES
[address]  [facility]  [address] [facility], [address]   Phone: [phone]  Fax: [phone]    Date: 12/11/2020   Patient:  Saira Multani   YOB: 1950 Age: 69 y.o. MRN: [id number]   PCP: Ann-Marie Patricia MD       Subjective:    Chief Complaint   Patient presents with    Cough     ST, HA, SX started \" week or week and a half ago \"       HPI: Patient presents with complaints of cough, sinus pressure, sore throat, headaches for the past 7 days. They report associated mild runny nose and mild bilateral otalgia. They have tried cough drops and tylenol without relief. She has chronic sinus issues. They deny fever, chills, shortness of breath, fatigue, body aches, loss of sense of taste, loss of sense of smell, eye irritation (has chronic dry eye), mouth/lip sores or irritation, swelling of hands or feet, nausea, vomiting, diarrhea, abdominal pain, or new rash. They deny any underlying chronic: lung disease, kidney disease, or liver disease. They have not been tested for Covid-19 before in the past 2 weeks, was months ago pre-op. She has underlying CAD, obesity, and DMII. History is obtained from the patient and previous medical records. All other review of systems negative. Allergies   Allergen Reactions    Iv Dye [Iodides] Shortness Of Breath    Penicillins Swelling    Sulfa Antibiotics Shortness Of Breath    Ace Inhibitors Nausea Only     Difficulty swallowing the Lisinopril. Gagging. Nausea.  Cozaar [Losartan] Nausea Only    Lipitor Other (See Comments)     Muscle aches.     Lopressor [Metoprolol] Other (See Comments)     leg pain    Morphine Nausea Only     Chest pressure       Current Outpatient Medications   Medication Sig Dispense Refill    azithromycin (ZITHROMAX) 250 MG tablet Take 1 tablet by mouth See Admin Instructions for 5 days 500mg on day 1 followed by 250mg on days 2 - 5 6 tablet 0    rosuvastatin (CRESTOR) 10 MG tablet Take 1 tablet by mouth nightly 30 tablet 3    vitamin D (OPTIMAL-D) 72286 UNIT CAPS Take 1 capsule by mouth once a week 4 capsule 3    ELDERBERRY PO Take by mouth      Ascorbic Acid (VITAMIN C PO) Take by mouth      isosorbide mononitrate (IMDUR) 30 MG extended release tablet Take 1 tablet by mouth daily 90 tablet 1    nitroGLYCERIN (NITROSTAT) 0.4 MG SL tablet Place 1 tablet under the tongue every 5 minutes as needed for Chest pain up to max of 3 total doses. If no relief after 1 dose, call 911. 15 tablet 3    pravastatin (PRAVACHOL) 40 MG tablet Take 1 tablet by mouth daily 90 tablet 1    glimepiride (AMARYL) 1 MG tablet Take 1 tablet in the morning with breakfast.  Take one-half tablet in the evening with the evening meal. 135 tablet 1    amLODIPine (NORVASC) 10 MG tablet Take 1 tablet by mouth daily 90 tablet 1    metFORMIN (GLUCOPHAGE) 500 MG tablet Take 1 tablet by mouth 2 times daily (with meals) 180 tablet 1    NONFORMULARY Artificial tear eye drops prn      fluticasone (FLONASE) 50 MCG/ACT nasal spray 1 spray by Nasal route daily (Patient taking differently: 1 spray by Nasal route daily Indications: taking PRN ) 1 Bottle 0    Multiple Vitamins-Minerals (MULTIVITAMIN PO) Take 1 tablet by mouth daily      NONFORMULARY 5,000 mcg daily OTC Biotin 1000mcg daily       aspirin 81 MG tablet Take 81 mg by mouth daily.       pantoprazole (PROTONIX) 40 MG tablet Take 1 tablet by mouth every morning (before breakfast) (Patient not taking: Reported on 12/11/2020) 90 tablet 0     Current Facility-Administered Medications   Medication Dose Route Frequency Provider Last Rate Last Dose    cyanocobalamin injection 1,000 mcg  1,000 mcg Intramuscular Q30 Days Paradise Verma MD   1,000 mcg at 12/04/20 1524    cyanocobalamin injection 2,000 mcg  2,000 mcg Intramuscular Q30 Days Shayy Fung MD             Past Medical History:   Diagnosis Date    CAD (coronary artery disease)     Cerebrovascular accident (CVA) due to thrombosis [facility] 12/19/2017    History of seasonal allergies     Hyperlipidemia     Hypertension     Interstitial cystitis     History of.  Obesity     Weight 176 lb, height 5 ft, BMI 35, 2/28/2013.  Plantar fasciitis, bilateral     History of.  Polyneuropathy associated with underlying disease ([facility] 3/20/2018    S/P drug eluting coronary stent placement-RCA 6/20/17 - Dr. Sola Cruz 6/20/2017    Seasonal allergies     Trigger finger, left     History of triggering, left long finger.  Type 2 diabetes mellitus (HCC)        Social History     Tobacco Use    Smoking status: Never Smoker    Smokeless tobacco: Never Used    Tobacco comment: MAXIMO Giraldo RRT 6/17/17   Substance Use Topics    Alcohol use: No     Alcohol/week: 0.0 standard drinks    Drug use: No       Significant family and surgical history reviewed as noted in the patient's record. Objective:    Physical Exam:  Vitals: /75   Pulse 79   Temp 97.1 °F (36.2 °C) (Temporal)   Resp 16   Ht 4' 9\" (1.448 m)   Wt 169 lb 9.6 oz (76.9 kg)   LMP  (LMP Unknown)   SpO2 98%   BMI 36.70 kg/m²     LABS:  CBC:  No results for input(s): WBC, HGB, PLT in the last 72 hours. BMP:  No results for input(s): NA, K, CL, CO2, BUN, CREATININE, GLUCOSE in the last 72 hours. Hepatic:  No results for input(s): AST, ALT, ALB, BILITOT, ALKPHOS in the last 72 hours. Pertinent lab and radiology results reviewed.        General Appearance: well developed, well nourished, and in no acute distress  Skin: warm and dry, no rash, no erythema  Head: normocephalic and atraumatic  Eyes: sclerae white, conjunctivae normal, eomi  ENT: left tympanic membrane normal, left ear canal normal, left external ear normal  right tympanic membrane normal, right ear canal normal, right external ear normal  nose without deformity, nasal mucosa and turbinates mildly congested, sinuses tender -- right frontal  pharynx with minimal erythema  Pulmonary/Chest: symptoms worsen, fail to improve, or new symptoms arise. The use, risks, benefits, and side effects of prescribed or recommended medications were discussed. All questions were answered and the patient/caregiver voiced understanding.        Electronically signed by ANUPAMA Ortiz, TALON on 12/11/2020 at 2:16 PM  Internal Medicine

## 2020-12-11 NOTE — PATIENT INSTRUCTIONS
Patient Education        Coronavirus (LITWX-26): Care Instructions  Overview  The coronavirus disease (COVID-19) is caused by a virus. Symptoms may include a fever, a cough, and shortness of breath. It mainly spreads person-to-person through droplets from coughing and sneezing. The virus also can spread when people are in close contact with someone who is infected. Most people have mild symptoms and can take care of themselves at home. If their symptoms get worse, they may need care in a hospital. Treatment may include medicines to reduce symptoms, plus breathing support such as oxygen therapy or a ventilator. It's important to not spread the virus to others. If you have COVID-19, wear a face cover anytime you are around other people. You need to isolate yourself while you are sick. Leave your home only if you need to get medical care or testing. Follow-up care is a key part of your treatment and safety. Be sure to make and go to all appointments, and call your doctor if you are having problems. It's also a good idea to know your test results and keep a list of the medicines you take. How can you care for yourself at home? · Get extra rest. It can help you feel better. · Drink plenty of fluids. This helps replace fluids lost from fever. Fluids also help ease a scratchy throat. Water, soup, fruit juice, and hot tea with lemon are good choices. · Take acetaminophen (such as Tylenol) to reduce a fever. It may also help with muscle aches. Read and follow all instructions on the label. · Use petroleum jelly on sore skin. This can help if the skin around your nose and lips becomes sore from rubbing a lot with tissues. Tips for self-isolation  · Limit contact with people in your home. If possible, stay in a separate bedroom and use a separate bathroom. · Wear a cloth face cover when you are around other people. It can help stop the spread of the virus when you cough or sneeze.   · If you have to leave home, avoid crowds and try to stay at least 6 feet away from other people. · Avoid contact with pets and other animals. · Cover your mouth and nose with a tissue when you cough or sneeze. Then throw it in the trash right away. · Wash your hands often, especially after you cough or sneeze. Use soap and water, and scrub for at least 20 seconds. If soap and water aren't available, use an alcohol-based hand . · Don't share personal household items. These include bedding, towels, cups and glasses, and eating utensils. · [address] in the warmest water allowed for the fabric type, and dry it completely. It's okay to wash other people's laundry with yours. · Clean and disinfect your home every day. Use household  and disinfectant wipes or sprays. Take special care to clean things that you grab with your hands. These include doorknobs, remote controls, phones, and handles on your refrigerator and microwave. And don't forget countertops, tabletops, bathrooms, and computer keyboards. When you can end self-isolation  · If you know or suspect that you have COVID-19, stay in self-isolation until:  ? You haven't had a fever for 3 days, and  ? Your symptoms have improved, and  ? It's been at least 10 days since your symptoms started. · Talk to your doctor about whether you also need testing, especially if you have a weakened immune system. When should you call for help? Call 911 anytime you think you may need emergency care. For example, call if you have life-threatening symptoms, such as:    · You have severe trouble breathing. (You can't talk at all.)     · You have constant chest pain or pressure.     · You are severely dizzy or lightheaded.     · You are confused or can't think clearly.     · Your face and lips have a blue color.     · You pass out (lose consciousness) or are very hard to wake up. Call your doctor now or seek immediate medical care if:    · You have moderate trouble breathing.  (You can't speak a full sentence.)     · You are coughing up blood (more than about 1 teaspoon).     · You have signs of low blood pressure. These include feeling lightheaded; being too weak to stand; and having cold, pale, clammy skin. Watch closely for changes in your health, and be sure to contact your doctor if:    · Your symptoms get worse.     · You are not getting better as expected. Call before you go to the doctor's office. Follow their instructions. And wear a cloth face cover. Current as of: July 10, 2020               Content Version: 12.6  © 2006-2020 [employer]. Care instructions adapted under license by [facility] ([facility]). If you have questions about a medical condition or this instruction, always ask your healthcare professional. [address] any warranty or liability for your use of this information. Patient Education        Learning About Coronavirus [phone])  Coronavirus [phone]): Overview  What is coronavirus (KFPCZ-95)? The coronavirus disease (COVID-19) is caused by a virus. It is an illness that was first found in December 2019. It has since spread worldwide. The virus can cause fever, cough, and trouble breathing. In severe cases, it can cause pneumonia and make it hard to breathe without help. It can cause death. This virus spreads person-to-person through droplets from coughing and sneezing. It can also spread when you are close to someone who is infected. And it can spread when you touch something that has the virus on it, such as a doorknob or a tabletop. Coronaviruses are a large group of viruses. They cause the common cold. They also cause more serious illnesses like [place] respiratory syndrome (MERS) and severe acute respiratory syndrome (SARS). COVID-19 is caused by a novel coronavirus. That means it's a new type that has not been seen in people before. How is COVID-19 treated?   Mild illness can be treated at home, but more serious illness needs to be treated in the hospital. Treatment may include medicines to reduce symptoms, plus breathing support such as oxygen therapy or a ventilator. Other treatments, such as antiviral medicines, may help people who have COVID-19. What can you do to protect yourself from COVID-19? The best way to protect yourself from getting sick is to:  · Avoid areas where there is an outbreak. · Avoid contact with people who may be infected. · Avoid crowds and try to stay at least 6 feet away from other people. · Wash your hands often, especially after you cough or sneeze. Use soap and water, and scrub for at least 20 seconds. If soap and water aren't available, use an alcohol-based hand . · Avoid touching your mouth, nose, and eyes. What can you do to avoid spreading the virus to others? To help avoid spreading the virus to others:  · Wash your hands often with soap or alcohol-based hand sanitizers. · Cover your mouth with a tissue when you cough or sneeze. Then throw the tissue in the trash. · Use a disinfectant to clean things that you touch often. These include doorknobs, remote controls, phones, and handles on your refrigerator and microwave. And don't forget countertops, tabletops, bathrooms, and computer keyboards. · Wear a cloth face cover if you have to go to public areas. If you know or suspect that you have COVID-19:  · Stay home. Don't go to school, work, or public areas. And don't use public transportation, ride-shares, or taxis unless you have no choice. · Leave your home only if you need to get medical care or testing. But call the doctor's office first so they know you're coming. And wear a face cover. · Limit contact with people in your home. If possible, stay in a separate bedroom and use a separate bathroom. · Wear a face cover whenever you're around other people. It can help stop the spread of the virus when you cough or sneeze. · Clean and disinfect your home every day.  Use household  and disinfectant wipes or sprays. Take special care to clean things that you grab with your hands. · Self-isolate until it's safe to be around others again. ? If you have symptoms, it's safe when you haven't had a fever for 3 days and your symptoms have improved and it's been at least 10 days since your symptoms started. ? If you were exposed to the virus but don't have symptoms, it's safe to be around others 14 days after exposure. ? Talk to your doctor about whether you also need testing, especially if you have a weakened immune system. When to call for help  Call 911 anytime you think you may need emergency care. For example, call if:  · You have severe trouble breathing. (You can't talk at all.)  · You have constant chest pain or pressure. · You are severely dizzy or lightheaded. · You are confused or can't think clearly. · Your face and lips have a blue color. · You passed out (lost consciousness) or are very hard to wake up. Call your doctor now if you develop symptoms such as:  · Shortness of breath. · Fever. · Cough. If you need to get care, call ahead to the doctor's office for instructions before you go. Make sure you wear a face cover to prevent exposing other people to the virus. Where can you get the latest information? The following health organizations are tracking and studying this virus. Their websites contain the most up-to-date information. Marcelina Nagel also learn what to do if you think you may have been exposed to the virus. · U.S. Centers for Disease Control and Prevention (CDC): The CDC provides updated news about the disease and travel advice. The website also tells you how to prevent the spread of infection. www.cdc.gov  · World Health Organization [facility]): WHO offers information about the virus outbreaks. WHO also has travel advice. www.who.int  Current as of: July 10, 2020               Content Version: 12.6  © 1278-8224 [employer].    Care instructions adapted under license by [facility] ([facility]). If you have questions about a medical condition or this instruction, always ask your healthcare professional. [address] any warranty or liability for your use of this information. Patient Education        Sinusitis: Care Instructions  Your Care Instructions     Sinusitis is an infection of the lining of the sinus cavities in your head. Sinusitis often follows a cold. It causes pain and pressure in your head and face. In most cases, sinusitis gets better on its own in 1 to 2 weeks. But some mild symptoms may last for several weeks. Sometimes antibiotics are needed. Follow-up care is a key part of your treatment and safety. Be sure to make and go to all appointments, and call your doctor if you are having problems. It's also a good idea to know your test results and keep a list of the medicines you take. How can you care for yourself at home? · Take an over-the-counter pain medicine, such as acetaminophen (Tylenol), ibuprofen (Advil, Motrin), or naproxen (Aleve). Read and follow all instructions on the label. · If the doctor prescribed antibiotics, take them as directed. Do not stop taking them just because you feel better. You need to take the full course of antibiotics. · Be careful when taking over-the-counter cold or flu medicines and Tylenol at the same time. Many of these medicines have acetaminophen, which is Tylenol. Read the labels to make sure that you are not taking more than the recommended dose. Too much acetaminophen (Tylenol) can be harmful. · Breathe warm, moist air from a steamy shower, a hot bath, or a sink filled with hot water. Avoid cold, dry air. Using a humidifier in your home may help. Follow the directions for cleaning the machine. · Use saline (saltwater) nasal washes to help keep your nasal passages open and wash out mucus and bacteria. You can buy saline nose drops at a grocery store or drugstore. Or you can make your own at home by adding 1 teaspoon of salt and 1 teaspoon of baking soda to 2 cups of distilled water. If you make your own, fill a bulb syringe with the solution, insert the tip into your nostril, and squeeze gently. Maribell Roes your nose. · Put a hot, wet towel or a warm gel pack on your face 3 or 4 times a day for 5 to 10 minutes each time. · Try a decongestant nasal spray like oxymetazoline (Afrin). Do not use it for more than 3 days in a row. Using it for more than 3 days can make your congestion worse. When should you call for help? Call your doctor now or seek immediate medical care if:    · You have new or worse swelling or redness in your face or around your eyes.     · You have a new or higher fever. Watch closely for changes in your health, and be sure to contact your doctor if:    · You have new or worse facial pain.     · The mucus from your nose becomes thicker (like pus) or has new blood in it.     · You are not getting better as expected. Where can you learn more? Go to https://[employer] - innovate | mentor | [employer] [place]. org and sign in to your [employer] account. Enter S022 in the [employer] box to learn more about \"Sinusitis: Care Instructions. \"     If you do not have an account, please click on the \"Sign Up Now\" link. Current as of: April 15, 2020               Content Version: 12.6  © 2006-2020 [employer]. Care instructions adapted under license by [facility] ([facility]). If you have questions about a medical condition or this instruction, always ask your healthcare professional. [place] [address] any warranty or liability for your use of this information.

## 2020-12-14 LAB — SARS-COV-2, NAA: NOT DETECTED

## 2021-01-04 DIAGNOSIS — E11.9 TYPE 2 DIABETES MELLITUS WITHOUT COMPLICATION, WITHOUT LONG-TERM CURRENT USE OF INSULIN (HCC): ICD-10-CM

## 2021-01-04 NOTE — TELEPHONE ENCOUNTER
Kika called requesting a refill of the below medication which has been pended for you:     Requested Prescriptions     Pending Prescriptions Disp Refills    glimepiride (AMARYL) 1 MG tablet [Pharmacy Med Name: Glimepiride 1 MG Oral Tablet] 135 tablet 0     Sig: TAKE 1 TABLET BY MOUTH ONCE DAILY IN THE MORNING WITH BREAKFAST AND 1/2 (ONE-HALF) TABLET WITH THE EVENING MEAL       Last Appointment Date: 8/26/2020  Next Appointment Date: 3/4/2021    Allergies   Allergen Reactions    Iv Dye [Iodides] Shortness Of Breath    Penicillins Swelling    Sulfa Antibiotics Shortness Of Breath    Ace Inhibitors Nausea Only     Difficulty swallowing the Lisinopril. Gagging. Nausea.  Cozaar [Losartan] Nausea Only    Lipitor Other (See Comments)     Muscle aches.     Lopressor [Metoprolol] Other (See Comments)     leg pain    Morphine Nausea Only     Chest pressure

## 2021-01-05 RX ORDER — GLIMEPIRIDE 1 MG/1
TABLET ORAL
Qty: 135 TABLET | Refills: 0 | Status: SHIPPED | OUTPATIENT
Start: 2021-01-05 | End: 2021-03-30 | Stop reason: SDUPTHER

## 2021-01-08 ENCOUNTER — NURSE ONLY (OUTPATIENT)
Dept: LAB | Age: 71
End: 2021-01-08
Payer: MEDICARE

## 2021-01-08 DIAGNOSIS — E53.8 B12 DEFICIENCY: Primary | ICD-10-CM

## 2021-01-08 PROCEDURE — 96372 THER/PROPH/DIAG INJ SC/IM: CPT

## 2021-01-08 RX ADMIN — CYANOCOBALAMIN 1000 MCG: 1000 INJECTION, SOLUTION INTRAMUSCULAR; SUBCUTANEOUS at 14:30

## 2021-01-14 DIAGNOSIS — I10 ESSENTIAL HYPERTENSION: ICD-10-CM

## 2021-01-14 DIAGNOSIS — I25.118 CORONARY ARTERY DISEASE OF NATIVE ARTERY OF NATIVE HEART WITH STABLE ANGINA PECTORIS (HCC): ICD-10-CM

## 2021-01-14 RX ORDER — ISOSORBIDE MONONITRATE 30 MG/1
TABLET, EXTENDED RELEASE ORAL
Qty: 90 TABLET | Refills: 3 | Status: SHIPPED | OUTPATIENT
Start: 2021-01-14 | End: 2022-01-10

## 2021-01-14 RX ORDER — AMLODIPINE BESYLATE 10 MG/1
TABLET ORAL
Qty: 90 TABLET | Refills: 0 | Status: SHIPPED | OUTPATIENT
Start: 2021-01-14 | End: 2021-03-30 | Stop reason: SDUPTHER

## 2021-01-14 NOTE — TELEPHONE ENCOUNTER
Kika called requesting a refill of the below medication which has been pended for you:     Requested Prescriptions     Pending Prescriptions Disp Refills    amLODIPine (NORVASC) 10 MG tablet [Pharmacy Med Name: amLODIPine Besylate 10 MG Oral Tablet] 90 tablet 0     Sig: Take 1 tablet by mouth once daily       Last Appointment Date: 8/26/2020  Next Appointment Date: 3/4/2021    Allergies   Allergen Reactions    Iv Dye [Iodides] Shortness Of Breath    Penicillins Swelling    Sulfa Antibiotics Shortness Of Breath    Ace Inhibitors Nausea Only     Difficulty swallowing the Lisinopril. Gagging. Nausea.  Cozaar [Losartan] Nausea Only    Lipitor Other (See Comments)     Muscle aches.     Lopressor [Metoprolol] Other (See Comments)     leg pain    Morphine Nausea Only     Chest pressure

## 2021-01-22 ENCOUNTER — OFFICE VISIT (OUTPATIENT)
Dept: CARDIOLOGY | Age: 71
End: 2021-01-22
Payer: MEDICARE

## 2021-01-22 VITALS
BODY MASS INDEX: 36.46 KG/M2 | WEIGHT: 169 LBS | DIASTOLIC BLOOD PRESSURE: 68 MMHG | SYSTOLIC BLOOD PRESSURE: 130 MMHG | HEIGHT: 57 IN | HEART RATE: 74 BPM

## 2021-01-22 DIAGNOSIS — I10 ESSENTIAL HYPERTENSION: ICD-10-CM

## 2021-01-22 DIAGNOSIS — E78.5 DYSLIPIDEMIA: ICD-10-CM

## 2021-01-22 DIAGNOSIS — I25.10 CORONARY ARTERY DISEASE INVOLVING NATIVE CORONARY ARTERY OF NATIVE HEART WITHOUT ANGINA PECTORIS: ICD-10-CM

## 2021-01-22 DIAGNOSIS — I25.118 CORONARY ARTERY DISEASE OF NATIVE ARTERY OF NATIVE HEART WITH STABLE ANGINA PECTORIS (HCC): Primary | ICD-10-CM

## 2021-01-22 PROCEDURE — G8484 FLU IMMUNIZE NO ADMIN: HCPCS | Performed by: INTERNAL MEDICINE

## 2021-01-22 PROCEDURE — G8417 CALC BMI ABV UP PARAM F/U: HCPCS | Performed by: INTERNAL MEDICINE

## 2021-01-22 PROCEDURE — 99214 OFFICE O/P EST MOD 30 MIN: CPT | Performed by: INTERNAL MEDICINE

## 2021-01-22 PROCEDURE — 99214 OFFICE O/P EST MOD 30 MIN: CPT

## 2021-01-22 PROCEDURE — 1090F PRES/ABSN URINE INCON ASSESS: CPT | Performed by: INTERNAL MEDICINE

## 2021-01-22 PROCEDURE — 3017F COLORECTAL CA SCREEN DOC REV: CPT | Performed by: INTERNAL MEDICINE

## 2021-01-22 PROCEDURE — 1036F TOBACCO NON-USER: CPT | Performed by: INTERNAL MEDICINE

## 2021-01-22 PROCEDURE — 93010 ELECTROCARDIOGRAM REPORT: CPT | Performed by: INTERNAL MEDICINE

## 2021-01-22 PROCEDURE — G8427 DOCREV CUR MEDS BY ELIG CLIN: HCPCS | Performed by: INTERNAL MEDICINE

## 2021-01-22 PROCEDURE — 1123F ACP DISCUSS/DSCN MKR DOCD: CPT | Performed by: INTERNAL MEDICINE

## 2021-01-22 PROCEDURE — 93005 ELECTROCARDIOGRAM TRACING: CPT | Performed by: INTERNAL MEDICINE

## 2021-01-22 PROCEDURE — 4040F PNEUMOC VAC/ADMIN/RCVD: CPT | Performed by: INTERNAL MEDICINE

## 2021-01-22 PROCEDURE — G8399 PT W/DXA RESULTS DOCUMENT: HCPCS | Performed by: INTERNAL MEDICINE

## 2021-01-22 RX ORDER — NITROGLYCERIN 0.4 MG/1
0.4 TABLET SUBLINGUAL EVERY 5 MIN PRN
Qty: 25 TABLET | Refills: 1 | Status: SHIPPED | OUTPATIENT
Start: 2021-01-22

## 2021-01-22 ASSESSMENT — ENCOUNTER SYMPTOMS
VOMITING: 0
SHORTNESS OF BREATH: 0
ABDOMINAL PAIN: 0
BACK PAIN: 0
CHEST TIGHTNESS: 0
NAUSEA: 0

## 2021-01-22 NOTE — PROGRESS NOTES
Today's Date: 1/22/2021  Patient's Name: Jose David Mclean  Patient's age: 69 y.o., 1950    Subjective: The patient is a 69y.o. year old, , female is in the office for CAD  Denies exertional chest pain or SOB, no dizziness or syncope. No palpitations. Past Medical History:   has a past medical history of CAD (coronary artery disease), Cerebrovascular accident (CVA) due to thrombosis of precerebral artery ([address] History of seasonal allergies, Hyperlipidemia, Hypertension, Interstitial cystitis, Obesity, Plantar fasciitis, bilateral, Polyneuropathy associated with underlying disease ([address] S/P drug eluting coronary stent placement-RCA 6/20/17 - Dr. Marv Villatoro, Seasonal allergies, Trigger finger, left, and Type 2 diabetes mellitus ([address] Past Surgical History:   has a past surgical history that includes Nasal septum surgery (3/9/2005); Cholecystectomy, laparoscopic (6/9/2000); Cystocopy (3/1996); Ovary removal (Right, 7/1993); laparoscopy (3/1979); Hysterectomy, total abdominal (1980); Colonoscopy (3/11/2015); Cataract removal with implant (Left, 10/20/2015); Cataract removal with implant (Right, 12/08/2015); Yag capsulotomy (Right, 04/2016); Cardiac catheterization (06/20/2017); Coronary angioplasty with stent (06/20/2017); Appendectomy; eye surgery; pr knee scope,diagnostic (Right, 11/6/2018); Upper gastrointestinal endoscopy (N/A, 3/5/2020); Upper gastrointestinal endoscopy; Upper gastrointestinal endoscopy (05/13/2020); Endoscopic ultrasonography, GI (N/A, 5/13/2020); Upper gastrointestinal endoscopy (5/13/2020); Upper gastrointestinal endoscopy (5/13/2020); and Upper gastrointestinal endoscopy (5/13/2020). Home Medications:  Prior to Admission medications    Medication Sig Start Date End Date Taking?  Authorizing Provider   isosorbide mononitrate (IMDUR) 30 MG extended release tablet Take 1 tablet by mouth once daily 1/14/21  Yes Gabe Kirby, DO reports that she has never smoked. She has never used smokeless tobacco. She reports that she does not drink alcohol or use drugs. Review of Systems:  Review of Systems   Constitutional: Negative for chills, diaphoresis and fever. HENT: Negative for congestion. Respiratory: Negative for chest tightness and shortness of breath. Cardiovascular: Negative for chest pain and palpitations. Gastrointestinal: Negative for abdominal pain, nausea and vomiting. Endocrine: Negative for cold intolerance and heat intolerance. Musculoskeletal: Negative for arthralgias and back pain. Neurological: Negative for dizziness and syncope. Psychiatric/Behavioral: Negative for agitation and behavioral problems. Physical Exam:  /68 (Site: Left Upper Arm, Position: Sitting, Cuff Size: Large Adult)   Pulse 74   Ht 4' 9\" (1.448 m)   Wt 169 lb (76.7 kg)   LMP  (LMP Unknown)   BMI 36.57 kg/m²    Physical Exam  Constitutional:       Appearance: Normal appearance. HENT:      Head: Normocephalic and atraumatic. Mouth/Throat:      Mouth: Mucous membranes are moist.   Neck:      Musculoskeletal: No neck rigidity or muscular tenderness. Cardiovascular:      Rate and Rhythm: Normal rate and regular rhythm. Pulses: Normal pulses. Heart sounds: Normal heart sounds. No murmur. Pulmonary:      Effort: Pulmonary effort is normal. No respiratory distress. Breath sounds: Normal breath sounds. No stridor. Musculoskeletal:         General: No swelling or tenderness. Skin:     Capillary Refill: Capillary refill takes less than 2 seconds. Coloration: Skin is not jaundiced or pale. Neurological:      General: No focal deficit present. Mental Status: She is alert and oriented to person, place, and time.    Psychiatric:         Mood and Affect: Mood normal.         Behavior: Behavior normal.         Cardiac Data:    Labs: CBC: No results for input(s): WBC, HGB, HCT, PLT in the last 72 hours. BMP:No results for input(s): NA, K, CO2, BUN, CREATININE, LABGLOM, GLUCOSE in the last 72 hours. PT/INR: No results for input(s): PROTIME, INR in the last 72 hours. FASTING LIPID PANEL:  Lab Results   Component Value Date    HDL 52 10/16/2020    TRIG 214 10/16/2020     LIVER PROFILE:No results for input(s): AST, ALT, LABALBU in the last 72 hours. IMPRESSION:    Patient Active Problem List   Diagnosis    Mixed hyperlipidemia    Obesity (BMI 30-39. 9)    Interstitial cystitis    Trigger finger, left    High risk medication use    Vitamin D deficiency    Essential hypertension    Atypical chest pain    Hypokalemia    Paresthesias    Coronary artery disease involving native coronary artery of native heart without angina pectoris    Cerebrovascular accident (CVA) due to thrombosis of precerebral artery (HCC)    Right tibial neuropathy    Neuropathy of left peroneal nerve    H/O major orthopedic surgery    Peripheral vascular disease (HCC)    Hematemesis       Assessment/Plan:  1. CAD s/p PCI of RCA 6/2017. Stress test 4/2019 normal perfusion. EF 83%. Stable and asymptomatic. Continue current medications. 2. Essential Hypertension. Well controlled. 3. HPL. LDL 77 on 10/2020. On Crestor. 4. DM II. Followed by PCP. 5. Echo 6/2017 EF 55%. Mild MR. Diana Joseph MD  [facility]           [phone]

## 2021-02-03 ENCOUNTER — TELEPHONE (OUTPATIENT)
Dept: FAMILY MEDICINE CLINIC | Age: 71
End: 2021-02-03

## 2021-02-03 NOTE — TELEPHONE ENCOUNTER
Patient is calling and is very upset about call the [phone] number for the COVID vaccine and patient relates that she calls the number and cant get anywhere and wonders if Gisell could help schedule her. This writer also gave her the Health Department number as well.  Please call and advise [phone]

## 2021-02-03 NOTE — TELEPHONE ENCOUNTER
I spoke to patient and explained the scheduling process and that our current schedules are full and we are unable to schedule due to not knowing how many doses we are receiving when we get another shipment. I advised the patient to call the [phone] hot line again next week to see if they have opened the schedules. (current schedules are booked until 2/12) I also advised patient to call the local pharmacies to see if they have any available doses. Patient verbalized understanding.

## 2021-02-09 ENCOUNTER — NURSE ONLY (OUTPATIENT)
Dept: LAB | Age: 71
End: 2021-02-09
Payer: MEDICARE

## 2021-02-09 DIAGNOSIS — E53.8 B12 DEFICIENCY: Primary | ICD-10-CM

## 2021-02-09 PROCEDURE — 96372 THER/PROPH/DIAG INJ SC/IM: CPT

## 2021-02-09 RX ADMIN — CYANOCOBALAMIN 1000 MCG: 1000 INJECTION, SOLUTION INTRAMUSCULAR; SUBCUTANEOUS at 13:04

## 2021-02-24 NOTE — PROGRESS NOTES
Patient ID: Camelia Acosta, 1950, [id number], 78 y.o. Referred by : Mikala Meeks MD  Reason for consultation:   Neuroendocrine tumor of stomach  HISTORY OF PRESENT ILLNESS:    Oncologic History:  Camelia Acosta is a 78 y.o. female who was seen there initial consultation visit for diagnosis of gastric neuroendocrine tumor. Patient recently presented with episodes of hematemesis with 2 episodes after nausea. She presented to ER for further work-up and she was seen by general surgery. She complains of some burning sensation in her epigastric region but also complains of pain on the right side of her ribs. On 3/5/2020 patient had upper endoscopy which showed gastritis, polyps at the antrum and superficial ulcer in the lesser curvature. She denies any history of multiple recurrent gastric ulcers. No family history of any neuroendocrine tumor. She denies any history of smoking. No unintentional weight loss she is eating and drinking well. She denied blood in stool or black stools. No history of abdominal cramps, diarrhea, constipation or flushing symptoms. She had a CT chest abdomen pelvis on 3/20/2020 which showed single abnormal lymph node in the left iliac region measuring 1.3 cm and remains indeterminate other than that no other acute disease process noted in the chest abdomen pelvis. Past Medical History:   Diagnosis Date    CAD (coronary artery disease)     Cerebrovascular accident (CVA) due to thrombosis of precerebral artery ([address] 12/19/2017    History of seasonal allergies     Hyperlipidemia     Hypertension     Interstitial cystitis     History of.  Obesity     Weight 176 lb, height 5 ft, BMI 35, 2/28/2013.  Plantar fasciitis, bilateral     History of.     Polyneuropathy associated with underlying disease ([address] 3/20/2018    S/P drug eluting coronary stent placement-RCA 6/20/17 - Dr. Miguel Aguirre 6/20/2017    Seasonal allergies     Trigger finger, left     History of triggering, left long finger.  Type 2 diabetes mellitus ([facility]        Past Surgical History:   Procedure Laterality Date    APPENDECTOMY      CARDIAC CATHETERIZATION  06/20/2017    Left main: normal, LAD: proximal 30% stenosis, LCX: 20 % proximal stenosis, RCA: Ostial 70% with pressure damping and mid 90% stenosis. Required PTCA-GRACE in both lesions. LVEF 55%. LV wall motion normal.  Dr. Mile Pryor, [facility]      CATARACT REMOVAL WITH IMPLANT Left 10/20/2015    Dr. Jim Tee, [facility] WITH IMPLANT Right 12/08/2015    [place] with DANIAL. Dr Jim Tee, [facility], LAPAROSCOPIC  6/9/2000    COLONOSCOPY  3/11/2015    Internal and external hemorrhoids otherwise normal    CORONARY ANGIOPLASTY WITH STENT PLACEMENT  06/20/2017    Right coronary artery 70% ostial lesion and mid RCA 90% lesion; successful PTCA with drug-eluting stents in both lesions, Dr. Mile Pryor, [facility]    CYSTOSCOPY  3/1996    Bladder biopsy, urethral dilatation.  EYE SURGERY      HYSTERECTOMY, TOTAL ABDOMINAL  1980    LAPAROSCOPY  3/1979    NASAL SEPTUM SURGERY  3/9/2005    Nasoseptal reconstruction.  OVARY REMOVAL Right 7/1993    Laparotomy.  [place] KNEE SCOPE,DIAGNOSTIC Right 11/6/2018    Right KNEE ARTHROSCOPY PARTIAL MEDIAL MENISECTOMY AND PLICA INCISION performed by Shanique Guerrero MD at [facility] 3/5/2020    EGD POLYP SNARE ET COLD BX'S performed by Misty Denson DO at [address] [place] [address] YAG CAPSULOTOMY Right 04/2016    Dr Cintia Maldonado   Allergen Reactions    Iv Dye [Iodides] Shortness Of Breath    Penicillins Swelling    Sulfa Antibiotics Shortness Of Breath    Ace Inhibitors Nausea Only     Difficulty swallowing the Lisinopril. Gagging. Nausea.  Cozaar [Losartan] Nausea Only    Lipitor Other (See Comments)     Muscle aches.     Lopressor [Metoprolol] Diabetes Brother     Diabetes Brother     Diabetes Sister     Diabetes Maternal Grandfather     Heart Attack Maternal Grandfather     Heart Attack Paternal Grandmother         REVIEW OF SYSTEM:     Constitutional: No fever or chills. No night sweats, no weight loss   Eyes: No eye discharge, double vision, or eye pain   HEENT: negative for sore mouth, sore throat, hoarseness and voice change   Respiratory: negative for cough , sputum, dyspnea, wheezing, hemoptysis, chest pain   Cardiovascular: negative for chest pain, dyspnea, palpitations, orthopnea, PND   Gastrointestinal: negative for nausea, vomiting, diarrhea, constipation, abdominal pain, Dysphagia, hematemesis and hematochezia   Genitourinary: negative for frequency, dysuria, nocturia, urinary incontinence, and hematuria   Integument: negative for rash, skin lesions, bruises.    Hematologic/Lymphatic: negative for easy bruising, bleeding, lymphadenopathy, petechiae and swelling/edema   Endocrine: negative for heat or cold intolerance, tremor, weight changes, change in bowel habits and hair loss   Musculoskeletal: negative for myalgias, arthralgias, pain, joint swelling,and bone pain   Neurological: negative for headaches, dizziness, seizures, weakness, numbness       OBJECTIVE:         Vitals:    03/23/20 0941   BP: 130/60   Pulse: 79   Resp: 16   Temp: 98 °F (36.7 °C)   SpO2: 97%       PHYSICAL EXAM:   General appearance - well appearing, no in pain or distress   Mental status - alert and cooperative   Eyes - pupils equal and reactive, extraocular eye movements intact   Ears - bilateral TM's and external ear canals normal   Mouth - mucous membranes moist, pharynx normal without lesions   Neck - supple, no significant adenopathy   Lymphatics - no palpable lymphadenopathy, no hepatosplenomegaly   Chest - clear to auscultation, no wheezes, rales or rhonchi, symmetric air entry   Heart - normal rate, regular rhythm, normal S1, S2, no murmurs, rubs, clicks or gallops   Abdomen - soft, nontender, nondistended, no masses or organomegaly   Neurological - alert, oriented, normal speech, no focal findings or movement disorder noted   Musculoskeletal - no joint tenderness, deformity or swelling   Extremities - peripheral pulses normal, no pedal edema, no clubbing or cyanosis   Skin - normal coloration and turgor, no rashes, no suspicious skin lesions noted ,      LABORATORY DATA:     Lab Results   Component Value Date    WBC 8.4 03/11/2020    HGB 13.3 03/11/2020    HCT 41.5 03/11/2020    MCV 79.0 (L) 03/11/2020     03/11/2020    LYMPHOPCT 38 03/11/2020    RBC 5.25 (H) 03/11/2020    MCH 25.3 03/11/2020    MCHC 32.0 03/11/2020    RDW 14.7 (H) 03/11/2020    MONOPCT 7 03/11/2020    BASOPCT 0 03/11/2020    NEUTROABS 4.47 03/11/2020    LYMPHSABS 3.19 03/11/2020    MONOSABS 0.56 03/11/2020    EOSABS 0.13 03/11/2020    BASOSABS <0.03 03/11/2020         Chemistry        Component Value Date/Time     03/11/2020 1253    K 3.2 (L) 03/11/2020 1253    CL 99 03/11/2020 1253    CO2 26 03/11/2020 1253    BUN 11 03/11/2020 1253    CREATININE 0.61 03/11/2020 1253        Component Value Date/Time    CALCIUM 9.8 03/11/2020 1253    ALKPHOS 115 (H) 03/11/2020 1253    AST 26 03/11/2020 1253    ALT 30 03/11/2020 1253    BILITOT 0.47 03/11/2020 1253        PATHOLOGY DATA:   3/5/20  -- Diagnosis --   1. STOMACH, ANTRUM, BIOPSY:        -  MILD CHRONIC ACTIVE GASTRITIS.      -  NEGATIVE FOR HELICOBACTER, INTESTINAL METAPLASIA OR DYSPLASIA. 2. STOMACH, ANTRUM, POLYP, BIOPSY:             -  MILD CHRONIC INACTIVE GASTRITIS WITH GOBLET CELL   INTESTINAL METAPLASIA. 3. STOMACH, BODY, POLYP, BIOPSIES:        -  FRAGMENTS OF HYPERPLASTIC AND FUNDIC GLAND POLYP. 4. STOMACH, ULCER AT LESSER CURVATURE, BIOPSY:             -  WELL-DIFFERENTIATED NEUROENDOCRINE TUMOR (GRADE 1) INVOLVING LAMINA PROPRIA.      -  MILD CHRONIC INACTIVE GASTRITIS WITH GOBLET CELL INTESTINAL METAPLASIA.      5. STOMACH, FUNDUS, POLYP, BIOPSIES:        -  FUNDIC GLAND POLYP.             -  BACKGROUND MILD CHRONIC INACTIVE GASTRITIS WITH GOBLET   CELL INTESTINAL METAPLASIA. IMAGING DATA:    CT chest abdomen pelvis 3/20/2020  Impression   1.  A single abnormal lymph node is identified in the proximal left common   iliac chain, measuring 1.3 cm, which is indeterminate.  Attention on   follow-up studies or additional evaluation with PET/CT.       2.  No gastric mass is visualized, but the stomach is nondistended, limiting   assessment.       3.  No acute process in the chest, abdomen, or pelvis.       4.  Heterogeneous bony mineralization with several punctate lucencies noted. These are similar to comparison studies and may be related to osteopenia. Possibility of tiny bony metastases or multiple myeloma is is difficult to   exclude.  Correlation with lab values and possible bone scan should be   considered. ASSESSMENT:    Adiel Brewster is a 78 y.o. female with gastric neuroendocrine tumor. I reviewed the laboratory data, imaging studies, biopsy results, diagnosis, treatment options with patient and family. I reviewed the NCCN guidelines and goals of care. She appears to have early stage very localized neuroendocrine tumor of stomach. We will get endoscopic ultrasound for staging and possible resection of the tumor. Small lymph node in the iliac chain possibly nonspecific and will be monitored closely. She does not have family history of neuroendocrine tumor or multiple gastric/duodenal ulcers. Therefore this is unlikely related to MEN. I will check gastrin levels. During today's visit, the patient and the family had a number of reasonable questions which were answered to their satisfaction. They verbalized understanding of the information provided and they agreed to proceed as outlined above.      PLAN:   Check serum gastrin, chromogranin A and serotonin levels  Referral to gastroenterology for endoscopic ultrasound and possible endoscopic resection  I recommended PPI for her gastritis and ulcer  Return to clinic in 6 to 8 weeks      Robert Glynn MD  Hematologist/Medical Oncologist    I spent more than 80 minutes examining, evaluating, reviewing data and counseling the patient. Greater than 50% of that time was spent face-to-face with the patient in counseling and coordinating her care. This note is created with the assistance of a speech recognition program.  While intending to generate a document that actually reflects the content of the visit, the document can still have some errors including those of syntax and sound a like substitutions which may escape proof reading. It such instances, actual meaning can be extrapolated by contextual diversion. show

## 2021-03-10 DIAGNOSIS — E55.9 VITAMIN D DEFICIENCY: ICD-10-CM

## 2021-03-15 ENCOUNTER — NURSE ONLY (OUTPATIENT)
Dept: LAB | Age: 71
End: 2021-03-15
Payer: MEDICARE

## 2021-03-15 DIAGNOSIS — E53.8 B12 DEFICIENCY: Primary | ICD-10-CM

## 2021-03-15 PROCEDURE — 96372 THER/PROPH/DIAG INJ SC/IM: CPT

## 2021-03-15 RX ADMIN — CYANOCOBALAMIN 1000 MCG: 1000 INJECTION, SOLUTION INTRAMUSCULAR; SUBCUTANEOUS at 15:32

## 2021-03-26 ENCOUNTER — HOSPITAL ENCOUNTER (OUTPATIENT)
Dept: LAB | Age: 71
Discharge: HOME | End: 2021-03-26
Payer: MEDICARE

## 2021-03-26 DIAGNOSIS — E11.9 TYPE 2 DIABETES MELLITUS WITHOUT COMPLICATION, WITHOUT LONG-TERM CURRENT USE OF INSULIN (HCC): ICD-10-CM

## 2021-03-26 DIAGNOSIS — I10 ESSENTIAL HYPERTENSION: ICD-10-CM

## 2021-03-26 DIAGNOSIS — E78.2 MIXED HYPERLIPIDEMIA: ICD-10-CM

## 2021-03-26 LAB
ALBUMIN SERPL-MCNC: 4.7 G/DL (ref 3.5–5.2)
ALBUMIN/GLOBULIN RATIO: 1.7 (ref 1–2.5)
ALP BLD-CCNC: 111 U/L (ref 35–104)
ALT SERPL-CCNC: 42 U/L (ref 5–33)
ANION GAP SERPL CALCULATED.3IONS-SCNC: 12 MMOL/L (ref 9–17)
AST SERPL-CCNC: 31 U/L
BILIRUB SERPL-MCNC: 0.47 MG/DL (ref 0.3–1.2)
BUN BLDV-MCNC: 11 MG/DL (ref 8–23)
BUN/CREAT BLD: 14 (ref 9–20)
CALCIUM SERPL-MCNC: 9.8 MG/DL (ref 8.6–10.4)
CHLORIDE BLD-SCNC: 101 MMOL/L (ref 98–107)
CHOLESTEROL/HDL RATIO: 3.3
CHOLESTEROL: 174 MG/DL
CO2: 28 MMOL/L (ref 20–31)
CREAT SERPL-MCNC: 0.77 MG/DL (ref 0.5–0.9)
GFR AFRICAN AMERICAN: >60 ML/MIN
GFR NON-AFRICAN AMERICAN: >60 ML/MIN
GFR SERPL CREATININE-BSD FRML MDRD: ABNORMAL ML/MIN/{1.73_M2}
GFR SERPL CREATININE-BSD FRML MDRD: ABNORMAL ML/MIN/{1.73_M2}
GLUCOSE BLD-MCNC: 177 MG/DL (ref 70–99)
HDLC SERPL-MCNC: 53 MG/DL
LDL CHOLESTEROL: 70 MG/DL (ref 0–130)
POTASSIUM SERPL-SCNC: 3.7 MMOL/L (ref 3.7–5.3)
SODIUM BLD-SCNC: 141 MMOL/L (ref 135–144)
TOTAL PROTEIN: 7.5 G/DL (ref 6.4–8.3)
TRIGL SERPL-MCNC: 253 MG/DL
VLDLC SERPL CALC-MCNC: ABNORMAL MG/DL (ref 1–30)

## 2021-03-26 PROCEDURE — 36415 COLL VENOUS BLD VENIPUNCTURE: CPT

## 2021-03-26 PROCEDURE — 83036 HEMOGLOBIN GLYCOSYLATED A1C: CPT

## 2021-03-26 PROCEDURE — 80061 LIPID PANEL: CPT

## 2021-03-26 PROCEDURE — 80053 COMPREHEN METABOLIC PANEL: CPT

## 2021-03-27 LAB
ESTIMATED AVERAGE GLUCOSE: 183 MG/DL
HBA1C MFR BLD: 8 % (ref 4–6)

## 2021-03-30 ENCOUNTER — OFFICE VISIT (OUTPATIENT)
Dept: FAMILY MEDICINE CLINIC | Age: 71
End: 2021-03-30
Payer: MEDICARE

## 2021-03-30 VITALS
BODY MASS INDEX: 36.24 KG/M2 | DIASTOLIC BLOOD PRESSURE: 74 MMHG | SYSTOLIC BLOOD PRESSURE: 124 MMHG | HEART RATE: 86 BPM | HEIGHT: 57 IN | WEIGHT: 168 LBS | RESPIRATION RATE: 18 BRPM

## 2021-03-30 DIAGNOSIS — E11.9 TYPE 2 DIABETES MELLITUS WITHOUT COMPLICATION, WITHOUT LONG-TERM CURRENT USE OF INSULIN (HCC): ICD-10-CM

## 2021-03-30 DIAGNOSIS — Z00.00 ROUTINE GENERAL MEDICAL EXAMINATION AT A HEALTH CARE FACILITY: Primary | ICD-10-CM

## 2021-03-30 DIAGNOSIS — I25.10 CORONARY ARTERY DISEASE INVOLVING NATIVE CORONARY ARTERY OF NATIVE HEART WITHOUT ANGINA PECTORIS: ICD-10-CM

## 2021-03-30 DIAGNOSIS — I10 ESSENTIAL HYPERTENSION: ICD-10-CM

## 2021-03-30 DIAGNOSIS — R06.02 SHORT OF BREATH ON EXERTION: ICD-10-CM

## 2021-03-30 DIAGNOSIS — E78.2 MIXED HYPERLIPIDEMIA: ICD-10-CM

## 2021-03-30 PROCEDURE — 1090F PRES/ABSN URINE INCON ASSESS: CPT | Performed by: FAMILY MEDICINE

## 2021-03-30 PROCEDURE — 3052F HG A1C>EQUAL 8.0%<EQUAL 9.0%: CPT | Performed by: FAMILY MEDICINE

## 2021-03-30 PROCEDURE — 2022F DILAT RTA XM EVC RTNOPTHY: CPT | Performed by: FAMILY MEDICINE

## 2021-03-30 PROCEDURE — G8427 DOCREV CUR MEDS BY ELIG CLIN: HCPCS | Performed by: FAMILY MEDICINE

## 2021-03-30 PROCEDURE — 99211 OFF/OP EST MAY X REQ PHY/QHP: CPT

## 2021-03-30 PROCEDURE — G8417 CALC BMI ABV UP PARAM F/U: HCPCS | Performed by: FAMILY MEDICINE

## 2021-03-30 PROCEDURE — G8399 PT W/DXA RESULTS DOCUMENT: HCPCS | Performed by: FAMILY MEDICINE

## 2021-03-30 PROCEDURE — 1123F ACP DISCUSS/DSCN MKR DOCD: CPT | Performed by: FAMILY MEDICINE

## 2021-03-30 PROCEDURE — 3017F COLORECTAL CA SCREEN DOC REV: CPT | Performed by: FAMILY MEDICINE

## 2021-03-30 PROCEDURE — G8484 FLU IMMUNIZE NO ADMIN: HCPCS | Performed by: FAMILY MEDICINE

## 2021-03-30 PROCEDURE — 1036F TOBACCO NON-USER: CPT | Performed by: FAMILY MEDICINE

## 2021-03-30 PROCEDURE — 99214 OFFICE O/P EST MOD 30 MIN: CPT | Performed by: FAMILY MEDICINE

## 2021-03-30 PROCEDURE — 4040F PNEUMOC VAC/ADMIN/RCVD: CPT | Performed by: FAMILY MEDICINE

## 2021-03-30 PROCEDURE — G0439 PPPS, SUBSEQ VISIT: HCPCS | Performed by: FAMILY MEDICINE

## 2021-03-30 RX ORDER — GLIMEPIRIDE 1 MG/1
1 TABLET ORAL 2 TIMES DAILY WITH MEALS
Qty: 180 TABLET | Refills: 0 | Status: SHIPPED | OUTPATIENT
Start: 2021-03-30 | End: 2021-03-31 | Stop reason: SDUPTHER

## 2021-03-30 RX ORDER — ROSUVASTATIN CALCIUM 10 MG/1
10 TABLET, COATED ORAL NIGHTLY
Qty: 30 TABLET | Refills: 5 | Status: SHIPPED | OUTPATIENT
Start: 2021-03-30 | End: 2021-07-22

## 2021-03-30 RX ORDER — AMLODIPINE BESYLATE 10 MG/1
TABLET ORAL
Qty: 90 TABLET | Refills: 2 | Status: SHIPPED | OUTPATIENT
Start: 2021-03-30 | End: 2022-01-10

## 2021-03-30 SDOH — ECONOMIC STABILITY: FOOD INSECURITY: WITHIN THE PAST 12 MONTHS, THE FOOD YOU BOUGHT JUST DIDN'T LAST AND YOU DIDN'T HAVE MONEY TO GET MORE.: NEVER TRUE

## 2021-03-30 SDOH — ECONOMIC STABILITY: TRANSPORTATION INSECURITY
IN THE PAST 12 MONTHS, HAS LACK OF TRANSPORTATION KEPT YOU FROM MEETINGS, WORK, OR FROM GETTING THINGS NEEDED FOR DAILY LIVING?: NOT ASKED

## 2021-03-30 ASSESSMENT — LIFESTYLE VARIABLES: HOW OFTEN DO YOU HAVE A DRINK CONTAINING ALCOHOL: 0

## 2021-03-30 ASSESSMENT — PATIENT HEALTH QUESTIONNAIRE - PHQ9
1. LITTLE INTEREST OR PLEASURE IN DOING THINGS: 0
SUM OF ALL RESPONSES TO PHQ9 QUESTIONS 1 & 2: 0

## 2021-03-30 NOTE — PROGRESS NOTES
Outpatient Medications Marked as Taking for the 3/30/21 encounter (Office Visit) with Constantine Ricks MD   Medication Sig Dispense Refill    OPTIMAL-D 1.25 MG (47378 UT) CAPS Take 1 capsule by mouth once a week 4 capsule 0    nitroGLYCERIN (NITROSTAT) 0.4 MG SL tablet Place 1 tablet under the tongue every 5 minutes as needed for Chest pain up to max of 3 total doses. If no relief after 1 dose, call 911. 25 tablet 1    isosorbide mononitrate (IMDUR) 30 MG extended release tablet Take 1 tablet by mouth once daily 90 tablet 3    amLODIPine (NORVASC) 10 MG tablet Take 1 tablet by mouth once daily 90 tablet 0    glimepiride (AMARYL) 1 MG tablet TAKE 1 TABLET BY MOUTH ONCE DAILY IN THE MORNING WITH BREAKFAST AND 1/2 (ONE-HALF) TABLET WITH THE EVENING MEAL 135 tablet 0    rosuvastatin (CRESTOR) 10 MG tablet Take 1 tablet by mouth nightly 30 tablet 3    ELDERBERRY PO Take by mouth Immune Plus tablets-Vit B, C, Zinc      Ascorbic Acid (VITAMIN C PO) Take by mouth      metFORMIN (GLUCOPHAGE) 500 MG tablet Take 1 tablet by mouth 2 times daily (with meals) 180 tablet 1    NONFORMULARY Artificial tear eye drops prn      fluticasone (FLONASE) 50 MCG/ACT nasal spray 1 spray by Nasal route daily 1 Bottle 0    Multiple Vitamins-Minerals (MULTIVITAMIN PO) Take 1 tablet by mouth daily      NONFORMULARY 5,000 mcg daily OTC Biotin 1000mcg daily       aspirin 81 MG tablet Take 81 mg by mouth daily. She is allergic to iv dye [iodides]; penicillins; sulfa antibiotics; ace inhibitors; cozaar [losartan]; lipitor; lopressor [metoprolol]; and morphine. She  reports that she has never smoked. She has never used smokeless tobacco.      Objective:    Vitals:    03/30/21 1508   BP: 124/74   Site: Right Upper Arm   Position: Sitting   Cuff Size: Medium Adult   Pulse: 86   Resp: 18   Weight: 168 lb (76.2 kg)   Height: 4' 9\" (1.448 m)     Body mass index is 36.35 kg/m².       Obese female, healthy-appearing, alert, cooperative and in no distress. Neck supple. No adenopathy. Thyroid symmetric, normal size. Chest:  Normal expansion. Clear to auscultation. No rales, rhonchi, or wheezing. Heart sounds are normal.  Regular rate and rhythm without murmur, gallop or rub. Lower extremities have no edema. Results of labs done 3/26/2021 were reviewed with the patient:   Hospital Outpatient Visit on 03/26/2021   Component Date Value Ref Range Status    Cholesterol 03/26/2021 174  <200 mg/dL Final    Comment:    Cholesterol Guidelines:      <200  Desirable   200-240  Borderline      >240  Undesirable         HDL 03/26/2021 53  >40 mg/dL Final    Comment:    HDL Guidelines:    <40     Undesirable   40-59    Borderline    >59     Desirable         LDL Cholesterol 03/26/2021 70  0 - 130 mg/dL Final    Comment:    LDL Guidelines:     <100    Desirable   100-129   Near to/above Desirable   130-159   Borderline      >159   Undesirable     Direct (measured) LDL and calculated LDL are not interchangeable tests.  Chol/HDL Ratio 03/26/2021 3.3  <5 Final            Triglycerides 03/26/2021 253* <150 mg/dL Final    Comment:    Triglyceride Guidelines:     <150   Desirable   150-199  Borderline   200-499  High     >499   Very high   Based on AHA Guidelines for fasting triglyceride, October 2012.  VLDL 03/26/2021 NOT REPORTED* 1 - 30 mg/dL Final    Hemoglobin A1C 03/26/2021 8.0* 4.0 - 6.0 % Final    Estimated Avg Glucose 03/26/2021 183  mg/dL Final    Comment: The ADA and AACC recommend providing the estimated average glucose result to permit better   patient understanding of their HBA1c result.       Glucose 03/26/2021 177* 70 - 99 mg/dL Final    BUN 03/26/2021 11  8 - 23 mg/dL Final    CREATININE 03/26/2021 0.77  0.50 - 0.90 mg/dL Final    Bun/Cre Ratio 03/26/2021 14  9 - 20 Final    Calcium 03/26/2021 9.8  8.6 - 10.4 mg/dL Final    Sodium 03/26/2021 141  135 - 144 mmol/L Final    Potassium 03/26/2021 3.7  3.7 - 5.3 mmol/L Final  Chloride 03/26/2021 101  98 - 107 mmol/L Final    CO2 03/26/2021 28  20 - 31 mmol/L Final    Anion Gap 03/26/2021 12  9 - 17 mmol/L Final    Alkaline Phosphatase 03/26/2021 111* 35 - 104 U/L Final    ALT 03/26/2021 42* 5 - 33 U/L Final    AST 03/26/2021 31  <32 U/L Final    Total Bilirubin 03/26/2021 0.47  0.3 - 1.2 mg/dL Final    Total Protein 03/26/2021 7.5  6.4 - 8.3 g/dL Final    Albumin 03/26/2021 4.7  3.5 - 5.2 g/dL Final    Albumin/Globulin Ratio 03/26/2021 1.7  1.0 - 2.5 Final    GFR Non- 03/26/2021 >60  >60 mL/min Final    GFR  03/26/2021 >60  >60 mL/min Final    GFR Comment 03/26/2021        Final    Comment: Average GFR for 79or more years old:   76 mL/min/1.73sq m  Chronic Kidney Disease:   <60 mL/min/1.73sq m  Kidney failure:   <15 mL/min/1.73sq m              eGFR calculated using average adult body mass. Additional eGFR calculator available at:        [employer]            GFR Staging 03/26/2021 NOT REPORTED   Final         Assessment and Plan:    1. Routine general medical examination at a health care facility  See separate progress note for Medicare Wellness Visit. 2. Type 2 diabetes mellitus without complication, without long-term current use of insulin (HCC)  Her HgbA1c was 8.0 %, which is not at goal and worsening. She was advised to continue her current regimen. Metformin was refilled:   - metFORMIN (GLUCOPHAGE) 500 MG tablet; Take 1 tablet by mouth 2 times daily (with meals)  Dispense: 180 tablet; Refill: 1    I discussed beginning a different diabetes medication, such as Ozempic. She declined. She prefers to try an increased dose of Amaryl. She was advised to increase her dose of Amaryl from 1 mg in the am and 0.5 mg in the pm to 1 mg BID. - Hemoglobin A1C; Future prior to her return visit in 3 months. 3. Essential hypertension  Her blood pressure is adequately-controlled.   (BP: 124/74)

## 2021-03-30 NOTE — PROGRESS NOTES
Patient does not have interest in the shingles vaccine at this time. Patient did receive her COVID vaccine.

## 2021-03-30 NOTE — PATIENT INSTRUCTIONS
Patient Education        Learning About How to Make a Home Safe  Making your home safe: Overview  You can help protect the person in your care by making the home safe. Here are some general tips for how to lower the chance of getting injured in the home. · Pad sharp corners on furniture and counter tops. · Keep objects that are used often within easy reach. · Install handrails around the toilet and in the shower. Use a tub mat to prevent slipping. · Use a shower chair or bath bench when the person bathes. · Provide good lighting inside and outside the home. Put night-lights in bedrooms, hallways, and bathrooms. Have light at the top and bottom of stairways. · Have a first aid kit. It is also important to be aware of safe temperatures in the home. When helping someone bathe, use the back of your hand to test the water to make sure it's not too hot. Lower the temperature setting in the hot water heater to 120°F or lower to avoid burns. And make sure other liquids (such as coffee, tea, or soup) are not too hot. How can you protect from fire and carbon monoxide? Home safety alarms are very important. A smoke alarm can detect small amounts of smoke. This can allow time to escape from a fire. And a carbon monoxide alarm can let people know about this deadly gas before it starts to make them sick. · Put smoke alarms:  ? On each level of the home, in the hallway outside sleeping areas, and inside each bedroom. ? In the center of a ceiling, or on a wall 6 to 12 inches from the ceiling. This is where smoke goes first. Avoid places near doors, windows, or air ducts. · Put a carbon monoxide alarm in the hallway outside of the bedrooms in each sleeping area of the house. The alarm should be placed high on the wall. Make sure that the alarm can't be covered up by furniture or drapes. · Test alarms regularly by pressing the test button. · Replace non-lithium batteries in alarms twice a year.   · Have a plan for to your [employer] account. Enter V597 in the [facility] box to learn more about \"Learning About How to Make a Home Safe. \"     If you do not have an account, please click on the \"Sign Up Now\" link. Current as of: July 17, 2020               Content Version: 12.8  © 0758-9679 Healthwise, [employer]. Care instructions adapted under license by [facility] ([facility]). If you have questions about a medical condition or this instruction, always ask your healthcare professional. [address] any warranty or liability for your use of this information. Please bring a copy of your living will to your next office visit     Personalized Preventive Plan for Amaury Lepe - 3/30/2021  Medicare offers a range of preventive health benefits. Some of the tests and screenings are paid in full while other may be subject to a deductible, co-insurance, and/or copay. Some of these benefits include a comprehensive review of your medical history including lifestyle, illnesses that may run in your family, and various assessments and screenings as appropriate. After reviewing your medical record and screening and assessments performed today your provider may have ordered immunizations, labs, imaging, and/or referrals for you. A list of these orders (if applicable) as well as your Preventive Care list are included within your After Visit Summary for your review. Other Preventive Recommendations:    · A preventive eye exam performed by an eye specialist is recommended every 1-2 years to screen for glaucoma; cataracts, macular degeneration, and other eye disorders. · A preventive dental visit is recommended every 6 months. · Try to get at least 150 minutes of exercise per week or 10,000 steps per day on a pedometer . · Order or download the FREE \"Exercise & Physical Activity: Your Everyday Guide\" from [employer].  Call 6-456.828.7255 or search [employer] online. · You need 5354-5663 mg of calcium and 7109-9764 IU of vitamin D per day. It is possible to meet your calcium requirement with diet alone, but a vitamin D supplement is usually necessary to meet this goal.  · When exposed to the sun, use a sunscreen that protects against both UVA and UVB radiation with an SPF of 30 or greater. Reapply every 2 to 3 hours or after sweating, drying off with a towel, or swimming. · Always wear a seat belt when traveling in a car. Always wear a helmet when riding a bicycle or motorcycle. Heart-Healthy Diet   Sodium, Fat, and Cholesterol Controlled Diet       What Is a Heart Healthy Diet? A heart-healthy diet is one that limits sodium , certain types of fat , and cholesterol . This type of diet is recommended for:   People with any form of cardiovascular disease (eg, coronary heart disease , peripheral vascular disease , previous heart attack , previous stroke )   People with risk factors for cardiovascular disease, such as high blood pressure , high cholesterol , or diabetes   Anyone who wants to lower their risk of developing cardiovascular disease   Sodium    Sodium is a mineral found in many foods. In general, most people consume much more sodium than they need. Diets high in sodium can increase blood pressure and lead to edema (water retention). On a heart-healthy diet, you should consume no more than 2,300 mg (milligrams) of sodium per dayabout the amount in one teaspoon of table salt. The foods highest in sodium include table salt (about 50% sodium), processed foods, convenience foods, and preserved foods. Cholesterol    Cholesterol is a fat-like, waxy substance in your blood. Our bodies make some cholesterol. It is also found in animal products, with the highest amounts in fatty meat, egg yolks, whole milk, cheese, shellfish, and organ meats. On a heart-healthy diet, you should limit your cholesterol intake to less than 200 mg per day.    It is normal and important to have some cholesterol in your bloodstream. But too much cholesterol can cause plaque to build up within your arteries, which can eventually lead to a heart attack or stroke. The two types of cholesterol that are most commonly referred to are:   Low-density lipoprotein (LDL) cholesterol  Also known as bad cholesterol, this is the cholesterol that tends to build up along your arteries. Bad cholesterol levels are increased by eating fats that are saturated or hydrogenated. Optimal level of this cholesterol is less than 100. Over 130 starts to get risky for heart disease. High-density lipoprotein (HDL) cholesterol  Also known as good cholesterol, this type of cholesterol actually carries cholesterol away from your arteries and may, therefore, help lower your risk of having a heart attack. You want this level to be high (ideally greater than 60). It is a risk to have a level less than 40. You can raise this good cholesterol by eating olive oil, canola oil, avocados, or nuts. Exercise raises this level, too. Fat    Fat is calorie dense and packs a lot of calories into a small amount of food. Even though fats should be limited due to their high calorie content, not all fats are bad. In fact, some fats are quite healthful. Fat can be broken down into four main types.    The good-for-you fats are:   Monounsaturated fat  found in oils such as olive and canola, avocados, and nuts and natural nut butters; can decrease cholesterol levels, while keeping levels of HDL cholesterol high   Polyunsaturated fat  found in oils such as safflower, sunflower, soybean, corn, and sesame; can decrease total cholesterol and LDL cholesterol   Omega-3 fatty acids  particularly those found in fatty fish (such as salmon, trout, tuna, mackerel, herring, and sardines); can decrease risk of arrhythmias, decrease triglyceride levels, and slightly lower blood pressure   The fats that you want to limit are:   Saturated fat  found in animal products, many fast foods, and a few vegetables; increases total blood cholesterol, including LDL levels   Animal fats that are saturated include: butter, lard, whole-milk dairy products, meat fat, and poultry skin   Vegetable fats that are saturated include: hydrogenated shortening, palm oil, coconut oil, cocoa butter   Hydrogenated or trans fat  found in margarine and vegetable shortening, most shelf stable snack foods, and fried foods; increases LDL and decreases HDL     It is generally recommended that you limit your total fat for the day to less than 30% of your total calories. If you follow an 1800-calorie heart healthy diet, for example, this would mean 60 grams of fat or less per day. Saturated fat and trans fat in your diet raises your blood cholesterol the most, much more than dietary cholesterol does. For this reason, on a heart-healthy diet, less than 7% of your calories should come from saturated fat and ideally 0% from trans fat. On an 1800-calorie diet, this translates into less than 14 grams of saturated fat per day, leaving 46 grams of fat to come from mono- and polyunsaturated fats.    Food Choices on a Heart Healthy Diet   Food Category   Foods Recommended   Foods to Avoid   Grains   Breads and rolls without salted tops Most dry and cooked cereals Unsalted crackers and breadsticks Low-sodium or homemade breadcrumbs or stuffing All rice and pastas   Breads, rolls, and crackers with salted tops High-fat baked goods (eg, muffins, donuts, pastries) Quick breads, self-rising flour, and biscuit mixes Regular bread crumbs Instant hot cereals Commercially prepared rice, pasta, or stuffing mixes   Vegetables   Most fresh, frozen, and low-sodium canned vegetables Low-sodium and salt-free vegetable juices Canned vegetables if unsalted or rinsed   Regular canned vegetables and juices, including sauerkraut and pickled vegetables Frozen vegetables with sauces Commercially prepared potato and vegetable mixes Fruits   Most fresh, frozen, and canned fruits All fruit juices   Fruits processed with salt or sodium   Milk   Nonfat or low-fat (1%) milk Nonfat or low-fat yogurt Cottage cheese, low-fat ricotta, cheeses labeled as low-fat and low-sodium   Whole milk Reduced-fat (2%) milk Malted and chocolate milk Full fat yogurt Most cheeses (unless low-fat and low salt) Buttermilk (no more than 1 cup per week)   Meats and Beans   Lean cuts of fresh or frozen beef, veal, lamb, or pork (look for the word loin) Fresh or frozen poultry without the skin Fresh or frozen fish and some shellfish Egg whites and egg substitutes (Limit whole eggs to three per week) Tofu Nuts or seeds (unsalted, dry-roasted), low-sodium peanut butter Dried peas, beans, and lentils   Any smoked, cured, salted, or canned meat, fish, or poultry (including melendrez, chipped beef, cold cuts, hot dogs, sausages, sardines, and anchovies) Poultry skins Breaded and/or fried fish or meats Canned peas, beans, and lentils Salted nuts   Fats and Oils   Olive oil and canola oil Low-sodium, low-fat salad dressings and mayonnaise   Butter, margarine, coconut and palm oils, melendrez fat   Snacks, Sweets, and Condiments   Low-sodium or unsalted versions of broths, soups, soy sauce, and condiments Pepper, herbs, and spices; vinegar, lemon, or lime juice Low-fat frozen desserts (yogurt, sherbet, fruit bars) Sugar, cocoa powder, honey, syrup, jam, and preserves Low-fat, trans-fat free cookies, cakes, and pies Shar and animal crackers, fig bars, ceci snaps   High-fat desserts Broth, soups, gravies, and sauces, made from instant mixes or other high-sodium ingredients Salted snack foods Canned olives Meat tenderizers, seasoning salt, and most flavored vinegars   Beverages   Low-sodium carbonated beverages Tea and coffee in moderation Soy milk   Commercially softened water   Suggestions   Make whole grains, fruits, and vegetables the base of your diet.     Choose heart-healthy fats sensory awareness   Side effects of medicine (eg, dizziness, blurred vision)especially medicines like prescription pain medicines and drugs used to treat mental health conditions   Depending on the brittleness of your bones, the consequences of a fall can be serious and long lasting. Home Life   Research by the Association of Aging [facility]) shows that some home accidents among older adults can be prevented by making simple lifestyle changes and basic modifications and repairs to the home environment. Here are some lifestyle changes that experts recommend:   Have your hearing and vision checked regularly. Be sure to wear prescription glasses that are right for you. Speak to your doctor or pharmacist about the possible side effects of your medicines. A number of medicines can cause dizziness. If you have problems with sleep, talk to your doctor. Limit your intake of alcohol. If necessary, use a cane or walker to help maintain your balance. Wear supportive, rubber-soled shoes, even at home. If you live in a region that gets wintry weather, you may want to put special cleats on your shoes to prevent you from slipping on the snow and ice. Exercise regularly to help maintain muscle tone, agility, and balance. Always hold the banister when going up or down stairs. Also, use  bars when getting in or out of the bath or shower, or using the toilet. To avoid dizziness, get up slowly from a lying down position. Sit up first, dangling your legs for a minute or two before rising to a standing position. Overall Home Safety Check   According to the Consumer [employer] Safety Commision's \"Older Consumer Home Safety Checklist,\" it is important to check for potential hazards in each room. And remember, proper lighting is an essential factor in home safety. If you cannot see clearly, you are more likely to fall.    Important questions to ask yourself include:   Are lamp, electric, extension, and telephone cords placed out of the flow of traffic and maintained in good condition? Have frayed cords been replaced? Are all small rugs and runners slip resistant? If not, you can secure them to the floor with a special double-sided carpet tape. Are smoke detectors properly locatedone on every floor of your home and one outside of every sleeping area? Are they in good working order? Are batteries replaced at least once a year? Do you have a well-maintained carbon monoxide detector outside every sleeping are in your home? Does your furniture layout leave plenty of space to maneuver between and around chairs, tables, beds, and sofas? Are hallways, stairs and passages between rooms well lit? Can you reach a lamp without getting out of bed? Are floor surfaces well maintained? Shag rugs, high-pile carpeting, tile floors, and polished wood floors can be particularly slippery. Stairs should always have handrails and be carpeted or fitted with a non-skid tread. Is your telephone easily reachable. Is the cord safely tucked away? Room by Room   According to the Association of Aging, bathrooms and yas are the two most potentially hazardous rooms in your home. In the Kitchen    Be sure your stove is in proper working order and always make sure burners and the oven are off before you go out or go to sleep. Keep pots on the back burners, turn handles away from the front of the stove, and keep stove clean and free of grease build-up. Kitchen ventilation systems and range exhausts should be working properly. Keep flammable objects such as towels and pot holders away from the cooking area except when in use. Make sure kitchen curtains are tied back. Move cords and appliances away from the sink and hot surfaces. If extension cords are needed, install wiring guides so they do not hang over the sink, range, or working areas. Look for coffee pots, kettles and toaster ovens with automatic shut-offs.     Keep a mop handy in the kitchen so you can wipe up spills instantly. You should also have a small fire extinguisher. Arrange your kitchen with frequently used items on lower shelves to avoid the need to stand on a stepstool to reach them. Make sure countertops are well-lit to avoid injuries while cutting and preparing food. In the Bathroom    Use a non-slip mat or decals in the tub and shower, since wet, soapy tile or porcelain surfaces are extremely slippery. Make sure bathroom rugs are non-skid or tape them firmly to the floor. Bathtubs should have at least one, preferably two, grab bars, firmly attached to structural supports in the wall. (Do not use built-in soap holders or glass shower doors as grab bars.)    Tub seats fitted with non-slip material on the legs allow you to wash sitting down. For people with limited mobility, bathtub transfer benches allow you to slide safely into the tub. Raised toilet seats and toilet safety rails are helpful for those with knee or hip problems. In the [facility]    Make sure you use a nightlight and that the area around your bed is clear of potential obstacles. Be careful with electric blankets and never go to sleep with a heating pad, which can cause serious burns even if on a low setting. Use fire-resistant mattress covers and pillows, and NEVER smoke in bed. Keep a phone next to the bed that is programmed to dial 911 at the push of a button. If you have a chronic condition, you may want to sign on with an automatic call-in service. Typically the system includes a small pendant that connects directly to an emergency medical voice-response system. You should also make arrangements to stay in contact with someonefriend, neighbor, family memberon a regular schedule.    Fire Prevention   According to the [employer]. (Smoke Alarms for Every) [address] [place] [address], senior citizens are one of the two highest risk groups for death and serious injuries due to residential fires.   When cooking, wear short-sleeved items, never a bulky long-sleeved robe. The [facility]'s Safety Checklist for Older Consumers emphasizes the importance of checking basements, garages, workshops and storage areas for fire hazards, such as volatile liquids, piles of old rags or clothing and overloaded circuits. Never smoke in bed or when lying down on a couch or recliner chair. Small portable electric or kerosene heaters are responsible for many home fires and should be used cautiously if at all. If you do use one, be sure to keep them away from flammable materials. In case of fire, make sure you have a pre-established emergency exit plan. Have a professional check your fireplace and other fuel-burning appliances yearly. [employer]   Baby boomers entering the chery years will continue to see the development of new products to help older adults live safely and independently in spite of age-related changes. Making Life More Livable  , by [employer], lists over 1,000 products for \"living well in the mature years,\" such as bathing and mobility aids, household security devices, ergonomically designed knives and peelers, and faucet valves and knobs for temperature control. Medical supply stores and organizations are good sources of information about products that improve your quality of life and insure your safety.      Last Reviewed: November 2009 Renato Guidry MD   Updated: 3/7/2011     ·

## 2021-03-31 ENCOUNTER — TELEPHONE (OUTPATIENT)
Dept: FAMILY MEDICINE CLINIC | Age: 71
End: 2021-03-31

## 2021-03-31 DIAGNOSIS — E11.9 TYPE 2 DIABETES MELLITUS WITHOUT COMPLICATION, WITHOUT LONG-TERM CURRENT USE OF INSULIN (HCC): ICD-10-CM

## 2021-03-31 RX ORDER — GLIMEPIRIDE 1 MG/1
1 TABLET ORAL 2 TIMES DAILY WITH MEALS
Qty: 180 TABLET | Refills: 0 | Status: SHIPPED | OUTPATIENT
Start: 2021-03-31 | End: 2021-06-29

## 2021-03-31 RX ORDER — GLIMEPIRIDE 1 MG/1
TABLET ORAL
Qty: 135 TABLET | Refills: 0 | OUTPATIENT
Start: 2021-03-31

## 2021-03-31 NOTE — TELEPHONE ENCOUNTER
A new prescription for Glimepiride was sent to the pharmacy:  Orders Placed This Encounter   Medications    glimepiride (AMARYL) 1 MG tablet     Sig: Take 1 tablet by mouth 2 times daily (with meals)     Dispense:  180 tablet     Refill:  0

## 2021-04-05 DIAGNOSIS — E55.9 VITAMIN D DEFICIENCY: ICD-10-CM

## 2021-04-15 ENCOUNTER — NURSE ONLY (OUTPATIENT)
Dept: LAB | Age: 71
End: 2021-04-15
Payer: MEDICARE

## 2021-04-15 DIAGNOSIS — E53.8 B12 DEFICIENCY: Primary | ICD-10-CM

## 2021-04-15 PROCEDURE — 96372 THER/PROPH/DIAG INJ SC/IM: CPT

## 2021-04-15 RX ADMIN — CYANOCOBALAMIN 1000 MCG: 1000 INJECTION, SOLUTION INTRAMUSCULAR; SUBCUTANEOUS at 15:21

## 2021-04-16 ENCOUNTER — OFFICE VISIT (OUTPATIENT)
Dept: CARDIOLOGY | Age: 71
End: 2021-04-16
Payer: MEDICARE

## 2021-04-16 VITALS
BODY MASS INDEX: 35.6 KG/M2 | HEIGHT: 57 IN | WEIGHT: 165 LBS | DIASTOLIC BLOOD PRESSURE: 60 MMHG | HEART RATE: 74 BPM | SYSTOLIC BLOOD PRESSURE: 130 MMHG

## 2021-04-16 DIAGNOSIS — I25.118 CORONARY ARTERY DISEASE OF NATIVE ARTERY OF NATIVE HEART WITH STABLE ANGINA PECTORIS (HCC): Primary | ICD-10-CM

## 2021-04-16 DIAGNOSIS — R07.9 CHEST PAIN, UNSPECIFIED TYPE: ICD-10-CM

## 2021-04-16 DIAGNOSIS — I73.9 PERIPHERAL VASCULAR DISEASE (HCC): ICD-10-CM

## 2021-04-16 DIAGNOSIS — R06.02 SOB (SHORTNESS OF BREATH): ICD-10-CM

## 2021-04-16 DIAGNOSIS — I10 ESSENTIAL HYPERTENSION: ICD-10-CM

## 2021-04-16 PROCEDURE — 93010 ELECTROCARDIOGRAM REPORT: CPT | Performed by: INTERNAL MEDICINE

## 2021-04-16 PROCEDURE — 99213 OFFICE O/P EST LOW 20 MIN: CPT | Performed by: INTERNAL MEDICINE

## 2021-04-16 PROCEDURE — 1090F PRES/ABSN URINE INCON ASSESS: CPT | Performed by: INTERNAL MEDICINE

## 2021-04-16 PROCEDURE — G8417 CALC BMI ABV UP PARAM F/U: HCPCS | Performed by: INTERNAL MEDICINE

## 2021-04-16 PROCEDURE — 3017F COLORECTAL CA SCREEN DOC REV: CPT | Performed by: INTERNAL MEDICINE

## 2021-04-16 PROCEDURE — G8428 CUR MEDS NOT DOCUMENT: HCPCS | Performed by: INTERNAL MEDICINE

## 2021-04-16 PROCEDURE — G8399 PT W/DXA RESULTS DOCUMENT: HCPCS | Performed by: INTERNAL MEDICINE

## 2021-04-16 PROCEDURE — 1123F ACP DISCUSS/DSCN MKR DOCD: CPT | Performed by: INTERNAL MEDICINE

## 2021-04-16 PROCEDURE — 99214 OFFICE O/P EST MOD 30 MIN: CPT | Performed by: INTERNAL MEDICINE

## 2021-04-16 PROCEDURE — 1036F TOBACCO NON-USER: CPT | Performed by: INTERNAL MEDICINE

## 2021-04-16 PROCEDURE — 93005 ELECTROCARDIOGRAM TRACING: CPT | Performed by: INTERNAL MEDICINE

## 2021-04-16 PROCEDURE — 4040F PNEUMOC VAC/ADMIN/RCVD: CPT | Performed by: INTERNAL MEDICINE

## 2021-04-16 ASSESSMENT — ENCOUNTER SYMPTOMS
ABDOMINAL DISTENTION: 0
EYE ITCHING: 0
NAUSEA: 0
ABDOMINAL PAIN: 0
CHEST TIGHTNESS: 0
APNEA: 0
VOMITING: 0
EYE DISCHARGE: 0
BACK PAIN: 0
SHORTNESS OF BREATH: 1

## 2021-04-16 NOTE — PROGRESS NOTES
tightness. Cardiovascular: Negative for chest pain and palpitations. Gastrointestinal: Negative for abdominal distention, abdominal pain, nausea and vomiting. Endocrine: Negative for cold intolerance and heat intolerance. Musculoskeletal: Negative for arthralgias and back pain. Neurological: Negative for dizziness, syncope and facial asymmetry. Psychiatric/Behavioral: Negative for agitation and behavioral problems. Physical Exam:  /60   Pulse 74   Ht 4' 9\" (1.448 m)   Wt 165 lb (74.8 kg)   LMP  (LMP Unknown)   BMI 35.71 kg/m²    Physical Exam  Constitutional:       Appearance: Normal appearance. She is normal weight. HENT:      Head: Normocephalic and atraumatic. Mouth/Throat:      Mouth: Mucous membranes are moist.   Neck:      Musculoskeletal: No neck rigidity. Cardiovascular:      Rate and Rhythm: Normal rate and regular rhythm. Pulses: Normal pulses. Heart sounds: Normal heart sounds. No murmur. Pulmonary:      Effort: Pulmonary effort is normal. No respiratory distress. Breath sounds: Normal breath sounds. No stridor. Abdominal:      General: There is no distension. Palpations: There is no mass. Musculoskeletal:         General: No swelling or tenderness. Skin:     General: Skin is warm. Capillary Refill: Capillary refill takes less than 2 seconds. Coloration: Skin is not jaundiced. Neurological:      General: No focal deficit present. Mental Status: She is alert and oriented to person, place, and time. Psychiatric:         Mood and Affect: Mood normal.         Behavior: Behavior normal.         Cardiac Data:    Labs:     CBC: No results for input(s): WBC, HGB, HCT, PLT in the last 72 hours. BMP:No results for input(s): NA, K, CO2, BUN, CREATININE, LABGLOM, GLUCOSE in the last 72 hours. PT/INR: No results for input(s): PROTIME, INR in the last 72 hours.   FASTING LIPID PANEL:  Lab Results   Component Value Date    HDL 53 03/26/2021    TRIG 253 03/26/2021     LIVER PROFILE:No results for input(s): AST, ALT, LABALBU in the last 72 hours. IMPRESSION:    Patient Active Problem List   Diagnosis    Mixed hyperlipidemia    Obesity (BMI 30-39. 9)    Interstitial cystitis    Trigger finger, left    High risk medication use    Vitamin D deficiency    Essential hypertension    Atypical chest pain    Hypokalemia    Paresthesias    Coronary artery disease involving native coronary artery of native heart without angina pectoris    Cerebrovascular accident (CVA) due to thrombosis of precerebral artery (HCC)    Right tibial neuropathy    Neuropathy of left peroneal nerve    H/O major orthopedic surgery    Peripheral vascular disease (HCC)    Hematemesis       Assessment/Plan:  1. CAD s/p PCI of RCA 6/2017. Stress test 4/2019 normal perfusion. EF 83%. Has not been feeling good, more SOB, will plan for Cardiolite stress test and echo. 2. Essential Hypertension. Well controlled. 3. HPL. LDL 70 on 3/2021. On Crestor. 4. DM II. Followed by PCP. 5. Echo 6/2017 EF 55%. Mild .          Gennaro Llanes MD  [facility]           [phone]

## 2021-04-26 ENCOUNTER — TELEPHONE (OUTPATIENT)
Dept: FAMILY MEDICINE CLINIC | Age: 71
End: 2021-04-26

## 2021-04-27 NOTE — TELEPHONE ENCOUNTER
She is currently taking Amaryl and Metformin. Please advise the patient to hold Metformin for 48 hours prior to the stress test.  She should not take Amaryl while she is npo, but she can take Amaryl with her first full meal when she begins to eat again.

## 2021-04-28 ENCOUNTER — TELEPHONE (OUTPATIENT)
Dept: FAMILY MEDICINE CLINIC | Age: 71
End: 2021-04-28

## 2021-04-29 ENCOUNTER — HOSPITAL ENCOUNTER (OUTPATIENT)
Dept: NON INVASIVE DIAGNOSTICS | Age: 71
Discharge: HOME | End: 2021-04-29
Payer: MEDICARE

## 2021-04-29 ENCOUNTER — HOSPITAL ENCOUNTER (OUTPATIENT)
Dept: NUCLEAR MEDICINE | Age: 71
Discharge: HOME | End: 2021-05-01
Payer: MEDICARE

## 2021-04-29 ENCOUNTER — TELEPHONE (OUTPATIENT)
Dept: CARDIOLOGY | Age: 71
End: 2021-04-29

## 2021-04-29 ENCOUNTER — HOSPITAL ENCOUNTER (OUTPATIENT)
Dept: NUCLEAR MEDICINE | Age: 71
End: 2021-04-29
Payer: MEDICARE

## 2021-04-29 DIAGNOSIS — R07.9 CHEST PAIN, UNSPECIFIED TYPE: ICD-10-CM

## 2021-04-29 DIAGNOSIS — R06.02 SOB (SHORTNESS OF BREATH): ICD-10-CM

## 2021-04-29 DIAGNOSIS — I25.118 CORONARY ARTERY DISEASE OF NATIVE ARTERY OF NATIVE HEART WITH STABLE ANGINA PECTORIS (HCC): ICD-10-CM

## 2021-04-29 DIAGNOSIS — I10 ESSENTIAL HYPERTENSION: ICD-10-CM

## 2021-04-29 DIAGNOSIS — I73.9 PERIPHERAL VASCULAR DISEASE (HCC): ICD-10-CM

## 2021-04-29 LAB
LV EF: 60 %
LVEF MODALITY: NORMAL

## 2021-04-29 PROCEDURE — 93017 CV STRESS TEST TRACING ONLY: CPT

## 2021-04-29 PROCEDURE — 3430000000 HC RX DIAGNOSTIC RADIOPHARMACEUTICAL: Performed by: INTERNAL MEDICINE

## 2021-04-29 PROCEDURE — A9500 TC99M SESTAMIBI: HCPCS | Performed by: INTERNAL MEDICINE

## 2021-04-29 PROCEDURE — 6360000002 HC RX W HCPCS: Performed by: INTERNAL MEDICINE

## 2021-04-29 PROCEDURE — 93306 TTE W/DOPPLER COMPLETE: CPT

## 2021-04-29 PROCEDURE — G1010 CDSM STANSON: HCPCS

## 2021-04-29 RX ORDER — AMINOPHYLLINE DIHYDRATE 25 MG/ML
100 INJECTION, SOLUTION INTRAVENOUS ONCE
Status: COMPLETED | OUTPATIENT
Start: 2021-04-29 | End: 2021-04-29

## 2021-04-29 RX ADMIN — TETRAKIS(2-METHOXYISOBUTYLISOCYANIDE)COPPER(I) TETRAFLUOROBORATE 30 MILLICURIE: 1 INJECTION, POWDER, LYOPHILIZED, FOR SOLUTION INTRAVENOUS at 10:34

## 2021-04-29 RX ADMIN — REGADENOSON 0.4 MG: 0.08 INJECTION, SOLUTION INTRAVENOUS at 10:00

## 2021-04-29 RX ADMIN — AMINOPHYLLINE 100 MG: 25 INJECTION, SOLUTION INTRAVENOUS at 10:02

## 2021-04-29 RX ADMIN — TETRAKIS(2-METHOXYISOBUTYLISOCYANIDE)COPPER(I) TETRAFLUOROBORATE 10 MILLICURIE: 1 INJECTION, POWDER, LYOPHILIZED, FOR SOLUTION INTRAVENOUS at 10:34

## 2021-04-29 NOTE — PROCEDURES
[address]                 [address] [place] [id number]                              CARDIAC STRESS TEST    PATIENT NAME: Shannon Allen                   :        1950  MED REC NO:   [id number]                             ROOM:  ACCOUNT NO:   [de-identified]                           ADMIT DATE: 2021  PROVIDER:     Laurence Waters    DATE OF STUDY:  2021    STRESS TEST    Ordering Provider: Bev Reece MD    Primary Care Provider: Rylee Vasquez MD    Patient's Age: 69     Height: 4 feet, 9 inches  Weight: 165 pounds    INDICATION:  Chest pain, coronary artery disease, angina. Lexiscan 0.4 mg injected over 10 seconds. IV Cardiolite injected 20 seconds post Lexiscan injection. Heart Rate: 67 Resting blood pressure:  178/75              HR   BP  1 min          110  177/74  2 min  3 min          102  153/68  4 min  5 min          91   150/66  6 min  7 min          85   160/72  8 min  9 min          88   156/74  10 min    Symptoms:  Chest Pain: Yes, tightness. Nausea: Yes. Headache:  Yes. Shortness of breath: Yes. Other: No.    Aminophyllin given: 100 mg    Aminophyllin wasted: 400 mg    Resting EKG:  Normal sinus rhythm, normal ECG. Arrhythmias: None. EKG changes:  Borderline ST depression seen after Lexiscan diffusely. INTERPRETATION:  1. Negative ECG portion of stress test for ischemia. 2. Nuclear scan report to follow. Nuclear Myocardial Perfusion Imaging (SPECT)    TESTING METHOD  STRESS:   Lexiscan  AGENT:    Cardiolite  REST:          Injection Date:  21  Time:  830  Amount:  11.14 mCi  STRESS:   Injection Date:  21  Time:  941  Amount:  32.8 mCi  IMAGE TIME:    Rest:  904  Stress:  1033    EF:  85%  TID:  0.80  LHR:  0.40    FINDINGS:  Ischemia (Reversible Defect):           No.  Infarction (Irreversible Defect):       No.  Ejection fraction Calculated:           Yes, 85%.   Segmental wall motion:                  Yes. Low Risk Study. IMPRESSION:  1. No ischemia or infarction seen. 2. Normal left ventricular ejection fraction. [facility]    D: 04/29/2021 14:54:12       T: 04/29/2021 14:56:33     [email or web address]  Job#: [id number]     Doc#: Unknown    CC: Flaca Gilliam.  Ursula Figueredo

## 2021-04-29 NOTE — TELEPHONE ENCOUNTER
Attempted to call patient to give stress (scanned below) and echo results. No answer of vm. Will call later        Patient Name Jeff Garcia      Date of Study               04/29/2021                [facility]      Date of      1950  Gender                      Female   Birth      Age          69 year(s)  Race                        Other      Room Number              Height:                     57 inch, 144.78 cm      Corporate ID [id number]    Weight:                     165 pounds, 74.8 kg   #      Patient Acct [de-identified]   BSA:          1.66 m^2      BMI:       35.71   #                                                               kg/m^2      MR #         [id number]     Sonographer                 Jonathan Navas, [facility]      Accession #  [id number]  Interpreting Physician      Charo Jackson      Fellow                   Referring Nurse                            Practitioner      Interpreting             Referring Physician   Fellow     Type of Study      TTE procedure:2D Echocardiogram, M-Mode, Doppler, Color Doppler. Procedure Date  Date: 04/29/2021 Start: 07:40 AM     Study Location: [facility]  Technical Quality: Good visualization     Indications:Coronary artery disease, Shortness of breath and Chest pain.     History / Tech. Comments:  Procedure explained to patient.     Patient Status: Outpatient     Height: 57 inches Weight: 165 pounds BSA: 1.66 m^2 BMI: 35.71 kg/m^2     Rhythm: Within normal limits     Allergies    - Iodine:(Sulfa, Ancef).     CONCLUSIONS     Summary  Left ventricular systolic function is normal.  Estimated Left ventricular ejection fraction 60 %. Mild left ventricular hypertrophy. Grade I (mild) left ventricular diastolic dysfunction. Aortic valve sclerosis without stenosis. No significant valvular regurgitation or stenosis seen.   Trivial pericardial effusion.     Signature  ----------------------------------------------------------------------------   Electronically

## 2021-04-29 NOTE — PROGRESS NOTES
Patient Name:  Tabatha Bright MRN:  [id number]   :  1950  Age:  69 y.o. Sex: female   Ordering Provider: Tiera Mendieta MD  Referring Provider: Devin Saucedo MD  Primary Care Provider:  Devin Saucedo MD     Indications: chest pain     Medications:     Current Outpatient Medications:     OPTIMAL-D 1.25 MG (21439 UT) CAPS, Take 1 capsule by mouth once a week, Disp: 4 capsule, Rfl: 2    glimepiride (AMARYL) 1 MG tablet, Take 1 tablet by mouth 2 times daily (with meals), Disp: 180 tablet, Rfl: 0    amLODIPine (NORVASC) 10 MG tablet, Take 1 tablet by mouth once daily, Disp: 90 tablet, Rfl: 2    rosuvastatin (CRESTOR) 10 MG tablet, Take 1 tablet by mouth nightly, Disp: 30 tablet, Rfl: 5    metFORMIN (GLUCOPHAGE) 500 MG tablet, Take 1 tablet by mouth 2 times daily (with meals), Disp: 180 tablet, Rfl: 1    nitroGLYCERIN (NITROSTAT) 0.4 MG SL tablet, Place 1 tablet under the tongue every 5 minutes as needed for Chest pain up to max of 3 total doses. If no relief after 1 dose, call 911., Disp: 25 tablet, Rfl: 1    isosorbide mononitrate (IMDUR) 30 MG extended release tablet, Take 1 tablet by mouth once daily, Disp: 90 tablet, Rfl: 3    ELDERBERRY PO, Take by mouth Immune Plus tablets-Vit B, C, Zinc, Disp: , Rfl:     Ascorbic Acid (VITAMIN C PO), Take by mouth, Disp: , Rfl:     NONFORMULARY, Artificial tear eye drops prn, Disp: , Rfl:     fluticasone (FLONASE) 50 MCG/ACT nasal spray, 1 spray by Nasal route daily, Disp: 1 Bottle, Rfl: 0    Multiple Vitamins-Minerals (MULTIVITAMIN PO), Take 1 tablet by mouth daily, Disp: , Rfl:     NONFORMULARY, 5,000 mcg daily OTC Biotin 1000mcg daily , Disp: , Rfl:     aspirin 81 MG tablet, Take 81 mg by mouth daily. , Disp: , Rfl:     Current Facility-Administered Medications:     regadenoson (LEXISCAN) injection 0.4 mg, 0.4 mg, Intravenous, Once, Suhas Hui MD    cyanocobalamin injection 1,000 mcg, 1,000 mcg, Intramuscular, Q30 Days, Devin Saucedo MD, 1,000 mcg at 04/15/21 1521    cyanocobalamin injection 2,000 mcg, 2,000 mcg, Intramuscular, Q30 Days, Crescencio Claros MD    Facility-Administered Medications Ordered in Other Encounters:     technetium sestamibi (CARDIOLITE) injection 30 millicurie, 30 millicurie, Intravenous, ONCE PRN, Velasquez Oneil MD    technetium sestamibi (CARDIOLITE) injection 10 millicurie, 10 millicurie, Intravenous, ONCE PRN, Velasquez Oneil MD      ----------------------------------------------------------------------------------------------------------                Lexiscan 0.4 mg injected over 10 seconds. IV Cardiolite injected 20 seconds post Lexiscan injection. Heart Rate:  67  Resting Blood Pressure:  178/75   ----------------------------------------------------------------------------------------------------------     HR BP   1 min 110 177/74   2 min     3 min 102 153/68   4 min     5 min 91 150/66   6 min     7 min 85 160/72   8 min     9 min 88 156/74   10 min       Symptoms:  Chest Pain:  Yes, tightness      Nausea:  Yes     Headache:  Yes    Shortness of Breath:  Yes     Other:  No      Aminophylline given:  100 mg        Aminophylline wasted:  400 mg     Electronically signed by Darlene Copeland RCP on 4/29/21 at 9:36 AM EDT    ----------------------------------------------------------------------------------------------------------  Resting EKG: Normal sinus rhythm, normal ECG    Arrhythmias: None    EKG Changes: Borderline ST depression seen after Lexiscan diffusely    Interpretation:    1. Negative ECG portion of stress test for ischemia  2.   Nuclear scan report to follow      Supervising Physician:  Ryan Carranza MD

## 2021-04-30 NOTE — TELEPHONE ENCOUNTER
Pt notified of stress and echo results. Her main symptoms are fatigue and SOB. She was aware that sleep study was mentioned. She wants to start walking again, around her neighborhood, to see if this helps. I offered her a sooner appt and she declines at this time. She will let us know or use ER if needed. Confirmed cardio follow up is scheduled in July.

## 2021-05-17 ENCOUNTER — NURSE ONLY (OUTPATIENT)
Dept: LAB | Age: 71
End: 2021-05-17
Payer: MEDICARE

## 2021-05-17 DIAGNOSIS — E53.8 B12 DEFICIENCY: Primary | ICD-10-CM

## 2021-05-17 PROCEDURE — 96372 THER/PROPH/DIAG INJ SC/IM: CPT

## 2021-05-17 RX ADMIN — CYANOCOBALAMIN 1000 MCG: 1000 INJECTION, SOLUTION INTRAMUSCULAR; SUBCUTANEOUS at 10:50

## 2021-06-15 ENCOUNTER — NURSE ONLY (OUTPATIENT)
Dept: LAB | Age: 71
End: 2021-06-15
Payer: MEDICARE

## 2021-06-15 DIAGNOSIS — E53.8 B12 DEFICIENCY: Primary | ICD-10-CM

## 2021-06-15 PROCEDURE — 96372 THER/PROPH/DIAG INJ SC/IM: CPT

## 2021-06-15 RX ADMIN — CYANOCOBALAMIN 1000 MCG: 1000 INJECTION, SOLUTION INTRAMUSCULAR; SUBCUTANEOUS at 15:44

## 2021-06-22 DIAGNOSIS — E55.9 VITAMIN D DEFICIENCY: ICD-10-CM

## 2021-06-22 NOTE — TELEPHONE ENCOUNTER
Patient due for lab work for 7/23/2021 appointment. Labs ordered. Kika called requesting a refill of the below medication which has been pended for you:     Requested Prescriptions     Pending Prescriptions Disp Refills    OPTIMAL-D 1.25 MG (46159 UT) CAPS [Pharmacy Med Name: Optimal-D 39736 UNIT Oral Capsule] 4 capsule 0     Sig: Take 1 capsule by mouth once a week       Last Appointment Date: 3/30/2021  Next Appointment Date: 7/23/2021    Allergies   Allergen Reactions    Iv Dye [Iodides] Shortness Of Breath    Penicillins Swelling    Sulfa Antibiotics Shortness Of Breath    Ace Inhibitors Nausea Only     Difficulty swallowing the Lisinopril. Gagging. Nausea.  Cozaar [Losartan] Nausea Only    Lipitor Other (See Comments)     Muscle aches.     Lopressor [Metoprolol] Other (See Comments)     leg pain    Morphine Nausea Only     Chest pressure

## 2021-06-29 DIAGNOSIS — E11.9 TYPE 2 DIABETES MELLITUS WITHOUT COMPLICATION, WITHOUT LONG-TERM CURRENT USE OF INSULIN (HCC): ICD-10-CM

## 2021-06-29 RX ORDER — GLIMEPIRIDE 1 MG/1
TABLET ORAL
Qty: 180 TABLET | Refills: 0 | Status: SHIPPED | OUTPATIENT
Start: 2021-06-29 | End: 2021-09-28

## 2021-07-16 ENCOUNTER — HOSPITAL ENCOUNTER (OUTPATIENT)
Dept: LAB | Age: 71
Discharge: HOME | End: 2021-07-16
Payer: MEDICARE

## 2021-07-16 ENCOUNTER — NURSE ONLY (OUTPATIENT)
Dept: LAB | Age: 71
End: 2021-07-16
Payer: MEDICARE

## 2021-07-16 DIAGNOSIS — E55.9 VITAMIN D DEFICIENCY: ICD-10-CM

## 2021-07-16 DIAGNOSIS — E53.8 B12 DEFICIENCY: Primary | ICD-10-CM

## 2021-07-16 DIAGNOSIS — E78.2 MIXED HYPERLIPIDEMIA: ICD-10-CM

## 2021-07-16 DIAGNOSIS — E11.9 TYPE 2 DIABETES MELLITUS WITHOUT COMPLICATION, WITHOUT LONG-TERM CURRENT USE OF INSULIN (HCC): ICD-10-CM

## 2021-07-16 DIAGNOSIS — I10 ESSENTIAL HYPERTENSION: ICD-10-CM

## 2021-07-16 LAB
ALBUMIN SERPL-MCNC: 4.7 G/DL (ref 3.5–5.2)
ALBUMIN/GLOBULIN RATIO: 1.5 (ref 1–2.5)
ALP BLD-CCNC: 119 U/L (ref 35–104)
ALT SERPL-CCNC: 31 U/L (ref 5–33)
ANION GAP SERPL CALCULATED.3IONS-SCNC: 11 MMOL/L (ref 9–17)
AST SERPL-CCNC: 27 U/L
BILIRUB SERPL-MCNC: 0.3 MG/DL (ref 0.3–1.2)
BUN BLDV-MCNC: 12 MG/DL (ref 8–23)
BUN/CREAT BLD: 17 (ref 9–20)
CALCIUM SERPL-MCNC: 9.8 MG/DL (ref 8.6–10.4)
CHLORIDE BLD-SCNC: 103 MMOL/L (ref 98–107)
CHOLESTEROL/HDL RATIO: 3.2
CHOLESTEROL: 161 MG/DL
CO2: 27 MMOL/L (ref 20–31)
CREAT SERPL-MCNC: 0.69 MG/DL (ref 0.5–0.9)
ESTIMATED AVERAGE GLUCOSE: 189 MG/DL
GFR AFRICAN AMERICAN: >60 ML/MIN
GFR NON-AFRICAN AMERICAN: >60 ML/MIN
GFR SERPL CREATININE-BSD FRML MDRD: ABNORMAL ML/MIN/{1.73_M2}
GFR SERPL CREATININE-BSD FRML MDRD: ABNORMAL ML/MIN/{1.73_M2}
GLUCOSE BLD-MCNC: 166 MG/DL (ref 70–99)
HBA1C MFR BLD: 8.2 % (ref 4–6)
HDLC SERPL-MCNC: 50 MG/DL
LDL CHOLESTEROL: 79 MG/DL (ref 0–130)
POTASSIUM SERPL-SCNC: 3.7 MMOL/L (ref 3.7–5.3)
SODIUM BLD-SCNC: 141 MMOL/L (ref 135–144)
TOTAL PROTEIN: 7.8 G/DL (ref 6.4–8.3)
TRIGL SERPL-MCNC: 162 MG/DL
VITAMIN D 25-HYDROXY: 98.4 NG/ML (ref 30–100)
VLDLC SERPL CALC-MCNC: ABNORMAL MG/DL (ref 1–30)

## 2021-07-16 PROCEDURE — 80061 LIPID PANEL: CPT

## 2021-07-16 PROCEDURE — 82306 VITAMIN D 25 HYDROXY: CPT

## 2021-07-16 PROCEDURE — 80053 COMPREHEN METABOLIC PANEL: CPT

## 2021-07-16 PROCEDURE — 83036 HEMOGLOBIN GLYCOSYLATED A1C: CPT

## 2021-07-16 PROCEDURE — 36415 COLL VENOUS BLD VENIPUNCTURE: CPT

## 2021-07-16 PROCEDURE — 96372 THER/PROPH/DIAG INJ SC/IM: CPT

## 2021-07-16 RX ORDER — CYANOCOBALAMIN 1000 UG/ML
1000 INJECTION INTRAMUSCULAR; SUBCUTANEOUS
Status: SHIPPED | OUTPATIENT
Start: 2021-07-16 | End: 2022-07-11

## 2021-07-16 RX ADMIN — CYANOCOBALAMIN 1000 MCG: 1000 INJECTION, SOLUTION INTRAMUSCULAR at 09:25

## 2021-07-21 NOTE — PROGRESS NOTES
[address], [address] [place] [address] Drive                        Telephone [phone]             Fax [phone]     Arnol Newman  1950  MRN:  [id number]  Date of visit:  7/23/2021    Subjective:    Arnol Newman is a 69 y.o. female who presents to [facility] today (7/23/2021) for follow up/evaluation of:  Diabetes (3 month follow up)      She states that she has been feeling well. She saw her cardiologist yesterday. He increased her dose of Crestor from 10 mg daily to 20 mg daily. She has not started taking the increased dose yet. She is tolerating her other medications well. She states that she has not been walking because there are a lot of dogs in her neighborhood. Her  plans to retire soon, and she hopes to be able to walk with him. She denies chest pain or shortness of breath with activity or at rest.   She reports difficulty with her hearing \"for a while. \"  She states that this has gotten progressively worse. She had an EGD done by Dr. Dominga Jones 3/5/2020. 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 \"  She had a consult with oncology and was referred to gastroenterology. She had an EGD with Dr. Jalen Allen on 5/13/2020. Biopsies were negative. She did not return for the recommended follow up EGD. She states that she has not had any abdominal pain. She denies difficulty swallowing. She denies early satiety. She has lost about 10 pounds in the last few months, but she states that she has been trying to limit portion sizes, and to lose weight. She has received both doses of the Palomo Peter Covid-19 vaccine. She has the following problem list:  Patient Active Problem List   Diagnosis    Mixed hyperlipidemia    Obesity (BMI 30-39. 9)    Interstitial cystitis    Trigger finger, left    High risk medication use    Vitamin D deficiency    Essential hypertension    Atypical chest pain    Hypokalemia    Paresthesias    Coronary artery disease involving native coronary artery of native heart without angina pectoris    Cerebrovascular accident (CVA) due to thrombosis of precerebral artery (HCC)    Right tibial neuropathy    Neuropathy of left peroneal nerve    H/O major orthopedic surgery    Peripheral vascular disease ([facility] [address]    Hematemesis        Current medications are:  Outpatient Medications Marked as Taking for the 7/23/21 encounter (Office Visit) with Merlin Kil, MD   Medication Sig Dispense Refill    rosuvastatin (CRESTOR) 20 MG tablet Take 1 tablet by mouth daily 30 tablet 3    glimepiride (AMARYL) 1 MG tablet TAKE 1 TABLET BY MOUTH TWICE DAILY WITH MEALS 180 tablet 0    OPTIMAL-D 1.25 MG (81094 UT) CAPS Take 1 capsule by mouth once a week 4 capsule 0    amLODIPine (NORVASC) 10 MG tablet Take 1 tablet by mouth once daily 90 tablet 2    metFORMIN (GLUCOPHAGE) 500 MG tablet Take 1 tablet by mouth 2 times daily (with meals) 180 tablet 1    nitroGLYCERIN (NITROSTAT) 0.4 MG SL tablet Place 1 tablet under the tongue every 5 minutes as needed for Chest pain up to max of 3 total doses. If no relief after 1 dose, call 911. 25 tablet 1    isosorbide mononitrate (IMDUR) 30 MG extended release tablet Take 1 tablet by mouth once daily 90 tablet 3    ELDERBERRY PO Take by mouth Immune Plus tablets-Vit B, C, Zinc      Ascorbic Acid (VITAMIN C PO) Take by mouth      NONFORMULARY Artificial tear eye drops prn      fluticasone (FLONASE) 50 MCG/ACT nasal spray 1 spray by Nasal route daily 1 Bottle 0    Multiple Vitamins-Minerals (MULTIVITAMIN PO) Take 1 tablet by mouth daily      NONFORMULARY 5,000 mcg daily OTC Biotin 1000mcg daily       aspirin 81 MG tablet Take 81 mg by mouth daily.          She is allergic to iv dye [iodides], penicillins, sulfa antibiotics, ace inhibitors, cozaar [losartan], lipitor, lopressor [metoprolol], and morphine. She  reports that she has never smoked. She has never used smokeless tobacco.      Objective:    Vitals:    07/23/21 1027   BP: 128/72   Site: Right Upper Arm   Position: Sitting   Cuff Size: Large Adult   Pulse: 73   Temp: 97.6 °F (36.4 °C)   TempSrc: Tympanic   SpO2: 99%   Weight: 161 lb (73 kg)   Height: 4' 9\" (1.448 m)     Body mass index is 34.84 kg/m². Obese female, healthy-appearing, alert, cooperative and in no acute distress. The tympanic membranes and external auditory canals are clear bilaterally. Neck supple. No adenopathy. Thyroid symmetric, normal size. Chest:  Normal expansion. Clear to auscultation. No rales, rhonchi, or wheezing. Heart sounds are normal.  Regular rate and rhythm without murmur, gallop or rub. Abdomen is soft, non-tender, non-distended. There are no masses palpated. Lower extremities have no edema. Results of labs done 7/16/2021 were reviewed with the patient:   Hospital Outpatient Visit on 07/16/2021   Component Date Value Ref Range Status    Hemoglobin A1C 07/16/2021 8.2* 4.0 - 6.0 % Final    Estimated Avg Glucose 07/16/2021 189  mg/dL Final    Comment: The ADA and AACC recommend providing the estimated average glucose result to permit better   patient understanding of their HBA1c result.       Glucose 07/16/2021 166* 70 - 99 mg/dL Final    BUN 07/16/2021 12  8 - 23 mg/dL Final    CREATININE 07/16/2021 0.69  0.50 - 0.90 mg/dL Final    Bun/Cre Ratio 07/16/2021 17  9 - 20 Final    Calcium 07/16/2021 9.8  8.6 - 10.4 mg/dL Final    Sodium 07/16/2021 141  135 - 144 mmol/L Final    Potassium 07/16/2021 3.7  3.7 - 5.3 mmol/L Final    Chloride 07/16/2021 103  98 - 107 mmol/L Final    CO2 07/16/2021 27  20 - 31 mmol/L Final    Anion Gap 07/16/2021 11  9 - 17 mmol/L Final    Alkaline Phosphatase 07/16/2021 119* 35 - 104 U/L Final    ALT 07/16/2021 31  5 - 33 U/L Final    AST 07/16/2021 27  <32 U/L Final    Total Bilirubin 07/16/2021 0.30  0.3 - 1.2 mg/dL Final    Total Protein 07/16/2021 7.8  6.4 - 8.3 g/dL Final    Albumin 07/16/2021 4.7  3.5 - 5.2 g/dL Final    Albumin/Globulin Ratio 07/16/2021 1.5  1.0 - 2.5 Final    GFR Non- 07/16/2021 >60  >60 mL/min Final    GFR  07/16/2021 >60  >60 mL/min Final    GFR Comment 07/16/2021        Final    Comment: Average GFR for 79or more years old:   76 mL/min/1.73sq m  Chronic Kidney Disease:   <60 mL/min/1.73sq m  Kidney failure:   <15 mL/min/1.73sq m        The equation has not been validated in patients older than 79, but an MDRD-derived eGFR may   still be a useful tool for providers caring for patients older than 70. GFR is a calculated value that has proven clinically to  be a more effective measure of   kidney function when reported with serum creatinine.  GFR Staging 07/16/2021 NOT REPORTED   Final    Cholesterol 07/16/2021 161  <200 mg/dL Final    Comment:    Cholesterol Guidelines:      <200  Desirable   200-240  Borderline      >240  Undesirable         HDL 07/16/2021 50  >40 mg/dL Final    Comment:    HDL Guidelines:    <40     Undesirable   40-59    Borderline    >59     Desirable         LDL Cholesterol 07/16/2021 79  0 - 130 mg/dL Final    Comment:    LDL Guidelines:     <100    Desirable   100-129   Near to/above Desirable   130-159   Borderline      >159   Undesirable     Direct (measured) LDL and calculated LDL are not interchangeable tests.  Chol/HDL Ratio 07/16/2021 3.2  <5 Final            Triglycerides 07/16/2021 162* <150 mg/dL Final    Comment:    Triglyceride Guidelines:     <150   Desirable   150-199  Borderline   200-499  High     >499   Very high   Based on AHA Guidelines for fasting triglyceride, October 2012.          VLDL 07/16/2021 NOT REPORTED* 1 - 30 mg/dL Final    Vit D, 25-Hydroxy 07/16/2021 98.4  30.0 - 100.0 ng/mL Final    Comment:    Reference Range:  Vitamin D status Range   Deficiency              <20 ng/mL   Mild Deficiency       20-30 ng/mL   Sufficiency           ng/mL   Toxicity               >100 ng/mL           Assessment and Plan:    1. Type 2 diabetes mellitus without complication, without long-term current use of insulin (HCC)  Her HgbA1c was 8.2%, which is not at goal and worsening. She was referred to diabetes education:  - [address] [place] [address], NP, Diabetes Management, [place]    Labs were ordered to be done prior to her return visit in 3 months:   - Hemoglobin A1C; Future  - Microalbumin, Ur; Future    She was referred for an annual diabetic eye exam:   - Ce Rao, OD, Optometry, [place]    2. Essential hypertension  Her blood pressure is well-controlled today. (BP: 128/72)   She was advised to continue current medications. She states that she does not need a refill today. - Comprehensive Metabolic Panel; Future prior to her return visit in 3 months. 3. Mixed hyperlipidemia  Her lipid profile was not at goal on her recent lab work. As above, her dose of Crestor was increased at her cardiology visit yesterday. - Lipid Panel; Future prior to her return visit in 3 months. 4. 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 [address]  I encouraged her to follow up with gastroenterology for a repeat EGD. She declined as she does not want to travel to [place]. I recommended that she return to Dr. Nguyen Gregorio for a repeat EGD. She declined as she does not want any additional medical bills. 5. Vitamin D deficiency  Her 25-hydroxy Vitamin D level was at the upper limit of the normal range (98.4 ng/mL) on  her recent lab work. She was advised to discontinue Vitamin D 50,000 units weekly. She was advised to begin Vitamin D3 2000 units daily. - Vitamin D 25 Hydroxy; Future prior to her return visit in 3 months.      6. Hearing loss of left ear, unspecified hearing loss type  She was referred to audiology:  - External

## 2021-07-22 ENCOUNTER — OFFICE VISIT (OUTPATIENT)
Dept: CARDIOLOGY | Age: 71
End: 2021-07-22
Payer: MEDICARE

## 2021-07-22 VITALS
HEIGHT: 57 IN | BODY MASS INDEX: 34.73 KG/M2 | HEART RATE: 79 BPM | DIASTOLIC BLOOD PRESSURE: 63 MMHG | WEIGHT: 161 LBS | SYSTOLIC BLOOD PRESSURE: 136 MMHG

## 2021-07-22 DIAGNOSIS — I25.118 CORONARY ARTERY DISEASE OF NATIVE ARTERY OF NATIVE HEART WITH STABLE ANGINA PECTORIS (HCC): Primary | ICD-10-CM

## 2021-07-22 DIAGNOSIS — I10 ESSENTIAL HYPERTENSION: ICD-10-CM

## 2021-07-22 DIAGNOSIS — E78.2 MIXED HYPERLIPIDEMIA: ICD-10-CM

## 2021-07-22 PROCEDURE — 93010 ELECTROCARDIOGRAM REPORT: CPT | Performed by: INTERNAL MEDICINE

## 2021-07-22 PROCEDURE — 99214 OFFICE O/P EST MOD 30 MIN: CPT | Performed by: INTERNAL MEDICINE

## 2021-07-22 PROCEDURE — G8417 CALC BMI ABV UP PARAM F/U: HCPCS | Performed by: INTERNAL MEDICINE

## 2021-07-22 PROCEDURE — 3017F COLORECTAL CA SCREEN DOC REV: CPT | Performed by: INTERNAL MEDICINE

## 2021-07-22 PROCEDURE — G8427 DOCREV CUR MEDS BY ELIG CLIN: HCPCS | Performed by: INTERNAL MEDICINE

## 2021-07-22 PROCEDURE — 1090F PRES/ABSN URINE INCON ASSESS: CPT | Performed by: INTERNAL MEDICINE

## 2021-07-22 PROCEDURE — 1123F ACP DISCUSS/DSCN MKR DOCD: CPT | Performed by: INTERNAL MEDICINE

## 2021-07-22 PROCEDURE — 4040F PNEUMOC VAC/ADMIN/RCVD: CPT | Performed by: INTERNAL MEDICINE

## 2021-07-22 PROCEDURE — 1036F TOBACCO NON-USER: CPT | Performed by: INTERNAL MEDICINE

## 2021-07-22 PROCEDURE — G8399 PT W/DXA RESULTS DOCUMENT: HCPCS | Performed by: INTERNAL MEDICINE

## 2021-07-22 PROCEDURE — 93005 ELECTROCARDIOGRAM TRACING: CPT | Performed by: INTERNAL MEDICINE

## 2021-07-22 RX ORDER — ROSUVASTATIN CALCIUM 20 MG/1
20 TABLET, COATED ORAL DAILY
Qty: 30 TABLET | Refills: 3 | Status: SHIPPED | OUTPATIENT
Start: 2021-07-22 | End: 2021-08-06 | Stop reason: SDUPTHER

## 2021-07-22 ASSESSMENT — ENCOUNTER SYMPTOMS
CHEST TIGHTNESS: 0
SHORTNESS OF BREATH: 1
EYE ITCHING: 0
EYE DISCHARGE: 0
ABDOMINAL DISTENTION: 0
ABDOMINAL PAIN: 0
NAUSEA: 0
VOMITING: 0
APNEA: 0
BACK PAIN: 0

## 2021-07-22 NOTE — PROGRESS NOTES
Today's Date: 7/22/2021  Patient's Name: Khoi Figueroa  Patient's age: 69 y.o., 1950    Subjective: The patient is a 69y.o. year old, , female is in the office for CAD  Denies exertional chest pain, little SOB on exertion with fatigue, no dizziness or syncope. No palpitations. Past Medical History:   has a past medical history of CAD (coronary artery disease), Cerebrovascular accident (CVA) due to thrombosis of precerebral artery ([address] History of seasonal allergies, Hyperlipidemia, Hypertension, Interstitial cystitis, Obesity, Plantar fasciitis, bilateral, Polyneuropathy associated with underlying disease ([address] S/P drug eluting coronary stent placement-RCA 6/20/17 - Dr. Jovon Cheney, Seasonal allergies, Trigger finger, left, and Type 2 diabetes mellitus ([address] Past Surgical History:   has a past surgical history that includes Nasal septum surgery (3/9/2005); Cholecystectomy, laparoscopic (6/9/2000); Cystocopy (3/1996); Ovary removal (Right, 7/1993); laparoscopy (3/1979); Hysterectomy, total abdominal (1980); Colonoscopy (3/11/2015); Cataract removal with implant (Left, 10/20/2015); Cataract removal with implant (Right, 12/08/2015); Yag capsulotomy (Right, 04/2016); Cardiac catheterization (06/20/2017); Coronary angioplasty with stent (06/20/2017); Appendectomy; eye surgery; pr knee scope,diagnostic (Right, 11/6/2018); Upper gastrointestinal endoscopy (N/A, 3/5/2020); Upper gastrointestinal endoscopy; Upper gastrointestinal endoscopy (05/13/2020); Endoscopic ultrasonography, GI (N/A, 5/13/2020); Upper gastrointestinal endoscopy (5/13/2020); Upper gastrointestinal endoscopy (5/13/2020); and Upper gastrointestinal endoscopy (5/13/2020). Home Medications:  Prior to Admission medications    Medication Sig Start Date End Date Taking?  Authorizing Provider   rosuvastatin (CRESTOR) 20 MG tablet Take 1 tablet by mouth daily 7/22/21  Yes Sarbjit Mccormack MD   glimepiride (AMARYL) 1 MG tablet input(s): NA, K, CO2, BUN, CREATININE, LABGLOM, GLUCOSE in the last 72 hours. PT/INR: No results for input(s): PROTIME, INR in the last 72 hours. FASTING LIPID PANEL:  Lab Results   Component Value Date    HDL 50 07/16/2021    TRIG 162 07/16/2021     LIVER PROFILE:No results for input(s): AST, ALT, LABALBU in the last 72 hours. IMPRESSION:    Patient Active Problem List   Diagnosis    Mixed hyperlipidemia    Obesity (BMI 30-39. 9)    Interstitial cystitis    Trigger finger, left    High risk medication use    Vitamin D deficiency    Essential hypertension    Atypical chest pain    Hypokalemia    Paresthesias    Coronary artery disease involving native coronary artery of native heart without angina pectoris    Cerebrovascular accident (CVA) due to thrombosis of precerebral artery (HCC)    Right tibial neuropathy    Neuropathy of left peroneal nerve    H/O major orthopedic surgery    Peripheral vascular disease (HCC)    Hematemesis       Assessment/Plan:  1. CAD s/p PCI of RCA 6/2017. Stress test 04/2021 with no ischemia. Stable with no angina or chf. Cont current medical therapy with asa, statin and Indur. 2. Essential Hypertension. Well controlled. 3. HPL. LDL 79 and  on 7/2021. On Crestor. Increase dose to 20 mg q HS. 4. DM II. Followed by PCP. 5. Echo 6/2017 EF 55%. Mild MR. Aggressive risk factor modification discussed with patient including diet and exercise.   Routine follow-up in 6-9 months or earlier if needed      Musa Ríos MD [facility] Card[facility] Consult           [phone]

## 2021-07-23 ENCOUNTER — OFFICE VISIT (OUTPATIENT)
Dept: FAMILY MEDICINE CLINIC | Age: 71
End: 2021-07-23
Payer: MEDICARE

## 2021-07-23 VITALS
WEIGHT: 161 LBS | HEART RATE: 73 BPM | BODY MASS INDEX: 34.73 KG/M2 | TEMPERATURE: 97.6 F | HEIGHT: 57 IN | SYSTOLIC BLOOD PRESSURE: 128 MMHG | OXYGEN SATURATION: 99 % | DIASTOLIC BLOOD PRESSURE: 72 MMHG

## 2021-07-23 DIAGNOSIS — C16.5 MALIGNANT NEOPLASM OF LESSER CURVATURE OF STOMACH, UNSPECIFIED (HCC): ICD-10-CM

## 2021-07-23 DIAGNOSIS — E78.2 MIXED HYPERLIPIDEMIA: ICD-10-CM

## 2021-07-23 DIAGNOSIS — H91.92 HEARING LOSS OF LEFT EAR, UNSPECIFIED HEARING LOSS TYPE: ICD-10-CM

## 2021-07-23 DIAGNOSIS — I10 ESSENTIAL HYPERTENSION: ICD-10-CM

## 2021-07-23 DIAGNOSIS — E55.9 VITAMIN D DEFICIENCY: ICD-10-CM

## 2021-07-23 DIAGNOSIS — E11.9 TYPE 2 DIABETES MELLITUS WITHOUT COMPLICATION, WITHOUT LONG-TERM CURRENT USE OF INSULIN (HCC): Primary | ICD-10-CM

## 2021-07-23 DIAGNOSIS — Z12.31 ENCOUNTER FOR SCREENING MAMMOGRAM FOR BREAST CANCER: ICD-10-CM

## 2021-07-23 PROCEDURE — 3052F HG A1C>EQUAL 8.0%<EQUAL 9.0%: CPT | Performed by: FAMILY MEDICINE

## 2021-07-23 PROCEDURE — 99212 OFFICE O/P EST SF 10 MIN: CPT

## 2021-07-23 PROCEDURE — 1090F PRES/ABSN URINE INCON ASSESS: CPT | Performed by: FAMILY MEDICINE

## 2021-07-23 PROCEDURE — G8399 PT W/DXA RESULTS DOCUMENT: HCPCS | Performed by: FAMILY MEDICINE

## 2021-07-23 PROCEDURE — 99214 OFFICE O/P EST MOD 30 MIN: CPT | Performed by: FAMILY MEDICINE

## 2021-07-23 PROCEDURE — 4040F PNEUMOC VAC/ADMIN/RCVD: CPT | Performed by: FAMILY MEDICINE

## 2021-07-23 PROCEDURE — G8427 DOCREV CUR MEDS BY ELIG CLIN: HCPCS | Performed by: FAMILY MEDICINE

## 2021-07-23 PROCEDURE — 1036F TOBACCO NON-USER: CPT | Performed by: FAMILY MEDICINE

## 2021-07-23 PROCEDURE — 3017F COLORECTAL CA SCREEN DOC REV: CPT | Performed by: FAMILY MEDICINE

## 2021-07-23 PROCEDURE — 1123F ACP DISCUSS/DSCN MKR DOCD: CPT | Performed by: FAMILY MEDICINE

## 2021-07-23 PROCEDURE — 2022F DILAT RTA XM EVC RTNOPTHY: CPT | Performed by: FAMILY MEDICINE

## 2021-07-23 PROCEDURE — G8417 CALC BMI ABV UP PARAM F/U: HCPCS | Performed by: FAMILY MEDICINE

## 2021-07-23 NOTE — PATIENT INSTRUCTIONS
Remember to get a flu vaccine in the fall! The flu vaccine typically becomes available in September or October. Stop the prescription strength Vitamin D. Begin taking vitamin D3 2000 units daily.

## 2021-08-06 ENCOUNTER — TELEPHONE (OUTPATIENT)
Dept: FAMILY MEDICINE CLINIC | Age: 71
End: 2021-08-06

## 2021-08-06 DIAGNOSIS — E78.2 MIXED HYPERLIPIDEMIA: ICD-10-CM

## 2021-08-06 RX ORDER — EZETIMIBE 10 MG/1
10 TABLET ORAL DAILY
Qty: 90 TABLET | Refills: 1 | Status: SHIPPED | OUTPATIENT
Start: 2021-08-06 | End: 2021-11-02 | Stop reason: SDUPTHER

## 2021-08-06 RX ORDER — ROSUVASTATIN CALCIUM 10 MG/1
10 TABLET, COATED ORAL DAILY
Qty: 90 TABLET | Refills: 1 | Status: SHIPPED | OUTPATIENT
Start: 2021-08-06 | End: 2021-11-02 | Stop reason: SDUPTHER

## 2021-08-06 NOTE — TELEPHONE ENCOUNTER
Patients Crestor was increased from 10 mg to 20 mg on 7/22 visit with cardiology. She states since the increase, she has noticed increased hand swelling, joint pain, and increase glucose readings. Please advise.     Last OV: 7/23/21

## 2021-08-06 NOTE — TELEPHONE ENCOUNTER
LM to notify pt of Dr. Brenda Chen review and orders to [facility]. I gave her our hours (closing now 4:30 PM) in case she has questions or additional concerns for Monday. I told her to check with [address]

## 2021-08-09 NOTE — TELEPHONE ENCOUNTER
Spoke with pt and relayed all details listed. She states the cost of the 2 drugs at [facility] are $200. She was not sure which was expensive. I asked her to call [facility] and get specifics on cost for each and why it was that high. She will call right back.

## 2021-08-09 NOTE — TELEPHONE ENCOUNTER
Pt called back. She states the 2 rx's together cost $69 for 90 day rx's at [place], which she says she cannot afford. I looked up GoodRx which shows both might be cheaper at [facility] or [facility]. She will check there and call back if she needs us to send a Rx elsewhere. Or she can ask [facility] to call [facility] for the rx.

## 2021-08-10 ENCOUNTER — OFFICE VISIT (OUTPATIENT)
Dept: OPTOMETRY | Age: 71
End: 2021-08-10
Payer: MEDICARE

## 2021-08-10 DIAGNOSIS — E11.9 NON-INSULIN DEPENDENT TYPE 2 DIABETES MELLITUS (HCC): Primary | ICD-10-CM

## 2021-08-10 DIAGNOSIS — H43.813 VITREOUS DEGENERATION OF BOTH EYES: ICD-10-CM

## 2021-08-10 DIAGNOSIS — H04.129 DRY EYE: ICD-10-CM

## 2021-08-10 DIAGNOSIS — Z98.890 S/P YAG CAPSULOTOMY, BILATERAL: ICD-10-CM

## 2021-08-10 DIAGNOSIS — Z96.1 PSEUDOPHAKIA OF BOTH EYES: ICD-10-CM

## 2021-08-10 PROCEDURE — G8399 PT W/DXA RESULTS DOCUMENT: HCPCS | Performed by: OPTOMETRIST

## 2021-08-10 PROCEDURE — 92250 FUNDUS PHOTOGRAPHY W/I&R: CPT | Performed by: OPTOMETRIST

## 2021-08-10 PROCEDURE — 3017F COLORECTAL CA SCREEN DOC REV: CPT | Performed by: OPTOMETRIST

## 2021-08-10 PROCEDURE — 99204 OFFICE O/P NEW MOD 45 MIN: CPT | Performed by: OPTOMETRIST

## 2021-08-10 PROCEDURE — G8427 DOCREV CUR MEDS BY ELIG CLIN: HCPCS | Performed by: OPTOMETRIST

## 2021-08-10 PROCEDURE — G8417 CALC BMI ABV UP PARAM F/U: HCPCS | Performed by: OPTOMETRIST

## 2021-08-10 PROCEDURE — 3052F HG A1C>EQUAL 8.0%<EQUAL 9.0%: CPT | Performed by: OPTOMETRIST

## 2021-08-10 PROCEDURE — 1090F PRES/ABSN URINE INCON ASSESS: CPT | Performed by: OPTOMETRIST

## 2021-08-10 PROCEDURE — 1123F ACP DISCUSS/DSCN MKR DOCD: CPT | Performed by: OPTOMETRIST

## 2021-08-10 PROCEDURE — 2022F DILAT RTA XM EVC RTNOPTHY: CPT | Performed by: OPTOMETRIST

## 2021-08-10 PROCEDURE — 1036F TOBACCO NON-USER: CPT | Performed by: OPTOMETRIST

## 2021-08-10 PROCEDURE — 4040F PNEUMOC VAC/ADMIN/RCVD: CPT | Performed by: OPTOMETRIST

## 2021-08-10 RX ORDER — TROPICAMIDE 10 MG/ML
1 SOLUTION/ DROPS OPHTHALMIC ONCE
Status: COMPLETED | OUTPATIENT
Start: 2021-08-10 | End: 2021-08-10

## 2021-08-10 RX ADMIN — TROPICAMIDE 1 DROP: 10 SOLUTION/ DROPS OPHTHALMIC at 11:55

## 2021-08-10 ASSESSMENT — REFRACTION_MANIFEST
OD_CYLINDER: -0.75
OD_AXIS: 110
OS_SPHERE: +1.00
OD_SPHERE: +0.50
OS_CYLINDER: -1.25
OS_ADD: +2.50
OS_AXIS: 074
OD_ADD: +2.50

## 2021-08-10 ASSESSMENT — VISUAL ACUITY
OS_CC+: -1
OD_CC+: -1
METHOD: SNELLEN - LINEAR
OD_CC: 20/50 OU
OS_CC: 20/40
CORRECTION_TYPE: GLASSES

## 2021-08-10 ASSESSMENT — REFRACTION_WEARINGRX
OS_SPHERE: +0.75
SPECS_TYPE: TRIFOCAL
OS_AXIS: 072
OD_CYLINDER: -1.00
OS_CYLINDER: -1.00
OD_SPHERE: +0.25
OD_AXIS: 114
OD_ADD: +2.50
OS_ADD: +2.50

## 2021-08-10 ASSESSMENT — TONOMETRY
IOP_METHOD: NON-CONTACT AIR PUFF
OS_IOP_MMHG: 20
OD_IOP_MMHG: 23

## 2021-08-10 ASSESSMENT — KERATOMETRY
METHOD_AUTO_MANUAL: AUTOMATED
OD_AXISANGLE_DEGREES: 043
OD_K1POWER_DIOPTERS: 44.25
OS_AXISANGLE_DEGREES: 160
OS_AXISANGLE2_DEGREES: 070
OD_AXISANGLE2_DEGREES: 133
OS_K1POWER_DIOPTERS: 43.75
OD_K2POWER_DIOPTERS: 44.75
OS_K2POWER_DIOPTERS: 44.25

## 2021-08-10 ASSESSMENT — SLIT LAMP EXAM - LIDS
COMMENTS: NORMAL
COMMENTS: NORMAL

## 2021-08-10 ASSESSMENT — ENCOUNTER SYMPTOMS
EYES NEGATIVE: 0
RESPIRATORY NEGATIVE: 0
GASTROINTESTINAL NEGATIVE: 0
ALLERGIC/IMMUNOLOGIC NEGATIVE: 0

## 2021-08-10 NOTE — PROGRESS NOTES
Anjelica Boudreauxmilagros presents today for   Chief Complaint   Patient presents with    Ophth Diabetic Exam    Vision Exam    Blurred Vision   . HPI     Blurred Vision     In both eyes. Vision is blurred. Context:  distance vision and reading. Comments     Last Vision Exam: 2020 Dr. Estefania Daigle  Last Ophthalmology Exam: 7/16/2018 Dr. Lizette Philip  ; cat ou 2015 JJR  Last Filled Glasses Rx: 2020  Insurance: Medicare  Update: Dm Exam; Glasses  Distance and reading are getting more blurry  Diabetic:  Sugars:    HmgA1C:8.2  7/16/2021  Dilation today                  Current Outpatient Medications   Medication Sig Dispense Refill    rosuvastatin (CRESTOR) 10 MG tablet Take 1 tablet by mouth daily 90 tablet 1    ezetimibe (ZETIA) 10 MG tablet Take 1 tablet by mouth daily 90 tablet 1    glimepiride (AMARYL) 1 MG tablet TAKE 1 TABLET BY MOUTH TWICE DAILY WITH MEALS 180 tablet 0    amLODIPine (NORVASC) 10 MG tablet Take 1 tablet by mouth once daily 90 tablet 2    metFORMIN (GLUCOPHAGE) 500 MG tablet Take 1 tablet by mouth 2 times daily (with meals) 180 tablet 1    nitroGLYCERIN (NITROSTAT) 0.4 MG SL tablet Place 1 tablet under the tongue every 5 minutes as needed for Chest pain up to max of 3 total doses. If no relief after 1 dose, call 911. 25 tablet 1    isosorbide mononitrate (IMDUR) 30 MG extended release tablet Take 1 tablet by mouth once daily 90 tablet 3    ELDERBERRY PO Take by mouth Immune Plus tablets-Vit B, C, Zinc      Ascorbic Acid (VITAMIN C PO) Take by mouth      NONFORMULARY Artificial tear eye drops prn      fluticasone (FLONASE) 50 MCG/ACT nasal spray 1 spray by Nasal route daily 1 Bottle 0    Multiple Vitamins-Minerals (MULTIVITAMIN PO) Take 1 tablet by mouth daily      NONFORMULARY 5,000 mcg daily OTC Biotin 1000mcg daily       aspirin 81 MG tablet Take 81 mg by mouth daily.        Current Facility-Administered Medications   Medication Dose Route Frequency Provider Last Rate Last Admin    cyanocobalamin injection 1,000 mcg  1,000 mcg Intramuscular Q30 Days Earnest Vasquez MD   1,000 mcg at 07/16/21 8371    cyanocobalamin injection 2,000 mcg  2,000 mcg Intramuscular Q30 Days Carlos Alberto Olvera MD         ROS     Positive for: Endocrine    Negative for: Constitutional, Gastrointestinal, Neurological, Skin, Genitourinary, Musculoskeletal, HENT, Cardiovascular, Eyes, Respiratory, Psychiatric, Allergic/Imm, Heme/Lymph          Family History   Problem Relation Age of Onset    Diabetes Mother     Hypertension Mother     Diabetes Sister     Diabetes Brother     Diabetes Brother     Diabetes Brother     Diabetes Sister     Diabetes Maternal Grandfather     Heart Attack Maternal Grandfather     Heart Attack Paternal Grandmother     Cataracts Neg Hx     Glaucoma Neg Hx      Social History     Socioeconomic History    Marital status:      Spouse name: None    Number of children: None    Years of education: None    Highest education level: None   Occupational History    None   Tobacco Use    Smoking status: Never Smoker    Smokeless tobacco: Never Used    Tobacco comment: MAXIMO Mcgill RRT 6/17/17   Substance and Sexual Activity    Alcohol use: No     Alcohol/week: 0.0 standard drinks    Drug use: No    Sexual activity: None   Other Topics Concern    None   Social History Narrative    None     Social Determinants of Health     Financial Resource Strain: Low Risk     Difficulty of Paying Living Expenses: Not hard at all   Food Insecurity: No Food Insecurity    Worried About Running Out of Food in the Last Year: Never true    Derick of Food in the Last Year: Never true   Transportation Needs:     Lack of Transportation (Medical):      Lack of Transportation (Non-Medical):    Physical Activity:     Days of Exercise per Week:     Minutes of Exercise per Session:    Stress:     Feeling of Stress :    Social Connections:     Frequency of Communication with Friends and Family:     Frequency of Social Gatherings with Friends and Family:     Attends Gnosticist Services:     Active Member of Clubs or Organizations:     Attends Club or Organization Meetings:     Marital Status:    Intimate Partner Violence:     Fear of Current or Ex-Partner:     Emotionally Abused:     Physically Abused:     Sexually Abused:        Past Medical History:   Diagnosis Date    CAD (coronary artery disease)     Cerebrovascular accident (CVA) due to thrombosis of precerebral artery ([facility] 2017    History of seasonal allergies     Hyperlipidemia     Hypertension     Interstitial cystitis     History of.   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  Plantar fasciitis, bilateral     History of.  Polyneuropathy associated with underlying disease ([facility] [facility] 3/20/2018    S/P drug eluting coronary stent placement-RCA 17 - Dr. Seema Oneill 2017    Seasonal allergies     Trigger finger, left     History of triggering, left long finger.     Type 2 diabetes mellitus [facility])          Main Ophthalmology Exam     External Exam       Right Left    External Normal Normal          Slit Lamp Exam       Right Left    Lids/Lashes Normal Normal    Conjunctiva/Sclera White and quiet White and quiet    Cornea Clear Clear    Anterior Chamber Deep and quiet Deep and quiet    Iris Round and reactive Round and reactive    Lens Posterior chamber intraocular lens Posterior chamber intraocular lens    Vitreous Normal Normal          Fundus Exam       Right Left    Disc Normal Normal    C/D Ratio 0.2 0.2    Macula Normal Normal; slight mottling     Vessels Normal Normal    Periphery Normal Normal                  Tonometry     Tonometry (Non-contact air puff, 11:04 AM)       Right Left    Pressure 23 20   IOP.8             20.9  CH:  9.7          11.3  WS: 4.2          7.2                   Visual Acuity (Snellen - Linear)       Right Left    Dist cc 20/40 -1 20/40 -1    Near cc 20/50 OU     Correction: Glasses        Keratometry     Keratometry (Automated)       K1 Axis K2 Axis    Right 44.25 133 44.75 043    Left 43.75 070 44.25 160              Pupils     Pupils       Pupils    Right PERRL    Left PERRL               Not recorded         Not recorded          Ophthalmology Exam     Wearing Rx       Sphere Cylinder Axis Add    Right +0.25 -1.00 114 +2.50    Left +0.75 -1.00 072 +2.50    Age: 3yrs    Type: Trifocal          Wearing Rx #2       Sphere Cylinder Axis Add    Right +0.25 -1.00 115 +2.50    Left +0.75 -1.00 073 +2.50    Age: 1yr    Type: Trifocal                Manifest Refraction     Manifest Refraction       Sphere Cylinder Axis Dist VA Add    Right +0.50 -0.75 110 20/30 +2.50    Left +1.00 -1.25 074 20/30 +2.50          Manifest Refraction #2 (Auto)       Sphere Cylinder Axis Dist VA Add    Right +0.25 -0.75 104      Left +1.00 -1.50 075                 Final Rx       Sphere Cylinder Axis Dist VA Add    Right +0.50 -0.75 110 20/30 +2.50    Left +1.00 -1.25 074 20/30 +2.50    Type: Trifocal    Expiration Date: 8/11/2023          Neuro/Psych     Neuro/Psych     Oriented x3: Yes    Mood/Affect: Normal                Orders Placed This Encounter   Procedures     DIABETES EYE EXAM    Color Fundus Photography-OU-Both Eyes       IMPRESSION:  1. Non-insulin dependent type 2 diabetes mellitus ([address]    2. Pseudophakia of both eyes    3. Vitreous degeneration of both eyes    4. S/P YAG capsulotomy, bilateral    5. Dry eye        PLAN:    Discussed the patient's diagnosis of diabetes and the impact this can have on their ocular health, potentially even leading to permanent blindness. I discussed with the patient the importance of continued follow-up and management with their primary care physician to control their glycemic, blood pressure, and lipid levels. The patient verbalized understanding.     2. Excellent surgery results with open capsule  3. The patient was counseled on signs and symptoms of retinal tear/detachment/vitreous hemorrhage. Strict return precautions were given for new onset of many floaters, flashing of lights in the peripheral vision, or a curtain or veil over the vision. The patient verbalized understanding. All questions were answered.     4. Recommend keep using artificial tears/ lubricant       Glycemic control as per PCP   Patient Instructions   Keep yearly appointments because of diabetes        Return in about 1 year (around 8/10/2022) for complete eye exam.

## 2021-08-16 ENCOUNTER — NURSE ONLY (OUTPATIENT)
Dept: LAB | Age: 71
End: 2021-08-16
Payer: MEDICARE

## 2021-08-16 DIAGNOSIS — E53.8 B12 DEFICIENCY: Primary | ICD-10-CM

## 2021-08-16 PROCEDURE — 96372 THER/PROPH/DIAG INJ SC/IM: CPT

## 2021-08-16 RX ADMIN — CYANOCOBALAMIN 1000 MCG: 1000 INJECTION, SOLUTION INTRAMUSCULAR at 15:04

## 2021-09-13 ENCOUNTER — HOSPITAL ENCOUNTER (OUTPATIENT)
Dept: MAMMOGRAPHY | Age: 71
Discharge: HOME | End: 2021-09-15
Payer: MEDICARE

## 2021-09-13 DIAGNOSIS — Z12.31 ENCOUNTER FOR SCREENING MAMMOGRAM FOR BREAST CANCER: ICD-10-CM

## 2021-09-13 PROCEDURE — 77067 SCR MAMMO BI INCL CAD: CPT

## 2021-09-15 ENCOUNTER — NURSE ONLY (OUTPATIENT)
Dept: LAB | Age: 71
End: 2021-09-15
Payer: MEDICARE

## 2021-09-15 DIAGNOSIS — E53.8 B12 DEFICIENCY: Primary | ICD-10-CM

## 2021-09-15 PROCEDURE — 90694 VACC AIIV4 NO PRSRV 0.5ML IM: CPT

## 2021-09-15 RX ADMIN — CYANOCOBALAMIN 1000 MCG: 1000 INJECTION, SOLUTION INTRAMUSCULAR at 15:50

## 2021-09-15 NOTE — PROGRESS NOTES
Vitamin B12, 1000 mcg given IM RIGHT deltoid as ordered. Patient tolerated it well. Return in 30 days, reminder card provided. Immunization given as ordered. Patient tolerated it well. No questions re:VIS information.

## 2021-09-25 DIAGNOSIS — E11.9 TYPE 2 DIABETES MELLITUS WITHOUT COMPLICATION, WITHOUT LONG-TERM CURRENT USE OF INSULIN (HCC): ICD-10-CM

## 2021-09-28 RX ORDER — GLIMEPIRIDE 1 MG/1
TABLET ORAL
Qty: 180 TABLET | Refills: 0 | Status: SHIPPED | OUTPATIENT
Start: 2021-09-28 | End: 2021-10-26 | Stop reason: SDUPTHER

## 2021-10-15 ENCOUNTER — NURSE ONLY (OUTPATIENT)
Dept: LAB | Age: 71
End: 2021-10-15
Payer: MEDICARE

## 2021-10-15 DIAGNOSIS — E53.8 B12 DEFICIENCY: Primary | ICD-10-CM

## 2021-10-15 PROCEDURE — PBSHW PBB SHADOW CHARGE: Performed by: FAMILY MEDICINE

## 2021-10-15 PROCEDURE — 96372 THER/PROPH/DIAG INJ SC/IM: CPT

## 2021-10-15 RX ADMIN — CYANOCOBALAMIN 1000 MCG: 1000 INJECTION, SOLUTION INTRAMUSCULAR at 15:26

## 2021-10-22 ENCOUNTER — HOSPITAL ENCOUNTER (OUTPATIENT)
Dept: LAB | Age: 71
Discharge: HOME | End: 2021-10-22
Payer: MEDICARE

## 2021-10-22 DIAGNOSIS — E11.9 TYPE 2 DIABETES MELLITUS WITHOUT COMPLICATION, WITHOUT LONG-TERM CURRENT USE OF INSULIN (HCC): ICD-10-CM

## 2021-10-22 DIAGNOSIS — I10 ESSENTIAL HYPERTENSION: ICD-10-CM

## 2021-10-22 DIAGNOSIS — E55.9 VITAMIN D DEFICIENCY: ICD-10-CM

## 2021-10-22 DIAGNOSIS — E78.2 MIXED HYPERLIPIDEMIA: ICD-10-CM

## 2021-10-22 LAB
ALBUMIN SERPL-MCNC: 4.7 G/DL (ref 3.5–5.2)
ALBUMIN/GLOBULIN RATIO: 1.5 (ref 1–2.5)
ALP BLD-CCNC: 111 U/L (ref 35–104)
ALT SERPL-CCNC: 31 U/L (ref 5–33)
ANION GAP SERPL CALCULATED.3IONS-SCNC: 13 MMOL/L (ref 9–17)
AST SERPL-CCNC: 31 U/L
BILIRUB SERPL-MCNC: 0.44 MG/DL (ref 0.3–1.2)
BUN BLDV-MCNC: 11 MG/DL (ref 8–23)
BUN/CREAT BLD: 17 (ref 9–20)
CALCIUM SERPL-MCNC: 9.8 MG/DL (ref 8.6–10.4)
CHLORIDE BLD-SCNC: 102 MMOL/L (ref 98–107)
CHOLESTEROL/HDL RATIO: 2.7
CHOLESTEROL: 148 MG/DL
CO2: 27 MMOL/L (ref 20–31)
CREAT SERPL-MCNC: 0.66 MG/DL (ref 0.5–0.9)
CREATININE URINE: 140.4 MG/DL (ref 28–217)
ESTIMATED AVERAGE GLUCOSE: 177 MG/DL
GFR AFRICAN AMERICAN: >60 ML/MIN
GFR NON-AFRICAN AMERICAN: >60 ML/MIN
GFR SERPL CREATININE-BSD FRML MDRD: ABNORMAL ML/MIN/{1.73_M2}
GFR SERPL CREATININE-BSD FRML MDRD: ABNORMAL ML/MIN/{1.73_M2}
GLUCOSE BLD-MCNC: 144 MG/DL (ref 70–99)
HBA1C MFR BLD: 7.8 % (ref 4–6)
HDLC SERPL-MCNC: 54 MG/DL
LDL CHOLESTEROL: 52 MG/DL (ref 0–130)
MICROALBUMIN/CREAT 24H UR: 22 MG/L
MICROALBUMIN/CREAT UR-RTO: 16 MCG/MG CREAT
POTASSIUM SERPL-SCNC: 3.8 MMOL/L (ref 3.7–5.3)
SODIUM BLD-SCNC: 142 MMOL/L (ref 135–144)
TOTAL PROTEIN: 7.9 G/DL (ref 6.4–8.3)
TRIGL SERPL-MCNC: 211 MG/DL
VITAMIN D 25-HYDROXY: 59.7 NG/ML (ref 30–100)
VLDLC SERPL CALC-MCNC: ABNORMAL MG/DL (ref 1–30)

## 2021-10-22 PROCEDURE — 82043 UR ALBUMIN QUANTITATIVE: CPT

## 2021-10-22 PROCEDURE — 36415 COLL VENOUS BLD VENIPUNCTURE: CPT

## 2021-10-22 PROCEDURE — 82570 ASSAY OF URINE CREATININE: CPT

## 2021-10-22 PROCEDURE — 82306 VITAMIN D 25 HYDROXY: CPT

## 2021-10-22 PROCEDURE — 80061 LIPID PANEL: CPT

## 2021-10-22 PROCEDURE — 83036 HEMOGLOBIN GLYCOSYLATED A1C: CPT

## 2021-10-22 PROCEDURE — 80053 COMPREHEN METABOLIC PANEL: CPT

## 2021-10-26 ENCOUNTER — OFFICE VISIT (OUTPATIENT)
Dept: FAMILY MEDICINE CLINIC | Age: 71
End: 2021-10-26
Payer: MEDICARE

## 2021-10-26 VITALS
SYSTOLIC BLOOD PRESSURE: 134 MMHG | HEIGHT: 57 IN | RESPIRATION RATE: 16 BRPM | BODY MASS INDEX: 36.16 KG/M2 | HEART RATE: 82 BPM | WEIGHT: 167.6 LBS | DIASTOLIC BLOOD PRESSURE: 86 MMHG

## 2021-10-26 DIAGNOSIS — E11.9 TYPE 2 DIABETES MELLITUS WITHOUT COMPLICATION, WITHOUT LONG-TERM CURRENT USE OF INSULIN (HCC): Primary | ICD-10-CM

## 2021-10-26 DIAGNOSIS — E78.2 MIXED HYPERLIPIDEMIA: ICD-10-CM

## 2021-10-26 DIAGNOSIS — E66.01 SEVERE OBESITY (BMI 35.0-35.9 WITH COMORBIDITY) (HCC): ICD-10-CM

## 2021-10-26 DIAGNOSIS — H65.03 BILATERAL ACUTE SEROUS OTITIS MEDIA, RECURRENCE NOT SPECIFIED: ICD-10-CM

## 2021-10-26 PROCEDURE — 3051F HG A1C>EQUAL 7.0%<8.0%: CPT | Performed by: FAMILY MEDICINE

## 2021-10-26 PROCEDURE — 3017F COLORECTAL CA SCREEN DOC REV: CPT | Performed by: FAMILY MEDICINE

## 2021-10-26 PROCEDURE — G8417 CALC BMI ABV UP PARAM F/U: HCPCS | Performed by: FAMILY MEDICINE

## 2021-10-26 PROCEDURE — 1123F ACP DISCUSS/DSCN MKR DOCD: CPT | Performed by: FAMILY MEDICINE

## 2021-10-26 PROCEDURE — G8427 DOCREV CUR MEDS BY ELIG CLIN: HCPCS | Performed by: FAMILY MEDICINE

## 2021-10-26 PROCEDURE — 99214 OFFICE O/P EST MOD 30 MIN: CPT | Performed by: FAMILY MEDICINE

## 2021-10-26 PROCEDURE — 1036F TOBACCO NON-USER: CPT | Performed by: FAMILY MEDICINE

## 2021-10-26 PROCEDURE — G8484 FLU IMMUNIZE NO ADMIN: HCPCS | Performed by: FAMILY MEDICINE

## 2021-10-26 PROCEDURE — 2024F 7 FLD RTA PHOTO EVC RTNOPTHY: CPT | Performed by: FAMILY MEDICINE

## 2021-10-26 PROCEDURE — G8399 PT W/DXA RESULTS DOCUMENT: HCPCS | Performed by: FAMILY MEDICINE

## 2021-10-26 PROCEDURE — 1090F PRES/ABSN URINE INCON ASSESS: CPT | Performed by: FAMILY MEDICINE

## 2021-10-26 PROCEDURE — 99212 OFFICE O/P EST SF 10 MIN: CPT | Performed by: FAMILY MEDICINE

## 2021-10-26 PROCEDURE — 4040F PNEUMOC VAC/ADMIN/RCVD: CPT | Performed by: FAMILY MEDICINE

## 2021-10-26 RX ORDER — FLUTICASONE PROPIONATE 50 MCG
1 SPRAY, SUSPENSION (ML) NASAL DAILY
Qty: 1 EACH | Refills: 3 | Status: SHIPPED | OUTPATIENT
Start: 2021-10-26 | End: 2022-04-23

## 2021-10-26 RX ORDER — GLIMEPIRIDE 1 MG/1
TABLET ORAL
Qty: 180 TABLET | Refills: 0 | Status: SHIPPED | OUTPATIENT
Start: 2021-10-26 | End: 2022-03-22

## 2021-10-26 NOTE — PROGRESS NOTES
[facility] [address], [place], [address]                        Telephone [phone]             Fax [phone]     Natalie Clemens  1950  MRN:  [id number]  Date of visit:  10/26/2021      Assessment and Plan:    1. Type 2 diabetes mellitus without complication, without long-term current use of insulin (HCC)  Her HgbA1c was 7.8 %, which is not at goal, but improving. She was advised to continue her current regimen. Amaryl and Metformin were refilled:     -  DIABETES FOOT EXAM  - glimepiride (AMARYL) 1 MG tablet; TAKE 1 TABLET BY MOUTH TWICE DAILY WITH MEALS  Dispense: 180 tablet; Refill: 0  - metFORMIN (GLUCOPHAGE) 500 MG tablet; TAKE 1 TABLET BY MOUTH TWICE DAILY WITH MEALS  Dispense: 180 tablet; Refill: 0    Labs were ordered to be done prior to her return visit in 6 months:   - Comprehensive Metabolic Panel; Future  - Hemoglobin A1C; Future    2. Mixed hyperlipidemia  Her lipid profile was at goal except for elevated triglycerides on her recent lab work. She has been intolerant of statins previously. She was advised to decrease highly processed foods in her diet, and to exercise regularly. Labs were ordered to be done prior to her return visit in 6 months:   - Comprehensive Metabolic Panel; Future  - Lipid Panel; Future    3. Bilateral acute serous otitis media, recurrence not specified  I recommended a trial of Flonase:  - fluticasone (FLONASE) 50 MCG/ACT nasal spray; 1 spray by Nasal route daily  Dispense: 1 each; Refill: 3    She was advised to follow up if symptoms worsen or do not resolve. 4. Severe obesity (BMI 35.0-35.9 with comorbidity) ([facility]  She was advised to decrease highly processed foods in her diet, and to exercise regularly. 5.  Routine health maintenance  Health maintenance was reviewed with the patient. She has received three doses of the Pfizer Covid-19 vaccine.   She has received an influenza vaccine this influenza season. Tdap and Shingrix were recommended and declined. All other health maintenance, including Pneumovax and Prevnar-13, is up to date at this time. Subjective:    Merla Meigs is a 66 y.o. female who presents to [facility] (10/26/2021) for follow up/evaluation of:  Diabetes, Hypertension, Hyperlipidemia, and Hand Pain      Overall, she states that she has been feeling well. She reports pain in both of her hands. She has started over the counter Voltaren gel, which has helped. She is tolerating her medications well. She denies chest pain or shortness of breath. She has received three doses of the Pfizer Covid-19 vaccine. She has the following problem list:  Patient Active Problem List   Diagnosis    Mixed hyperlipidemia    Obesity (BMI 30-39. 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, unspecified (HCC)        Current medications are:  Outpatient Medications Marked as Taking for the 10/26/21 encounter (Office Visit) with Jossy Medina MD   Medication Sig Dispense Refill    CINNAMON PO Take by mouth daily      glimepiride (AMARYL) 1 MG tablet TAKE 1 TABLET BY MOUTH TWICE DAILY WITH MEALS 180 tablet 0    metFORMIN (GLUCOPHAGE) 500 MG tablet TAKE 1 TABLET BY MOUTH TWICE DAILY WITH MEALS 180 tablet 0    rosuvastatin (CRESTOR) 10 MG tablet Take 1 tablet by mouth daily 90 tablet 1    ezetimibe (ZETIA) 10 MG tablet Take 1 tablet by mouth daily 90 tablet 1    amLODIPine (NORVASC) 10 MG tablet Take 1 tablet by mouth once daily 90 tablet 2    isosorbide mononitrate (IMDUR) 30 MG extended release tablet Take 1 tablet by mouth once daily 90 tablet 3    ELDERBERRY PO Take by mouth Immune Plus tablets-Vit B, C, Zinc      Ascorbic Acid (VITAMIN C PO) Take by mouth      fluticasone (FLONASE) 50 MCG/ACT nasal spray 1 spray by Nasal route daily 1 Bottle 0    Multiple Vitamins-Minerals (MULTIVITAMIN PO) Take 1 tablet by mouth daily      NONFORMULARY 5,000 mcg daily OTC Biotin 1000mcg daily       aspirin 81 MG tablet Take 81 mg by mouth daily. She is allergic to iv dye [iodides], penicillins, sulfa antibiotics, ace inhibitors, cozaar [losartan], lipitor, lopressor [metoprolol], and morphine. She  reports that she has never smoked. She has never used smokeless tobacco.      Objective:    Vitals:    10/26/21 1620   BP: 134/86   Site: Right Upper Arm   Position: Sitting   Cuff Size: Large Adult   Pulse: 82   Resp: 16   Weight: 167 lb 9.6 oz (76 kg)   Height: 4' 9\" (1.448 m)     Body mass index is 36.27 kg/m². Obese female, healthy-appearing, alert, cooperative and in no acute distress. Neck supple. No adenopathy. Thyroid symmetric, normal size. Chest:  Normal expansion. Clear to auscultation. No rales, rhonchi, or wheezing. Heart sounds are normal.  Regular rate and rhythm without murmur, gallop or rub. Lower extremities have no edema. Her feet were examined. There were no areas of redness, ulceration, or skin breakdown. There was normal sensation to light touch of the feet bilaterally.   The pulses in the feet and ankles were normal.       Results of labs done 10/22/2021 were reviewed with the patient:   Hospital Outpatient Visit on 10/22/2021   Component Date Value Ref Range Status    Vit D, 25-Hydroxy 10/22/2021 59.7  30.0 - 100.0 ng/mL Final    Comment:    Reference Range:  Vitamin D status         Range   Deficiency              <20 ng/mL   Mild Deficiency       20-30 ng/mL   Sufficiency           ng/mL   Toxicity               >100 ng/mL      Glucose 10/22/2021 144* 70 - 99 mg/dL Final    BUN 10/22/2021 11  8 - 23 mg/dL Final    CREATININE 10/22/2021 0.66  0.50 - 0.90 mg/dL Final    Bun/Cre Ratio 10/22/2021 17  9 - 20 Final    Calcium 10/22/2021 9.8  8.6 - 10.4 mg/dL Final    Sodium 10/22/2021 142  135 - 144 mmol/L Final    Potassium 10/22/2021 3.8  3.7 - 5.3 mmol/L Final    Chloride 10/22/2021 102  98 - 107 mmol/L Final    CO2 10/22/2021 27  20 - 31 mmol/L Final    Anion Gap 10/22/2021 13  9 - 17 mmol/L Final    Alkaline Phosphatase 10/22/2021 111* 35 - 104 U/L Final    ALT 10/22/2021 31  5 - 33 U/L Final    AST 10/22/2021 31  <32 U/L Final    Total Bilirubin 10/22/2021 0.44  0.3 - 1.2 mg/dL Final    Total Protein 10/22/2021 7.9  6.4 - 8.3 g/dL Final    Albumin 10/22/2021 4.7  3.5 - 5.2 g/dL Final    Albumin/Globulin Ratio 10/22/2021 1.5  1.0 - 2.5 Final    GFR Non- 10/22/2021 >60  >60 mL/min Final    GFR  10/22/2021 >60  >60 mL/min Final    GFR Comment 10/22/2021        Final    Comment: Average GFR for 79or more years old:   76 mL/min/1.73sq m  Chronic Kidney Disease:   <60 mL/min/1.73sq m  Kidney failure:   <15 mL/min/1.73sq m              eGFR calculated using average adult body mass. Additional eGFR calculator available at:        [employer]            GFR Staging 10/22/2021 NOT REPORTED   Final    Hemoglobin A1C 10/22/2021 7.8* 4.0 - 6.0 % Final    Estimated Avg Glucose 10/22/2021 177  mg/dL Final    Comment: The ADA and AACC recommend providing the estimated average glucose result to permit better   patient understanding of their HBA1c result.       Cholesterol 10/22/2021 148  <200 mg/dL Final    Comment:    Cholesterol Guidelines:      <200  Desirable   200-240 Borderline      >240  Undesirable         HDL 10/22/2021 54  >40 mg/dL Final    Comment:    HDL Guidelines:    <40     Undesirable   40-59    Borderline    >59     Desirable         LDL Cholesterol 10/22/2021 52  0 - 130 mg/dL Final    Comment:    LDL Guidelines:     <100    Desirable   100-129   Near to/above Desirable   130-159   Borderline      >159   Undesirable     Direct (measured) LDL and calculated LDL are not interchangeable tests.  Chol/HDL Ratio 10/22/2021 2.7  <5 Final            Triglycerides 10/22/2021 211* <150 mg/dL Final    Comment:    Triglyceride Guidelines:     <150   Desirable   150-199  Borderline   200-499  High     >499   Very high   Based on AHA Guidelines for fasting triglyceride, October 2012.  VLDL 10/22/2021 NOT REPORTED* 1 - 30 mg/dL Final    Microalb, Ur 10/22/2021 22* <21 mg/L Final    Creatinine, Ur 10/22/2021 140.4  28.0 - 217.0 mg/dL Final    Microalb/Crt.  Ratio 10/22/2021 16  <25 mcg/mg creat Final             (Please note that portions of this note were completed with a voice-recognition program. Efforts were made to edit the dictation but occasionally words are mis-transcribed.)

## 2021-11-02 DIAGNOSIS — E78.2 MIXED HYPERLIPIDEMIA: ICD-10-CM

## 2021-11-02 NOTE — TELEPHONE ENCOUNTER
She uses a good RX card and found her meds are cheaper at [facility] if you use a 90 day supply for her ezemetimbe 10mg and her rosuvastin 10mg - she would like it sent there   Please advise and call her

## 2021-11-03 RX ORDER — EZETIMIBE 10 MG/1
10 TABLET ORAL DAILY
Qty: 90 TABLET | Refills: 3 | Status: SHIPPED | OUTPATIENT
Start: 2021-11-03 | End: 2022-10-27 | Stop reason: SDUPTHER

## 2021-11-03 RX ORDER — ROSUVASTATIN CALCIUM 10 MG/1
10 TABLET, COATED ORAL DAILY
Qty: 90 TABLET | Refills: 3 | Status: SHIPPED | OUTPATIENT
Start: 2021-11-03 | End: 2022-10-24

## 2021-11-03 RX ORDER — EZETIMIBE 10 MG/1
10 TABLET ORAL DAILY
Qty: 90 TABLET | Refills: 3 | Status: SHIPPED | OUTPATIENT
Start: 2021-11-03 | End: 2021-11-03 | Stop reason: CLARIF

## 2021-11-03 RX ORDER — ROSUVASTATIN CALCIUM 10 MG/1
10 TABLET, COATED ORAL DAILY
Qty: 90 TABLET | Refills: 3 | Status: SHIPPED | OUTPATIENT
Start: 2021-11-03 | End: 2021-11-03 | Stop reason: CLARIF

## 2021-11-15 ENCOUNTER — NURSE ONLY (OUTPATIENT)
Dept: LAB | Age: 71
End: 2021-11-15
Payer: MEDICARE

## 2021-11-15 DIAGNOSIS — E53.8 B12 DEFICIENCY: Primary | ICD-10-CM

## 2021-11-15 PROCEDURE — PBSHW PBB SHADOW CHARGE: Performed by: FAMILY MEDICINE

## 2021-11-15 PROCEDURE — 96372 THER/PROPH/DIAG INJ SC/IM: CPT

## 2021-11-15 RX ADMIN — CYANOCOBALAMIN 1000 MCG: 1000 INJECTION, SOLUTION INTRAMUSCULAR at 14:32

## 2021-12-15 ENCOUNTER — NURSE ONLY (OUTPATIENT)
Dept: LAB | Age: 71
End: 2021-12-15
Payer: MEDICARE

## 2021-12-15 DIAGNOSIS — E53.8 B12 DEFICIENCY: Primary | ICD-10-CM

## 2021-12-15 PROCEDURE — 96372 THER/PROPH/DIAG INJ SC/IM: CPT

## 2021-12-15 PROCEDURE — PBSHW PBB SHADOW CHARGE: Performed by: FAMILY MEDICINE

## 2021-12-15 RX ADMIN — CYANOCOBALAMIN 1000 MCG: 1000 INJECTION, SOLUTION INTRAMUSCULAR at 11:57

## 2022-01-08 DIAGNOSIS — I25.118 CORONARY ARTERY DISEASE OF NATIVE ARTERY OF NATIVE HEART WITH STABLE ANGINA PECTORIS (HCC): ICD-10-CM

## 2022-01-10 RX ORDER — ISOSORBIDE MONONITRATE 30 MG/1
TABLET, EXTENDED RELEASE ORAL
Qty: 90 TABLET | Refills: 2 | Status: SHIPPED | OUTPATIENT
Start: 2022-01-10 | End: 2022-10-10

## 2022-01-14 ENCOUNTER — NURSE ONLY (OUTPATIENT)
Dept: LAB | Age: 72
End: 2022-01-14
Payer: MEDICARE

## 2022-01-14 DIAGNOSIS — E53.8 B12 DEFICIENCY: Primary | ICD-10-CM

## 2022-01-14 PROCEDURE — 96372 THER/PROPH/DIAG INJ SC/IM: CPT

## 2022-01-14 PROCEDURE — PBSHW PBB SHADOW CHARGE: Performed by: FAMILY MEDICINE

## 2022-01-14 RX ADMIN — CYANOCOBALAMIN 1000 MCG: 1000 INJECTION, SOLUTION INTRAMUSCULAR at 15:22

## 2022-02-17 ENCOUNTER — NURSE ONLY (OUTPATIENT)
Dept: LAB | Age: 72
End: 2022-02-17
Payer: MEDICARE

## 2022-02-17 DIAGNOSIS — E53.8 B12 DEFICIENCY: Primary | ICD-10-CM

## 2022-02-17 PROCEDURE — PBSHW PBB SHADOW CHARGE: Performed by: FAMILY MEDICINE

## 2022-02-17 PROCEDURE — 96372 THER/PROPH/DIAG INJ SC/IM: CPT

## 2022-02-17 RX ADMIN — CYANOCOBALAMIN 1000 MCG: 1000 INJECTION, SOLUTION INTRAMUSCULAR at 11:18

## 2022-03-21 ENCOUNTER — NURSE ONLY (OUTPATIENT)
Dept: LAB | Age: 72
End: 2022-03-21
Payer: MEDICARE

## 2022-03-21 DIAGNOSIS — E53.8 B12 DEFICIENCY: Primary | ICD-10-CM

## 2022-03-21 PROCEDURE — 96372 THER/PROPH/DIAG INJ SC/IM: CPT

## 2022-03-21 PROCEDURE — PBSHW PBB SHADOW CHARGE: Performed by: FAMILY MEDICINE

## 2022-03-21 RX ADMIN — CYANOCOBALAMIN 1000 MCG: 1000 INJECTION, SOLUTION INTRAMUSCULAR at 14:31

## 2022-03-22 DIAGNOSIS — E11.9 TYPE 2 DIABETES MELLITUS WITHOUT COMPLICATION, WITHOUT LONG-TERM CURRENT USE OF INSULIN (HCC): ICD-10-CM

## 2022-03-22 RX ORDER — GLIMEPIRIDE 1 MG/1
TABLET ORAL
Qty: 180 TABLET | Refills: 0 | Status: SHIPPED | OUTPATIENT
Start: 2022-03-22 | End: 2022-06-21

## 2022-03-22 NOTE — TELEPHONE ENCOUNTER
Kika called requesting a refill of the below medication which has been pended for you:     Metformin filled 12/21/21 #180/1 refill    Requested Prescriptions     Pending Prescriptions Disp Refills    glimepiride (AMARYL) 1 MG tablet [Pharmacy Med Name: Glimepiride 1 MG Oral Tablet] 180 tablet 0     Sig: TAKE 1 TABLET BY MOUTH TWICE DAILY WITH MEALS     Refused Prescriptions Disp Refills    metFORMIN (GLUCOPHAGE) 500 MG tablet [Pharmacy Med Name: metFORMIN HCl 500 MG Oral Tablet] 180 tablet 0     Sig: TAKE 1 TABLET BY MOUTH TWICE DAILY WITH MEALS       Last Appointment Date: 10/26/2021  Next Appointment Date: 4/28/2022    Allergies   Allergen Reactions    Iv Dye [Iodides] Shortness Of Breath    Penicillins Swelling    Sulfa Antibiotics Shortness Of Breath    Ace Inhibitors Nausea Only     Difficulty swallowing the Lisinopril. Gagging. Nausea.  Cozaar [Losartan] Nausea Only    Lipitor Other (See Comments)     Muscle aches.     Lopressor [Metoprolol] Other (See Comments)     leg pain    Morphine Nausea Only     Chest pressure

## 2022-04-20 ENCOUNTER — NURSE ONLY (OUTPATIENT)
Dept: LAB | Age: 72
End: 2022-04-20
Payer: MEDICARE

## 2022-04-20 DIAGNOSIS — E53.8 B12 DEFICIENCY: Primary | ICD-10-CM

## 2022-04-20 PROCEDURE — 96372 THER/PROPH/DIAG INJ SC/IM: CPT

## 2022-04-20 PROCEDURE — PBSHW PBB SHADOW CHARGE: Performed by: FAMILY MEDICINE

## 2022-04-20 RX ADMIN — CYANOCOBALAMIN 1000 MCG: 1000 INJECTION, SOLUTION INTRAMUSCULAR at 10:29

## 2022-04-23 ENCOUNTER — OFFICE VISIT (OUTPATIENT)
Dept: PRIMARY CARE CLINIC | Age: 72
End: 2022-04-23
Payer: MEDICARE

## 2022-04-23 VITALS
WEIGHT: 165.6 LBS | RESPIRATION RATE: 18 BRPM | DIASTOLIC BLOOD PRESSURE: 74 MMHG | TEMPERATURE: 97 F | HEART RATE: 73 BPM | OXYGEN SATURATION: 96 % | BODY MASS INDEX: 35.72 KG/M2 | SYSTOLIC BLOOD PRESSURE: 138 MMHG | HEIGHT: 57 IN

## 2022-04-23 DIAGNOSIS — H81.10 BENIGN PAROXYSMAL POSITIONAL VERTIGO, UNSPECIFIED LATERALITY: Primary | ICD-10-CM

## 2022-04-23 PROCEDURE — 99214 OFFICE O/P EST MOD 30 MIN: CPT | Performed by: FAMILY MEDICINE

## 2022-04-23 PROCEDURE — 1090F PRES/ABSN URINE INCON ASSESS: CPT | Performed by: FAMILY MEDICINE

## 2022-04-23 PROCEDURE — 1036F TOBACCO NON-USER: CPT | Performed by: FAMILY MEDICINE

## 2022-04-23 PROCEDURE — 4040F PNEUMOC VAC/ADMIN/RCVD: CPT | Performed by: FAMILY MEDICINE

## 2022-04-23 PROCEDURE — 1123F ACP DISCUSS/DSCN MKR DOCD: CPT | Performed by: FAMILY MEDICINE

## 2022-04-23 PROCEDURE — G8417 CALC BMI ABV UP PARAM F/U: HCPCS | Performed by: FAMILY MEDICINE

## 2022-04-23 PROCEDURE — 3017F COLORECTAL CA SCREEN DOC REV: CPT | Performed by: FAMILY MEDICINE

## 2022-04-23 PROCEDURE — G8399 PT W/DXA RESULTS DOCUMENT: HCPCS | Performed by: FAMILY MEDICINE

## 2022-04-23 PROCEDURE — G8427 DOCREV CUR MEDS BY ELIG CLIN: HCPCS | Performed by: FAMILY MEDICINE

## 2022-04-23 PROCEDURE — 99212 OFFICE O/P EST SF 10 MIN: CPT | Performed by: FAMILY MEDICINE

## 2022-04-23 RX ORDER — FLUTICASONE PROPIONATE 50 MCG
2 SPRAY, SUSPENSION (ML) NASAL DAILY
Qty: 16 G | Refills: 5 | Status: SHIPPED | OUTPATIENT
Start: 2022-04-23

## 2022-04-23 RX ORDER — MECLIZINE HYDROCHLORIDE 25 MG/1
25 TABLET ORAL 3 TIMES DAILY PRN
Qty: 30 TABLET | Refills: 0 | Status: SHIPPED | OUTPATIENT
Start: 2022-04-23 | End: 2022-05-03

## 2022-04-23 ASSESSMENT — ENCOUNTER SYMPTOMS
TROUBLE SWALLOWING: 0
EYE DISCHARGE: 0
WHEEZING: 0
RHINORRHEA: 1
EYE REDNESS: 0
ABDOMINAL PAIN: 0
VOMITING: 0
SORE THROAT: 0
SINUS PRESSURE: 0
COUGH: 0
CONSTIPATION: 0
NAUSEA: 1
SHORTNESS OF BREATH: 0
SINUS PAIN: 0
DIARRHEA: 0

## 2022-04-23 NOTE — Clinical Note
INDU, just wanted to give you a heads up on this patient. She I believe is seeing you on Thursday. She came in for positional vertigo which I treated with antivert. I did hear a soft bruit to the left carotid but she was so miserable from her vertigo she really couldn't hold her breath when I was trying to listen. Just wanted you to double check this at your visit to see if you hear it as well.

## 2022-04-23 NOTE — PROGRESS NOTES
Neurological: Positive for dizziness and vertigo. Negative for syncope, weakness, light-headedness and headaches. Hematological: Negative for adenopathy. Psychiatric/Behavioral: Negative. Prior to Visit Medications    Medication Sig Taking? Authorizing Provider   meclizine (ANTIVERT) 25 MG tablet Take 1 tablet by mouth 3 times daily as needed for Dizziness or Nausea Yes Yulissa Angelo, DO   fluticasone (FLONASE) 50 MCG/ACT nasal spray 2 sprays by Each Nostril route daily Yes Yulissa Angelo DO   glimepiride (AMARYL) 1 MG tablet TAKE 1 TABLET BY MOUTH TWICE DAILY WITH MEALS Yes Wilber Fitzgerald MD   amLODIPine (NORVASC) 10 MG tablet Take 1 tablet by mouth once daily Yes Wilber Fitzgerald MD   isosorbide mononitrate (IMDUR) 30 MG extended release tablet Take 1 tablet by mouth once daily Yes Abhijit Huynh MD   metFORMIN (GLUCOPHAGE) 500 MG tablet TAKE 1 TABLET BY MOUTH TWICE DAILY WITH MEALS Yes Wilber Fitzgerald MD   ezetimibe (ZETIA) 10 MG tablet Take 1 tablet by mouth daily Yes Carlo Siegel MD   rosuvastatin (CRESTOR) 10 MG tablet Take 1 tablet by mouth daily Yes Carlo Siegel MD   CINNAMON PO Take by mouth daily Yes Historical Provider, MD   Diclofenac Sodium (VOLTAREN EX) Apply topically as needed Yes Historical Provider, MD   ELDERBERRY PO Take by mouth Immune Plus tablets-Vit B, C, Zinc Yes Historical Provider, MD   Ascorbic Acid (VITAMIN C PO) Take by mouth Yes Historical Provider, MD   Multiple Vitamins-Minerals (MULTIVITAMIN PO) Take 1 tablet by mouth daily Yes Historical Provider, MD   NONFORMULARY 5,000 mcg daily OTC Biotin 1000mcg daily  Yes Historical Provider, MD   aspirin 81 MG tablet Take 81 mg by mouth daily. Yes Historical Provider, MD   nitroGLYCERIN (NITROSTAT) 0.4 MG SL tablet Place 1 tablet under the tongue every 5 minutes as needed for Chest pain up to max of 3 total doses. If no relief after 1 dose, call 911.   Patient not taking: Reported on 4/23/2022  Brad Moreira MD Social History     Tobacco Use    Smoking status: Never Smoker    Smokeless tobacco: Never Used    Tobacco comment: Carlo Haq RRT 6/17/17   Substance Use Topics    Alcohol use: No     Alcohol/week: 0.0 standard drinks        Vitals:    04/23/22 1053   BP: 138/74   Pulse: 73   Resp: 18   Temp: 97 °F (36.1 °C)   TempSrc: Tympanic   SpO2: 96%   Weight: 165 lb 9.6 oz (75.1 kg)   Height: 4' 9\" (1.448 m)     Estimated body mass index is 35.84 kg/m² as calculated from the following:    Height as of this encounter: 4' 9\" (1.448 m). Weight as of this encounter: 165 lb 9.6 oz (75.1 kg). Physical Exam  Vitals and nursing note reviewed. Constitutional:       General: She is not in acute distress. Appearance: Normal appearance. She is well-developed. She is not diaphoretic. HENT:      Head: Normocephalic and atraumatic. Right Ear: External ear normal.      Left Ear: External ear normal.      Ears:      Comments: TMs dull with fluid behind the TM     Nose: Congestion and rhinorrhea present. Mouth/Throat:      Pharynx: No posterior oropharyngeal erythema. Comments: Post nasal drainage noted  Eyes:      General: No scleral icterus. Right eye: No discharge. Left eye: No discharge. Conjunctiva/sclera: Conjunctivae normal.      Pupils: Pupils are equal, round, and reactive to light. Neck:      Thyroid: No thyromegaly. Vascular: Carotid bruit (soft bruit noted on the left) present. Cardiovascular:      Rate and Rhythm: Normal rate and regular rhythm. Heart sounds: Normal heart sounds. Pulmonary:      Effort: Pulmonary effort is normal. No respiratory distress. Breath sounds: Normal breath sounds. No wheezing. Musculoskeletal:      Cervical back: Normal range of motion and neck supple. Lymphadenopathy:      Cervical: No cervical adenopathy. Skin:     General: Skin is warm and dry. Findings: No rash.    Neurological:      Mental Status: She is alert and oriented to person, place, and time. Cranial Nerves: Cranial nerves are intact. Sensory: Sensation is intact. Motor: Motor function is intact. Gait: Gait is intact. Comments: Positive Justina Saber to the left. Unable to do maneuver to the right due to the profound dizziness and discomfort with testing to the left. Psychiatric:         Behavior: Behavior normal.         Thought Content: Thought content normal.         Judgment: Judgment normal.         ASSESSMENT/PLAN:  Encounter Diagnosis   Name Primary?  Benign paroxysmal positional vertigo, unspecified laterality Yes     Orders Placed This Encounter   Medications    meclizine (ANTIVERT) 25 MG tablet     Sig: Take 1 tablet by mouth 3 times daily as needed for Dizziness or Nausea     Dispense:  30 tablet     Refill:  0    fluticasone (FLONASE) 50 MCG/ACT nasal spray     Si sprays by Each Nostril route daily     Dispense:  16 g     Refill:  5     RX as noted above.  notes as well that she hasn't been using her flonase spray consistently, so would recommend starting back on this medical therapy consistently. Would rest and maintain adequate hydration. Return  if no improvement in symptoms or if any further symptoms arise. No follow-ups on file. An electronic signature was used to authenticate this note.     --Juju Fountain, DO on 2022 at 11:11 AM

## 2022-04-25 ENCOUNTER — HOSPITAL ENCOUNTER (OUTPATIENT)
Dept: LAB | Age: 72
Discharge: HOME | End: 2022-04-25
Payer: MEDICARE

## 2022-04-25 DIAGNOSIS — E11.9 TYPE 2 DIABETES MELLITUS WITHOUT COMPLICATION, WITHOUT LONG-TERM CURRENT USE OF INSULIN (HCC): ICD-10-CM

## 2022-04-25 DIAGNOSIS — E78.2 MIXED HYPERLIPIDEMIA: ICD-10-CM

## 2022-04-25 LAB
ALBUMIN SERPL-MCNC: 4.7 G/DL (ref 3.5–5.2)
ALBUMIN/GLOBULIN RATIO: 1.6 (ref 1–2.5)
ALP BLD-CCNC: 120 U/L (ref 35–104)
ALT SERPL-CCNC: 32 U/L (ref 5–33)
ANION GAP SERPL CALCULATED.3IONS-SCNC: 10 MMOL/L (ref 9–17)
AST SERPL-CCNC: 25 U/L
BILIRUB SERPL-MCNC: 0.34 MG/DL (ref 0.3–1.2)
BUN BLDV-MCNC: 17 MG/DL (ref 8–23)
BUN/CREAT BLD: 22 (ref 9–20)
CALCIUM SERPL-MCNC: 9.3 MG/DL (ref 8.6–10.4)
CHLORIDE BLD-SCNC: 103 MMOL/L (ref 98–107)
CHOLESTEROL/HDL RATIO: 2.9
CHOLESTEROL: 159 MG/DL
CO2: 27 MMOL/L (ref 20–31)
CREAT SERPL-MCNC: 0.77 MG/DL (ref 0.5–0.9)
ESTIMATED AVERAGE GLUCOSE: 192 MG/DL
GFR AFRICAN AMERICAN: >60 ML/MIN
GFR NON-AFRICAN AMERICAN: >60 ML/MIN
GFR SERPL CREATININE-BSD FRML MDRD: ABNORMAL ML/MIN/{1.73_M2}
GLUCOSE BLD-MCNC: 204 MG/DL (ref 70–99)
HBA1C MFR BLD: 8.3 % (ref 4–6)
HDLC SERPL-MCNC: 54 MG/DL
LDL CHOLESTEROL: 65 MG/DL (ref 0–130)
POTASSIUM SERPL-SCNC: 3.9 MMOL/L (ref 3.7–5.3)
SODIUM BLD-SCNC: 140 MMOL/L (ref 135–144)
TOTAL PROTEIN: 7.6 G/DL (ref 6.4–8.3)
TRIGL SERPL-MCNC: 198 MG/DL

## 2022-04-25 PROCEDURE — 83036 HEMOGLOBIN GLYCOSYLATED A1C: CPT

## 2022-04-25 PROCEDURE — 36415 COLL VENOUS BLD VENIPUNCTURE: CPT

## 2022-04-25 PROCEDURE — 80053 COMPREHEN METABOLIC PANEL: CPT

## 2022-04-25 PROCEDURE — 80061 LIPID PANEL: CPT

## 2022-04-27 ENCOUNTER — TELEPHONE (OUTPATIENT)
Dept: FAMILY MEDICINE CLINIC | Age: 72
End: 2022-04-27

## 2022-04-27 ENCOUNTER — OFFICE VISIT (OUTPATIENT)
Dept: PRIMARY CARE CLINIC | Age: 72
End: 2022-04-27
Payer: MEDICARE

## 2022-04-27 ENCOUNTER — HOSPITAL ENCOUNTER (OUTPATIENT)
Dept: CT IMAGING | Age: 72
Discharge: HOME | End: 2022-04-29
Payer: MEDICARE

## 2022-04-27 VITALS
HEIGHT: 57 IN | TEMPERATURE: 97.2 F | HEART RATE: 77 BPM | WEIGHT: 165.2 LBS | BODY MASS INDEX: 35.64 KG/M2 | OXYGEN SATURATION: 100 % | SYSTOLIC BLOOD PRESSURE: 132 MMHG | DIASTOLIC BLOOD PRESSURE: 79 MMHG

## 2022-04-27 DIAGNOSIS — I10 ESSENTIAL HYPERTENSION: ICD-10-CM

## 2022-04-27 DIAGNOSIS — E11.65 TYPE 2 DIABETES MELLITUS WITH HYPERGLYCEMIA, WITHOUT LONG-TERM CURRENT USE OF INSULIN (HCC): ICD-10-CM

## 2022-04-27 DIAGNOSIS — E66.01 SEVERE OBESITY (BMI 35.0-39.9) WITH COMORBIDITY (HCC): ICD-10-CM

## 2022-04-27 DIAGNOSIS — R42 DIZZINESS: Primary | ICD-10-CM

## 2022-04-27 DIAGNOSIS — E53.8 VITAMIN B12 DEFICIENCY: ICD-10-CM

## 2022-04-27 DIAGNOSIS — R42 DIZZINESS: ICD-10-CM

## 2022-04-27 DIAGNOSIS — E78.2 MIXED HYPERLIPIDEMIA: ICD-10-CM

## 2022-04-27 PROBLEM — E11.9 TYPE 2 DIABETES MELLITUS (HCC): Status: ACTIVE | Noted: 2022-04-27

## 2022-04-27 PROCEDURE — G8417 CALC BMI ABV UP PARAM F/U: HCPCS | Performed by: FAMILY MEDICINE

## 2022-04-27 PROCEDURE — 4040F PNEUMOC VAC/ADMIN/RCVD: CPT | Performed by: FAMILY MEDICINE

## 2022-04-27 PROCEDURE — 93010 ELECTROCARDIOGRAM REPORT: CPT | Performed by: FAMILY MEDICINE

## 2022-04-27 PROCEDURE — 70450 CT HEAD/BRAIN W/O DYE: CPT

## 2022-04-27 PROCEDURE — 1090F PRES/ABSN URINE INCON ASSESS: CPT | Performed by: FAMILY MEDICINE

## 2022-04-27 PROCEDURE — 1123F ACP DISCUSS/DSCN MKR DOCD: CPT | Performed by: FAMILY MEDICINE

## 2022-04-27 PROCEDURE — 3017F COLORECTAL CA SCREEN DOC REV: CPT | Performed by: FAMILY MEDICINE

## 2022-04-27 PROCEDURE — 93005 ELECTROCARDIOGRAM TRACING: CPT | Performed by: FAMILY MEDICINE

## 2022-04-27 PROCEDURE — 1036F TOBACCO NON-USER: CPT | Performed by: FAMILY MEDICINE

## 2022-04-27 PROCEDURE — 3052F HG A1C>EQUAL 8.0%<EQUAL 9.0%: CPT | Performed by: FAMILY MEDICINE

## 2022-04-27 PROCEDURE — G8399 PT W/DXA RESULTS DOCUMENT: HCPCS | Performed by: FAMILY MEDICINE

## 2022-04-27 PROCEDURE — G8427 DOCREV CUR MEDS BY ELIG CLIN: HCPCS | Performed by: FAMILY MEDICINE

## 2022-04-27 PROCEDURE — 99215 OFFICE O/P EST HI 40 MIN: CPT | Performed by: FAMILY MEDICINE

## 2022-04-27 PROCEDURE — 2022F DILAT RTA XM EVC RTNOPTHY: CPT | Performed by: FAMILY MEDICINE

## 2022-04-27 RX ORDER — ORAL SEMAGLUTIDE 3 MG/1
3 TABLET ORAL DAILY
Qty: 30 TABLET | Refills: 2 | Status: SHIPPED | OUTPATIENT
Start: 2022-04-27 | End: 2022-07-28

## 2022-04-27 SDOH — ECONOMIC STABILITY: FOOD INSECURITY: WITHIN THE PAST 12 MONTHS, THE FOOD YOU BOUGHT JUST DIDN'T LAST AND YOU DIDN'T HAVE MONEY TO GET MORE.: NEVER TRUE

## 2022-04-27 SDOH — ECONOMIC STABILITY: FOOD INSECURITY: WITHIN THE PAST 12 MONTHS, YOU WORRIED THAT YOUR FOOD WOULD RUN OUT BEFORE YOU GOT MONEY TO BUY MORE.: NEVER TRUE

## 2022-04-27 ASSESSMENT — PATIENT HEALTH QUESTIONNAIRE - PHQ9
SUM OF ALL RESPONSES TO PHQ QUESTIONS 1-9: 0
SUM OF ALL RESPONSES TO PHQ9 QUESTIONS 1 & 2: 0
1. LITTLE INTEREST OR PLEASURE IN DOING THINGS: 0
SUM OF ALL RESPONSES TO PHQ QUESTIONS 1-9: 0
2. FEELING DOWN, DEPRESSED OR HOPELESS: 0

## 2022-04-27 ASSESSMENT — SOCIAL DETERMINANTS OF HEALTH (SDOH): HOW HARD IS IT FOR YOU TO PAY FOR THE VERY BASICS LIKE FOOD, HOUSING, MEDICAL CARE, AND HEATING?: NOT HARD AT ALL

## 2022-04-27 NOTE — TELEPHONE ENCOUNTER
Writer contacted patient. Gave patient option of apply for savings card per [employer] recommendations for additional savings and patient is not interested. Patient states that she will be taking her previous regimen of medication until an alternative medication is prescribed.      Last OV: today  Next OV: to be scheduled for 3 month follow up      Please advise

## 2022-04-27 NOTE — TELEPHONE ENCOUNTER
Her insurance may have better coverage for an injectable medication, such as Ozempic. I can prescribe this if she is interested. If she is not interested in an injectable medication, I agree with continuing her current medications. Please ask the patient is she needs any refills. Thank you.

## 2022-04-27 NOTE — PROGRESS NOTES
[facility]             [address] [facility], [facility] [place], [address]                        Telephone [phone]             Fax [phone]       Jose C Cyr  :  1950  Age:  66 y.o. MRN:  [id number]  Date of visit:  2022     Assessment and Plan:    1. Dizziness  Benign paroxysmal positional vertigo vs. arhythmia vs. other  I reviewed the results of the ECG and head CT done today with the patient. - Holter Monitor 24 Hour; Future was also ordered. I recommended physical therapy for Eply maneuvers. She declined. Printed information regardingVertigo: Exercises was provided to the patient with the after visit summary. 2. Type 2 diabetes mellitus with hyperglycemia, without long-term current use of insulin (HCC)  Her HgbA1c was 8.3 %, which is not at goal and worsening. She was advised to discontinue Glimepiride and begin Rybelsus:  - Semaglutide (RYBELSUS) 3 MG TABS; Take 3 mg by mouth daily  Dispense: 30 tablet; Refill: 2    Risks, benefits, and side effects of Rybelsus were discussed with the patient. She was advised to continue Metformin. She states that she is uncertain if she needs any refills. I have asked her to call if she needs a refill. A referral to diabetes education was recommended and declined. Labs were ordered to be done prior to her return visit in 3 months:   - Comprehensive Metabolic Panel; Future  - Hemoglobin A1C; Future    3. Essential hypertension  Her blood pressure is adequately-controlled today. (BP: 132/79)   She was advised to continue current medications. She states that she is uncertain if she needs any refills. I have asked her to call if she needs a refill. Labs were ordered to be done prior to her return visit in 3 months:    - Comprehensive Metabolic Panel; Future  - Lipid Panel; Future    4.  Mixed hyperlipidemia  Her lipid profile was at goal except for elevated triglycerides on her recent lab work. She is tolerating Crestor and Zetia well. She states that she is uncertain if she needs any refills. I have asked her to call if she needs a refill.  - Lipid Panel; Future    5. Severe obesity (BMI 35.0-39. 9) with comorbidity ([address]  Her weight has decreased 2 pounds in the last 6 months. 6. Vitamin B12 deficiency  She is receiving vitamin B12 injections. - Vitamin B12; Future was ordered to be done prior to her return visit in 3 months. 7. Health maintenance  She has received 3 doses of the Pfizer Covid-19 vaccine. She was advised that a fourth dose of the Covid-19 vaccine has been authorized for higher risk individuals, including those over age 48, if it has been more than four months since the previous dose. Tdap and Shingrix were recommended and declined. All other health maintenance, including Prevnar-13 and Pneumovax, is up to date at this time. She was advised to return in 3 months for follow up. Subjective:    Ifeoma Bartholomew is a 66 y.o. female who presents to [facility] today (4/27/2022) for evaluation of:  Dizziness (was here saturday with same symptoms (dizzy/ ringing in L ear, upset stomach), script for meclizine, symptoms not improving)      She states that she was seen at Urgent Care on 4/23/2022 due to dizziness and vertigo. Meclizine was prescribed for benign paroxysmal positional vertigo. She states that the Meclizine caused sedation, and she discontinued it. She felt that her symptoms were improving, but she feels worse again today. She denies fever. She reports mild nausea. She denies vomiting. She reports ringing in the left ear. She checked her glucose at home this morning, at it was 181. Her  accompanied her to the visit today, and he assisted in providing the history. She has an appointment scheduled with me in [facility] tomorrow.    She asks if her chronic health issues can be addressed today as well. She has received 3 doses of the Pfizer Covid-19 vaccine. The most recent dose was 10/4/2021. She has the following problem list:  Patient Active Problem List   Diagnosis    Mixed hyperlipidemia    Obesity (BMI 30-39. 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, unspecified ([facility])        Current medications are:  Current Outpatient Medications   Medication Sig Dispense Refill    fluticasone (VERAMYST) 27.5 MCG/SPRAY nasal spray 2 sprays by Each Nostril route as needed for Rhinitis      fluticasone (FLONASE) 50 MCG/ACT nasal spray 2 sprays by Each Nostril route daily 16 g 5    glimepiride (AMARYL) 1 MG tablet TAKE 1 TABLET BY MOUTH TWICE DAILY WITH MEALS 180 tablet 0    amLODIPine (NORVASC) 10 MG tablet Take 1 tablet by mouth once daily 90 tablet 1    isosorbide mononitrate (IMDUR) 30 MG extended release tablet Take 1 tablet by mouth once daily 90 tablet 2    metFORMIN (GLUCOPHAGE) 500 MG tablet TAKE 1 TABLET BY MOUTH TWICE DAILY WITH MEALS 180 tablet 1    ezetimibe (ZETIA) 10 MG tablet Take 1 tablet by mouth daily 90 tablet 3    rosuvastatin (CRESTOR) 10 MG tablet Take 1 tablet by mouth daily 90 tablet 3    CINNAMON PO Take by mouth daily      Diclofenac Sodium (VOLTAREN EX) Apply topically as needed      nitroGLYCERIN (NITROSTAT) 0.4 MG SL tablet Place 1 tablet under the tongue every 5 minutes as needed for Chest pain up to max of 3 total doses. If no relief after 1 dose, call 911. 25 tablet 1    ELDERBERRY PO Take by mouth Immune Plus tablets-Vit B, C, Zinc      Ascorbic Acid (VITAMIN C PO) Take by mouth      Multiple Vitamins-Minerals (MULTIVITAMIN PO) Take 1 tablet by mouth daily      NONFORMULARY 5,000 mcg daily OTC Biotin 1000mcg daily       aspirin 81 MG tablet Take 81 mg by mouth daily.  meclizine (ANTIVERT) 25 MG tablet Take 1 tablet by mouth 3 times daily as needed for Dizziness or Nausea (Patient not taking: Reported on 4/27/2022) 30 tablet 0     Current Facility-Administered Medications   Medication Dose Route Frequency Provider Last Rate Last Admin    cyanocobalamin injection 1,000 mcg  1,000 mcg IntraMUSCular Q30 Days Gilda Castillo MD   1,000 mcg at 04/20/22 1029    cyanocobalamin injection 2,000 mcg  2,000 mcg IntraMUSCular Q30 Days Ashley Pang MD          Immunization history was reviewed:  Immunization History   Administered Date(s) Administered    COVID-19, Pfizer Purple top, DILUTE for use, 12+ yrs, 30mcg/0.3mL dose 02/04/2021, 03/04/2021, 10/04/2021    DT (pediatric) 10/19/2000    Influenza, High Dose (Fluzone 65 yrs and older) 10/26/2017, 09/06/2018    Influenza, Quadv, adjuvanted, 65 yrs +, IM, PF (Fluad) 09/21/2020, 09/15/2021    Influenza, Triv, inactivated, subunit, adjuvanted, IM (Fluad 65 yrs and older) 10/11/2019    Pneumococcal Conjugate 13-valent ([id number]) 04/19/2017    Pneumococcal Polysaccharide ([id number]) 04/19/2018          She is allergic to iv dye [iodides], penicillins, sulfa antibiotics, ace inhibitors, cozaar [losartan], lipitor, lopressor [metoprolol], and morphine. She  reports that she has never smoked.  She has never used smokeless tobacco.      Objective:    Vitals:    04/27/22 1104   BP: 132/79   Site: Left Upper Arm   Position: Sitting   Cuff Size: Medium Adult   Pulse: 77   Temp: 97.2 °F (36.2 °C)   SpO2: 100%   Weight: 165 lb 3.2 oz (74.9 kg)   Height: 4' 9\" (1.448 m) Body mass index is 35.75 kg/m². Wt Readings from Last 3 Encounters:   04/27/22 165 lb 3.2 oz (74.9 kg)   04/23/22 165 lb 9.6 oz (75.1 kg)   10/26/21 167 lb 9.6 oz (76 kg)        Obese female healthy-appearing, alert, cooperative and in no acute distress. Cranial nerves II-XII are intact. Neck supple. No adenopathy. Thyroid symmetric, normal size. Chest is clear to auscultation, no wheezes, rales, or rhonchi. Heart sounds are regular rate and rhythm, no murmurs. Lower extremities have no edema. Affect is anxious. Thought processes are logical. There is no evidence of paranoia or delusions. Responses to questions are appropriate. Dress and grooming are appropriate. Speech is clear. She responds to questions appropriately. Results of the ECG done today were reviewed with the patient:  Sinus rhythm, low voltage in precordial leads, old inferior infarct, old anterior infarct. Results of the head CT done today were reviewed with the patient:  CT HEAD WO CONTRAST    Result Date: 4/27/2022  EXAMINATION: CT OF THE HEAD WITHOUT CONTRAST  4/27/2022 12:33 pm TECHNIQUE: CT of the head was performed without the administration of intravenous contrast. Dose modulation, iterative reconstruction, and/or weight based adjustment of the mA/kV was utilized to reduce the radiation dose to as low as reasonably achievable. COMPARISON: None. HISTORY: ORDERING SYSTEM PROVIDED HISTORY: Dizziness TECHNOLOGIST PROVIDED HISTORY: dizziness, \"feeling drunk\" x 3 days, symptoms initially improving, now getting worse, no history of fall Reason for Exam: dizziness, \"feeling drunk\" x 3 days, symptoms initially improving, now getting worse, no history of fall FINDINGS: BRAIN/VENTRICLES: There is no acute intracranial hemorrhage, mass effect or midline shift. No abnormal extra-axial fluid collection. The gray-white differentiation is maintained without evidence of an acute infarct. There is no evidence of hydrocephalus.  There is mild cortical atrophy and scattered periventricular white matter hypoattenuation, most likely chronic microvascular ischemic change. ORBITS: The visualized portion of the orbits demonstrate no acute abnormality. SINUSES: The visualized paranasal sinuses and mastoid air cells demonstrate no acute abnormality. SOFT TISSUES/SKULL:  No acute abnormality of the visualized skull or soft tissues. No acute intracranial abnormality. RECOMMENDATIONS: Unavailable        Results of labs done 4/25/2022 were reviewed with the patient:  Hospital Outpatient Visit on 04/25/2022   Component Date Value Ref Range Status    Cholesterol 04/25/2022 159  <200 mg/dL Final    Comment:    Cholesterol Guidelines:      <200  Desirable   200-240  Borderline      >240  Undesirable         HDL 04/25/2022 54  >40 mg/dL Final    Comment:    HDL Guidelines:    <40     Undesirable   40-59    Borderline    >59     Desirable         LDL Cholesterol 04/25/2022 65  0 - 130 mg/dL Final    Comment:    LDL Guidelines:     <100    Desirable   100-129   Near to/above Desirable   130-159   Borderline      >159   Undesirable     Direct (measured) LDL and calculated LDL are not interchangeable tests.  Chol/HDL Ratio 04/25/2022 2.9  <5 Final            Triglycerides 04/25/2022 198* <150 mg/dL Final    Comment:    Triglyceride Guidelines:     <150   Desirable   150-199  Borderline   200-499  High     >499   Very high   Based on AHA Guidelines for fasting triglyceride, October 2012.  Hemoglobin A1C 04/25/2022 8.3* 4.0 - 6.0 % Final    Estimated Avg Glucose 04/25/2022 192  mg/dL Final    Comment: The ADA and AACC recommend providing the estimated average glucose result to permit better   patient understanding of their HBA1c result.       Glucose 04/25/2022 204* 70 - 99 mg/dL Final    BUN 04/25/2022 17  8 - 23 mg/dL Final    CREATININE 04/25/2022 0.77  0.50 - 0.90 mg/dL Final    Bun/Cre Ratio 04/25/2022 22* 9 - 20 Final    Calcium 04/25/2022 9.3  8.6 - 10.4 mg/dL Final    Sodium 04/25/2022 140  135 - 144 mmol/L Final    Potassium 04/25/2022 3.9  3.7 - 5.3 mmol/L Final    Chloride 04/25/2022 103  98 - 107 mmol/L Final    CO2 04/25/2022 27  20 - 31 mmol/L Final    Anion Gap 04/25/2022 10  9 - 17 mmol/L Final    Alkaline Phosphatase 04/25/2022 120* 35 - 104 U/L Final    ALT 04/25/2022 32  5 - 33 U/L Final    AST 04/25/2022 25  <32 U/L Final    Total Bilirubin 04/25/2022 0.34  0.3 - 1.2 mg/dL Final    Total Protein 04/25/2022 7.6  6.4 - 8.3 g/dL Final    Albumin 04/25/2022 4.7  3.5 - 5.2 g/dL Final    Albumin/Globulin Ratio 04/25/2022 1.6  1.0 - 2.5 Final    GFR Non- 04/25/2022 >60  >60 mL/min Final    GFR  04/25/2022 >60  >60 mL/min Final    GFR Comment 04/25/2022        Final    Comment: Average GFR for 79or more years old:   76 mL/min/1.73sq m  Chronic Kidney Disease:   <60 mL/min/1.73sq m  Kidney failure:   <15 mL/min/1.73sq m              eGFR calculated using average adult body mass. Additional eGFR calculator available at:        [email or web address]                On this date 4/27/2022 I have spent over 40 minutes reviewing previous notes, test results and face to face with the patient discussing the diagnosis and importance of compliance with the treatment plan as well as documenting on the day of the visit.       (Please note that portions of this note were completed with a voice-recognition program. Efforts were made to edit the dictation but occasionally words are mis-transcribed.)

## 2022-04-27 NOTE — PATIENT INSTRUCTIONS
Patient Education        Vertigo: Exercises  Introduction  Here are some examples of exercises for you to try. The exercises may be suggested for a condition or for rehabilitation. Start each exercise slowly. Ease off the exercises if you start to have pain. You will be told when to start these exercises and which ones will work bestfor you. How to do the exercises  Exercise 1    1. Stand with a chair in front of you and a wall behind you. If you begin to fall, you may use them for support. 2. Stand with your feet together and your arms at your sides. 3. Move your head up and down 10 times. Exercise 2    1. Move your head side to side 10 times. Exercise 3    1. Move your head diagonally up and down 10 times. Exercise 4    1. Move your head diagonally up and down 10 times on the other side. Follow-up care is a key part of your treatment and safety. Be sure to make and go to all appointments, and call your doctor if you are having problems. It's also a good idea to know your test results and keep alist of the medicines you take. Where can you learn more? Go to https://[email or web address]. org and sign in to your [employer] account. Enter F349 in the [employer] box to learn more about \"Vertigo: Exercises. \"     If you do not have an account, please click on the \"Sign Up Now\" link. Current as of: September 8, 2021               Content Version: 13.2  © 2006-2022 [employer]wise, Incorporated. Care instructions adapted under license by [facility] ([facility]). If you have questions about a medical condition or this instruction, always ask your healthcare professional. [place] [address] any warranty or liability for your use of this information.

## 2022-04-27 NOTE — TELEPHONE ENCOUNTER
Pt calling stating she went to the pharmacy to get the Rybelsus and pharmacy told pt the medication will cost her $400 after ins or using [facility] the price is still $400 and pt can't afford this, please advise.

## 2022-04-27 NOTE — TELEPHONE ENCOUNTER
Writer spoke to patient and patient does not want to try injectable medications at this time. Will be taking previous medication regimen. Appt for tomorrow cancelled and  3 month follow up appt scheduled and patient is aware to have fasting labs done prior.

## 2022-05-10 ENCOUNTER — TELEPHONE (OUTPATIENT)
Dept: FAMILY MEDICINE CLINIC | Age: 72
End: 2022-05-10

## 2022-05-12 ENCOUNTER — OFFICE VISIT (OUTPATIENT)
Dept: CARDIOLOGY | Age: 72
End: 2022-05-12
Payer: MEDICARE

## 2022-05-12 VITALS
SYSTOLIC BLOOD PRESSURE: 132 MMHG | DIASTOLIC BLOOD PRESSURE: 72 MMHG | HEIGHT: 57 IN | WEIGHT: 165.2 LBS | BODY MASS INDEX: 35.64 KG/M2 | HEART RATE: 78 BPM

## 2022-05-12 DIAGNOSIS — I25.10 CORONARY ARTERY DISEASE INVOLVING NATIVE CORONARY ARTERY OF NATIVE HEART WITHOUT ANGINA PECTORIS: Primary | ICD-10-CM

## 2022-05-12 PROCEDURE — 1123F ACP DISCUSS/DSCN MKR DOCD: CPT | Performed by: INTERNAL MEDICINE

## 2022-05-12 PROCEDURE — 4040F PNEUMOC VAC/ADMIN/RCVD: CPT | Performed by: INTERNAL MEDICINE

## 2022-05-12 PROCEDURE — G8399 PT W/DXA RESULTS DOCUMENT: HCPCS | Performed by: INTERNAL MEDICINE

## 2022-05-12 PROCEDURE — 1090F PRES/ABSN URINE INCON ASSESS: CPT | Performed by: INTERNAL MEDICINE

## 2022-05-12 PROCEDURE — G8427 DOCREV CUR MEDS BY ELIG CLIN: HCPCS | Performed by: INTERNAL MEDICINE

## 2022-05-12 PROCEDURE — 3017F COLORECTAL CA SCREEN DOC REV: CPT | Performed by: INTERNAL MEDICINE

## 2022-05-12 PROCEDURE — 99214 OFFICE O/P EST MOD 30 MIN: CPT | Performed by: INTERNAL MEDICINE

## 2022-05-12 PROCEDURE — G8417 CALC BMI ABV UP PARAM F/U: HCPCS | Performed by: INTERNAL MEDICINE

## 2022-05-12 PROCEDURE — 1036F TOBACCO NON-USER: CPT | Performed by: INTERNAL MEDICINE

## 2022-05-12 PROCEDURE — 99212 OFFICE O/P EST SF 10 MIN: CPT | Performed by: INTERNAL MEDICINE

## 2022-05-12 ASSESSMENT — ENCOUNTER SYMPTOMS
SHORTNESS OF BREATH: 1
BACK PAIN: 0
EYE DISCHARGE: 0
CHEST TIGHTNESS: 0
NAUSEA: 0
APNEA: 0
ABDOMINAL PAIN: 0
EYE ITCHING: 0
VOMITING: 0
ABDOMINAL DISTENTION: 0

## 2022-05-12 NOTE — PROGRESS NOTES
Today's Date: 2022  Patient's Name: Ifeoma Bartholomew  Patient's age: 66 y.o., 1950    Subjective: The patient is a 69y.o. year old, , female is in the office for CAD  2 weeks ago she started having dizziness/room spinning sensation, mainly on sudden turning of neck, was evaluated at urgent care, prescribed meclizine which she could not tolerate. The symptoms are now better. No orthostatic lightheadedness or dizziness. No syncope or near syncope. Lately she has been under a lot of stress due to her grandson being in snf. She reports an episode of right-sided chest pain which lasted 10 to 15 minutes and occurred 2 weeks ago while her  was at a .  This did not radiate to jaw or back. There was no recurrence of similar symptoms in the next 2 weeks. No significant dyspnea on exertion. Past Medical History:   has a past medical history of CAD (coronary artery disease), Cerebrovascular accident (CVA) due to thrombosis of precerebral artery ([address] History of seasonal allergies, Hyperlipidemia, Hypertension, Interstitial cystitis, Malignant neoplasm of lesser curvature of stomach, unspecified ([address] Obesity, Plantar fasciitis, bilateral, Polyneuropathy associated with underlying disease ([address] S/P drug eluting coronary stent placement-RCA 17 - Dr. Dee Dee Mclean, Seasonal allergies, Trigger finger, left, and Type 2 diabetes mellitus ([address] Past Surgical History:   has a past surgical history that includes Nasal septum surgery (3/9/2005); Cholecystectomy, laparoscopic (2000); Cystocopy (3/1996); Ovary removal (Right, 1993); laparoscopy (3/1979); Hysterectomy, total abdominal (); Colonoscopy (3/11/2015); Cataract removal with implant (Left, 10/20/2015); Cataract removal with implant (Right, 2015); Yag capsulotomy (Right, 2016); Cardiac catheterization (2017); Coronary angioplasty with stent (2017);  Appendectomy; eye surgery; pr knee scope,diagnostic (Right, 11/6/2018); Upper gastrointestinal endoscopy (N/A, 3/5/2020); Upper gastrointestinal endoscopy; Upper gastrointestinal endoscopy (05/13/2020); Endoscopic ultrasonography, GI (N/A, 5/13/2020); Upper gastrointestinal endoscopy (5/13/2020); Upper gastrointestinal endoscopy (5/13/2020); and Upper gastrointestinal endoscopy (5/13/2020). Home Medications:  Prior to Admission medications    Medication Sig Start Date End Date Taking? Authorizing Provider   fluticasone (VERAMYST) 27.5 MCG/SPRAY nasal spray 2 sprays by Each Nostril route as needed for Rhinitis   Yes Historical Provider, MD   Semaglutide (RYBELSUS) 3 MG TABS Take 3 mg by mouth daily 4/27/22  Yes Gilda Castillo MD   fluticasone (FLONASE) 50 MCG/ACT nasal spray 2 sprays by Each Nostril route daily 4/23/22  Yes Che Trejo DO   glimepiride (AMARYL) 1 MG tablet TAKE 1 TABLET BY MOUTH TWICE DAILY WITH MEALS 3/22/22  Yes Gilda Castillo MD   amLODIPine (NORVASC) 10 MG tablet Take 1 tablet by mouth once daily 1/10/22  Yes Gilda Castillo MD   isosorbide mononitrate (IMDUR) 30 MG extended release tablet Take 1 tablet by mouth once daily 1/10/22  Yes Andrew Beck MD   metFORMIN (GLUCOPHAGE) 500 MG tablet TAKE 1 TABLET BY MOUTH TWICE DAILY WITH MEALS 12/21/21  Yes Gilda Castillo MD   ezetimibe (ZETIA) 10 MG tablet Take 1 tablet by mouth daily 11/3/21  Yes Lucian Dawn MD   rosuvastatin (CRESTOR) 10 MG tablet Take 1 tablet by mouth daily 11/3/21  Yes Lucian Dawn MD   CINNAMON PO Take by mouth daily   Yes Historical Provider, MD   Diclofenac Sodium (VOLTAREN EX) Apply topically as needed   Yes Historical Provider, MD   nitroGLYCERIN (NITROSTAT) 0.4 MG SL tablet Place 1 tablet under the tongue every 5 minutes as needed for Chest pain up to max of 3 total doses.  If no relief after 1 dose, call 911. 1/22/21  Yes Rosalie Baron MD   ELDERBERRY PO Take by mouth Immune Plus tablets-Vit B, C, Zinc   Yes Historical Provider, MD   Ascorbic Acid (VITAMIN C PO) Take by mouth   Yes Historical Provider, MD   Multiple Vitamins-Minerals (MULTIVITAMIN PO) Take 1 tablet by mouth daily   Yes Historical Provider, MD   NONFORMULARY 5,000 mcg daily OTC Biotin 1000mcg daily    Yes Historical Provider, MD   aspirin 81 MG tablet Take 81 mg by mouth daily. Yes Historical Provider, MD       Allergies: Iv dye [iodides], Penicillins, Sulfa antibiotics, Ace inhibitors, Cozaar [losartan], Lipitor, Lopressor [metoprolol], and Morphine    Social History:   reports that she has never smoked. She has never used smokeless tobacco. She reports that she does not drink alcohol and does not use drugs. Review of Systems:  Review of Systems   Constitutional: Negative for chills, fatigue and fever. HENT: Negative for congestion and dental problem. Eyes: Negative for discharge and itching. Respiratory: Positive for shortness of breath. Negative for apnea and chest tightness. Cardiovascular: Negative for chest pain and palpitations. Gastrointestinal: Negative for abdominal distention, abdominal pain, nausea and vomiting. Endocrine: Negative for cold intolerance and heat intolerance. Musculoskeletal: Negative for arthralgias and back pain. Neurological: Negative for dizziness, syncope and facial asymmetry. Psychiatric/Behavioral: Negative for agitation and behavioral problems. Physical Exam:  /72   Pulse 78   Ht 4' 9\" (1.448 m)   Wt 165 lb 3.2 oz (74.9 kg)   LMP  (LMP Unknown)   BMI 35.75 kg/m²    Physical Exam  Constitutional:       Appearance: Normal appearance. She is normal weight. HENT:      Head: Normocephalic and atraumatic. Mouth/Throat:      Mouth: Mucous membranes are moist.   Cardiovascular:      Rate and Rhythm: Normal rate and regular rhythm. Pulses: Normal pulses. Heart sounds: Normal heart sounds. No murmur heard. Pulmonary:      Effort: Pulmonary effort is normal. No respiratory distress.       Breath sounds: Normal breath sounds. No stridor. Abdominal:      General: There is no distension. Palpations: There is no mass. Musculoskeletal:         General: No swelling or tenderness. Cervical back: No rigidity. Skin:     General: Skin is warm. Capillary Refill: Capillary refill takes less than 2 seconds. Coloration: Skin is not jaundiced. Neurological:      General: No focal deficit present. Mental Status: She is alert and oriented to person, place, and time. Psychiatric:         Mood and Affect: Mood normal.         Behavior: Behavior normal.         Cardiac Data:    ECG 4/27/2022 NSR, old inferior infarct, no new change    Nuclear stress test with lexiscan 4/29/21  Negative ecg portion   IMPRESSION:  1. No ischemia or infarction seen. 2. Normal left ventricular ejection fraction. Labs:     CBC: No results for input(s): WBC, HGB, HCT, PLT in the last 72 hours. BMP:No results for input(s): NA, K, CO2, BUN, CREATININE, LABGLOM, GLUCOSE in the last 72 hours. PT/INR: No results for input(s): PROTIME, INR in the last 72 hours. FASTING LIPID PANEL:  Lab Results   Component Value Date    HDL 54 04/25/2022    TRIG 198 04/25/2022     LIVER PROFILE:No results for input(s): AST, ALT, LABALBU in the last 72 hours. IMPRESSION:    Patient Active Problem List   Diagnosis    Mixed hyperlipidemia    Obesity (BMI 30-39. 9)    Interstitial cystitis    Trigger finger, left    High risk medication use    Vitamin D deficiency    Essential hypertension    Atypical chest pain    Hypokalemia    Paresthesias    Coronary artery disease involving native coronary artery of native heart without angina pectoris    Cerebrovascular accident (CVA) due to thrombosis of precerebral artery (HCC)    Right tibial neuropathy    Neuropathy of left peroneal nerve    H/O major orthopedic surgery    Peripheral vascular disease ([facility]    Hematemesis    Malignant neoplasm of lesser curvature of stomach, unspecified ([facility]    Type 2 diabetes mellitus       Assessment/Plan:  1. CAD s/p PCI of RCA in 6/2017. Stress test 04/2021 with no ischemia. Stable with no angina or chf.  Did have one episode of right-sided chest pain likely related to stress as described above. No recurrence. I advised the patient to keep a close eye. If the symptoms are recurrent, will repeat stress test.  Cont current medical therapy with asa, statin and Imdur. 2. Essential Hypertension. Well controlled. 3. HPL. LDL 65 and  in 04/2022. Maintained on crestor and Zetia. 4. DM II. Followed by PCP. 5. Echo 6/2017 EF 55%. Mild MR. Aggressive risk factor modification discussed with patient including diet and exercise.   Routine follow-up in 3 months or earlier if needed      Darron Rose MD [facility] Cardiology Consult           [phone]

## 2022-05-16 ENCOUNTER — HOSPITAL ENCOUNTER (OUTPATIENT)
Dept: NON INVASIVE DIAGNOSTICS | Age: 72
Discharge: HOME | End: 2022-05-16
Payer: MEDICARE

## 2022-05-16 DIAGNOSIS — R42 DIZZINESS: ICD-10-CM

## 2022-05-16 PROCEDURE — 93226 XTRNL ECG REC<48 HR SCAN A/R: CPT

## 2022-05-16 PROCEDURE — 93225 XTRNL ECG REC<48 HRS REC: CPT

## 2022-05-17 ENCOUNTER — HOSPITAL ENCOUNTER (OUTPATIENT)
Dept: NON INVASIVE DIAGNOSTICS | Age: 72
Discharge: HOME | End: 2022-05-17

## 2022-05-19 ENCOUNTER — TELEPHONE (OUTPATIENT)
Dept: FAMILY MEDICINE CLINIC | Age: 72
End: 2022-05-19

## 2022-05-19 NOTE — TELEPHONE ENCOUNTER
----- Message from Nila Tinajero MD sent at 5/19/2022  2:11 PM EDT -----  Please advise the patient that the Holter monitor showed no significant abnormal rhythm.

## 2022-06-09 ENCOUNTER — TELEPHONE (OUTPATIENT)
Dept: FAMILY MEDICINE CLINIC | Age: 72
End: 2022-06-09

## 2022-06-21 DIAGNOSIS — E11.9 TYPE 2 DIABETES MELLITUS WITHOUT COMPLICATION, WITHOUT LONG-TERM CURRENT USE OF INSULIN (HCC): ICD-10-CM

## 2022-06-21 RX ORDER — GLIMEPIRIDE 1 MG/1
TABLET ORAL
Qty: 180 TABLET | Refills: 0 | Status: SHIPPED | OUTPATIENT
Start: 2022-06-21 | End: 2022-07-28 | Stop reason: SDUPTHER

## 2022-06-21 NOTE — TELEPHONE ENCOUNTER
Kika called requesting a refill of the below medication which has been pended for you:     Requested Prescriptions     Pending Prescriptions Disp Refills    glimepiride (AMARYL) 1 MG tablet [Pharmacy Med Name: Glimepiride 1 MG Oral Tablet] 180 tablet 0     Sig: TAKE 1 TABLET BY MOUTH TWICE DAILY WITH MEALS       Last Appointment Date: 4/27/2022  Next Appointment Date: 7/28/2022    Allergies   Allergen Reactions    Iv Dye [Iodides] Shortness Of Breath    Penicillins Swelling    Sulfa Antibiotics Shortness Of Breath    Ace Inhibitors Nausea Only     Difficulty swallowing the Lisinopril. Gagging. Nausea.  Cozaar [Losartan] Nausea Only    Lipitor Other (See Comments)     Muscle aches.     Lopressor [Metoprolol] Other (See Comments)     leg pain    Morphine Nausea Only     Chest pressure

## 2022-07-05 DIAGNOSIS — I10 ESSENTIAL HYPERTENSION: ICD-10-CM

## 2022-07-06 RX ORDER — AMLODIPINE BESYLATE 10 MG/1
TABLET ORAL
Qty: 30 TABLET | Refills: 0 | Status: SHIPPED | OUTPATIENT
Start: 2022-07-06 | End: 2022-07-28 | Stop reason: SDUPTHER

## 2022-07-06 NOTE — TELEPHONE ENCOUNTER
Kika called requesting a refill of the below medication which has been pended for you:     Requested Prescriptions     Pending Prescriptions Disp Refills    amLODIPine (NORVASC) 10 MG tablet [Pharmacy Med Name: amLODIPine Besylate 10 MG Oral Tablet] 30 tablet 0     Sig: Take 1 tablet by mouth once daily       Last Appointment Date: 4/27/2022  Next Appointment Date: 7/28/2022    Allergies   Allergen Reactions    Iv Dye [Iodides] Shortness Of Breath    Penicillins Swelling    Sulfa Antibiotics Shortness Of Breath    Ace Inhibitors Nausea Only     Difficulty swallowing the Lisinopril. Gagging. Nausea.  Cozaar [Losartan] Nausea Only    Lipitor Other (See Comments)     Muscle aches.     Lopressor [Metoprolol] Other (See Comments)     leg pain    Morphine Nausea Only     Chest pressure

## 2022-07-26 ENCOUNTER — HOSPITAL ENCOUNTER (OUTPATIENT)
Dept: LAB | Age: 72
Discharge: HOME | End: 2022-07-26
Payer: MEDICARE

## 2022-07-26 DIAGNOSIS — I10 ESSENTIAL HYPERTENSION: ICD-10-CM

## 2022-07-26 DIAGNOSIS — E78.2 MIXED HYPERLIPIDEMIA: ICD-10-CM

## 2022-07-26 DIAGNOSIS — E11.65 TYPE 2 DIABETES MELLITUS WITH HYPERGLYCEMIA, WITHOUT LONG-TERM CURRENT USE OF INSULIN (HCC): ICD-10-CM

## 2022-07-26 DIAGNOSIS — E53.8 VITAMIN B12 DEFICIENCY: ICD-10-CM

## 2022-07-26 LAB
ALBUMIN SERPL-MCNC: 4.5 G/DL (ref 3.5–5.2)
ALBUMIN/GLOBULIN RATIO: 1.5 (ref 1–2.5)
ALP BLD-CCNC: 113 U/L (ref 35–104)
ALT SERPL-CCNC: 32 U/L (ref 5–33)
ANION GAP SERPL CALCULATED.3IONS-SCNC: 12 MMOL/L (ref 9–17)
AST SERPL-CCNC: 25 U/L
BILIRUB SERPL-MCNC: 0.3 MG/DL (ref 0.3–1.2)
BUN BLDV-MCNC: 11 MG/DL (ref 8–23)
BUN/CREAT BLD: 19 (ref 9–20)
CALCIUM SERPL-MCNC: 9.7 MG/DL (ref 8.6–10.4)
CHLORIDE BLD-SCNC: 100 MMOL/L (ref 98–107)
CHOLESTEROL/HDL RATIO: 2.7
CHOLESTEROL: 139 MG/DL
CO2: 27 MMOL/L (ref 20–31)
CREAT SERPL-MCNC: 0.59 MG/DL (ref 0.5–0.9)
ESTIMATED AVERAGE GLUCOSE: 203 MG/DL
GFR AFRICAN AMERICAN: >60 ML/MIN
GFR NON-AFRICAN AMERICAN: >60 ML/MIN
GFR SERPL CREATININE-BSD FRML MDRD: ABNORMAL ML/MIN/{1.73_M2}
GLUCOSE BLD-MCNC: 173 MG/DL (ref 70–99)
HBA1C MFR BLD: 8.7 % (ref 4–6)
HDLC SERPL-MCNC: 51 MG/DL
LDL CHOLESTEROL: 61 MG/DL (ref 0–130)
POTASSIUM SERPL-SCNC: 3.9 MMOL/L (ref 3.7–5.3)
SODIUM BLD-SCNC: 139 MMOL/L (ref 135–144)
TOTAL PROTEIN: 7.5 G/DL (ref 6.4–8.3)
TRIGL SERPL-MCNC: 135 MG/DL
VITAMIN B-12: 323 PG/ML (ref 232–1245)

## 2022-07-26 PROCEDURE — 80053 COMPREHEN METABOLIC PANEL: CPT

## 2022-07-26 PROCEDURE — 36415 COLL VENOUS BLD VENIPUNCTURE: CPT

## 2022-07-26 PROCEDURE — 80061 LIPID PANEL: CPT

## 2022-07-26 PROCEDURE — 82607 VITAMIN B-12: CPT

## 2022-07-26 PROCEDURE — 83036 HEMOGLOBIN GLYCOSYLATED A1C: CPT

## 2022-07-28 ENCOUNTER — OFFICE VISIT (OUTPATIENT)
Dept: FAMILY MEDICINE CLINIC | Age: 72
End: 2022-07-28
Payer: MEDICARE

## 2022-07-28 ENCOUNTER — HOSPITAL ENCOUNTER (OUTPATIENT)
Dept: LAB | Age: 72
Discharge: HOME | End: 2022-07-28
Payer: MEDICARE

## 2022-07-28 VITALS
HEART RATE: 74 BPM | DIASTOLIC BLOOD PRESSURE: 60 MMHG | HEIGHT: 57 IN | SYSTOLIC BLOOD PRESSURE: 124 MMHG | TEMPERATURE: 97.7 F | BODY MASS INDEX: 36.2 KG/M2 | WEIGHT: 167.8 LBS | OXYGEN SATURATION: 97 %

## 2022-07-28 DIAGNOSIS — Z12.31 BREAST CANCER SCREENING BY MAMMOGRAM: ICD-10-CM

## 2022-07-28 DIAGNOSIS — G47.62 NOCTURNAL LEG CRAMPS: ICD-10-CM

## 2022-07-28 DIAGNOSIS — E11.69 TYPE 2 DIABETES MELLITUS WITH OTHER SPECIFIED COMPLICATION, WITHOUT LONG-TERM CURRENT USE OF INSULIN (HCC): ICD-10-CM

## 2022-07-28 DIAGNOSIS — E55.9 VITAMIN D DEFICIENCY: ICD-10-CM

## 2022-07-28 DIAGNOSIS — E11.9 TYPE 2 DIABETES MELLITUS WITHOUT COMPLICATION, WITHOUT LONG-TERM CURRENT USE OF INSULIN (HCC): Primary | ICD-10-CM

## 2022-07-28 DIAGNOSIS — I10 ESSENTIAL HYPERTENSION: ICD-10-CM

## 2022-07-28 DIAGNOSIS — E78.2 MIXED HYPERLIPIDEMIA: ICD-10-CM

## 2022-07-28 LAB
ABSOLUTE EOS #: 0.1 K/UL (ref 0–0.44)
ABSOLUTE IMMATURE GRANULOCYTE: 0.04 K/UL (ref 0–0.3)
ABSOLUTE LYMPH #: 3.89 K/UL (ref 1.1–3.7)
ABSOLUTE MONO #: 0.54 K/UL (ref 0.1–1.2)
BASOPHILS # BLD: 0 % (ref 0–2)
BASOPHILS ABSOLUTE: 0.03 K/UL (ref 0–0.2)
EOSINOPHILS RELATIVE PERCENT: 1 % (ref 1–4)
HCT VFR BLD CALC: 37 % (ref 36.3–47.1)
HEMOGLOBIN: 12.4 G/DL (ref 11.9–15.1)
IMMATURE GRANULOCYTES: 0 %
IRON SATURATION: 14 % (ref 20–55)
IRON: 55 UG/DL (ref 37–145)
LYMPHOCYTES # BLD: 41 % (ref 24–43)
MCH RBC QN AUTO: 26.4 PG (ref 25.2–33.5)
MCHC RBC AUTO-ENTMCNC: 33.5 G/DL (ref 25.2–33.5)
MCV RBC AUTO: 78.9 FL (ref 82.6–102.9)
MONOCYTES # BLD: 6 % (ref 3–12)
NRBC AUTOMATED: 0 PER 100 WBC
PDW BLD-RTO: 14.5 % (ref 11.8–14.4)
PLATELET # BLD: 252 K/UL (ref 138–453)
PMV BLD AUTO: 11.1 FL (ref 8.1–13.5)
RBC # BLD: 4.69 M/UL (ref 3.95–5.11)
RBC # BLD: ABNORMAL 10*6/UL
SEG NEUTROPHILS: 52 % (ref 36–65)
SEGMENTED NEUTROPHILS ABSOLUTE COUNT: 4.86 K/UL (ref 1.5–8.1)
TOTAL IRON BINDING CAPACITY: 381 UG/DL (ref 250–450)
UNSATURATED IRON BINDING CAPACITY: 326 UG/DL (ref 112–347)
WBC # BLD: 9.5 K/UL (ref 3.5–11.3)

## 2022-07-28 PROCEDURE — 1090F PRES/ABSN URINE INCON ASSESS: CPT | Performed by: FAMILY MEDICINE

## 2022-07-28 PROCEDURE — 3052F HG A1C>EQUAL 8.0%<EQUAL 9.0%: CPT | Performed by: FAMILY MEDICINE

## 2022-07-28 PROCEDURE — G8427 DOCREV CUR MEDS BY ELIG CLIN: HCPCS | Performed by: FAMILY MEDICINE

## 2022-07-28 PROCEDURE — 85025 COMPLETE CBC W/AUTO DIFF WBC: CPT

## 2022-07-28 PROCEDURE — 99213 OFFICE O/P EST LOW 20 MIN: CPT

## 2022-07-28 PROCEDURE — 1036F TOBACCO NON-USER: CPT | Performed by: FAMILY MEDICINE

## 2022-07-28 PROCEDURE — 83550 IRON BINDING TEST: CPT

## 2022-07-28 PROCEDURE — 2022F DILAT RTA XM EVC RTNOPTHY: CPT | Performed by: FAMILY MEDICINE

## 2022-07-28 PROCEDURE — 99215 OFFICE O/P EST HI 40 MIN: CPT | Performed by: FAMILY MEDICINE

## 2022-07-28 PROCEDURE — G8399 PT W/DXA RESULTS DOCUMENT: HCPCS | Performed by: FAMILY MEDICINE

## 2022-07-28 PROCEDURE — 83540 ASSAY OF IRON: CPT

## 2022-07-28 PROCEDURE — G8417 CALC BMI ABV UP PARAM F/U: HCPCS | Performed by: FAMILY MEDICINE

## 2022-07-28 PROCEDURE — 36415 COLL VENOUS BLD VENIPUNCTURE: CPT

## 2022-07-28 PROCEDURE — 3017F COLORECTAL CA SCREEN DOC REV: CPT | Performed by: FAMILY MEDICINE

## 2022-07-28 PROCEDURE — 1123F ACP DISCUSS/DSCN MKR DOCD: CPT | Performed by: FAMILY MEDICINE

## 2022-07-28 RX ORDER — ORAL SEMAGLUTIDE 3 MG/1
3 TABLET ORAL DAILY
Qty: 30 TABLET | Refills: 1
Start: 2022-07-28 | End: 2022-11-01

## 2022-07-28 RX ORDER — GLIMEPIRIDE 1 MG/1
1 TABLET ORAL DAILY
Qty: 90 TABLET | Refills: 1 | Status: SHIPPED | OUTPATIENT
Start: 2022-07-28 | End: 2022-11-01 | Stop reason: SDUPTHER

## 2022-07-28 RX ORDER — AMLODIPINE BESYLATE 10 MG/1
TABLET ORAL
Qty: 90 TABLET | Refills: 1 | Status: SHIPPED | OUTPATIENT
Start: 2022-07-28 | End: 2022-11-01 | Stop reason: SDUPTHER

## 2022-07-28 NOTE — PROGRESS NOTES
Patient no longer wants to take B12 via injection. Started taking oral supplement. Unsure of the dosage. Would like recommendation if she should continue to take oral and if so which dose. Declines Shingles, Tdap  Will be going to get 2nd covid booster  Agreeable to mammogram.    Reports that she had her MAW done through someone else already. Had last 2 months ago.

## 2022-07-28 NOTE — PROGRESS NOTES
[facility] [address], [address]                        Telephone [phone]             Fax [phone]       Jeff Weeks  :  1950  Age:  66 y.o. MRN:  [id number]  Date of visit:  2022       Assessment and Plan:    1. Type 2 diabetes mellitus without complication, without long-term current use of insulin (HCC)  Her HgbA1c was 8.7 %, which is not at goal and worsening. At her last visit with me on 2022, she was advised to begin Rybelsus 3 mg daily. She was unable to fill the prescription due to cost.    We were able to provide her with samples today:  - Semaglutide (RYBELSUS) 3 MG TABS; Take 3 mg by mouth daily  Dispense: 30 tablet; Refill: 1    She was advised to decrease her dose of Amaryl from 1 mg BID to 1 mg daily:  - glimepiride (AMARYL) 1 MG tablet; Take 1 tablet by mouth in the morning. Dispense: 90 tablet; Refill: 1    Metformin was also refilled:  - metFORMIN (GLUCOPHAGE) 500 MG tablet; TAKE 1 TABLET BY MOUTH TWICE DAILY WITH MEALS  Dispense: 180 tablet; Refill: 1    Diabetes education was recommended and declined. - Hemoglobin A1C; Future was ordered to be done prior to her return visit in 3 months. 2. Essential hypertension  Her blood pressure is adequately-controlled today. (BP: 124/60)   She was advised to continue current medications. Amlodipine was refilled:   - amLODIPine (NORVASC) 10 MG tablet; Take 1 tablet by mouth once daily  Dispense: 90 tablet; Refill: 1    3. Mixed hyperlipidemia  Her lipid profile was excellent on her recent lab work. She is tolerating Crestor well. She states that she does not need a refill today. - Comprehensive Metabolic Panel; Future  - Lipid Panel; Future    4. Vitamin D deficiency  Her 25-hydroxy Vitamin D level was within normal limits (59.7 ng/mL) on 10/22/2021.   She was advised to continue with her current dose of Vitamin D. - Vitamin D 25 Hydroxy; Future was ordered to be done prior to her return visit in 3 months. 5. Nocturnal leg cramps  6. Type 2 diabetes mellitus with other specified complication, without long-term current use of insulin (HCC)   Labs were ordered to be done today:  - CBC with Auto Differential; Future  - Iron and TIBC; Future    She will be contacted when the results are available. She was advised to maintain adequate hydration, and to remain active. Printed information regarding Nighttime Leg Cramps was provided to the patient with the after visit summary. 7.  Skin changes  Acanthosis nigricans vs. other  I recommended referral to dermatology. She declined. 8. Routine health maintenance  Health maintenance was reviewed with the patient. She has received three doses of the Pfizer Covid-19 vaccine. She was advised to receive additional dose of a Covid vaccine. She was also advised that there is not a consensus opinion regarding getting another dose now, or waiting for a vaccine this fall that may have improved protection against variants. Annual influenza vaccine was recommended. Shingrix and Tdap were recommended and declined due to cost.   All other health maintenance, including Pneumovax and Prevnar-13, is up to date at this time. Follow up instructions were given to the patient:  Return in about 3 months (around 10/28/2022) for diabetes, hypertension, hyperlipidemia. Subjective:    Merla Meigs is a 66 y.o. female who presents to [place] [id number] today (7/28/2022) for follow up/evaluation of:  3 Month Follow-Up, Diabetes (Patient reports that she checks blood sugars regularly. Patient reports improvement since she started walking regularly.), Spasms (Patient reports that she has been getting muscle spasms in both legs. Patient reports this is a chronic intermittent problem, but have been having more frequently.  Occurs mostly at night. Rate pain 10/10 when having a spasm. ), and Other (Patient reports dark spots on skin that she started noticing over the last year that she feels has been worsening. )      She reports that she has been having intermittent leg cramps. She has otherwise been feeling well. She is tolerating her medications well. She was not able to fill the prescription for Rybelsus due to cost.  She has been walking for exercise. She has noticed some improvement in her home glucose readings since she began walking. She also has some concerns regarding dark spots on her neck, chest, and back. She states that the lesions are not itchy or painful. She has received three doses of the Pfizer Covid-19 vaccine. The most recent dose was 10/4/2021. Immunization history was reviewed:  Immunization History   Administered Date(s) Administered    COVID-19, PFIZER PURPLE top, DILUTE for use, (age 15 y+), 30mcg/0.3mL 02/04/2021, 03/04/2021, 10/04/2021    DT (pediatric) 10/19/2000    Influenza, High Dose (Fluzone 65 yrs and older) 10/26/2017, 09/06/2018    Influenza, Quadv, adjuvanted, 65 yrs +, IM, PF (Fluad) 09/21/2020, 09/15/2021    Influenza, Triv, inactivated, subunit, adjuvanted, IM (Fluad 65 yrs and older) 10/11/2019    Pneumococcal Conjugate 13-valent ([id number]) 04/19/2017    Pneumococcal Polysaccharide ([id number]) 04/19/2018          She has the following problem list:  Patient Active Problem List   Diagnosis    Mixed hyperlipidemia    Obesity (BMI 30-39. 9)    Interstitial cystitis    Trigger finger, left    High risk medication use    Vitamin D deficiency    Essential hypertension    Atypical chest pain    Hypokalemia    Paresthesias    Coronary artery disease involving native coronary artery of native heart without angina pectoris    Cerebrovascular accident (CVA) due to thrombosis of precerebral artery (HCC)    Right tibial neuropathy    Neuropathy of left peroneal nerve    H/O major orthopedic surgery Peripheral vascular disease ([facility] [address]    Hematemesis    Malignant neoplasm of lesser curvature of stomach, unspecified ([facility] [address]    Type 2 diabetes mellitus        Current medications are:  Outpatient Medications Marked as Taking for the 7/28/22 encounter (Office Visit) with Sarah Duke MD   Medication Sig Dispense Refill    NONFORMULARY Tumeric daily      amLODIPine (NORVASC) 10 MG tablet Take 1 tablet by mouth once daily 30 tablet 0    glimepiride (AMARYL) 1 MG tablet TAKE 1 TABLET BY MOUTH TWICE DAILY WITH MEALS 180 tablet 0    fluticasone (VERAMYST) 27.5 MCG/SPRAY nasal spray 2 sprays by Each Nostril route as needed for Rhinitis      fluticasone (FLONASE) 50 MCG/ACT nasal spray 2 sprays by Each Nostril route daily (Patient taking differently: 2 sprays by Each Nostril route as needed) 16 g 5    isosorbide mononitrate (IMDUR) 30 MG extended release tablet Take 1 tablet by mouth once daily 90 tablet 2    metFORMIN (GLUCOPHAGE) 500 MG tablet TAKE 1 TABLET BY MOUTH TWICE DAILY WITH MEALS 180 tablet 1    ezetimibe (ZETIA) 10 MG tablet Take 1 tablet by mouth daily 90 tablet 3    rosuvastatin (CRESTOR) 10 MG tablet Take 1 tablet by mouth daily 90 tablet 3    CINNAMON PO Take by mouth daily      nitroGLYCERIN (NITROSTAT) 0.4 MG SL tablet Place 1 tablet under the tongue every 5 minutes as needed for Chest pain up to max of 3 total doses. If no relief after 1 dose, call 911. 25 tablet 1    ELDERBERRY PO Take by mouth Immune Plus tablets-Vit B, C, Zinc      Ascorbic Acid (VITAMIN C PO) Take by mouth      Multiple Vitamins-Minerals (MULTIVITAMIN PO) Take 1 tablet by mouth daily      NONFORMULARY 5,000 mcg daily OTC Biotin 1000mcg daily       aspirin 81 MG tablet Take 81 mg by mouth daily. She is allergic to iv dye [iodides], penicillins, sulfa antibiotics, ace inhibitors, cozaar [losartan], lipitor, lopressor [metoprolol], and morphine. She  reports that she has never smoked.  She has never used smokeless tobacco.      Objective:    Vitals:    07/28/22 1430   BP: 124/60   Site: Right Upper Arm   Position: Sitting   Cuff Size: Large Adult   Pulse: 74   Temp: 97.7 °F (36.5 °C)   TempSrc: Temporal   SpO2: 97%   Weight: 167 lb 12.8 oz (76.1 kg)   Height: 4' 9\" (1.448 m)     Body mass index is 36.31 kg/m². Obese female, healthy-appearing, alert, cooperative, and in no acute distress. Neck supple. No adenopathy. Thyroid symmetric, normal size. Chest:  Normal expansion. Clear to auscultation. No rales, rhonchi, or wheezing. Heart sounds are normal.  Regular rate and rhythm without murmur, gallop or rub. Lower extremities have no edema. There are scattered hyperpigmented macules on the neck, chest, back, and arms. Results of labs done 7/26/2022 were reviewed with the patient:   Hospital Outpatient Visit on 07/26/2022   Component Date Value Ref Range Status    Vitamin B-12 07/26/2022 323  232 - 1245 pg/mL Final    Cholesterol 07/26/2022 139  <200 mg/dL Final    Comment:    Cholesterol Guidelines:      <200  Desirable   200-240  Borderline      >240  Undesirable         HDL 07/26/2022 51  >40 mg/dL Final    Comment:    HDL Guidelines:    <40     Undesirable   40-59    Borderline    >59     Desirable         LDL Cholesterol 07/26/2022 61  0 - 130 mg/dL Final    Comment:    LDL Guidelines:     <100    Desirable   100-129   Near to/above Desirable   130-159   Borderline      >159   Undesirable     Direct (measured) LDL and calculated LDL are not interchangeable tests. Chol/HDL Ratio 07/26/2022 2.7  <5 Final            Triglycerides 07/26/2022 135  <150 mg/dL Final    Comment:    Triglyceride Guidelines:     <150   Desirable   150-199  Borderline   200-499  High     >499   Very high   Based on AHA Guidelines for fasting triglyceride, October 2012.          Hemoglobin A1C 07/26/2022 8.7 (A) 4.0 - 6.0 % Final    Estimated Avg Glucose 07/26/2022 203  mg/dL Final    Comment: The ADA and AACC recommend providing the estimated average glucose result to permit better   patient understanding of their HBA1c result. Glucose 07/26/2022 173 (A) 70 - 99 mg/dL Final    BUN 07/26/2022 11  8 - 23 mg/dL Final    Creatinine 07/26/2022 0.59  0.50 - 0.90 mg/dL Final    Bun/Cre Ratio 07/26/2022 19  9 - 20 Final    Calcium 07/26/2022 9.7  8.6 - 10.4 mg/dL Final    Sodium 07/26/2022 139  135 - 144 mmol/L Final    Potassium 07/26/2022 3.9  3.7 - 5.3 mmol/L Final    Chloride 07/26/2022 100  98 - 107 mmol/L Final    CO2 07/26/2022 27  20 - 31 mmol/L Final    Anion Gap 07/26/2022 12  9 - 17 mmol/L Final    Alkaline Phosphatase 07/26/2022 113 (A) 35 - 104 U/L Final    ALT 07/26/2022 32  5 - 33 U/L Final    AST 07/26/2022 25  <32 U/L Final    Total Bilirubin 07/26/2022 0.30  0.3 - 1.2 mg/dL Final    Total Protein 07/26/2022 7.5  6.4 - 8.3 g/dL Final    Albumin 07/26/2022 4.5  3.5 - 5.2 g/dL Final    Albumin/Globulin Ratio 07/26/2022 1.5  1.0 - 2.5 Final    GFR Non- 07/26/2022 >60  >60 mL/min Final    GFR  07/26/2022 >60  >60 mL/min Final    GFR Comment 07/26/2022        Final    Comment: Average GFR for 79or more years old:   76 mL/min/1.73sq m  Chronic Kidney Disease:   <60 mL/min/1.73sq m  Kidney failure:   <15 mL/min/1.73sq m              eGFR calculated using average adult body mass. Additional eGFR calculator available at:        [email or web address]               On this date 7/28/2022 I have spent over 60 minutes reviewing previous notes, test results and face to face with the patient discussing the diagnosis and importance of compliance with the treatment plan as well as documenting on the day of the visit.         (Please note that portions of this note were completed with a voice-recognition program. Efforts were made to edit the dictation but occasionally words are mis-transcribed.)

## 2022-08-11 ENCOUNTER — OFFICE VISIT (OUTPATIENT)
Dept: CARDIOLOGY | Age: 72
End: 2022-08-11
Payer: MEDICARE

## 2022-08-11 VITALS
WEIGHT: 164 LBS | OXYGEN SATURATION: 98 % | HEIGHT: 57 IN | DIASTOLIC BLOOD PRESSURE: 76 MMHG | HEART RATE: 85 BPM | BODY MASS INDEX: 35.38 KG/M2 | SYSTOLIC BLOOD PRESSURE: 136 MMHG

## 2022-08-11 DIAGNOSIS — I73.9 CLAUDICATION (HCC): Primary | ICD-10-CM

## 2022-08-11 PROCEDURE — 1090F PRES/ABSN URINE INCON ASSESS: CPT | Performed by: INTERNAL MEDICINE

## 2022-08-11 PROCEDURE — G8399 PT W/DXA RESULTS DOCUMENT: HCPCS | Performed by: INTERNAL MEDICINE

## 2022-08-11 PROCEDURE — 99213 OFFICE O/P EST LOW 20 MIN: CPT

## 2022-08-11 PROCEDURE — 1123F ACP DISCUSS/DSCN MKR DOCD: CPT | Performed by: INTERNAL MEDICINE

## 2022-08-11 PROCEDURE — G8417 CALC BMI ABV UP PARAM F/U: HCPCS | Performed by: INTERNAL MEDICINE

## 2022-08-11 PROCEDURE — G8427 DOCREV CUR MEDS BY ELIG CLIN: HCPCS | Performed by: INTERNAL MEDICINE

## 2022-08-11 PROCEDURE — 99214 OFFICE O/P EST MOD 30 MIN: CPT | Performed by: INTERNAL MEDICINE

## 2022-08-11 PROCEDURE — 3017F COLORECTAL CA SCREEN DOC REV: CPT | Performed by: INTERNAL MEDICINE

## 2022-08-11 PROCEDURE — 1036F TOBACCO NON-USER: CPT | Performed by: INTERNAL MEDICINE

## 2022-08-11 ASSESSMENT — ENCOUNTER SYMPTOMS
ABDOMINAL DISTENTION: 0
CHEST TIGHTNESS: 0
APNEA: 0
SHORTNESS OF BREATH: 1
VOMITING: 0
EYE DISCHARGE: 0
EYE ITCHING: 0
NAUSEA: 0
ABDOMINAL PAIN: 0
BACK PAIN: 0

## 2022-08-11 NOTE — PROGRESS NOTES
Today's Date: 8/11/2022  Patient's Name: Jefe Chance  Patient's age: 66 y.o., 1950    Subjective: The patient is a 69y.o. year old, , female is in the office for CAD  Stable from cardiac standpoint. No further episodes of chest pain or angina. No dyspnea on exertion. Reports bilateral lower extremity cramping and pain which is primarily at rest however occurs on exertion on the right side. No orthopnea or lower extremity edema. No decline in functional capacity. 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 Gordon Brock, Seasonal allergies, Trigger finger, left, and Type 2 diabetes mellitus ([address] Past Surgical History:   has a past surgical history that includes Nasal septum surgery (3/9/2005); Cholecystectomy, laparoscopic (6/9/2000); Cystocopy (3/1996); Ovary removal (Right, 7/1993); laparoscopy (3/1979); Hysterectomy, total abdominal (1980); Colonoscopy (3/11/2015); Cataract removal with implant (Left, 10/20/2015); Cataract removal with implant (Right, 12/08/2015); Yag capsulotomy (Right, 04/2016); Cardiac catheterization (06/20/2017); Coronary angioplasty with stent (06/20/2017); Appendectomy; eye surgery; pr knee scope,diagnostic (Right, 11/6/2018); Upper gastrointestinal endoscopy (N/A, 3/5/2020); Upper gastrointestinal endoscopy; Upper gastrointestinal endoscopy (05/13/2020); Endoscopic ultrasonography, GI (N/A, 5/13/2020); Upper gastrointestinal endoscopy (5/13/2020);  Upper gastrointestinal endoscopy (5/13/2020); and Upper gastrointestinal endoscopy (5/13/2020). Home Medications:  Prior to Admission medications    Medication Sig Start Date End Date Taking? Authorizing Provider   NONFORMULARY Tumeric daily   Yes Historical Provider, MD   amLODIPine (NORVASC) 10 MG tablet Take 1 tablet by mouth once daily 7/28/22  Yes Sandra Payne MD   glimepiride (AMARYL) 1 MG tablet Take 1 tablet by mouth in the morning. 7/28/22  Yes Sandra Payne MD   metFORMIN (GLUCOPHAGE) 500 MG tablet TAKE 1 TABLET BY MOUTH TWICE DAILY WITH MEALS 7/28/22  Yes Sandra Payne MD   Semaglutide (RYBELSUS) 3 MG TABS Take 3 mg by mouth daily 7/28/22  Yes Sandra Payne MD   fluticasone (VERAMYST) 27.5 MCG/SPRAY nasal spray 2 sprays by Each Nostril route as needed for Rhinitis   Yes Historical Provider, MD   fluticasone (FLONASE) 50 MCG/ACT nasal spray 2 sprays by Each Nostril route daily  Patient taking differently: 2 sprays by Each Nostril route as needed 4/23/22  Yes Rowdy Martins DO   isosorbide mononitrate (IMDUR) 30 MG extended release tablet Take 1 tablet by mouth once daily 1/10/22  Yes Hellen Herrera MD   ezetimibe (ZETIA) 10 MG tablet Take 1 tablet by mouth daily 11/3/21  Yes Velasquez Chin MD   rosuvastatin (CRESTOR) 10 MG tablet Take 1 tablet by mouth daily 11/3/21  Yes Velasquez Chin MD   CINNAMON PO Take by mouth daily   Yes Historical Provider, MD   nitroGLYCERIN (NITROSTAT) 0.4 MG SL tablet Place 1 tablet under the tongue every 5 minutes as needed for Chest pain up to max of 3 total doses. If no relief after 1 dose, call 911. 1/22/21  Yes Akira Lawrence MD   ELDERBERRY PO Take by mouth Immune Plus tablets-Vit B, C, Zinc   Yes Historical Provider, MD   Ascorbic Acid (VITAMIN C PO) Take by mouth   Yes Historical Provider, MD   Multiple Vitamins-Minerals (MULTIVITAMIN PO) Take 1 tablet by mouth daily   Yes Historical Provider, MD   NONFORMULARY 5,000 mcg daily OTC Biotin 1000mcg daily    Yes Historical Provider, MD   aspirin 81 MG tablet Take 81 mg by mouth daily.    Yes Historical Provider, MD       Allergies: Iv dye [iodides], Penicillins, Sulfa antibiotics, Ace inhibitors, Cozaar [losartan], Lipitor, Lopressor [metoprolol], and Morphine    Social History:   reports that she has never smoked. She has never used smokeless tobacco. She reports that she does not drink alcohol and does not use drugs. Review of Systems:  Review of Systems   Constitutional:  Negative for chills, fatigue and fever. HENT:  Negative for congestion and dental problem. Eyes:  Negative for discharge and itching. Respiratory:  Positive for shortness of breath. Negative for apnea and chest tightness. Cardiovascular:  Negative for chest pain and palpitations. Gastrointestinal:  Negative for abdominal distention, abdominal pain, nausea and vomiting. Endocrine: Negative for cold intolerance and heat intolerance. Musculoskeletal:  Negative for arthralgias and back pain. Neurological:  Negative for dizziness, syncope and facial asymmetry. Psychiatric/Behavioral:  Negative for agitation and behavioral problems. Physical Exam:  /76   Pulse 85   Ht 4' 9\" (1.448 m)   Wt 164 lb (74.4 kg)   LMP  (LMP Unknown)   SpO2 98%   BMI 35.49 kg/m²    Physical Exam  Constitutional:       Appearance: Normal appearance. She is normal weight. HENT:      Head: Normocephalic and atraumatic. Mouth/Throat:      Mouth: Mucous membranes are moist.   Cardiovascular:      Rate and Rhythm: Normal rate and regular rhythm. Pulses: Normal pulses. Heart sounds: Normal heart sounds. No murmur heard. Pulmonary:      Effort: Pulmonary effort is normal. No respiratory distress. Breath sounds: Normal breath sounds. No stridor. Abdominal:      General: There is no distension. Palpations: There is no mass. Musculoskeletal:         General: No swelling or tenderness. Cervical back: No rigidity. Skin:     General: Skin is warm.       Capillary Refill: Capillary refill takes less than 2 seconds. Coloration: Skin is not jaundiced. Neurological:      General: No focal deficit present. Mental Status: She is alert and oriented to person, place, and time. Psychiatric:         Mood and Affect: Mood normal.         Behavior: Behavior normal.       Cardiac Data:    ECG 4/27/2022 NSR, old inferior infarct, no new change    Nuclear stress test with lexiscan 4/29/21  Negative ecg portion   IMPRESSION:  1. No ischemia or infarction seen. 2. Normal left ventricular ejection fraction. Labs:     CBC: No results for input(s): WBC, HGB, HCT, PLT in the last 72 hours. BMP:No results for input(s): NA, K, CO2, BUN, CREATININE, LABGLOM, GLUCOSE in the last 72 hours. PT/INR: No results for input(s): PROTIME, INR in the last 72 hours. FASTING LIPID PANEL:  Lab Results   Component Value Date/Time    HDL 51 07/26/2022 08:11 AM    TRIG 135 07/26/2022 08:11 AM     LIVER PROFILE:No results for input(s): AST, ALT, LABALBU in the last 72 hours. IMPRESSION:    Patient Active Problem List   Diagnosis    Mixed hyperlipidemia    Obesity (BMI 30-39. 9)    Interstitial cystitis    Trigger finger, left    High risk medication use    Vitamin D deficiency    Essential hypertension    Atypical chest pain    Hypokalemia    Paresthesias    Coronary artery disease involving native coronary artery of native heart without angina pectoris    Cerebrovascular accident (CVA) due to thrombosis of precerebral artery (HCC)    Right tibial neuropathy    Neuropathy of left peroneal nerve    H/O major orthopedic surgery    Peripheral vascular disease ([facility]    Hematemesis    Malignant neoplasm of lesser curvature of stomach, unspecified (HCC)    Type 2 diabetes mellitus       Assessment/Plan:  1. CAD s/p PCI of RCA in 6/2017. Stress test 04/2021 with no ischemia. Stable with no angina or chf. Cont current medical therapy with asa, statin and Imdur. 2. Essential Hypertension. Well controlled.   Continue Norvasc and Imdur at current doses. Low-salt diet. 3. HPL. LDL 61 and  in 07/2022. Maintained on crestor and Zetia. 4. DM II. Last HbA1c 8.7. Medication changes per PCP. 5. Echo 6/2017 EF 55%. Mild MR.     6.  Bilateral lower extremity claudication/cramping, will check ABIs. Aggressive risk factor modification discussed with patient including diet and exercise.   Routine follow-up in 6 months or earlier if needed      Elizabeth Parker MD [facility] Cardiology Consult           [phone]

## 2022-08-24 ENCOUNTER — APPOINTMENT (OUTPATIENT)
Dept: INTERVENTIONAL RADIOLOGY/VASCULAR | Age: 72
End: 2022-08-24
Payer: MEDICARE

## 2022-09-08 ENCOUNTER — HOSPITAL ENCOUNTER (OUTPATIENT)
Dept: INTERVENTIONAL RADIOLOGY/VASCULAR | Age: 72
Discharge: HOME | End: 2022-09-10
Payer: MEDICARE

## 2022-09-08 DIAGNOSIS — I73.9 CLAUDICATION (HCC): ICD-10-CM

## 2022-09-08 PROCEDURE — 93922 UPR/L XTREMITY ART 2 LEVELS: CPT

## 2022-09-15 ENCOUNTER — HOSPITAL ENCOUNTER (OUTPATIENT)
Dept: MAMMOGRAPHY | Age: 72
Discharge: HOME | End: 2022-09-17
Payer: MEDICARE

## 2022-09-15 DIAGNOSIS — Z12.31 BREAST CANCER SCREENING BY MAMMOGRAM: ICD-10-CM

## 2022-09-15 PROCEDURE — 77063 BREAST TOMOSYNTHESIS BI: CPT

## 2022-09-20 ENCOUNTER — TELEPHONE (OUTPATIENT)
Dept: FAMILY MEDICINE CLINIC | Age: 72
End: 2022-09-20

## 2022-09-20 NOTE — TELEPHONE ENCOUNTER
Spoke with patient. Patient states that she will run out of Rybelsus samples at the end of the week. Has signed up for a discount card, but is unsure of when she will receive this to use since she is awaiting the card in the mail. Reports that the out of pocket for the medication is more than $500. Would like advice regarding what to do if she runs out of Rybelsus before she receives her discount card.     Last OV:7/28/2022  Next OV: 11/1/2022

## 2022-09-20 NOTE — TELEPHONE ENCOUNTER
Pt calling stating she was started on Rybelsus and was given samples, we have no samples available at this time and pt states she will be out at the end of this week, states she called to get a \"card\" and it will arrive in 1-2 weeks, what do you want her to do in the mean time, please advise.

## 2022-09-26 ENCOUNTER — OFFICE VISIT (OUTPATIENT)
Dept: OPTOMETRY | Age: 72
End: 2022-09-26
Payer: MEDICARE

## 2022-09-26 VITALS — DIASTOLIC BLOOD PRESSURE: 67 MMHG | SYSTOLIC BLOOD PRESSURE: 131 MMHG

## 2022-09-26 DIAGNOSIS — H43.813 VITREOUS DEGENERATION OF BOTH EYES: ICD-10-CM

## 2022-09-26 DIAGNOSIS — E11.9 NON-INSULIN DEPENDENT TYPE 2 DIABETES MELLITUS (HCC): Primary | ICD-10-CM

## 2022-09-26 DIAGNOSIS — Z96.1 PSEUDOPHAKIA OF BOTH EYES: ICD-10-CM

## 2022-09-26 DIAGNOSIS — H52.4 HYPEROPIA OF BOTH EYES WITH ASTIGMATISM AND PRESBYOPIA: ICD-10-CM

## 2022-09-26 DIAGNOSIS — H52.03 HYPEROPIA OF BOTH EYES WITH ASTIGMATISM AND PRESBYOPIA: ICD-10-CM

## 2022-09-26 DIAGNOSIS — H52.203 HYPEROPIA OF BOTH EYES WITH ASTIGMATISM AND PRESBYOPIA: ICD-10-CM

## 2022-09-26 PROCEDURE — 3017F COLORECTAL CA SCREEN DOC REV: CPT | Performed by: OPTOMETRIST

## 2022-09-26 PROCEDURE — 1090F PRES/ABSN URINE INCON ASSESS: CPT | Performed by: OPTOMETRIST

## 2022-09-26 PROCEDURE — 99211 OFF/OP EST MAY X REQ PHY/QHP: CPT | Performed by: OPTOMETRIST

## 2022-09-26 PROCEDURE — 92015 DETERMINE REFRACTIVE STATE: CPT | Performed by: OPTOMETRIST

## 2022-09-26 PROCEDURE — 92250 FUNDUS PHOTOGRAPHY W/I&R: CPT | Performed by: OPTOMETRIST

## 2022-09-26 PROCEDURE — G8427 DOCREV CUR MEDS BY ELIG CLIN: HCPCS | Performed by: OPTOMETRIST

## 2022-09-26 PROCEDURE — 1036F TOBACCO NON-USER: CPT | Performed by: OPTOMETRIST

## 2022-09-26 PROCEDURE — 1123F ACP DISCUSS/DSCN MKR DOCD: CPT | Performed by: OPTOMETRIST

## 2022-09-26 PROCEDURE — G8399 PT W/DXA RESULTS DOCUMENT: HCPCS | Performed by: OPTOMETRIST

## 2022-09-26 PROCEDURE — G8417 CALC BMI ABV UP PARAM F/U: HCPCS | Performed by: OPTOMETRIST

## 2022-09-26 PROCEDURE — 3052F HG A1C>EQUAL 8.0%<EQUAL 9.0%: CPT | Performed by: OPTOMETRIST

## 2022-09-26 PROCEDURE — 99214 OFFICE O/P EST MOD 30 MIN: CPT | Performed by: OPTOMETRIST

## 2022-09-26 PROCEDURE — 2022F DILAT RTA XM EVC RTNOPTHY: CPT | Performed by: OPTOMETRIST

## 2022-09-26 RX ORDER — TROPICAMIDE 10 MG/ML
1 SOLUTION/ DROPS OPHTHALMIC ONCE
Status: COMPLETED | OUTPATIENT
Start: 2022-09-26 | End: 2022-09-26

## 2022-09-26 RX ADMIN — TROPICAMIDE 1 DROP: 10 SOLUTION/ DROPS OPHTHALMIC at 12:01

## 2022-09-26 ASSESSMENT — KERATOMETRY
OS_AXISANGLE_DEGREES: 164
OD_AXISANGLE2_DEGREES: 129
OS_K1POWER_DIOPTERS: 43.50
OS_K2POWER_DIOPTERS: 44.25
OD_AXISANGLE_DEGREES: 039
OS_AXISANGLE2_DEGREES: 074
METHOD_AUTO_MANUAL: AUTOMATED
OD_K1POWER_DIOPTERS: 44.00
OD_K2POWER_DIOPTERS: 44.75

## 2022-09-26 ASSESSMENT — REFRACTION_MANIFEST
OS_ADD: +2.50
OD_AXIS: 108
OD_ADD: +2.50
OS_CYLINDER: -1.25
OD_CYLINDER: -1.00
OS_AXIS: 076
OD_SPHERE: +0.50
OS_SPHERE: +1.00

## 2022-09-26 ASSESSMENT — REFRACTION_WEARINGRX
OS_CYLINDER: -1.25
OD_ADD: +2.50
SPECS_TYPE: TRIFOCAL
OD_CYLINDER: -0.75
OD_AXIS: 110
OS_ADD: +2.50
OS_AXIS: 074
OS_SPHERE: +1.00
OD_SPHERE: +0.50

## 2022-09-26 ASSESSMENT — VISUAL ACUITY
METHOD: SNELLEN - LINEAR
CORRECTION_TYPE: GLASSES
OS_CC: 20/20

## 2022-09-26 ASSESSMENT — TONOMETRY
OS_IOP_MMHG: 25
OD_IOP_MMHG: 27
IOP_METHOD: NON-CONTACT AIR PUFF

## 2022-09-26 ASSESSMENT — SLIT LAMP EXAM - LIDS
COMMENTS: NORMAL
COMMENTS: NORMAL

## 2022-09-26 NOTE — PROGRESS NOTES
mouth daily. Current Facility-Administered Medications   Medication Dose Route Frequency Provider Last Rate Last Admin    tropicamide (MYDRIACYL) 1 % ophthalmic solution 1 drop  1 drop Both Eyes Once Ce Solomon, OD        cyanocobalamin injection 2,000 mcg  2,000 mcg IntraMUSCular Q30 Days Babs Leos MD             Family History   Problem Relation Age of Onset    Diabetes Mother     Hypertension Mother     Diabetes Sister     Diabetes Brother     Diabetes Brother     Diabetes Brother     Diabetes Sister     Diabetes Maternal Grandfather     Heart Attack Maternal Grandfather     Heart Attack Paternal Grandmother     Cataracts Neg Hx     Glaucoma Neg Hx      Social History     Socioeconomic History    Marital status:      Spouse name: None    Number of children: None    Years of education: None    Highest education level: None   Tobacco Use    Smoking status: Never    Smokeless tobacco: Never    Tobacco comments:     S Elis Kevin RRT 6/17/17   Substance and Sexual Activity    Alcohol use: No     Alcohol/week: 0.0 standard drinks    Drug use: No     Social Determinants of Health     Financial Resource Strain: Low Risk     Difficulty of Paying Living Expenses: Not hard at all   Food Insecurity: No Food Insecurity    Worried About Running Out of Food in the Last Year: Never true    Ran Out of Food in the Last Year: Never true       Past Medical History:   Diagnosis Date    CAD (coronary artery disease)     Cerebrovascular accident (CVA) due to thrombosis of precerebral artery ([address] 12/19/2017    History of seasonal allergies     Hyperlipidemia     Hypertension     Interstitial cystitis     History of. Malignant neoplasm of lesser curvature of stomach, unspecified ([address] 7/23/2021    Obesity     Weight 176 lb, height 5 ft, BMI 35, 2/28/2013. Plantar fasciitis, bilateral     History of.     Polyneuropathy associated with underlying disease ([address] 3/20/2018    S/P drug eluting coronary stent placement-[facility] 6/20/17 - Dr. Blue Harper 6/20/2017    Seasonal allergies     Trigger finger, left     History of triggering, left long finger.     Type 2 diabetes mellitus [facility])          Main Ophthalmology Exam       External Exam         Right Left    External Normal Normal              Slit Lamp Exam         Right Left    Lids/Lashes Normal Normal    Conjunctiva/Sclera White and quiet White and quiet    Cornea Clear Clear    Anterior Chamber Deep and quiet Deep and quiet    Iris Round and reactive Round and reactive    Lens Posterior chamber intraocular lens Posterior chamber intraocular lens    Vitreous Normal Normal              Fundus Exam         Right Left    Disc Normal Normal    C/D Ratio 0.2 0.2    Macula Normal Normal; slight mottling     Vessels Normal Normal    Periphery Normal Normal                   <div id=\"[email or web address]_REVIEWED\"></div>     Tonometry       Tonometry (Non-contact air puff, 10:54 AM)         Right Left    Pressure 27 25              Tonometry #2 (Tonopen, 11:06 AM)         Right Left    Pressure 19 16              Tonometry Comments       Right / Left  IOPg   CH   WS                        Visual Acuity (Snellen - Linear)         Right Left    Dist cc 20/20 20/20      Correction: Glasses          Keratometry       Keratometry (Automated)         K1 Axis K2 Axis    Right 44.00 129 44.75 039    Left 43.50 074 44.25 164                  Pupils       Pupils         Pupils    Right PERRL    Left PERRL                  Not recorded       Not recorded         Ophthalmology Exam       Wearing Rx         Sphere Cylinder Axis Add    Right +0.50 -0.75 110 +2.50    Left +1.00 -1.25 074 +2.50      Age: 1yr    Type: Trifocal                    Manifest Refraction       Manifest Refraction         Sphere Cylinder [place] Dist [place] Add [place]    Right +0.50 -1.00 108 20/20 +2.50     Left +1.00 -1.25 076 20/20 +2.50 20/20ou              Manifest Refraction #2 (Auto)         Sphere Cylinder [place] Dist [place] Add [place] Right +0.25 -1.00 108       Left +0.75 -1.25 076                      Final Rx         Sphere Cylinder [place] Dist [facility] Add Near [facility]    Right +0.50 -1.00 108 20/20 +2.50     Left +1.00 -1.25 076 20/20 +2.50 20/20ou      Type: Trifocal    Expiration Date: 9/26/2024            Neuro/Psych       Neuro/Psych       Oriented x3: Yes    Mood/Affect: Normal                    Orders Placed This Encounter   Procedures     DIABETES EYE EXAM    Color Fundus Photography-OU-Both Eyes       IMPRESSION:  1. Non-insulin dependent type 2 diabetes mellitus ([address]    2. Vitreous degeneration of both eyes    3. Pseudophakia of both eyes    4. Hyperopia of both eyes with astigmatism and presbyopia        PLAN:    Discussed the patient's diagnosis of diabetes and the impact this can have on their ocular health, potentially even leading to permanent blindness. I discussed with the patient the importance of continued follow-up and management with their primary care physician to control their glycemic, blood pressure, and lipid levels. The patient verbalized understanding. 3. Excellent surgical results  4. New glasses if desired;  slightly stronger       Glycemic control as per PCP   There are no Patient Instructions on file for this visit.    Return in about 1 year (around 9/26/2023) for complete eye exam.

## 2022-10-10 DIAGNOSIS — I25.118 CORONARY ARTERY DISEASE OF NATIVE ARTERY OF NATIVE HEART WITH STABLE ANGINA PECTORIS (HCC): ICD-10-CM

## 2022-10-10 RX ORDER — ISOSORBIDE MONONITRATE 30 MG/1
TABLET, EXTENDED RELEASE ORAL
Qty: 90 TABLET | Refills: 3 | Status: SHIPPED | OUTPATIENT
Start: 2022-10-10

## 2022-10-24 DIAGNOSIS — E78.2 MIXED HYPERLIPIDEMIA: ICD-10-CM

## 2022-10-24 RX ORDER — ROSUVASTATIN CALCIUM 10 MG/1
TABLET, COATED ORAL
Qty: 90 TABLET | Refills: 3 | Status: SHIPPED | OUTPATIENT
Start: 2022-10-24

## 2022-10-26 NOTE — TELEPHONE ENCOUNTER
Pt requesting refill of zetia sent to [address] [place] [address]..      Last Appt:  8/11/2022  Next Appt:   2/16/2023  Med verified in [place]

## 2022-10-27 RX ORDER — EZETIMIBE 10 MG/1
10 TABLET ORAL DAILY
Qty: 90 TABLET | Refills: 3 | Status: SHIPPED | OUTPATIENT
Start: 2022-10-27

## 2022-10-28 ENCOUNTER — HOSPITAL ENCOUNTER (OUTPATIENT)
Dept: LAB | Age: 72
Discharge: HOME | End: 2022-10-28
Payer: MEDICARE

## 2022-10-28 DIAGNOSIS — E78.2 MIXED HYPERLIPIDEMIA: ICD-10-CM

## 2022-10-28 DIAGNOSIS — E55.9 VITAMIN D DEFICIENCY: ICD-10-CM

## 2022-10-28 DIAGNOSIS — E11.9 TYPE 2 DIABETES MELLITUS WITHOUT COMPLICATION, WITHOUT LONG-TERM CURRENT USE OF INSULIN (HCC): ICD-10-CM

## 2022-10-28 LAB
ALBUMIN SERPL-MCNC: 4.8 G/DL (ref 3.5–5.2)
ALBUMIN/GLOBULIN RATIO: 1.7 (ref 1–2.5)
ALP BLD-CCNC: 99 U/L (ref 35–104)
ALT SERPL-CCNC: 29 U/L (ref 5–33)
ANION GAP SERPL CALCULATED.3IONS-SCNC: 10 MMOL/L (ref 9–17)
AST SERPL-CCNC: 26 U/L
BILIRUB SERPL-MCNC: 0.5 MG/DL (ref 0.3–1.2)
BUN BLDV-MCNC: 13 MG/DL (ref 8–23)
BUN/CREAT BLD: 17 (ref 9–20)
CALCIUM SERPL-MCNC: 9.8 MG/DL (ref 8.6–10.4)
CHLORIDE BLD-SCNC: 102 MMOL/L (ref 98–107)
CHOLESTEROL/HDL RATIO: 2.8
CHOLESTEROL: 145 MG/DL
CO2: 30 MMOL/L (ref 20–31)
CREAT SERPL-MCNC: 0.75 MG/DL (ref 0.5–0.9)
ESTIMATED AVERAGE GLUCOSE: 169 MG/DL
GFR SERPL CREATININE-BSD FRML MDRD: >60 ML/MIN/1.73M2
GLUCOSE BLD-MCNC: 154 MG/DL (ref 70–99)
HBA1C MFR BLD: 7.5 % (ref 4–6)
HDLC SERPL-MCNC: 51 MG/DL
LDL CHOLESTEROL: 59 MG/DL (ref 0–130)
POTASSIUM SERPL-SCNC: 3.8 MMOL/L (ref 3.7–5.3)
SODIUM BLD-SCNC: 142 MMOL/L (ref 135–144)
TOTAL PROTEIN: 7.7 G/DL (ref 6.4–8.3)
TRIGL SERPL-MCNC: 175 MG/DL
VITAMIN D 25-HYDROXY: 53.9 NG/ML

## 2022-10-28 PROCEDURE — 83036 HEMOGLOBIN GLYCOSYLATED A1C: CPT

## 2022-10-28 PROCEDURE — 82306 VITAMIN D 25 HYDROXY: CPT

## 2022-10-28 PROCEDURE — 36415 COLL VENOUS BLD VENIPUNCTURE: CPT

## 2022-10-28 PROCEDURE — 80053 COMPREHEN METABOLIC PANEL: CPT

## 2022-10-28 PROCEDURE — 80061 LIPID PANEL: CPT

## 2022-11-01 ENCOUNTER — OFFICE VISIT (OUTPATIENT)
Dept: FAMILY MEDICINE CLINIC | Age: 72
End: 2022-11-01
Payer: MEDICARE

## 2022-11-01 ENCOUNTER — IMMUNIZATION (OUTPATIENT)
Dept: LAB | Age: 72
End: 2022-11-01
Payer: MEDICARE

## 2022-11-01 VITALS
DIASTOLIC BLOOD PRESSURE: 60 MMHG | HEART RATE: 80 BPM | WEIGHT: 164.6 LBS | HEIGHT: 57 IN | OXYGEN SATURATION: 96 % | TEMPERATURE: 97.2 F | SYSTOLIC BLOOD PRESSURE: 124 MMHG | BODY MASS INDEX: 35.51 KG/M2

## 2022-11-01 DIAGNOSIS — I10 ESSENTIAL HYPERTENSION: ICD-10-CM

## 2022-11-01 DIAGNOSIS — E78.2 MIXED HYPERLIPIDEMIA: ICD-10-CM

## 2022-11-01 DIAGNOSIS — E53.8 B12 DEFICIENCY: ICD-10-CM

## 2022-11-01 DIAGNOSIS — E11.9 TYPE 2 DIABETES MELLITUS WITHOUT COMPLICATION, WITHOUT LONG-TERM CURRENT USE OF INSULIN (HCC): Primary | ICD-10-CM

## 2022-11-01 DIAGNOSIS — E55.9 VITAMIN D DEFICIENCY: ICD-10-CM

## 2022-11-01 DIAGNOSIS — N95.2 ATROPHIC VAGINITIS: ICD-10-CM

## 2022-11-01 PROCEDURE — 91313 COVID-19, MODERNA BIVALENT BOOSTER, (AGE 12Y+), IM, 50 MCG/0.5 ML: CPT | Performed by: FAMILY MEDICINE

## 2022-11-01 PROCEDURE — 99214 OFFICE O/P EST MOD 30 MIN: CPT | Performed by: FAMILY MEDICINE

## 2022-11-01 PROCEDURE — PBSHW INFLUENZA, FLUAD, (AGE 65 Y+), IM, PF, 0.5 ML: Performed by: FAMILY MEDICINE

## 2022-11-01 PROCEDURE — 2024F 7 FLD RTA PHOTO EVC RTNOPTHY: CPT | Performed by: FAMILY MEDICINE

## 2022-11-01 PROCEDURE — G0008 ADMIN INFLUENZA VIRUS VAC: HCPCS | Performed by: FAMILY MEDICINE

## 2022-11-01 PROCEDURE — 3078F DIAST BP <80 MM HG: CPT | Performed by: FAMILY MEDICINE

## 2022-11-01 PROCEDURE — 3051F HG A1C>EQUAL 7.0%<8.0%: CPT | Performed by: FAMILY MEDICINE

## 2022-11-01 PROCEDURE — 1090F PRES/ABSN URINE INCON ASSESS: CPT | Performed by: FAMILY MEDICINE

## 2022-11-01 PROCEDURE — PBSHW COVID-19, MODERNA BIVALENT BOOSTER, (AGE 12Y+), IM, 50 MCG/0.5 ML: Performed by: FAMILY MEDICINE

## 2022-11-01 PROCEDURE — 3074F SYST BP LT 130 MM HG: CPT | Performed by: FAMILY MEDICINE

## 2022-11-01 PROCEDURE — G8484 FLU IMMUNIZE NO ADMIN: HCPCS | Performed by: FAMILY MEDICINE

## 2022-11-01 PROCEDURE — G8417 CALC BMI ABV UP PARAM F/U: HCPCS | Performed by: FAMILY MEDICINE

## 2022-11-01 PROCEDURE — 99213 OFFICE O/P EST LOW 20 MIN: CPT

## 2022-11-01 PROCEDURE — 1036F TOBACCO NON-USER: CPT | Performed by: FAMILY MEDICINE

## 2022-11-01 PROCEDURE — 3017F COLORECTAL CA SCREEN DOC REV: CPT | Performed by: FAMILY MEDICINE

## 2022-11-01 PROCEDURE — 1123F ACP DISCUSS/DSCN MKR DOCD: CPT | Performed by: FAMILY MEDICINE

## 2022-11-01 PROCEDURE — G8427 DOCREV CUR MEDS BY ELIG CLIN: HCPCS | Performed by: FAMILY MEDICINE

## 2022-11-01 PROCEDURE — 90694 VACC AIIV4 NO PRSRV 0.5ML IM: CPT | Performed by: FAMILY MEDICINE

## 2022-11-01 PROCEDURE — G8399 PT W/DXA RESULTS DOCUMENT: HCPCS | Performed by: FAMILY MEDICINE

## 2022-11-01 RX ORDER — AMLODIPINE BESYLATE 10 MG/1
TABLET ORAL
Qty: 90 TABLET | Refills: 1 | Status: SHIPPED | OUTPATIENT
Start: 2022-11-01

## 2022-11-01 RX ORDER — ESTRADIOL 0.1 MG/G
CREAM VAGINAL
Qty: 1 EACH | Refills: 3 | Status: SHIPPED | OUTPATIENT
Start: 2022-11-01

## 2022-11-01 RX ORDER — GLIMEPIRIDE 1 MG/1
1 TABLET ORAL DAILY
Qty: 90 TABLET | Refills: 1 | Status: SHIPPED | OUTPATIENT
Start: 2022-11-01

## 2022-11-01 NOTE — PROGRESS NOTES
AUREA HOBSON [address], [facility] [address]                        Telephone [phone]             Fax [phone]       Estee Montelongo  :  1950  Age:  67 y.o. MRN:  [id number]  Date of visit:  2022       Assessment and Plan:    1. Type 2 diabetes mellitus without complication, without long-term current use of insulin (HCC)  Her HgbA1c was 7.5 %, which is not at goal, but improving. As noted, she has a significant co-pay with Rybelsus. I advised the patient that we will attempt to provide her with samples. She was advised to continue her current regimen. Amaryl and Metformin were refilled:   - glimepiride (AMARYL) 1 MG tablet; Take 1 tablet by mouth daily  Dispense: 90 tablet; Refill: 1  - metFORMIN (GLUCOPHAGE) 500 MG tablet; TAKE 1 TABLET BY MOUTH TWICE DAILY WITH MEALS  Dispense: 180 tablet; Refill: 1    Labs were ordered to be done prior to her return visit in 6 months:   - Hemoglobin A1C; Future  - Microalbumin, Ur; Future    2. Essential hypertension  Her blood pressure is well-controlled today. (BP: 124/60)   She was advised to continue current medications. Amlodipine was refilled:   - amLODIPine (NORVASC) 10 MG tablet; Take 1 tablet by mouth once daily  Dispense: 90 tablet; Refill: 1    - Comprehensive Metabolic Panel; Future was ordered to be done prior to her return visit in 6 months. 3. Mixed hyperlipidemia  Her lipid profile was at goal except for elevated triglycerides on her recent lab work. She is tolerating Crestor and Zetia well. She states that she does not need a refill today. - Lipid Panel; Future was ordered to be done prior to her return visit in 6 months. 4. Vitamin D deficiency  Her 25-hydroxy Vitamin D level was within normal limits (53.9 ng/mL) on  her recent lab work.   She was advised to continue with her current dose of Vitamin D.        5. B12 deficiency  The vitamin B12 level was within normal limits (323  pg/mL) on 7/26/2022. Oral vitamin B12 was prescribed:  - cyanocobalamin ([facility] VITAMIN B12) 1000 MCG tablet; Take 1 tablet by mouth daily  Dispense: 90 tablet; Refill: 1    - Vitamin B12; Future    6. Atrophic vaginitis  Estrace vaginal cream was prescribed:  - estradiol (ESTRACE VAGINAL) 0.1 MG/GM vaginal cream; Apply 1 gram vaginally nightly for two weeks, then apply 3 times a week as needed. Dispense: 1 each; Refill: 3    She was advised to follow up if symptoms worsen or do not resolve. 7.  Routine health maintenance  Health maintenance was reviewed with the patient. She has received three doses of the Pfizer Covid-19 vaccine. She was advised that the updated Covid vaccine is recommended this fall. Annual influenza vaccine was recommended. Shingrix and Tdap were recommended. All other health maintenance, including Pneumovax and Prevnar-13, is up to date at this time. Follow up instructions were given to the patient:  Return in about 6 months (around 5/1/2023) for diabetes, hypertension. Subjective:    Jeri Rendon is a 67 y.o. female who presents to [place] [address] today (11/1/2022) for follow up/evaluation of:  Diabetes (3 month follow up- Patient started Rybelsus, but cannot afford cost. Per pharmacy patient would be liable for over $500 per month), Hypertension, and Hyperlipidemia      She states that she is tolerating her medications well. However, the Rybelsus is still very expensive, even with her insurance. She has been walking for exercise. She also has concerns regarding unsatisfying sex. She denies dyspareunia. She has never used hormonal replacement therapy or estrogen cream.         She has received three doses of the [place]ment vaccine. The most recent dose was 10/4/2021.   {   Internal Administration   First Dose COVID-19, PFIZER PURPLE top, DILUTE for use, (age 15 y+), 30mcg/0.3mL  02/04/2021   Second Dose COVID-19, PFIZER PURPLE top, DILUTE for use, (age 15 y+), 30mcg/0.3mL   03/04/2021       Last COVID Lab SARS-CoV-2, PCR (no units)   Date Value   05/11/2020 Not Detected     SARS-CoV-2, RAY (no units)   Date Value   12/11/2020 Not Detected         (Optional):[id number]}     Immunization history was reviewed:  Immunization History   Administered Date(s) Administered    COVID-19, PFIZER PURPLE top, DILUTE for use, (age 15 y+), 30mcg/0.3mL 02/04/2021, 03/04/2021, 10/04/2021    DT (pediatric) 10/19/2000    Influenza, FLUAD, (age 72 y+), Adjuvanted, 0.5mL 09/21/2020, 09/15/2021    Influenza, High Dose (Fluzone 65 yrs and older) 10/26/2017, 09/06/2018    Influenza, Triv, inactivated, subunit, adjuvanted, IM (Fluad 65 yrs and older) 10/11/2019    Pneumococcal Conjugate 13-valent ([id number]) 04/19/2017    Pneumococcal Polysaccharide ([id number]) 04/19/2018          She has the following problem list:  Patient Active Problem List   Diagnosis    Mixed hyperlipidemia    Obesity (BMI 30-39. 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, unspecified ([address]    Type 2 diabetes mellitus        Current medications are:  Outpatient Medications Marked as Taking for the 11/1/22 encounter (Office Visit) with Karoline Sy MD   Medication Sig Dispense Refill    ezetimibe (ZETIA) 10 MG tablet Take 1 tablet by mouth daily 90 tablet 3    rosuvastatin (CRESTOR) 10 MG tablet TAKE 1 TABLET BY MOUTH EVERY DAY 90 tablet 3    isosorbide mononitrate (IMDUR) 30 MG extended release tablet Take 1 tablet by mouth once daily 90 tablet 3    NONFORMULARY Tumeric daily      amLODIPine (NORVASC) 10 MG tablet Take 1 tablet by mouth once daily 90 tablet 1    glimepiride (AMARYL) 1 MG tablet Take 1 tablet by mouth in the morning. 90 tablet 1    metFORMIN (GLUCOPHAGE) 500 MG tablet TAKE 1 TABLET BY MOUTH TWICE DAILY WITH MEALS 180 tablet 1    fluticasone (FLONASE) 50 MCG/ACT nasal spray 2 sprays by Each Nostril route daily (Patient taking differently: 2 sprays by Each Nostril route as needed) 16 g 5    nitroGLYCERIN (NITROSTAT) 0.4 MG SL tablet Place 1 tablet under the tongue every 5 minutes as needed for Chest pain up to max of 3 total doses. If no relief after 1 dose, call 911. 25 tablet 1    ELDERBERRY PO Take by mouth Immune Plus tablets-Vit B, C, Zinc      Ascorbic Acid (VITAMIN C PO) Take by mouth      Multiple Vitamins-Minerals (MULTIVITAMIN PO) Take 1 tablet by mouth daily      NONFORMULARY 5,000 mcg daily OTC Biotin 1000mcg daily       aspirin 81 MG tablet Take 81 mg by mouth daily. She is allergic to iv dye [iodides], penicillins, sulfa antibiotics, ace inhibitors, cozaar [losartan], lipitor, lopressor [metoprolol], and morphine. She  reports that she has never smoked. She has never used smokeless tobacco.      Objective:    Vitals:    11/01/22 1437   BP: 124/60   Site: Right Upper Arm   Position: Sitting   Cuff Size: Large Adult   Pulse: 80   Temp: 97.2 °F (36.2 °C)   TempSrc: Temporal   SpO2: 96%   Weight: 164 lb 9.6 oz (74.7 kg)   Height: 4' 9\" (1.448 m)     Body mass index is 35.62 kg/m². Well-nourished, well-developed female with obesity, healthy-appearing, alert, cooperative, and in no acute distress. Neck supple. No adenopathy. Thyroid symmetric, normal size. Chest:  Normal expansion. Clear to auscultation. No rales, rhonchi, or wheezing.   Heart sounds are normal.  Regular rate and rhythm without murmur, gallop or rub. Lower extremities have no edema. Her feet were examined. There were no areas of redness, ulceration, or skin breakdown. There was normal sensation to light touch of the feet bilaterally. The pulses in the feet and ankles were normal.     Results of labs done 10/28/2022 were reviewed with the patient:   Hospital Outpatient Visit on 10/28/2022   Component Date Value Ref Range Status    Vit D, 25-Hydroxy 10/28/2022 53.9  >29.9 ng/mL Final    Comment:    Reference Range:  Vitamin D status         Range   Deficiency              <20 ng/mL   Mild Deficiency       20-30 ng/mL   Sufficiency           ng/mL   Toxicity               >100 ng/mL      Cholesterol 10/28/2022 145  <200 mg/dL Final    Comment:    Cholesterol Guidelines:      <200  Desirable   200-240  Borderline      >240  Undesirable         HDL 10/28/2022 51  >40 mg/dL Final    Comment:    HDL Guidelines:    <40     Undesirable   40-59    Borderline    >59     Desirable         LDL Cholesterol 10/28/2022 59  0 - 130 mg/dL Final    Comment:    LDL Guidelines:     <100    Desirable   100-129   Near to/above Desirable   130-159   Borderline      >159   Undesirable     Direct (measured) LDL and calculated LDL are not interchangeable tests. Chol/HDL Ratio 10/28/2022 2.8  <5 Final            Triglycerides 10/28/2022 175 (A)  <150 mg/dL Final    Comment:    Triglyceride Guidelines:     <150   Desirable   150-199  Borderline   200-499  High     >499   Very high   Based on AHA Guidelines for fasting triglyceride, October 2012. Hemoglobin A1C 10/28/2022 7.5 (A)  4.0 - 6.0 % Final    Estimated Avg Glucose 10/28/2022 169  mg/dL Final    Comment: The ADA and AACC recommend providing the estimated average glucose result to permit better   patient understanding of their HBA1c result.       Glucose 10/28/2022 154 (A)  70 - 99 mg/dL Final    BUN 10/28/2022 13  8 - 23 mg/dL Final    Creatinine 10/28/2022 0.75  0.50 - 0.90 mg/dL Final    Est, Glom Filt Rate 10/28/2022 >60  >60 mL/min/1.73m2 Final    Comment:       Effective Oct 3, 2022        These results are not intended for use in patients <25years of age. eGFR results are calculated without a race factor using the 2021 CKD-EPI equation. Careful clinical correlation is recommended, particularly when comparing to results   calculated using previous equations. The CKD-EPI equation is less accurate in patients with extremes of muscle mass, extra-renal   metabolism of creatine, excessive creatine ingestion, or following therapy that affects   renal tubular secretion.       Bun/Cre Ratio 10/28/2022 17  9 - 20 Final    Calcium 10/28/2022 9.8  8.6 - 10.4 mg/dL Final    Sodium 10/28/2022 142  135 - 144 mmol/L Final    Potassium 10/28/2022 3.8  3.7 - 5.3 mmol/L Final    Chloride 10/28/2022 102  98 - 107 mmol/L Final    CO2 10/28/2022 30  20 - 31 mmol/L Final    Anion Gap 10/28/2022 10  9 - 17 mmol/L Final    Alkaline Phosphatase 10/28/2022 99  35 - 104 U/L Final    ALT 10/28/2022 29  5 - 33 U/L Final    AST 10/28/2022 26  <32 U/L Final    Total Bilirubin 10/28/2022 0.5  0.3 - 1.2 mg/dL Final    Total Protein 10/28/2022 7.7  6.4 - 8.3 g/dL Final    Albumin 10/28/2022 4.8  3.5 - 5.2 g/dL Final    Albumin/Globulin Ratio 10/28/2022 1.7  1.0 - 2.5 Final                (Please note that portions of this note were completed with a voice-recognition program. Efforts were made to edit the dictation but occasionally words are mis-transcribed.)

## 2022-11-01 NOTE — PROGRESS NOTES
Patient reports she was does not want to continue with B12 injections and is not taking any by mouth. Crestor/Angie Agarwal    Agreeable to covid booster, flu vaccine, foot exam.  Declines Shingles, Tdap, MAW.

## 2022-12-13 NOTE — PROGRESS NOTES
Medicare Annual Wellness Visit  Name: Jordon Rudolph Date: 3/30/2021   MRN: [id number] Sex: Female   Age: 69 y.o. Ethnicity: /   : 1950 Race:       Luis Hugo is here for Annual Exam (Medicare annual wellness visit), Hand Pain (having bilateral hand/finger pain. middle fingers on both hands \"lock\" and take a while to be able to move again. also having cramps in her feet.), Shortness of Breath (gets very tired and \"winded\" when walking long distances, like at the grocery store. has been going on for the last 3-4 months.), and Blood Sugar Problem (has had increased blood glucose levels, runs about 170 normally now.)    Screenings for behavioral, psychosocial and functional/safety risks, and cognitive dysfunction are all negative except as indicated below. These results, as well as other patient data from the [address] form, are documented in Flowsheets linked to this Encounter. Allergies   Allergen Reactions    Iv Dye [Iodides] Shortness Of Breath    Penicillins Swelling    Sulfa Antibiotics Shortness Of Breath    Ace Inhibitors Nausea Only     Difficulty swallowing the Lisinopril. Gagging. Nausea.  Cozaar [Losartan] Nausea Only    Lipitor Other (See Comments)     Muscle aches.  Lopressor [Metoprolol] Other (See Comments)     leg pain    Morphine Nausea Only     Chest pressure       Prior to Visit Medications    Medication Sig Taking? Authorizing Provider   OPTIMAL-D 1.25 MG (81406 UT) CAPS Take 1 capsule by mouth once a week Yes Carolynne Severance, MD   nitroGLYCERIN (NITROSTAT) 0.4 MG SL tablet Place 1 tablet under the tongue every 5 minutes as needed for Chest pain up to max of 3 total doses. If no relief after 1 dose, call 911.  Yes Laurie Grey MD   isosorbide mononitrate (IMDUR) 30 MG extended release tablet Take 1 tablet by mouth once daily Yes Gabe Kirby DO   amLODIPine (NORVASC) 10 MG tablet Take 1 tablet by mouth once daily Yes Clem Chery MD Christina   glimepiride (AMARYL) 1 MG tablet TAKE 1 TABLET BY MOUTH ONCE DAILY IN THE MORNING WITH BREAKFAST AND 1/2 (ONE-HALF) TABLET WITH THE EVENING MEAL Yes Alejandro Velez MD   rosuvastatin (CRESTOR) 10 MG tablet Take 1 tablet by mouth nightly Yes Alejandro Velez MD   ELDERBERRY PO Take by mouth Immune Plus tablets-Vit B, C, Zinc Yes Historical Provider, MD   Ascorbic Acid (VITAMIN C PO) Take by mouth Yes Historical Provider, MD   metFORMIN (GLUCOPHAGE) 500 MG tablet Take 1 tablet by mouth 2 times daily (with meals) Yes Alejandro Velez MD   NONFORMULARY Artificial tear eye drops prn Yes Historical Provider, MD   fluticasone (FLONASE) 50 MCG/ACT nasal spray 1 spray by Nasal route daily Yes Alejandro Velez MD   Multiple Vitamins-Minerals (MULTIVITAMIN PO) Take 1 tablet by mouth daily Yes Historical Provider, MD   NONFORMULARY 5,000 mcg daily OTC Biotin 1000mcg daily  Yes Historical Provider, MD   aspirin 81 MG tablet Take 81 mg by mouth daily. Yes Historical Provider, MD       Past Medical History:   Diagnosis Date    CAD (coronary artery disease)     Cerebrovascular accident (CVA) due to thrombosis of precerebral artery ([address] 12/19/2017    History of seasonal allergies     Hyperlipidemia     Hypertension     Interstitial cystitis     History of.  Obesity     Weight 176 lb, height 5 ft, BMI 35, 2/28/2013.  Plantar fasciitis, bilateral     History of.  Polyneuropathy associated with underlying disease ([address] 3/20/2018    S/P drug eluting coronary stent placement-RCA 6/20/17 - Dr. Delacruz Holter 6/20/2017    Seasonal allergies     Trigger finger, left     History of triggering, left long finger.  Type 2 diabetes mellitus ([address]        Past Surgical History:   Procedure Laterality Date    APPENDECTOMY      CARDIAC CATHETERIZATION  06/20/2017    Left main: normal, LAD: proximal 30% stenosis, LCX: 20 % proximal stenosis, RCA: Ostial 70% with pressure damping and mid 90% stenosis.   Required PTCA-GRACE in both lesions. LVEF 55%. LV wall motion normal.  Dr. Riley Chavis, [facility]      CATARACT REMOVAL WITH IMPLANT Left 10/20/2015    Dr. Tip Burgess, [address] WITH IMPLANT Right 12/08/2015    [place] with DANIAL. Dr Tip Burgess, [facility], LAPAROSCOPIC  6/9/2000    COLONOSCOPY  3/11/2015    Internal and external hemorrhoids otherwise normal    CORONARY ANGIOPLASTY WITH STENT PLACEMENT  06/20/2017    Right coronary artery 70% ostial lesion and mid RCA 90% lesion; successful PTCA with drug-eluting stents in both lesions, Dr. Riley Chavis, [facility]    CYSTOSCOPY  3/1996    Bladder biopsy, urethral dilatation.  ENDOSCOPIC ULTRASOUND (LOWER) N/A 5/13/2020    ** CASE IN O.R. WITH GI STAFF** ENDOSCOPIC ULTRASOUND- PATHOLOGY REQUESTED- PAT NOTIFIED performed by Harvinder Patel MD at [address] [place] [address]  3/1979    NASAL SEPTUM SURGERY  3/9/2005    Nasoseptal reconstruction.  OVARY REMOVAL Right 7/1993    Laparotomy.     IL KNEE SCOPE,DIAGNOSTIC Right 11/6/2018    Right KNEE ARTHROSCOPY PARTIAL MEDIAL MENISECTOMY AND PLICA INCISION performed by Elana Blackmon MD at [facility] 3/5/2020    EGD POLYP SNARE ET COLD BX'S performed by Khari Leo DO at [address] [place] [address] with bx, 5 clips placed    UPPER GASTROINTESTINAL ENDOSCOPY  05/13/2020    egd with bx, 5 clips    UPPER GASTROINTESTINAL ENDOSCOPY  5/13/2020    EGD BIOPSY in OR with GI staff performed by Harvinder Patel MD at [address] [place] [address]  5/13/2020    EGD POLYP HOT FORCEP/CAUTERY performed by Harvinder Patel MD at [address] [place] [address]  5/13/2020    EGD W/ EMR performed by Harvinder Patel MD at [facility], biopsies benign  YAG CAPSULOTOMY Right 04/2016    Dr Estrada Just       Family History   Problem Relation Age of Onset    Diabetes Mother     Hypertension Mother     Diabetes Sister     Diabetes Brother     Diabetes Brother     Diabetes Brother     Diabetes Sister     Diabetes Maternal Grandfather     Heart Attack Maternal Grandfather     Heart Attack Paternal Grandmother        CareTeam (Including outside providers/suppliers regularly involved in providing care):   Patient Care Team:  Zoran Huynh MD as PCP - General (Family Medicine)  Zoran Huynh MD as PCP - [address] [place] [address] Provider  Diana Rubi (Optometry)  Kobe Dugan MD as Surgeon (Ophthalmology)  Ruthie Frausto MD as Consulting Physician (Cardiology)    Wt Readings from Last 3 Encounters:   03/30/21 168 lb (76.2 kg)   01/22/21 169 lb (76.7 kg)   12/11/20 169 lb 9.6 oz (76.9 kg)     Vitals:    03/30/21 1508   BP: 124/74   Site: Right Upper Arm   Position: Sitting   Cuff Size: Medium Adult   Pulse: 86   Resp: 18   Weight: 168 lb (76.2 kg)   Height: 4' 9\" (1.448 m)     Body mass index is 36.35 kg/m². Based upon direct observation of the patient, evaluation of cognition reveals recent and remote memory intact. Patient's complete Health Risk Assessment and screening values have been reviewed and are found in Flowsheets. The following problems were reviewed today and where indicated follow up appointments were made and/or referrals ordered. Positive Risk Factor Screenings with Interventions:          General Health and ACP:  General  In general, how would you say your health is?: Good  In the past 7 days, have you experienced any of the following?  New or Increased Pain, New or Increased Fatigue, Loneliness, Social Isolation, Stress or Anger?: None of These  Do you get the social and emotional support that you need?: Yes  Do you have a Living Will?: Yes  Advance Directives     Power of  Living Will ACP-Advance Directive ACP-Power of [employer] Not on File Not on File Not on File Not on File      General Health Risk Interventions:  · No Living Will: She was advised to bring in a copy of her living will.     Health Habits/Nutrition:  Health Habits/Nutrition  Do you exercise for at least 20 minutes 2-3 times per week?: Yes  Have you lost any weight without trying in the past 3 months?: No  Do you eat only one meal per day?: No  Have you seen the dentist within the past year?: (!) No  Body mass index: (!) 36.35  Health Habits/Nutrition Interventions:  · Nutritional issues:  educational materials for healthy, well-balanced diet provided  · Dental exam overdue:  patient encouraged to make appointment with his/her dentist    Hearing/Vision:  No exam data present  Hearing/Vision  Do you or your family notice any trouble with your hearing that hasn't been managed with hearing aids?: (!) Yes  Do you have difficulty driving, watching TV, or doing any of your daily activities because of your eyesight?: No  Have you had an eye exam within the past year?: Appointment is scheduled  Hearing/Vision Interventions:  · Hearing concerns:  patient declines any further evaluation/treatment for hearing issues    Safety:  Safety  Do you have working smoke detectors?: Yes  Have all throw rugs been removed or fastened?: (!) No  Do you have non-slip mats or surfaces in all bathtubs/showers?: Yes  Do all of your stairways have a railing or banister?: Yes  Are your doorways, halls and stairs free of clutter?: Yes  Do you always fasten your seatbelt when you are in a car?: Yes  Safety Interventions:  · Home safety tips provided     Personalized Preventive Plan   Current Health Maintenance Status  Immunization History   Administered Date(s) Administered    COVID-19, Palomo Peter, PF, 30mcg/0.3mL 02/04/2021, 03/04/2021    DT (pediatric) 10/19/2000    Influenza, High Dose (Fluzone 65 yrs and older) 10/26/2017, 09/06/2018    Influenza, Quadv, adjuvanted, 65 yrs +, IM, PF (Fluad) 09/21/2020  Influenza, Triv, inactivated, subunit, adjuvanted, IM (Fluad 65 yrs and older) 10/11/2019    Pneumococcal Conjugate 13-valent ([id number]) 04/19/2017    Pneumococcal Polysaccharide ([id number]) 04/19/2018        Health Maintenance   Topic Date Due    Shingles Vaccine (1 of 2) Never done    DTaP/Tdap/Td vaccine (2 - Tdap) 10/19/2010    Annual Wellness Visit (AWV)  Never done    Diabetic retinal exam  08/07/2021    Diabetic foot exam  08/26/2021    Diabetic microalbuminuria test  09/10/2021    Breast cancer screen  09/10/2021    A1C test (Diabetic or Prediabetic)  03/26/2022    Lipid screen  03/26/2022    Potassium monitoring  03/26/2022    Creatinine monitoring  03/26/2022    Colon cancer screen colonoscopy  03/11/2025    DEXA (modify frequency per FRAX score)  Completed    Flu vaccine  Completed    Pneumococcal 65+ years Vaccine  Completed    COVID-19 Vaccine  Completed    Hepatitis C screen  Addressed    Hepatitis A vaccine  Aged Out    Hib vaccine  Aged Out    Meningococcal (ACWY) vaccine  Aged Out     Recommendations for [employer] Due: see orders and patient instructions/AVS.  . Recommended screening schedule for the next 5-10 years is provided to the patient in written form: see Patient Instructions/AVS.    Memo Cruz was seen today for annual exam, hand pain, shortness of breath and blood sugar problem.     Diagnoses and all orders for this visit:    Essential hypertension    Mixed hyperlipidemia    Type 2 diabetes mellitus without complication, without long-term current use of insulin ([address] none

## 2022-12-20 ENCOUNTER — TELEPHONE (OUTPATIENT)
Dept: FAMILY MEDICINE CLINIC | Age: 72
End: 2022-12-20

## 2022-12-20 NOTE — TELEPHONE ENCOUNTER
Pt called, in past was been given samples for Rybelsus 3 mg will run out on 12-31-22. Pt's ins. Is switching 1-1-23 would like samples to get though.  Please call pt to advise

## 2023-01-05 ENCOUNTER — OFFICE VISIT (OUTPATIENT)
Dept: PRIMARY CARE CLINIC | Age: 73
End: 2023-01-05
Payer: COMMERCIAL

## 2023-01-05 VITALS
DIASTOLIC BLOOD PRESSURE: 68 MMHG | RESPIRATION RATE: 22 BRPM | TEMPERATURE: 98.5 F | BODY MASS INDEX: 35.17 KG/M2 | SYSTOLIC BLOOD PRESSURE: 126 MMHG | HEART RATE: 86 BPM | HEIGHT: 57 IN | WEIGHT: 163 LBS | OXYGEN SATURATION: 98 %

## 2023-01-05 DIAGNOSIS — H69.83 DYSFUNCTION OF BOTH EUSTACHIAN TUBES: ICD-10-CM

## 2023-01-05 DIAGNOSIS — J06.9 VIRAL URI WITH COUGH: Primary | ICD-10-CM

## 2023-01-05 DIAGNOSIS — J02.9 SORE THROAT: ICD-10-CM

## 2023-01-05 LAB — S PYO AG THROAT QL: NORMAL

## 2023-01-05 PROCEDURE — 87880 STREP A ASSAY W/OPTIC: CPT | Performed by: FAMILY MEDICINE

## 2023-01-05 PROCEDURE — 1123F ACP DISCUSS/DSCN MKR DOCD: CPT | Performed by: FAMILY MEDICINE

## 2023-01-05 PROCEDURE — 3074F SYST BP LT 130 MM HG: CPT | Performed by: FAMILY MEDICINE

## 2023-01-05 PROCEDURE — 99214 OFFICE O/P EST MOD 30 MIN: CPT | Performed by: FAMILY MEDICINE

## 2023-01-05 PROCEDURE — 3078F DIAST BP <80 MM HG: CPT | Performed by: FAMILY MEDICINE

## 2023-01-05 RX ORDER — BENZONATATE 200 MG/1
200 CAPSULE ORAL 3 TIMES DAILY PRN
Qty: 30 CAPSULE | Refills: 0 | Status: SHIPPED | OUTPATIENT
Start: 2023-01-05 | End: 2023-01-12

## 2023-01-05 RX ORDER — PREDNISONE 20 MG/1
20 TABLET ORAL DAILY
Qty: 5 TABLET | Refills: 0 | Status: SHIPPED | OUTPATIENT
Start: 2023-01-05 | End: 2023-01-10

## 2023-01-05 ASSESSMENT — ENCOUNTER SYMPTOMS
COUGH: 1
SINUS PRESSURE: 1
CONSTIPATION: 0
NAUSEA: 0
VOMITING: 0
CHEST TIGHTNESS: 0
ABDOMINAL PAIN: 0
SHORTNESS OF BREATH: 0
SORE THROAT: 1
WHEEZING: 0
DIARRHEA: 0
CHANGE IN BOWEL HABIT: 0
CHOKING: 0
TROUBLE SWALLOWING: 0
VISUAL CHANGE: 0

## 2023-01-05 ASSESSMENT — PATIENT HEALTH QUESTIONNAIRE - PHQ9
SUM OF ALL RESPONSES TO PHQ QUESTIONS 1-9: 0
2. FEELING DOWN, DEPRESSED OR HOPELESS: 0
SUM OF ALL RESPONSES TO PHQ QUESTIONS 1-9: 0
SUM OF ALL RESPONSES TO PHQ9 QUESTIONS 1 & 2: 0
SUM OF ALL RESPONSES TO PHQ QUESTIONS 1-9: 0
SUM OF ALL RESPONSES TO PHQ QUESTIONS 1-9: 0
1. LITTLE INTEREST OR PLEASURE IN DOING THINGS: 0

## 2023-01-05 NOTE — PROGRESS NOTES
[place] [id number]  [address] [place] [address]  Dept: [phone]  Dept Fax: [phone]    Pamela Acosta is a 67 y.o. female who presents today for her medical conditions/complaints as noted below. Pamela Acosta is c/o of   Chief Complaint   Patient presents with    Pharyngitis     Sore throat starting Tuesday with bilat ear pain and cough productive clear. Pt using Coricidin and cough drops. HPI:     Here today for a sore throat. Pharyngitis  This is a new problem. The current episode started in the past 7 days. The problem has been gradually worsening. Associated symptoms include congestion, coughing, headaches and a sore throat. Pertinent negatives include no abdominal pain, anorexia, arthralgias, change in bowel habit, chest pain, chills, diaphoresis, fatigue, fever, joint swelling, myalgias, nausea, neck pain, numbness, rash, urinary symptoms, vertigo, visual change, vomiting or weakness. The symptoms are aggravated by drinking and coughing. Treatments tried: cough drops. She has a sore throat, headache, and earache that started on Tuesday and is gradually worsening. She hasn't had any difficulty hearing or any ear drainage. Her throat is raw with difficulty eating and swallowing. She is coughing occasionally but attributes this to nasal drip. She has sinus pain and pressure along with congestion and rhinorrhea. She has not had any fevers, chills, or sweats. She has tried using cough drops and noticed that they were bringing her sugars up. She usually has prescription cough drops that have helped in the past.  She has no recent sick contacts. She does not have asthma.       Past Medical History:   Diagnosis Date    CAD (coronary artery disease)     Cerebrovascular accident (CVA) due to thrombosis of precerebral artery ([facility] 12/19/2017    History of seasonal allergies     Hyperlipidemia     Hypertension     Interstitial cystitis     History of. 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 Plantar fasciitis, bilateral     History of. Polyneuropathy associated with underlying disease ([facility] 3/20/2018    S/P drug eluting coronary stent placement-RCA 6/20/17 - Dr. Matthew Gaxiola 6/20/2017    Seasonal allergies     Trigger finger, left     History of triggering, left long finger.     Type 2 diabetes mellitus (HCC)           Social History     Tobacco Use    Smoking status: Never    Smokeless tobacco: Never    Tobacco comments:     MAXIMO Vides [facility] 6/17/17   Substance Use Topics    Alcohol use: No     Alcohol/week: 0.0 standard drinks     Current Outpatient Medications   Medication Sig Dispense Refill    benzonatate (TESSALON) 200 MG capsule Take 1 capsule by mouth 3 times daily as needed for Cough 30 capsule 0    predniSONE (DELTASONE) 20 MG tablet Take 1 tablet by mouth daily for 5 days 5 tablet 0    cyanocobalamin ([employer] VITAMIN B12) 1000 MCG tablet Take 1 tablet by mouth daily 90 tablet 1    glimepiride (AMARYL) 1 MG tablet Take 1 tablet by mouth daily 90 tablet 1    metFORMIN (GLUCOPHAGE) 500 MG tablet TAKE 1 TABLET BY MOUTH TWICE DAILY WITH MEALS 180 tablet 1    amLODIPine (NORVASC) 10 MG tablet Take 1 tablet by mouth once daily 90 tablet 1    ezetimibe (ZETIA) 10 MG tablet Take 1 tablet by mouth daily 90 tablet 3    rosuvastatin (CRESTOR) 10 MG tablet TAKE 1 TABLET BY MOUTH EVERY DAY 90 tablet 3    isosorbide mononitrate (IMDUR) 30 MG extended release tablet Take 1 tablet by mouth once daily 90 tablet 3    NONFORMULARY Tumeric daily      fluticasone (FLONASE) 50 MCG/ACT nasal spray 2 sprays by Each Nostril route daily (Patient taking differently: 2 sprays by Each Nostril route as needed) 16 g 5    nitroGLYCERIN (NITROSTAT) 0.4 MG SL tablet Place 1 tablet under the tongue every 5 minutes as needed for Chest pain up to max of 3 total doses. If no relief after 1 dose, call 911. 25 tablet 1    ELDERBERRY PO Take by mouth Immune Plus tablets-Vit B, C, Zinc      Multiple Vitamins-Minerals (MULTIVITAMIN PO) Take 1 tablet by mouth daily      NONFORMULARY 5,000 mcg daily OTC Biotin 1000mcg daily       aspirin 81 MG tablet Take 81 mg by mouth daily. estradiol (ESTRACE VAGINAL) 0.1 MG/GM vaginal cream Apply 1 gram vaginally nightly for two weeks, then apply 3 times a week as needed. (Patient not taking: Reported on 1/5/2023) 1 each 3    Ascorbic Acid (VITAMIN C PO) Take by mouth (Patient not taking: Reported on 1/5/2023)       Current Facility-Administered Medications   Medication Dose Route Frequency Provider Last Rate Last Admin    cyanocobalamin injection 2,000 mcg  2,000 mcg IntraMUSCular Q30 Days Gracy Vazquez MD              Allergies   Allergen Reactions    Iv Dye [Iodides] Shortness Of Breath    Penicillins Swelling    Sulfa Antibiotics Shortness Of Breath    Ace Inhibitors Nausea Only     Difficulty swallowing the Lisinopril. Gagging. Nausea. Cozaar [Losartan] Nausea Only    Lipitor Other (See Comments)     Muscle aches. Lopressor [Metoprolol] Other (See Comments)     leg pain    Morphine Nausea Only     Chest pressure       Subjective:     Review of Systems   Constitutional:  Negative for activity change, appetite change, chills, diaphoresis, fatigue and fever. HENT:  Positive for congestion, postnasal drip, sinus pressure and sore throat. Negative for ear pain, sneezing and trouble swallowing. Eyes:  Negative for visual disturbance. Respiratory:  Positive for cough. Negative for choking, chest tightness, shortness of breath and wheezing. Cardiovascular:  Negative for chest pain, palpitations and leg swelling. Gastrointestinal:  Negative for abdominal pain, anorexia, change in bowel habit, constipation, diarrhea, nausea and vomiting.    Musculoskeletal: Negative for arthralgias, joint swelling, myalgias and neck pain. Skin:  Negative for rash. Allergic/Immunologic: Negative for environmental allergies. Neurological:  Positive for headaches. Negative for vertigo, weakness and numbness. Objective:      Physical Exam  Vitals and nursing note reviewed. Constitutional:       General: She is not in acute distress. Appearance: She is well-developed. HENT:      Right Ear: Ear canal and external ear normal. Tympanic membrane is not erythematous, retracted or bulging. Left Ear: Ear canal and external ear normal. Tympanic membrane is not erythematous, retracted or bulging. Nose: Mucosal edema present. Right Sinus: No maxillary sinus tenderness or frontal sinus tenderness. Left Sinus: No maxillary sinus tenderness or frontal sinus tenderness. Mouth/Throat:      Pharynx: No oropharyngeal exudate. Eyes:      Conjunctiva/sclera: Conjunctivae normal.   Cardiovascular:      Rate and Rhythm: Normal rate and regular rhythm. Heart sounds: Normal heart sounds. No murmur heard. Pulmonary:      Effort: Pulmonary effort is normal. No respiratory distress. Breath sounds: Normal breath sounds. No wheezing or rales. Musculoskeletal:      Cervical back: Normal range of motion and neck supple. Lymphadenopathy:      Cervical: No cervical adenopathy. Skin:     General: Skin is warm and dry. Findings: No rash. Neurological:      Mental Status: She is alert and oriented to person, place, and time. Psychiatric:         Behavior: Behavior normal.         Judgment: Judgment normal.     /68 (Site: Right Upper Arm, Position: Sitting, Cuff Size: Large Adult)   Pulse 86   Temp 98.5 °F (36.9 °C) (Tympanic)   Resp 22   Ht 4' 9\" (1.448 m)   Wt 163 lb (73.9 kg)   LMP  (LMP Unknown)   SpO2 98%   BMI 35.27 kg/m²     Assessment:       Diagnosis Orders   1. Viral URI with cough        2.  Dysfunction of both eustachian tubes 3. Sore throat  POCT rapid strep A      4. Cough  benzonatate (TESSALON) 200 MG capsule         Office Visit on 01/05/2023   Component Date Value Ref Range Status    Strep A Ag 01/05/2023 None Detected  None Detected Final            Plan:       Viral Isidra Golden: new; she has a viral respiratory infection so I recommended that she use mucinex to help with congestion and cough. I also recommended Flonase and an antihistamine for sinus symptoms. she was also encouraged to use tylenol or ibuprofen for fever. I also sent in tessalon to help her with her cough. she was instructed to return if there is no improvement or symptoms worsen. Eustachian tube dysfunction: new; I will treat with prednisone and I recommended flonase as needed. Return if symptoms worsen or fail to improve. Orders Placed This Encounter   Procedures    POCT rapid strep A     Orders Placed This Encounter   Medications    benzonatate (TESSALON) 200 MG capsule     Sig: Take 1 capsule by mouth 3 times daily as needed for Cough     Dispense:  30 capsule     Refill:  0    predniSONE (DELTASONE) 20 MG tablet     Sig: Take 1 tablet by mouth daily for 5 days     Dispense:  5 tablet     Refill:  0       Patientgiven educational materials - see patient instructions. Discussed use, benefit,and side effects of prescribed medications. All patient questions answered. Ptvoiced understanding. Reviewed health maintenance. Instructed to continue currentmedications, diet and exercise. Patient agreed with treatment plan. Follow up asdirected.      Electronically signed by Denita Lr MD on 1/5/2023 at 11:54 AM

## 2023-01-10 ENCOUNTER — TELEPHONE (OUTPATIENT)
Dept: FAMILY MEDICINE CLINIC | Age: 73
End: 2023-01-10

## 2023-01-10 RX ORDER — ORAL SEMAGLUTIDE 3 MG/1
3 TABLET ORAL DAILY
Qty: 30 TABLET | Refills: 0
Start: 2023-01-10

## 2023-01-10 NOTE — TELEPHONE ENCOUNTER
Patient is taking rybelsus 3 mg in morning before breakfast.(Added to medication list)  Patient is also taking glimepiride 1 mg twice daily and metformin 500 mg twice daily. Blood sugars are \"normal\" however feels like blood sugars drop around lunch time and patient has to have a snack or meal  around that time. Patient is check blood sugars in the morning. She states she is going to start checking blood sugars before supper as well.      Last OV: 11/1/22  Next OV: 05/02/23

## 2023-01-10 NOTE — TELEPHONE ENCOUNTER
Patient called states she has been taking the rybelsus for a while at least since summer of 2022, and now she notes her blood sugars are dropping. This morning is was 99. She is not having symptoms at this time, but any lower she dose such as shaking. She asked for a call back to see if she should check her blood sugars more often or not. Please advise.

## 2023-01-10 NOTE — TELEPHONE ENCOUNTER
I agree with checking glucose more frequently. Also, I recommend decreasing Glimepiride. She should stop the morning dose, and continue the dose before her evening meal.    Please ask her to call next week with an update. Thank you.

## 2023-02-16 ENCOUNTER — OFFICE VISIT (OUTPATIENT)
Dept: CARDIOLOGY | Age: 73
End: 2023-02-16
Payer: COMMERCIAL

## 2023-02-16 VITALS
HEART RATE: 78 BPM | DIASTOLIC BLOOD PRESSURE: 64 MMHG | WEIGHT: 161 LBS | SYSTOLIC BLOOD PRESSURE: 132 MMHG | OXYGEN SATURATION: 99 % | BODY MASS INDEX: 34.84 KG/M2

## 2023-02-16 DIAGNOSIS — E78.2 MIXED HYPERLIPIDEMIA: Primary | ICD-10-CM

## 2023-02-16 DIAGNOSIS — I25.118 CORONARY ARTERY DISEASE OF NATIVE ARTERY OF NATIVE HEART WITH STABLE ANGINA PECTORIS (HCC): ICD-10-CM

## 2023-02-16 DIAGNOSIS — I25.10 CORONARY ARTERY DISEASE INVOLVING NATIVE CORONARY ARTERY OF NATIVE HEART WITHOUT ANGINA PECTORIS: ICD-10-CM

## 2023-02-16 DIAGNOSIS — I10 ESSENTIAL HYPERTENSION: ICD-10-CM

## 2023-02-16 DIAGNOSIS — Z98.62 STATUS POST ANGIOPLASTY: ICD-10-CM

## 2023-02-16 PROCEDURE — 93000 ELECTROCARDIOGRAM COMPLETE: CPT | Performed by: INTERNAL MEDICINE

## 2023-02-16 PROCEDURE — 99214 OFFICE O/P EST MOD 30 MIN: CPT | Performed by: INTERNAL MEDICINE

## 2023-02-16 PROCEDURE — 1123F ACP DISCUSS/DSCN MKR DOCD: CPT | Performed by: INTERNAL MEDICINE

## 2023-02-16 PROCEDURE — 3075F SYST BP GE 130 - 139MM HG: CPT | Performed by: INTERNAL MEDICINE

## 2023-02-16 PROCEDURE — 3078F DIAST BP <80 MM HG: CPT | Performed by: INTERNAL MEDICINE

## 2023-02-16 ASSESSMENT — ENCOUNTER SYMPTOMS
EYE ITCHING: 0
NAUSEA: 0
ABDOMINAL DISTENTION: 0
EYE DISCHARGE: 0
APNEA: 0
CHEST TIGHTNESS: 0
BACK PAIN: 0
VOMITING: 0
ABDOMINAL PAIN: 0
SHORTNESS OF BREATH: 0

## 2023-02-16 NOTE — PROGRESS NOTES
Today's Date: 2/16/2023  Patient's Name: Adrian Begum  Patient's age: 67 y.o., 1950    CC: The patient is a 67 y.o. , female is in the office for CAD    HPI:  The patient is a 67 y.o. , female is in the office for CAD. States she is not feeling too bad. Gets a bit more tired than usual.   States she has no cp or sob. No palpitations, no le edema, no orthopnea, no PND, syncope. 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 Isaiah Nice, Seasonal allergies, Trigger finger, left, and Type 2 diabetes mellitus ([address] Past Surgical History:   has a past surgical history that includes Nasal septum surgery (3/9/2005); Cholecystectomy, laparoscopic (6/9/2000); Cystocopy (3/1996); Ovary removal (Right, 7/1993); laparoscopy (3/1979); Hysterectomy, total abdominal (1980); Colonoscopy (3/11/2015); Cataract removal with implant (Left, 10/20/2015); Cataract removal with implant (Right, 12/08/2015); Yag capsulotomy (Right, 04/2016); Cardiac catheterization (06/20/2017); Coronary angioplasty with stent (06/20/2017); Appendectomy; eye surgery; pr arthroscopy knee diagnostic w/wo synovial bx spx (Right, 11/6/2018); Upper gastrointestinal endoscopy (N/A, 3/5/2020); Upper gastrointestinal endoscopy; Upper gastrointestinal endoscopy (05/13/2020); Endoscopic ultrasonography, GI (N/A, 5/13/2020); Upper gastrointestinal endoscopy (5/13/2020); Upper gastrointestinal endoscopy (5/13/2020); and Upper gastrointestinal endoscopy (5/13/2020).     Home Medications:  Prior to Admission medications    Medication Sig Start Date End Date Taking? Authorizing Provider   Semaglutide (RYBELSUS) 3 MG TABS Take 3 mg by mouth daily 1/10/23  Yes Dionisio Khan MD   cyanocobalamin ([employer] VITAMIN B12) 1000 MCG tablet Take 1 tablet by mouth daily 11/1/22  Yes Dionisio Khan MD   glimepiride (AMARYL) 1 MG tablet Take 1 tablet by mouth daily 11/1/22  Yes Dionisio Khan MD   metFORMIN (GLUCOPHAGE) 500 MG tablet TAKE 1 TABLET BY MOUTH TWICE DAILY WITH MEALS 11/1/22  Yes Dionisio Khan MD   amLODIPine (NORVASC) 10 MG tablet Take 1 tablet by mouth once daily 11/1/22  Yes Dionisio Khan MD   ezetimibe (ZETIA) 10 MG tablet Take 1 tablet by mouth daily 10/27/22  Yes Soo Daugherty MD   rosuvastatin (CRESTOR) 10 MG tablet TAKE 1 TABLET BY MOUTH EVERY DAY 10/24/22  Yes Gabe Kirby DO   isosorbide mononitrate (IMDUR) 30 MG extended release tablet Take 1 tablet by mouth once daily 10/10/22  Yes Gabe Kirby DO   NONFORMULARY Tumeric daily   Yes Historical Provider, MD   fluticasone (FLONASE) 50 MCG/ACT nasal spray 2 sprays by Each Nostril route daily  Patient taking differently: 2 sprays by Each Nostril route as needed 4/23/22  Yes Steffi Drake DO   nitroGLYCERIN (NITROSTAT) 0.4 MG SL tablet Place 1 tablet under the tongue every 5 minutes as needed for Chest pain up to max of 3 total doses. If no relief after 1 dose, call 911. 1/22/21  Yes Vicki Garces MD   ELDERBERRY PO Take by mouth Immune Plus tablets-Vit B, C, Zinc   Yes Historical Provider, MD   Ascorbic Acid (VITAMIN C PO) Take by mouth   Yes Historical Provider, MD   Multiple Vitamins-Minerals (MULTIVITAMIN PO) Take 1 tablet by mouth daily   Yes Historical Provider, MD   NONFORMULARY 5,000 mcg daily OTC Biotin 1000mcg daily    Yes Historical Provider, MD   aspirin 81 MG tablet Take 81 mg by mouth daily.    Yes Historical Provider, MD   estradiol (ESTRACE VAGINAL) 0.1 MG/GM vaginal cream Apply 1 gram vaginally nightly for two weeks, then apply 3 times a week as needed. Patient not taking: Reported on 2/16/2023 11/1/22   Louie Morris MD       Allergies: Iv dye [iodides], Penicillins, Sulfa antibiotics, Ace inhibitors, Cozaar [losartan], Lipitor, Lopressor [metoprolol], and Morphine    Social History:   reports that she has never smoked. She has never used smokeless tobacco. She reports that she does not drink alcohol and does not use drugs. Review of Systems:  Review of Systems   Constitutional:  Negative for chills, fatigue and fever. HENT:  Negative for congestion and dental problem. Eyes:  Negative for discharge and itching. Respiratory:  Negative for apnea, chest tightness and shortness of breath. Cardiovascular:  Negative for chest pain and palpitations. Gastrointestinal:  Negative for abdominal distention, abdominal pain, nausea and vomiting. Endocrine: Negative for cold intolerance and heat intolerance. Musculoskeletal:  Negative for arthralgias and back pain. Neurological:  Negative for dizziness, syncope and facial asymmetry. Psychiatric/Behavioral:  Negative for agitation and behavioral problems. Physical Exam:  /64   Pulse 78   Wt 161 lb (73 kg)   LMP  (LMP Unknown)   SpO2 99%   BMI 34.84 kg/m²    Physical Exam  Constitutional:       Appearance: Normal appearance. She is normal weight. HENT:      Head: Normocephalic and atraumatic. Mouth/Throat:      Mouth: Mucous membranes are moist.   Cardiovascular:      Rate and Rhythm: Normal rate and regular rhythm. Pulses: Normal pulses. Heart sounds: Normal heart sounds. No murmur heard. Pulmonary:      Effort: Pulmonary effort is normal. No respiratory distress. Breath sounds: Normal breath sounds. No stridor. Abdominal:      General: There is no distension. Palpations: There is no mass. Musculoskeletal:         General: No swelling or tenderness. Cervical back: No rigidity. Skin:     General: Skin is warm.       Capillary Refill: Capillary refill takes less than 2 seconds. Coloration: Skin is not jaundiced. Neurological:      General: No focal deficit present. Mental Status: She is alert and oriented to person, place, and time. Psychiatric:         Mood and Affect: Mood normal.         Behavior: Behavior normal.       LABS    Lab Results   Component Value Date    CHOL 145 10/28/2022    TRIG 175 (H) 10/28/2022    HDL 51 10/28/2022    LDLCHOLESTEROL 59 10/28/2022    VLDL NOT REPORTED (H) 10/22/2021    CHOLHDLRATIO 2.8 10/28/2022       Lab Results   Component Value Date     10/28/2022    K 3.8 10/28/2022     10/28/2022    CO2 30 10/28/2022    BUN 13 10/28/2022    CREATININE 0.75 10/28/2022    GLUCOSE 154 (H) 10/28/2022    CALCIUM 9.8 10/28/2022    PROT 7.7 10/28/2022    LABALBU 4.8 10/28/2022    BILITOT 0.5 10/28/2022    ALKPHOS 99 10/28/2022    AST 26 10/28/2022    ALT 29 10/28/2022    LABGLOM >60 10/28/2022    GFRAA >60 07/26/2022      Cardiac Data:    ECG:  Sinus  Rhythm   Low voltage in precordial leads.    -Old inferior infarct  -Poor R-wave progression -may be secondary to left ventricular hypertrophy    -Nonspecific ST depression  -Seen with left ventricular hypertrophy (strain) or digitalis effect. Nuclear stress test with lexiscan 4/29/21  Negative ecg portion   IMPRESSION:  1. No ischemia or infarction seen. 2. Normal left ventricular ejection fraction. Holter 5/22:      RASHIDA 8/22   Impression   Normal RASHIDA bilaterally     Assessment/Plan:  1. CAD s/p PCI of RCA in 6/2017. Stress test 04/2021 with no ischemia. Stable with no angina or chf. Cont current medical therapy with asa, statin and Imdur.   -she has some fatigue- but she will get more active this spring / summer    2. Essential Hypertension. Well controlled. Continue Norvasc and Imdur at current doses. Low-salt diet. 3. HPL. LDL 59 on 10/22. Maintained on crestor and Zetia. 4. DM II. Per PCP    5. Echo 6/2017 EF 55%.  Mild MR.     6. Bilateral lower extremity claudication/cramping- normal RASHIDA on 8/2e    7. Low risk Holter 5/22    The patient is to continue heart healthy diet, weight loss and exercise as tolerated. Patient's medications and side effects were discussed. Medication refills were provided if needed. Follow up appointment timing was discussed. All questions and concerns were addressed to patient's satisfaction. The patient is to follow up in 6 months or sooner if necessary. Thank you for allowing me to participate in the care of this patient, please do not hesitate to call if you have any questions. Bryce Rodriguez DO, [facility], [address], [address], [place] [address] [facility]  [facility]. [facility]  [phone]

## 2023-03-08 ENCOUNTER — TELEPHONE (OUTPATIENT)
Dept: FAMILY MEDICINE CLINIC | Age: 73
End: 2023-03-08

## 2023-03-08 NOTE — TELEPHONE ENCOUNTER
Please advise the patient that there are no samples of Rybelsus currently. Insullin is a less expensive option. I recommend a referral to [facility] to begin insulin injections.

## 2023-03-08 NOTE — TELEPHONE ENCOUNTER
Pt calling stating she only has 8 Rybelsus sample pills left but states she doesn't have an appt until 5-15 and pt states she checked with her ins and the med is very costly, pt not sure if she wants to stay on this as it's too expensive, please advise.

## 2023-03-08 NOTE — TELEPHONE ENCOUNTER
No samples available, however co pay cards are available to try.      Last OV: 11/1/22  Next OV: 5/2/23

## 2023-03-08 NOTE — TELEPHONE ENCOUNTER
I don't think she is able to use the co-pay card, as she has Medicare. Please check to see if Allison Newton has any refills.

## 2023-03-09 ENCOUNTER — TELEPHONE (OUTPATIENT)
Dept: FAMILY MEDICINE CLINIC | Age: 73
End: 2023-03-09

## 2023-03-09 NOTE — TELEPHONE ENCOUNTER
See telephone call from 3/9/23. Write called and spoke to patient and she declines diabetic education at this time. She also declines insulin. She states her blood sugars are around 130-150. She states she will discuss at upcoming appointment in May. She is taking Metformin in the AM and PM.  She is taking Glimepiride in PM (previously taking in the AM and PM)  She states she was advised to stop Glimepiride in the AM while taking Rybelsus. Patient will be out of Rybelsus on Monday and would like to know if she should restart her AM Glimepiride dose. Please advise.      Last OV: 11/1/22  Next OV: 5/2/23

## 2023-03-09 NOTE — TELEPHONE ENCOUNTER
Pt is calling checking on samples. Pt was informed and voiced understanding that there are no samples at this time. Pt states that she will finish out medication and then wait to see doctor in may. Pt states she doesn't want to do the injections.

## 2023-03-14 NOTE — TELEPHONE ENCOUNTER
I recommend continuing Metformin BID, and taking Glimepiride with the evening meal only.  She should continue to check her glucose, and call if the readings are increasing.

## 2023-03-14 NOTE — TELEPHONE ENCOUNTER
Patient states her blood sugar in the AM has been around 132-136.    She checks her blood sugar only in the AM. She reports feeling fine

## 2023-03-30 ENCOUNTER — TELEPHONE (OUTPATIENT)
Dept: FAMILY MEDICINE CLINIC | Age: 73
End: 2023-03-30

## 2023-03-30 DIAGNOSIS — E11.9 TYPE 2 DIABETES MELLITUS WITHOUT COMPLICATION, WITHOUT LONG-TERM CURRENT USE OF INSULIN (HCC): Primary | ICD-10-CM

## 2023-03-30 RX ORDER — LANCETS 30 GAUGE
1 EACH MISCELLANEOUS DAILY
Qty: 100 EACH | Refills: 1 | Status: SHIPPED | OUTPATIENT
Start: 2023-03-30

## 2023-03-30 RX ORDER — GLUCOSAMINE HCL/CHONDROITIN SU 500-400 MG
CAPSULE ORAL
Qty: 100 STRIP | Refills: 1 | Status: SHIPPED | OUTPATIENT
Start: 2023-03-30

## 2023-03-30 RX ORDER — BLOOD PRESSURE TEST KIT
1 KIT MISCELLANEOUS DAILY
Qty: 90 EACH | Refills: 1 | Status: SHIPPED | OUTPATIENT
Start: 2023-03-30

## 2023-03-30 NOTE — TELEPHONE ENCOUNTER
Pt is calling in regards to glucose meter. Pt changed insurances, the company said they could provided with a glucose meter and supplies for 90 days. But in order to neo them nurse has to call the insurance company at [phone].

## 2023-03-30 NOTE — TELEPHONE ENCOUNTER
Kika called requesting a refill of the below medication which has been pended for you:     Spoke to patient and she is requesting glucose monitor and supplies to be sent to mail order pharmacy    Requested Prescriptions     Pending Prescriptions Disp Refills    blood glucose monitor kit and supplies 1 kit 0     Sig: Testing 1 time daily    blood glucose monitor strips 100 strip 1     Sig: Test 1 times a day    Lancets MISC 100 each 1     Si each by Does not apply route daily    Alcohol Swabs PADS 90 each 1     Si each by Does not apply route daily       Last Appointment Date: 2022  Next Appointment Date: 2023    Allergies   Allergen Reactions    Iv Dye [Iodides] Shortness Of Breath    Penicillins Swelling    Sulfa Antibiotics Shortness Of Breath    Ace Inhibitors Nausea Only     Difficulty swallowing the Lisinopril. Gagging. Nausea. Cozaar [Losartan] Nausea Only    Lipitor Other (See Comments)     Muscle aches.     Lopressor [Metoprolol] Other (See Comments)     leg pain    Morphine Nausea Only     Chest pressure

## 2023-04-26 ENCOUNTER — HOSPITAL ENCOUNTER (OUTPATIENT)
Age: 73
Discharge: HOME | End: 2023-04-26
Payer: MEDICARE

## 2023-04-26 DIAGNOSIS — E78.2 MIXED HYPERLIPIDEMIA: ICD-10-CM

## 2023-04-26 DIAGNOSIS — E11.9 TYPE 2 DIABETES MELLITUS WITHOUT COMPLICATION, WITHOUT LONG-TERM CURRENT USE OF INSULIN (HCC): ICD-10-CM

## 2023-04-26 DIAGNOSIS — E53.8 B12 DEFICIENCY: ICD-10-CM

## 2023-04-26 DIAGNOSIS — I10 ESSENTIAL HYPERTENSION: ICD-10-CM

## 2023-04-26 LAB
ALBUMIN SERPL-MCNC: 4.7 G/DL (ref 3.5–5.2)
ALBUMIN/GLOBULIN RATIO: 1.4 (ref 1–2.5)
ALP SERPL-CCNC: 103 U/L (ref 35–104)
ALT SERPL-CCNC: 26 U/L (ref 5–33)
ANION GAP SERPL CALCULATED.3IONS-SCNC: 12 MMOL/L (ref 9–17)
AST SERPL-CCNC: 26 U/L
BILIRUB SERPL-MCNC: 0.4 MG/DL (ref 0.3–1.2)
BUN SERPL-MCNC: 9 MG/DL (ref 8–23)
BUN/CREAT BLD: 13 (ref 9–20)
CALCIUM SERPL-MCNC: 10 MG/DL (ref 8.6–10.4)
CHLORIDE SERPL-SCNC: 100 MMOL/L (ref 98–107)
CHOLEST SERPL-MCNC: 155 MG/DL
CHOLESTEROL/HDL RATIO: 3
CO2 SERPL-SCNC: 29 MMOL/L (ref 20–31)
CREAT SERPL-MCNC: 0.68 MG/DL (ref 0.5–0.9)
CREATININE URINE: 284.4 MG/DL (ref 28–217)
GFR SERPL CREATININE-BSD FRML MDRD: >60 ML/MIN/1.73M2
GLUCOSE SERPL-MCNC: 175 MG/DL (ref 70–99)
HDLC SERPL-MCNC: 52 MG/DL
LDLC SERPL CALC-MCNC: 67 MG/DL (ref 0–130)
MICROALBUMIN/CREAT 24H UR: 92 MG/L
MICROALBUMIN/CREAT UR-RTO: 32 MCG/MG CREAT
POTASSIUM SERPL-SCNC: 3.7 MMOL/L (ref 3.7–5.3)
PROT SERPL-MCNC: 8.1 G/DL (ref 6.4–8.3)
SODIUM SERPL-SCNC: 141 MMOL/L (ref 135–144)
TRIGL SERPL-MCNC: 182 MG/DL
VIT B12 SERPL-MCNC: 1793 PG/ML (ref 232–1245)

## 2023-04-26 PROCEDURE — 80053 COMPREHEN METABOLIC PANEL: CPT

## 2023-04-26 PROCEDURE — 36415 COLL VENOUS BLD VENIPUNCTURE: CPT

## 2023-04-26 PROCEDURE — 83036 HEMOGLOBIN GLYCOSYLATED A1C: CPT

## 2023-04-26 PROCEDURE — 80061 LIPID PANEL: CPT

## 2023-04-26 PROCEDURE — 82043 UR ALBUMIN QUANTITATIVE: CPT

## 2023-04-26 PROCEDURE — 82607 VITAMIN B-12: CPT

## 2023-04-26 PROCEDURE — 82570 ASSAY OF URINE CREATININE: CPT

## 2023-04-27 LAB
EST. AVERAGE GLUCOSE BLD GHB EST-MCNC: 171 MG/DL
HBA1C MFR BLD: 7.6 % (ref 4–6)

## 2023-05-03 ENCOUNTER — OFFICE VISIT (OUTPATIENT)
Dept: FAMILY MEDICINE CLINIC | Age: 73
End: 2023-05-03
Payer: MEDICARE

## 2023-05-03 VITALS
HEART RATE: 82 BPM | DIASTOLIC BLOOD PRESSURE: 68 MMHG | SYSTOLIC BLOOD PRESSURE: 130 MMHG | OXYGEN SATURATION: 98 % | TEMPERATURE: 98.1 F | WEIGHT: 164 LBS | HEIGHT: 57 IN | BODY MASS INDEX: 35.38 KG/M2

## 2023-05-03 DIAGNOSIS — E55.9 VITAMIN D DEFICIENCY: ICD-10-CM

## 2023-05-03 DIAGNOSIS — Z23 NEED FOR DIPHTHERIA-TETANUS-PERTUSSIS (TDAP) VACCINE: ICD-10-CM

## 2023-05-03 DIAGNOSIS — E78.2 MIXED HYPERLIPIDEMIA: ICD-10-CM

## 2023-05-03 DIAGNOSIS — I10 ESSENTIAL HYPERTENSION: ICD-10-CM

## 2023-05-03 DIAGNOSIS — E53.8 B12 DEFICIENCY: ICD-10-CM

## 2023-05-03 DIAGNOSIS — E11.69 TYPE 2 DIABETES MELLITUS WITH OTHER SPECIFIED COMPLICATION, WITHOUT LONG-TERM CURRENT USE OF INSULIN (HCC): Primary | ICD-10-CM

## 2023-05-03 DIAGNOSIS — E66.01 SEVERE OBESITY (BMI 35.0-39.9) WITH COMORBIDITY (HCC): ICD-10-CM

## 2023-05-03 PROCEDURE — 3075F SYST BP GE 130 - 139MM HG: CPT | Performed by: FAMILY MEDICINE

## 2023-05-03 PROCEDURE — G8399 PT W/DXA RESULTS DOCUMENT: HCPCS | Performed by: FAMILY MEDICINE

## 2023-05-03 PROCEDURE — 3017F COLORECTAL CA SCREEN DOC REV: CPT | Performed by: FAMILY MEDICINE

## 2023-05-03 PROCEDURE — 3078F DIAST BP <80 MM HG: CPT | Performed by: FAMILY MEDICINE

## 2023-05-03 PROCEDURE — 1036F TOBACCO NON-USER: CPT | Performed by: FAMILY MEDICINE

## 2023-05-03 PROCEDURE — 1090F PRES/ABSN URINE INCON ASSESS: CPT | Performed by: FAMILY MEDICINE

## 2023-05-03 PROCEDURE — 2022F DILAT RTA XM EVC RTNOPTHY: CPT | Performed by: FAMILY MEDICINE

## 2023-05-03 PROCEDURE — 1123F ACP DISCUSS/DSCN MKR DOCD: CPT | Performed by: FAMILY MEDICINE

## 2023-05-03 PROCEDURE — 3051F HG A1C>EQUAL 7.0%<8.0%: CPT | Performed by: FAMILY MEDICINE

## 2023-05-03 PROCEDURE — 99215 OFFICE O/P EST HI 40 MIN: CPT | Performed by: FAMILY MEDICINE

## 2023-05-03 PROCEDURE — G8417 CALC BMI ABV UP PARAM F/U: HCPCS | Performed by: FAMILY MEDICINE

## 2023-05-03 PROCEDURE — G8427 DOCREV CUR MEDS BY ELIG CLIN: HCPCS | Performed by: FAMILY MEDICINE

## 2023-05-03 RX ORDER — GLIMEPIRIDE 1 MG/1
1 TABLET ORAL DAILY
Qty: 90 TABLET | Refills: 1 | Status: SHIPPED | OUTPATIENT
Start: 2023-05-03 | End: 2023-05-04 | Stop reason: SDUPTHER

## 2023-05-03 RX ORDER — EZETIMIBE 10 MG/1
10 TABLET ORAL DAILY
Qty: 90 TABLET | Refills: 1 | Status: SHIPPED | OUTPATIENT
Start: 2023-05-03

## 2023-05-03 RX ORDER — AMLODIPINE BESYLATE 10 MG/1
TABLET ORAL
Qty: 90 TABLET | Refills: 1 | Status: SHIPPED | OUTPATIENT
Start: 2023-05-03

## 2023-05-03 RX ORDER — SEMAGLUTIDE 1.34 MG/ML
0.25 INJECTION, SOLUTION SUBCUTANEOUS WEEKLY
Qty: 1 ADJUSTABLE DOSE PRE-FILLED PEN SYRINGE | Refills: 0 | Status: SHIPPED | OUTPATIENT
Start: 2023-05-03 | End: 2023-05-04

## 2023-05-03 SDOH — ECONOMIC STABILITY: FOOD INSECURITY: WITHIN THE PAST 12 MONTHS, THE FOOD YOU BOUGHT JUST DIDN'T LAST AND YOU DIDN'T HAVE MONEY TO GET MORE.: PATIENT DECLINED

## 2023-05-03 SDOH — ECONOMIC STABILITY: HOUSING INSECURITY
IN THE LAST 12 MONTHS, WAS THERE A TIME WHEN YOU DID NOT HAVE A STEADY PLACE TO SLEEP OR SLEPT IN A SHELTER (INCLUDING NOW)?: PATIENT REFUSED

## 2023-05-03 SDOH — ECONOMIC STABILITY: FOOD INSECURITY: WITHIN THE PAST 12 MONTHS, YOU WORRIED THAT YOUR FOOD WOULD RUN OUT BEFORE YOU GOT MONEY TO BUY MORE.: PATIENT DECLINED

## 2023-05-03 SDOH — ECONOMIC STABILITY: INCOME INSECURITY: HOW HARD IS IT FOR YOU TO PAY FOR THE VERY BASICS LIKE FOOD, HOUSING, MEDICAL CARE, AND HEATING?: PATIENT DECLINED

## 2023-05-03 NOTE — PROGRESS NOTES
AUREA HOBSON [address], [address]                        Telephone [phone]             Fax [phone]       Enid Middleton  :  1950  Age:  67 y.o. MRN:  [id number]  Date of visit:  5/3/2023       Assessment and Plan:    1. Type 2 diabetes mellitus with other specified complication, without long-term current use of insulin (HCC)  Her HgbA1c was 7.6 %, which is not at goal and worsening. I recommended beginning Ozempic:  - Semaglutide,0.25 or 0.5MG/DOS, (OZEMPIC, 0.25 OR 0.5 MG/DOSE,) 2 MG/1.5ML SOPN; Inject 0.25 mg into the skin once a week  Dispense: 1 Adjustable Dose Pre-filled Pen Syringe; Refill: 0    Metformin and Amaryl were refilled:  - metFORMIN (GLUCOPHAGE) 500 MG tablet; TAKE 1 TABLET BY MOUTH TWICE DAILY WITH MEALS  Dispense: 180 tablet; Refill: 1  - glimepiride (AMARYL) 1 MG tablet; Take 1 tablet by mouth daily  Dispense: 90 tablet; Refill: 1    She was advised to hold her dose of Glimepiride if her glucose is 150 or less. Labs were ordered to be done prior to her return visit in 3 months:   - Comprehensive Metabolic Panel; Future  - Hemoglobin A1C; Future    Printed information regarding Semaglutide was provided to the patient with the after visit summary. Printed information regarding Hypoglycemia was provided to the patient with the after visit summary. 2. Essential hypertension  Her blood pressure is adequately-controlled today. (BP: 130/68)   She was advised to continue current medications. Amlodipine was refilled:   - amLODIPine (NORVASC) 10 MG tablet; Take 1 tablet by mouth once daily  Dispense: 90 tablet; Refill: 1    3. Mixed hyperlipidemia  Her lipid profile was at goal except for elevated triglycerides  on her recent lab work. She is tolerating Zetia well; this was refilled:   - ezetimibe (ZETIA) 10 MG tablet;  Take 1 tablet by mouth daily  Dispense: 90 tablet;

## 2023-05-03 NOTE — PATIENT INSTRUCTIONS
Hospital Outpatient Visit on 04/26/2023   Component Date Value Ref Range Status    Vitamin B-12 04/26/2023 1793 (H)  232 - 1245 pg/mL Final    Cholesterol 04/26/2023 155  <200 mg/dL Final    Comment:    Cholesterol Guidelines:      <200  Desirable   200-240  Borderline      >240  Undesirable         HDL 04/26/2023 52  >40 mg/dL Final    Comment:    HDL Guidelines:    <40     Undesirable   40-59    Borderline    >59     Desirable         LDL Cholesterol 04/26/2023 67  0 - 130 mg/dL Final    Comment:    LDL Guidelines:     <100    Desirable   100-129   Near to/above Desirable   130-159   Borderline      >159   Undesirable     Direct (measured) LDL and calculated LDL are not interchangeable tests. Chol/HDL Ratio 04/26/2023 3.0  <5 Final            Triglycerides 04/26/2023 182 (H)  <150 mg/dL Final    Comment:    Triglyceride Guidelines:     <150   Desirable   150-199  Borderline   200-499  High     >499   Very high   Based on AHA Guidelines for fasting triglyceride, October 2012. Hemoglobin A1C 04/26/2023 7.6 (H)  4.0 - 6.0 % Final    Estimated Avg Glucose 04/26/2023 171  mg/dL Final    Comment: The ADA and AACC recommend providing the estimated average glucose result to permit better   patient understanding of their HBA1c result. Glucose 04/26/2023 175 (H)  70 - 99 mg/dL Final    BUN 04/26/2023 9  8 - 23 mg/dL Final    Creatinine 04/26/2023 0.68  0.50 - 0.90 mg/dL Final    Est, Glom Filt Rate 04/26/2023 >60  >60 mL/min/1.73m2 Final    Comment:       These results are not intended for use in patients <25years of age. eGFR results are calculated without a race factor using the 2021 CKD-EPI equation. Careful clinical correlation is recommended, particularly when comparing to results   calculated using previous equations.   The CKD-EPI equation is less accurate in patients with extremes of muscle mass, extra-renal   metabolism of creatine, excessive creatine ingestion, or following therapy that

## 2023-05-04 ENCOUNTER — TELEPHONE (OUTPATIENT)
Dept: FAMILY MEDICINE CLINIC | Age: 73
End: 2023-05-04

## 2023-05-04 DIAGNOSIS — E11.69 TYPE 2 DIABETES MELLITUS WITH OTHER SPECIFIED COMPLICATION, WITHOUT LONG-TERM CURRENT USE OF INSULIN (HCC): ICD-10-CM

## 2023-05-04 RX ORDER — GLIMEPIRIDE 1 MG/1
1 TABLET ORAL
Qty: 90 TABLET | Refills: 1
Start: 2023-05-04

## 2023-05-04 NOTE — TELEPHONE ENCOUNTER
Pt calling stating she was seen and offered an injectable med but pt talked to her pharmacy and it would be $94 ro 2 mo or $47 for a month and pt states at that cost it would put her in the donut hole with her med coverage and pt doesn't want to do that, so pt questions if there is anything cheaper or should she just continue on the meds that she's on, please advise.

## 2023-05-16 ENCOUNTER — TELEPHONE (OUTPATIENT)
Dept: FAMILY MEDICINE CLINIC | Age: 73
End: 2023-05-16

## 2023-05-16 ENCOUNTER — OFFICE VISIT (OUTPATIENT)
Dept: FAMILY MEDICINE CLINIC | Age: 73
End: 2023-05-16
Payer: MEDICARE

## 2023-05-16 DIAGNOSIS — Z00.00 MEDICARE ANNUAL WELLNESS VISIT, SUBSEQUENT: Primary | ICD-10-CM

## 2023-05-16 DIAGNOSIS — E11.69 TYPE 2 DIABETES MELLITUS WITH OTHER SPECIFIED COMPLICATION, WITHOUT LONG-TERM CURRENT USE OF INSULIN (HCC): ICD-10-CM

## 2023-05-16 PROCEDURE — 1123F ACP DISCUSS/DSCN MKR DOCD: CPT | Performed by: FAMILY MEDICINE

## 2023-05-16 PROCEDURE — 3017F COLORECTAL CA SCREEN DOC REV: CPT | Performed by: FAMILY MEDICINE

## 2023-05-16 PROCEDURE — G0439 PPPS, SUBSEQ VISIT: HCPCS | Performed by: FAMILY MEDICINE

## 2023-05-16 RX ORDER — SEMAGLUTIDE 1.34 MG/ML
0.25 INJECTION, SOLUTION SUBCUTANEOUS WEEKLY
Qty: 1 ADJUSTABLE DOSE PRE-FILLED PEN SYRINGE | Refills: 0
Start: 2023-05-16

## 2023-05-16 ASSESSMENT — PATIENT HEALTH QUESTIONNAIRE - PHQ9
SUM OF ALL RESPONSES TO PHQ QUESTIONS 1-9: 0
2. FEELING DOWN, DEPRESSED OR HOPELESS: 0
SUM OF ALL RESPONSES TO PHQ QUESTIONS 1-9: 0
SUM OF ALL RESPONSES TO PHQ QUESTIONS 1-9: 0
SUM OF ALL RESPONSES TO PHQ9 QUESTIONS 1 & 2: 0
SUM OF ALL RESPONSES TO PHQ QUESTIONS 1-9: 0
1. LITTLE INTEREST OR PLEASURE IN DOING THINGS: 0

## 2023-05-16 ASSESSMENT — LIFESTYLE VARIABLES
HOW OFTEN DO YOU HAVE A DRINK CONTAINING ALCOHOL: NEVER
HOW MANY STANDARD DRINKS CONTAINING ALCOHOL DO YOU HAVE ON A TYPICAL DAY: PATIENT DOES NOT DRINK

## 2023-05-16 NOTE — PROGRESS NOTES
Toño Vasquez, direct supervision, 5/16/2023.   Electronically signed by Romeo Hoffman MD on 5/16/2023 at 2:09 PM.

## 2023-05-16 NOTE — PATIENT INSTRUCTIONS
Personalized Preventive Plan for Shekhar January - 5/16/2023  Medicare offers a range of preventive health benefits. Some of the tests and screenings are paid in full while other may be subject to a deductible, co-insurance, and/or copay. Some of these benefits include a comprehensive review of your medical history including lifestyle, illnesses that may run in your family, and various assessments and screenings as appropriate. After reviewing your medical record and screening and assessments performed today your provider may have ordered immunizations, labs, imaging, and/or referrals for you. A list of these orders (if applicable) as well as your Preventive Care list are included within your After Visit Summary for your review. Other Preventive Recommendations:    A preventive eye exam performed by an eye specialist is recommended every 1-2 years to screen for glaucoma; cataracts, macular degeneration, and other eye disorders. A preventive dental visit is recommended every 6 months. Try to get at least 150 minutes of exercise per week or 10,000 steps per day on a pedometer . Order or download the FREE \"Exercise & Physical Activity: Your Everyday Guide\" from [employer]. Call 7-175.400.3142 or search [employer] online. You need 5794-0206 mg of calcium and 1777-0461 IU of vitamin D per day. It is possible to meet your calcium requirement with diet alone, but a vitamin D supplement is usually necessary to meet this goal.  When exposed to the sun, use a sunscreen that protects against both UVA and UVB radiation with an SPF of 30 or greater. Reapply every 2 to 3 hours or after sweating, drying off with a towel, or swimming. Always wear a seat belt when traveling in a car. Always wear a helmet when riding a bicycle or motorcycle.

## 2023-05-16 NOTE — TELEPHONE ENCOUNTER
Patient reports elevated blood sugars the last few weeks. She states her normal blood sugar ranges are around 160. Blood sugars are now between 170-190's. Patient is taking Glimepiride 1 mg BID and Metformin 500 mg BID. She declines any other medication changes or diet changes. On 5/3/23 patient was seen in office and was prescribed Ozempic 0.25 mg once weekly as well as to hold daily dose of glimepiride if blood sugars below 150. Patient did not start this medication due to cost, however if samples are available, patient would like to try this medication now. She requests  to sample this before spending money on a medication that may not work. Samples are available. Please advise.      Last OV: 5/3/23  Next OV: 8/3/23

## 2023-06-08 DIAGNOSIS — E11.69 TYPE 2 DIABETES MELLITUS WITH OTHER SPECIFIED COMPLICATION, WITHOUT LONG-TERM CURRENT USE OF INSULIN (HCC): ICD-10-CM

## 2023-06-08 RX ORDER — GLIMEPIRIDE 1 MG/1
1 TABLET ORAL
Qty: 60 TABLET | Refills: 1 | Status: SHIPPED | OUTPATIENT
Start: 2023-06-08

## 2023-06-08 NOTE — TELEPHONE ENCOUNTER
Kika called requesting a refill of the below medication which has been pended for you:     Requested Prescriptions     Pending Prescriptions Disp Refills    glimepiride (AMARYL) 1 MG tablet 60 tablet 1     Sig: Take 1 tablet by mouth 2 times daily (before meals)       Last Appointment Date: 5/16/2023  Next Appointment Date: 8/3/2023    Allergies   Allergen Reactions    Iv Dye [Iodides] Shortness Of Breath    Penicillins Swelling    Sulfa Antibiotics Shortness Of Breath    Ace Inhibitors Nausea Only     Difficulty swallowing the Lisinopril. Gagging. Nausea. Cozaar [Losartan] Nausea Only    Lipitor Other (See Comments)     Muscle aches.     Lopressor [Metoprolol] Other (See Comments)     leg pain    Morphine Nausea Only     Chest pressure

## 2023-07-31 DIAGNOSIS — E11.69 TYPE 2 DIABETES MELLITUS WITH OTHER SPECIFIED COMPLICATION, WITHOUT LONG-TERM CURRENT USE OF INSULIN (HCC): ICD-10-CM

## 2023-07-31 RX ORDER — GLIMEPIRIDE 1 MG/1
TABLET ORAL
Qty: 120 TABLET | OUTPATIENT
Start: 2023-07-31

## 2023-08-01 DIAGNOSIS — E11.69 TYPE 2 DIABETES MELLITUS WITH OTHER SPECIFIED COMPLICATION, WITHOUT LONG-TERM CURRENT USE OF INSULIN (HCC): ICD-10-CM

## 2023-08-01 RX ORDER — GLIMEPIRIDE 1 MG/1
TABLET ORAL
Qty: 120 TABLET | OUTPATIENT
Start: 2023-08-01

## 2023-08-01 NOTE — TELEPHONE ENCOUNTER
Last filled 06/13/23 #30 R1  Kika called requesting a refill of the below medication which has been pended for you:     Requested Prescriptions     Pending Prescriptions Disp Refills    glimepiride (AMARYL) 1 MG tablet [Pharmacy Med Name: GLIMEPIRIDE 1 MG Tablet] 120 tablet      Sig: TAKE 1 TABLET TWICE DAILY BEFORE MEALS       Last Appointment Date: 5/16/2023  Next Appointment Date: 8/9/2023    Allergies   Allergen Reactions    Iv Dye [Iodides] Shortness Of Breath    Penicillins Swelling    Sulfa Antibiotics Shortness Of Breath    Ace Inhibitors Nausea Only     Difficulty swallowing the Lisinopril. Gagging. Nausea. Cozaar [Losartan] Nausea Only    Lipitor Other (See Comments)     Muscle aches.     Lopressor [Metoprolol] Other (See Comments)     leg pain    Morphine Nausea Only     Chest pressure

## 2023-08-07 ENCOUNTER — HOSPITAL ENCOUNTER (OUTPATIENT)
Age: 73
Discharge: HOME | End: 2023-08-07
Payer: MEDICARE

## 2023-08-07 DIAGNOSIS — E53.8 B12 DEFICIENCY: ICD-10-CM

## 2023-08-07 DIAGNOSIS — E55.9 VITAMIN D DEFICIENCY: ICD-10-CM

## 2023-08-07 DIAGNOSIS — E11.69 TYPE 2 DIABETES MELLITUS WITH OTHER SPECIFIED COMPLICATION, WITHOUT LONG-TERM CURRENT USE OF INSULIN (HCC): ICD-10-CM

## 2023-08-07 LAB
25(OH)D3 SERPL-MCNC: 52 NG/ML
ALBUMIN SERPL-MCNC: 4.6 G/DL (ref 3.5–5.2)
ALBUMIN/GLOB SERPL: 1.4 {RATIO} (ref 1–2.5)
ALP SERPL-CCNC: 102 U/L (ref 35–104)
ALT SERPL-CCNC: 29 U/L (ref 5–33)
ANION GAP SERPL CALCULATED.3IONS-SCNC: 11 MMOL/L (ref 9–17)
AST SERPL-CCNC: 26 U/L
BILIRUB SERPL-MCNC: 0.4 MG/DL (ref 0.3–1.2)
BUN SERPL-MCNC: 10 MG/DL (ref 8–23)
BUN/CREAT SERPL: 14 (ref 9–20)
CALCIUM SERPL-MCNC: 9.7 MG/DL (ref 8.6–10.4)
CHLORIDE SERPL-SCNC: 105 MMOL/L (ref 98–107)
CHOLEST SERPL-MCNC: 128 MG/DL
CHOLESTEROL/HDL RATIO: 2.7
CO2 SERPL-SCNC: 27 MMOL/L (ref 20–31)
CREAT SERPL-MCNC: 0.7 MG/DL (ref 0.5–0.9)
EST. AVERAGE GLUCOSE BLD GHB EST-MCNC: 171 MG/DL
GFR SERPL CREATININE-BSD FRML MDRD: >60 ML/MIN/1.73M2
GLUCOSE SERPL-MCNC: 169 MG/DL (ref 70–99)
HBA1C MFR BLD: 7.6 % (ref 4–6)
HDLC SERPL-MCNC: 47 MG/DL
LDLC SERPL CALC-MCNC: 48 MG/DL (ref 0–130)
POTASSIUM SERPL-SCNC: 4.1 MMOL/L (ref 3.7–5.3)
PROT SERPL-MCNC: 7.9 G/DL (ref 6.4–8.3)
SODIUM SERPL-SCNC: 143 MMOL/L (ref 135–144)
TRIGL SERPL-MCNC: 165 MG/DL
VIT B12 SERPL-MCNC: 658 PG/ML (ref 232–1245)

## 2023-08-07 PROCEDURE — 82607 VITAMIN B-12: CPT

## 2023-08-07 PROCEDURE — 80061 LIPID PANEL: CPT

## 2023-08-07 PROCEDURE — 82306 VITAMIN D 25 HYDROXY: CPT

## 2023-08-07 PROCEDURE — 80053 COMPREHEN METABOLIC PANEL: CPT

## 2023-08-07 PROCEDURE — 36415 COLL VENOUS BLD VENIPUNCTURE: CPT

## 2023-08-07 PROCEDURE — 83036 HEMOGLOBIN GLYCOSYLATED A1C: CPT

## 2023-08-09 ENCOUNTER — OFFICE VISIT (OUTPATIENT)
Dept: FAMILY MEDICINE CLINIC | Age: 73
End: 2023-08-09
Payer: MEDICARE

## 2023-08-09 VITALS
TEMPERATURE: 98.2 F | HEART RATE: 80 BPM | OXYGEN SATURATION: 97 % | DIASTOLIC BLOOD PRESSURE: 60 MMHG | WEIGHT: 163.4 LBS | SYSTOLIC BLOOD PRESSURE: 130 MMHG | HEIGHT: 57 IN | BODY MASS INDEX: 35.25 KG/M2

## 2023-08-09 DIAGNOSIS — E78.2 MIXED HYPERLIPIDEMIA: ICD-10-CM

## 2023-08-09 DIAGNOSIS — Z23 NEED FOR DIPHTHERIA-TETANUS-PERTUSSIS (TDAP) VACCINE: ICD-10-CM

## 2023-08-09 DIAGNOSIS — I10 ESSENTIAL HYPERTENSION: ICD-10-CM

## 2023-08-09 DIAGNOSIS — E11.69 TYPE 2 DIABETES MELLITUS WITH OTHER SPECIFIED COMPLICATION, WITHOUT LONG-TERM CURRENT USE OF INSULIN (HCC): Primary | ICD-10-CM

## 2023-08-09 PROCEDURE — G8417 CALC BMI ABV UP PARAM F/U: HCPCS | Performed by: FAMILY MEDICINE

## 2023-08-09 PROCEDURE — 1036F TOBACCO NON-USER: CPT | Performed by: FAMILY MEDICINE

## 2023-08-09 PROCEDURE — G8399 PT W/DXA RESULTS DOCUMENT: HCPCS | Performed by: FAMILY MEDICINE

## 2023-08-09 PROCEDURE — 3078F DIAST BP <80 MM HG: CPT | Performed by: FAMILY MEDICINE

## 2023-08-09 PROCEDURE — 1090F PRES/ABSN URINE INCON ASSESS: CPT | Performed by: FAMILY MEDICINE

## 2023-08-09 PROCEDURE — 3075F SYST BP GE 130 - 139MM HG: CPT | Performed by: FAMILY MEDICINE

## 2023-08-09 PROCEDURE — 99214 OFFICE O/P EST MOD 30 MIN: CPT | Performed by: FAMILY MEDICINE

## 2023-08-09 PROCEDURE — 99213 OFFICE O/P EST LOW 20 MIN: CPT | Performed by: FAMILY MEDICINE

## 2023-08-09 PROCEDURE — 3017F COLORECTAL CA SCREEN DOC REV: CPT | Performed by: FAMILY MEDICINE

## 2023-08-09 PROCEDURE — G8427 DOCREV CUR MEDS BY ELIG CLIN: HCPCS | Performed by: FAMILY MEDICINE

## 2023-08-09 PROCEDURE — 3051F HG A1C>EQUAL 7.0%<8.0%: CPT | Performed by: FAMILY MEDICINE

## 2023-08-09 PROCEDURE — 1123F ACP DISCUSS/DSCN MKR DOCD: CPT | Performed by: FAMILY MEDICINE

## 2023-08-09 PROCEDURE — 2022F DILAT RTA XM EVC RTNOPTHY: CPT | Performed by: FAMILY MEDICINE

## 2023-08-09 RX ORDER — EZETIMIBE 10 MG/1
10 TABLET ORAL DAILY
Qty: 90 TABLET | Refills: 1 | Status: SHIPPED | OUTPATIENT
Start: 2023-08-09

## 2023-08-09 RX ORDER — AMLODIPINE BESYLATE 10 MG/1
TABLET ORAL
Qty: 90 TABLET | Refills: 1 | Status: SHIPPED | OUTPATIENT
Start: 2023-08-09

## 2023-08-09 RX ORDER — GLIMEPIRIDE 1 MG/1
1 TABLET ORAL
Qty: 180 TABLET | Refills: 0 | Status: SHIPPED | OUTPATIENT
Start: 2023-08-09

## 2023-08-09 RX ORDER — AMPICILLIN TRIHYDRATE 250 MG
1 CAPSULE ORAL DAILY
COMMUNITY

## 2023-08-09 RX ORDER — ORAL SEMAGLUTIDE 3 MG/1
3 TABLET ORAL DAILY
Qty: 30 TABLET | Refills: 0 | Status: SHIPPED | OUTPATIENT
Start: 2023-08-09

## 2023-08-09 NOTE — PATIENT INSTRUCTIONS
Call your insurance and ask if Ozempic or [facility] are covered medications. The starting dose of Ozempic is 0.25 mg weekly. The starting dose of Mounjaro is 2.5 mg weekly.

## 2023-08-09 NOTE — PROGRESS NOTES
Pt is interested in the Tetanus vaccine
note were completed with a voice-recognition program. Efforts were made to edit the dictation but occasionally words are mis-transcribed.)

## 2023-08-12 ENCOUNTER — HOSPITAL ENCOUNTER (OUTPATIENT)
Age: 73
Discharge: HOME | End: 2023-08-12
Payer: MEDICARE

## 2023-08-12 ENCOUNTER — OFFICE VISIT (OUTPATIENT)
Dept: PRIMARY CARE CLINIC | Age: 73
End: 2023-08-12
Payer: MEDICARE

## 2023-08-12 VITALS
TEMPERATURE: 97.4 F | RESPIRATION RATE: 14 BRPM | DIASTOLIC BLOOD PRESSURE: 72 MMHG | OXYGEN SATURATION: 96 % | HEART RATE: 75 BPM | SYSTOLIC BLOOD PRESSURE: 122 MMHG | BODY MASS INDEX: 35.25 KG/M2 | HEIGHT: 57 IN | WEIGHT: 163.38 LBS

## 2023-08-12 DIAGNOSIS — R30.9 PAINFUL URINATION: ICD-10-CM

## 2023-08-12 DIAGNOSIS — N30.01 ACUTE CYSTITIS WITH HEMATURIA: Primary | ICD-10-CM

## 2023-08-12 DIAGNOSIS — N30.01 ACUTE CYSTITIS WITH HEMATURIA: ICD-10-CM

## 2023-08-12 LAB
BACTERIA URNS QL MICRO: ABNORMAL
BILIRUB UR QL STRIP: NEGATIVE
CHARACTER UR: ABNORMAL
CLARITY UR: CLEAR
COLOR UR: YELLOW
EPI CELLS #/AREA URNS HPF: ABNORMAL /HPF (ref 0–5)
GLUCOSE UR STRIP-MCNC: ABNORMAL MG/DL
HGB UR QL STRIP.AUTO: ABNORMAL
KETONES UR STRIP-MCNC: ABNORMAL MG/DL
LEUKOCYTE ESTERASE UR QL STRIP: ABNORMAL
MUCOUS THREADS URNS QL MICRO: ABNORMAL
NITRITE UR QL STRIP: NEGATIVE
PH UR STRIP: 5.5 [PH] (ref 5–6)
PROT UR STRIP-MCNC: ABNORMAL MG/DL
RBC #/AREA URNS HPF: ABNORMAL /HPF (ref 0–4)
SP GR UR STRIP: 1.03 (ref 1.01–1.02)
UROBILINOGEN UR STRIP-ACNC: NORMAL EU/DL (ref 0–1)
WBC #/AREA URNS HPF: ABNORMAL /HPF (ref 0–4)

## 2023-08-12 PROCEDURE — 81001 URINALYSIS AUTO W/SCOPE: CPT

## 2023-08-12 PROCEDURE — 87086 URINE CULTURE/COLONY COUNT: CPT

## 2023-08-12 PROCEDURE — 99212 OFFICE O/P EST SF 10 MIN: CPT

## 2023-08-12 RX ORDER — NITROFURANTOIN 25; 75 MG/1; MG/1
100 CAPSULE ORAL 2 TIMES DAILY
Qty: 10 CAPSULE | Refills: 0 | Status: SHIPPED | OUTPATIENT
Start: 2023-08-12 | End: 2023-08-17

## 2023-08-12 RX ORDER — FLUCONAZOLE 150 MG/1
TABLET ORAL
Qty: 2 TABLET | Refills: 0 | Status: SHIPPED | OUTPATIENT
Start: 2023-08-12 | End: 2023-08-13

## 2023-08-12 ASSESSMENT — ENCOUNTER SYMPTOMS
NAUSEA: 0
VOMITING: 0

## 2023-08-12 NOTE — PATIENT INSTRUCTIONS
Ensure you are drinking atleast 64 oz of water  Macrobid x 5 days  Will call in 2-3 days with urine culture results  Return for continued or worsening symptoms

## 2023-08-12 NOTE — PROGRESS NOTES
Ketones, Urine 08/12/2023 TRACE (A)  NEGATIVE mg/dL Final    Specific Gravity, UA 08/12/2023 1.030 (H)  1.010 - 1.025 Final    Urine Hgb 08/12/2023 TRACE (A)  NEGATIVE Final    pH, UA 08/12/2023 5.5  5.0 - 6.0 Final    Protein, UA 08/12/2023 1+ (A)  NEGATIVE mg/dL Final    Urobilinogen, Urine 08/12/2023 Normal  0.0 - 1.0 EU/dL Final    Nitrite, Urine 08/12/2023 NEGATIVE  NEGATIVE Final    Leukocyte Esterase, Urine 08/12/2023 1+ (A)  NEGATIVE Final    WBC, UA 08/12/2023 2 TO 5  0 - 4 /HPF Final    RBC, UA 08/12/2023 0 TO 2  0 - 4 /HPF Final    Epithelial Cells UA 08/12/2023 5 TO 10  0 - 5 /HPF Final    Bacteria, UA 08/12/2023 FEW (A)  None Final    Mucus, UA 08/12/2023 1+ (A)  None Final    Other Observations UA 08/12/2023  (A)  NOT REQ. Final     Benign abdomen on exam. Negative CVA tenderness. Afebrile VSS. Patient does report mild pruritis to vagina with intermittent scant \"brown\" discharge. Discussed pelvic exam and additional testing however patient wishes to defer at this time. Will start treatment for UTI and provide diflucan to cover for yeast infection. If symptoms continue or worsen after treatment, patient to return for re-evaluation. Discussed UA results with patient in clinic. Ensure you are drinking atleast 64 oz of water  Macrobid x 5 days  Will call in 2-3 days with urine culture results  Return for continued or worsening symptoms    Discussed exam, POCT findings, plan of care, and follow-up at length with patient. Reviewed all prescribed and recommended medications, administration and side effects. Encouraged patient to follow up with PCP or return to the clinic for no improvement and or worsening of symptoms. All questions were answered and they verbalized understanding and were agreeable with the plan. Follow up as needed.         Electronically signed by EMY Chamberlain CNP on 8/12/2023 at 11:19 AM

## 2023-08-13 DIAGNOSIS — N89.8 VAGINAL ITCHING: Primary | ICD-10-CM

## 2023-08-13 LAB
MICROORGANISM SPEC CULT: NORMAL
SPECIMEN DESCRIPTION: NORMAL

## 2023-08-13 RX ORDER — FLUCONAZOLE 150 MG/1
TABLET ORAL
Qty: 2 TABLET | Refills: 0 | Status: SHIPPED | OUTPATIENT
Start: 2023-08-13

## 2023-08-23 DIAGNOSIS — E11.9 TYPE 2 DIABETES MELLITUS WITHOUT COMPLICATION, WITHOUT LONG-TERM CURRENT USE OF INSULIN (HCC): ICD-10-CM

## 2023-08-23 RX ORDER — CALCIUM CITRATE/VITAMIN D3 200MG-6.25
TABLET ORAL
Qty: 100 STRIP | Refills: 1 | Status: SHIPPED | OUTPATIENT
Start: 2023-08-23

## 2023-08-23 RX ORDER — ISOPROPYL ALCOHOL 70 ML/100ML
SWAB TOPICAL
Qty: 100 EACH | Refills: 1 | Status: SHIPPED | OUTPATIENT
Start: 2023-08-23

## 2023-08-23 RX ORDER — GLUCOSAM/CHON-MSM1/C/MANG/BOSW 500-416.6
TABLET ORAL
Qty: 100 EACH | Refills: 1 | Status: SHIPPED | OUTPATIENT
Start: 2023-08-23

## 2023-08-23 NOTE — TELEPHONE ENCOUNTER
Kika called requesting a refill of the below medication which has been pended for you:     Requested Prescriptions     Pending Prescriptions Disp Refills    blood glucose test strips (TRUE METRIX BLOOD GLUCOSE TEST) strip [Pharmacy Med Name: TRUE METRIX SELF MONITORING BLOOD GLUCOSE STRIPS   Strip] 100 strip 1     Sig: TEST BLOOD SUGAR EVERY DAY    TRUEplus Lancets 33G [place] [Pharmacy Med Name: [facility] [place] LANCETS 33G] 100 each 1     Sig: TEST BLOOD SUGAR EVERY DAY    Alcohol Swabs (DROPSAFE ALCOHOL PREP) 70 % PADS [Pharmacy Med Name: DROPSAFE ALCOHOL PREP PADS 70 % Pad] 100 each 1     Sig: USE AS DIRECTED DAILY       Last Appointment Date: 8/9/2023  Next Appointment Date: 11/15/2023    Allergies   Allergen Reactions    Iv Dye [Iodides] Shortness Of Breath    Penicillins Swelling    Sulfa Antibiotics Shortness Of Breath    Ace Inhibitors Nausea Only     Difficulty swallowing the Lisinopril. Gagging. Nausea. Cozaar [Losartan] Nausea Only    Lipitor Other (See Comments)     Muscle aches.     Lopressor [Metoprolol] Other (See Comments)     leg pain    Morphine Nausea Only     Chest pressure

## 2023-08-31 ENCOUNTER — OFFICE VISIT (OUTPATIENT)
Dept: CARDIOLOGY | Age: 73
End: 2023-08-31
Payer: MEDICARE

## 2023-08-31 VITALS
SYSTOLIC BLOOD PRESSURE: 140 MMHG | DIASTOLIC BLOOD PRESSURE: 64 MMHG | HEIGHT: 57 IN | WEIGHT: 163 LBS | HEART RATE: 68 BPM | BODY MASS INDEX: 35.17 KG/M2

## 2023-08-31 DIAGNOSIS — I25.10 CORONARY ARTERY DISEASE INVOLVING NATIVE CORONARY ARTERY OF NATIVE HEART WITHOUT ANGINA PECTORIS: ICD-10-CM

## 2023-08-31 DIAGNOSIS — I10 ESSENTIAL HYPERTENSION: ICD-10-CM

## 2023-08-31 DIAGNOSIS — I25.118 CORONARY ARTERY DISEASE OF NATIVE ARTERY OF NATIVE HEART WITH STABLE ANGINA PECTORIS (HCC): ICD-10-CM

## 2023-08-31 DIAGNOSIS — E78.2 MIXED HYPERLIPIDEMIA: Primary | ICD-10-CM

## 2023-08-31 DIAGNOSIS — Z12.31 SCREENING MAMMOGRAM FOR BREAST CANCER: Primary | ICD-10-CM

## 2023-08-31 PROCEDURE — 99212 OFFICE O/P EST SF 10 MIN: CPT | Performed by: INTERNAL MEDICINE

## 2023-08-31 PROCEDURE — 99214 OFFICE O/P EST MOD 30 MIN: CPT | Performed by: INTERNAL MEDICINE

## 2023-08-31 PROCEDURE — 3078F DIAST BP <80 MM HG: CPT | Performed by: INTERNAL MEDICINE

## 2023-08-31 PROCEDURE — G8427 DOCREV CUR MEDS BY ELIG CLIN: HCPCS | Performed by: INTERNAL MEDICINE

## 2023-08-31 PROCEDURE — 3017F COLORECTAL CA SCREEN DOC REV: CPT | Performed by: INTERNAL MEDICINE

## 2023-08-31 PROCEDURE — G8399 PT W/DXA RESULTS DOCUMENT: HCPCS | Performed by: INTERNAL MEDICINE

## 2023-08-31 PROCEDURE — 1123F ACP DISCUSS/DSCN MKR DOCD: CPT | Performed by: INTERNAL MEDICINE

## 2023-08-31 PROCEDURE — G8417 CALC BMI ABV UP PARAM F/U: HCPCS | Performed by: INTERNAL MEDICINE

## 2023-08-31 PROCEDURE — 1090F PRES/ABSN URINE INCON ASSESS: CPT | Performed by: INTERNAL MEDICINE

## 2023-08-31 PROCEDURE — 3077F SYST BP >= 140 MM HG: CPT | Performed by: INTERNAL MEDICINE

## 2023-08-31 PROCEDURE — 1036F TOBACCO NON-USER: CPT | Performed by: INTERNAL MEDICINE

## 2023-08-31 ASSESSMENT — ENCOUNTER SYMPTOMS
SHORTNESS OF BREATH: 0
VOMITING: 0
BACK PAIN: 0
ABDOMINAL DISTENTION: 0
CHEST TIGHTNESS: 0
NAUSEA: 0
EYE ITCHING: 0
APNEA: 0
ABDOMINAL PAIN: 0
EYE DISCHARGE: 0

## 2023-09-05 DIAGNOSIS — E11.69 TYPE 2 DIABETES MELLITUS WITH OTHER SPECIFIED COMPLICATION, WITHOUT LONG-TERM CURRENT USE OF INSULIN (HCC): ICD-10-CM

## 2023-09-05 RX ORDER — ORAL SEMAGLUTIDE 3 MG/1
TABLET ORAL
Qty: 30 TABLET | Refills: 3 | Status: SHIPPED | OUTPATIENT
Start: 2023-09-05

## 2023-09-05 NOTE — TELEPHONE ENCOUNTER
Writer spoke to patient and she would like to stay on 3 mg until follow up appointment in November wit Dr. Jared Aleman

## 2023-09-05 NOTE — TELEPHONE ENCOUNTER
Kika called requesting a refill of the below medication which has been pended for you:     Requested Prescriptions     Pending Prescriptions Disp Refills    RYBELSUS 3 MG TABS [Pharmacy Med Name: Rybelsus 3 MG Oral Tablet] 30 tablet 0     Sig: Take 1 tablet by mouth once daily       Last Appointment Date: 8/9/2023  Next Appointment Date: 11/15/2023    Allergies   Allergen Reactions    Iv Dye [Iodides] Shortness Of Breath    Penicillins Swelling    Sulfa Antibiotics Shortness Of Breath    Ace Inhibitors Nausea Only     Difficulty swallowing the Lisinopril. Gagging. Nausea. Cozaar [Losartan] Nausea Only    Lipitor Other (See Comments)     Muscle aches.     Lopressor [Metoprolol] Other (See Comments)     leg pain    Morphine Nausea Only     Chest pressure

## 2023-09-23 DIAGNOSIS — I25.118 CORONARY ARTERY DISEASE OF NATIVE ARTERY OF NATIVE HEART WITH STABLE ANGINA PECTORIS (HCC): ICD-10-CM

## 2023-09-25 RX ORDER — ISOSORBIDE MONONITRATE 30 MG/1
TABLET, EXTENDED RELEASE ORAL
Qty: 90 TABLET | Refills: 3 | Status: SHIPPED | OUTPATIENT
Start: 2023-09-25

## 2023-09-25 NOTE — TELEPHONE ENCOUNTER
Last Appt:  8/31/2023  Next Appt:   Visit date not found  Med verified in [address] [place] [address]

## 2023-09-26 ENCOUNTER — HOSPITAL ENCOUNTER (OUTPATIENT)
Dept: MAMMOGRAPHY | Age: 73
Discharge: HOME | End: 2023-09-28
Payer: MEDICARE

## 2023-09-26 VITALS — HEIGHT: 57 IN | BODY MASS INDEX: 34.95 KG/M2 | WEIGHT: 162 LBS

## 2023-09-26 DIAGNOSIS — Z12.31 SCREENING MAMMOGRAM FOR BREAST CANCER: ICD-10-CM

## 2023-09-26 PROCEDURE — 77063 BREAST TOMOSYNTHESIS BI: CPT

## 2023-10-23 DIAGNOSIS — E78.2 MIXED HYPERLIPIDEMIA: ICD-10-CM

## 2023-10-23 NOTE — TELEPHONE ENCOUNTER
Kika called requesting a refill of the below medication which has been pended for you:     Requested Prescriptions     Pending Prescriptions Disp Refills    rosuvastatin (CRESTOR) 10 MG tablet 90 tablet 3     Sig: Take 1 tablet by mouth daily       Last Appointment Date: 8/31/2023  Next Appointment Date: 8/29/2024    Allergies   Allergen Reactions    Iv Dye [Iodides] Shortness Of Breath    Penicillins Swelling    Sulfa Antibiotics Shortness Of Breath    Ace Inhibitors Nausea Only     Difficulty swallowing the Lisinopril. Gagging. Nausea. Cozaar [Losartan] Nausea Only    Lipitor Other (See Comments)     Muscle aches.     Lopressor [Metoprolol] Other (See Comments)     leg pain    Morphine Nausea Only     Chest pressure       Pharm[facility]:  [facility]

## 2023-10-24 DIAGNOSIS — E78.2 MIXED HYPERLIPIDEMIA: ICD-10-CM

## 2023-10-24 RX ORDER — ROSUVASTATIN CALCIUM 10 MG/1
10 TABLET, COATED ORAL DAILY
Qty: 90 TABLET | Refills: 3 | Status: SHIPPED | OUTPATIENT
Start: 2023-10-24

## 2023-10-24 RX ORDER — EZETIMIBE 10 MG/1
10 TABLET ORAL DAILY
Qty: 30 TABLET | Refills: 0 | Status: SHIPPED | OUTPATIENT
Start: 2023-10-24

## 2023-10-24 NOTE — TELEPHONE ENCOUNTER
Kika called requesting a refill of the below medication which has been pended for you:     Requested Prescriptions     Pending Prescriptions Disp Refills    ezetimibe (ZETIA) 10 MG tablet 30 tablet 0     Sig: Take 1 tablet by mouth daily       Last Appointment Date: 8/9/2023  Next Appointment Date: 11/8/2023    Allergies   Allergen Reactions    Iv Dye [Iodides] Shortness Of Breath    Penicillins Swelling    Sulfa Antibiotics Shortness Of Breath    Ace Inhibitors Nausea Only     Difficulty swallowing the Lisinopril. Gagging. Nausea. Cozaar [Losartan] Nausea Only    Lipitor Other (See Comments)     Muscle aches.     Lopressor [Metoprolol] Other (See Comments)     leg pain    Morphine Nausea Only     Chest pressure

## 2023-10-25 DIAGNOSIS — E78.2 MIXED HYPERLIPIDEMIA: ICD-10-CM

## 2023-10-25 RX ORDER — ROSUVASTATIN CALCIUM 10 MG/1
10 TABLET, COATED ORAL DAILY
Qty: 90 TABLET | Refills: 0 | OUTPATIENT
Start: 2023-10-25

## 2023-11-03 ENCOUNTER — HOSPITAL ENCOUNTER (OUTPATIENT)
Age: 73
Discharge: HOME | End: 2023-11-03
Payer: MEDICARE

## 2023-11-03 DIAGNOSIS — E11.69 TYPE 2 DIABETES MELLITUS WITH OTHER SPECIFIED COMPLICATION, WITHOUT LONG-TERM CURRENT USE OF INSULIN (HCC): ICD-10-CM

## 2023-11-03 LAB
ALBUMIN SERPL-MCNC: 4.5 G/DL (ref 3.5–5.2)
ALBUMIN/GLOB SERPL: 1.5 {RATIO} (ref 1–2.5)
ALP SERPL-CCNC: 107 U/L (ref 35–104)
ALT SERPL-CCNC: 25 U/L (ref 5–33)
ANION GAP SERPL CALCULATED.3IONS-SCNC: 11 MMOL/L (ref 9–17)
AST SERPL-CCNC: 22 U/L
BILIRUB SERPL-MCNC: 0.3 MG/DL (ref 0.3–1.2)
BUN SERPL-MCNC: 11 MG/DL (ref 8–23)
BUN/CREAT SERPL: 16 (ref 9–20)
CALCIUM SERPL-MCNC: 9.7 MG/DL (ref 8.6–10.4)
CHLORIDE SERPL-SCNC: 103 MMOL/L (ref 98–107)
CHOLEST SERPL-MCNC: 135 MG/DL
CHOLESTEROL/HDL RATIO: 2.6
CO2 SERPL-SCNC: 29 MMOL/L (ref 20–31)
CREAT SERPL-MCNC: 0.7 MG/DL (ref 0.5–0.9)
EST. AVERAGE GLUCOSE BLD GHB EST-MCNC: 160 MG/DL
GFR SERPL CREATININE-BSD FRML MDRD: >60 ML/MIN/1.73M2
GLUCOSE SERPL-MCNC: 126 MG/DL (ref 70–99)
HBA1C MFR BLD: 7.2 % (ref 4–6)
HDLC SERPL-MCNC: 51 MG/DL
LDLC SERPL CALC-MCNC: 57 MG/DL (ref 0–130)
POTASSIUM SERPL-SCNC: 3.6 MMOL/L (ref 3.7–5.3)
PROT SERPL-MCNC: 7.6 G/DL (ref 6.4–8.3)
SODIUM SERPL-SCNC: 143 MMOL/L (ref 135–144)
TRIGL SERPL-MCNC: 135 MG/DL

## 2023-11-03 PROCEDURE — 36415 COLL VENOUS BLD VENIPUNCTURE: CPT

## 2023-11-03 PROCEDURE — 80061 LIPID PANEL: CPT

## 2023-11-03 PROCEDURE — 83036 HEMOGLOBIN GLYCOSYLATED A1C: CPT

## 2023-11-03 PROCEDURE — 80053 COMPREHEN METABOLIC PANEL: CPT

## 2023-11-08 ENCOUNTER — HOSPITAL ENCOUNTER (OUTPATIENT)
Age: 73
Discharge: HOME | End: 2023-11-08
Payer: MEDICARE

## 2023-11-08 ENCOUNTER — OFFICE VISIT (OUTPATIENT)
Dept: FAMILY MEDICINE CLINIC | Age: 73
End: 2023-11-08
Payer: MEDICARE

## 2023-11-08 VITALS
HEIGHT: 57 IN | WEIGHT: 160 LBS | SYSTOLIC BLOOD PRESSURE: 122 MMHG | DIASTOLIC BLOOD PRESSURE: 72 MMHG | BODY MASS INDEX: 34.52 KG/M2 | OXYGEN SATURATION: 98 % | HEART RATE: 74 BPM

## 2023-11-08 DIAGNOSIS — E53.8 B12 DEFICIENCY: ICD-10-CM

## 2023-11-08 DIAGNOSIS — H65.03 BILATERAL ACUTE SEROUS OTITIS MEDIA, RECURRENCE NOT SPECIFIED: ICD-10-CM

## 2023-11-08 DIAGNOSIS — I10 ESSENTIAL HYPERTENSION: ICD-10-CM

## 2023-11-08 DIAGNOSIS — E11.69 TYPE 2 DIABETES MELLITUS WITH OTHER SPECIFIED COMPLICATION, WITHOUT LONG-TERM CURRENT USE OF INSULIN (HCC): Primary | ICD-10-CM

## 2023-11-08 DIAGNOSIS — E78.2 MIXED HYPERLIPIDEMIA: ICD-10-CM

## 2023-11-08 LAB
FOLATE SERPL-MCNC: >20 NG/ML (ref 4.8–24.2)
VIT B12 SERPL-MCNC: 414 PG/ML (ref 232–1245)

## 2023-11-08 PROCEDURE — G8417 CALC BMI ABV UP PARAM F/U: HCPCS | Performed by: FAMILY MEDICINE

## 2023-11-08 PROCEDURE — 3074F SYST BP LT 130 MM HG: CPT | Performed by: FAMILY MEDICINE

## 2023-11-08 PROCEDURE — 1123F ACP DISCUSS/DSCN MKR DOCD: CPT | Performed by: FAMILY MEDICINE

## 2023-11-08 PROCEDURE — 3051F HG A1C>EQUAL 7.0%<8.0%: CPT | Performed by: FAMILY MEDICINE

## 2023-11-08 PROCEDURE — 1090F PRES/ABSN URINE INCON ASSESS: CPT | Performed by: FAMILY MEDICINE

## 2023-11-08 PROCEDURE — 3017F COLORECTAL CA SCREEN DOC REV: CPT | Performed by: FAMILY MEDICINE

## 2023-11-08 PROCEDURE — G8427 DOCREV CUR MEDS BY ELIG CLIN: HCPCS | Performed by: FAMILY MEDICINE

## 2023-11-08 PROCEDURE — 3078F DIAST BP <80 MM HG: CPT | Performed by: FAMILY MEDICINE

## 2023-11-08 PROCEDURE — 82607 VITAMIN B-12: CPT

## 2023-11-08 PROCEDURE — 82746 ASSAY OF FOLIC ACID SERUM: CPT

## 2023-11-08 PROCEDURE — 2022F DILAT RTA XM EVC RTNOPTHY: CPT | Performed by: FAMILY MEDICINE

## 2023-11-08 PROCEDURE — 36415 COLL VENOUS BLD VENIPUNCTURE: CPT

## 2023-11-08 PROCEDURE — 1036F TOBACCO NON-USER: CPT | Performed by: FAMILY MEDICINE

## 2023-11-08 PROCEDURE — G8484 FLU IMMUNIZE NO ADMIN: HCPCS | Performed by: FAMILY MEDICINE

## 2023-11-08 PROCEDURE — 99215 OFFICE O/P EST HI 40 MIN: CPT | Performed by: FAMILY MEDICINE

## 2023-11-08 PROCEDURE — G8399 PT W/DXA RESULTS DOCUMENT: HCPCS | Performed by: FAMILY MEDICINE

## 2023-11-08 RX ORDER — BLOOD-GLUCOSE SENSOR
1 EACH MISCELLANEOUS
Qty: 8 EACH | Refills: 0 | Status: SHIPPED | OUTPATIENT
Start: 2023-11-08

## 2023-11-08 RX ORDER — BLOOD-GLUCOSE METER
KIT MISCELLANEOUS
Qty: 1 KIT | Refills: 0 | Status: SHIPPED | OUTPATIENT
Start: 2023-11-08

## 2023-11-08 RX ORDER — EZETIMIBE 10 MG/1
10 TABLET ORAL DAILY
Qty: 90 TABLET | Refills: 1 | Status: SHIPPED | OUTPATIENT
Start: 2023-11-08

## 2023-11-08 RX ORDER — AMLODIPINE BESYLATE 10 MG/1
TABLET ORAL
Qty: 90 TABLET | Refills: 1 | Status: SHIPPED | OUTPATIENT
Start: 2023-11-08

## 2023-11-08 RX ORDER — GLIMEPIRIDE 1 MG/1
1 TABLET ORAL DAILY
Qty: 90 TABLET | Refills: 1 | Status: SHIPPED | OUTPATIENT
Start: 2023-11-08

## 2023-11-08 RX ORDER — AZITHROMYCIN 250 MG/1
250 TABLET, FILM COATED ORAL SEE ADMIN INSTRUCTIONS
Qty: 6 TABLET | Refills: 0 | Status: SHIPPED | OUTPATIENT
Start: 2023-11-08 | End: 2023-11-13

## 2023-11-08 NOTE — TELEPHONE ENCOUNTER
Pharmacy called and they need specific continuous glucose monitor brand along with separate prescriptions for sensors

## 2023-11-08 NOTE — TELEPHONE ENCOUNTER
Kika called requesting a refill of the below medication which has been pended for you:     Requested Prescriptions     Pending Prescriptions Disp Refills    Continuous Blood Gluc Sensor (FREESTYLE SHINE 3 SENSOR) MISC 8 each 0     Si each by Does not apply route every 14 days    glucose monitoring kit 1 kit 0     Sig: Freestyle Shine 3 continuous glucose monitor       Last Appointment Date: 2023  Next Appointment Date: 2024    Allergies   Allergen Reactions    Iv Dye [Iodides] Shortness Of Breath    Penicillins Swelling    Sulfa Antibiotics Shortness Of Breath    Ace Inhibitors Nausea Only     Difficulty swallowing the Lisinopril. Gagging. Nausea. Cozaar [Losartan] Nausea Only    Lipitor Other (See Comments)     Muscle aches.     Lopressor [Metoprolol] Other (See Comments)     leg pain    Morphine Nausea Only     Chest pressure

## 2023-11-08 NOTE — PROGRESS NOTES
Patient will get covid booster at [facility]
Estimated Avg Glucose 11/03/2023 160  mg/dL Final    Comment: The ADA and AACC recommend providing the estimated average glucose result to permit better   patient understanding of their HBA1c result. Sodium 11/03/2023 143  135 - 144 mmol/L Final    Potassium 11/03/2023 3.6 (L)  3.7 - 5.3 mmol/L Final    Chloride 11/03/2023 103  98 - 107 mmol/L Final    CO2 11/03/2023 29  20 - 31 mmol/L Final    Anion Gap 11/03/2023 11  9 - 17 mmol/L Final    Glucose 11/03/2023 126 (H)  70 - 99 mg/dL Final    BUN 11/03/2023 11  8 - 23 mg/dL Final    Creatinine 11/03/2023 0.7  0.5 - 0.9 mg/dL Final    Est, Glom Filt Rate 11/03/2023 >60  >60 mL/min/1.73m2 Final    Comment:       These results are not intended for use in patients <25years of age. eGFR results are calculated without a race factor using the 2021 CKD-EPI equation. Careful clinical correlation is recommended, particularly when comparing to results   calculated using previous equations. The CKD-EPI equation is less accurate in patients with extremes of muscle mass, extra-renal   metabolism of creatine, excessive creatine ingestion, or following therapy that affects   renal tubular secretion. Bun/Cre Ratio 11/03/2023 16  9 - 20 Final    Calcium 11/03/2023 9.7  8.6 - 10.4 mg/dL Final    Total Protein 11/03/2023 7.6  6.4 - 8.3 g/dL Final    Albumin 11/03/2023 4.5  3.5 - 5.2 g/dL Final    Albumin/Globulin Ratio 11/03/2023 1.5  1.0 - 2.5 Final    Total Bilirubin 11/03/2023 0.3  0.3 - 1.2 mg/dL Final    Alkaline Phosphatase 11/03/2023 107 (H)  35 - 104 U/L Final    ALT 11/03/2023 25  5 - 33 U/L Final    AST 11/03/2023 22  <32 U/L Final         On this date 11/8/2023 I have spent 40 minutes reviewing previous notes, test results and face to face with the patient discussing the diagnosis and importance of compliance with the treatment plan as well as documenting on the day of the visit.        (Please note that portions of this note were completed with a

## 2023-11-09 ENCOUNTER — TELEPHONE (OUTPATIENT)
Dept: FAMILY MEDICINE CLINIC | Age: 73
End: 2023-11-09

## 2023-11-09 NOTE — TELEPHONE ENCOUNTER
Writer offered copay cards and patient states they do not work. Good RX price is over 900 dollars for a 30 day supply. Patient would like to know if she should stop Rybelsus? She states she would like to see what the cost is at the first of the year.

## 2023-11-09 NOTE — TELEPHONE ENCOUNTER
Pt calling stating she went to  her Rybelsus yesterday and the price has doubled, it went from $47 to $87, do you want her to wait on the samples or what do you recommend, please advise.

## 2023-11-09 NOTE — TELEPHONE ENCOUNTER
She can stop Rybelsus. I recommend that she continue the lower dose of Glimepiride that we discussed at her visit yesterday (one dose daily instead of BID).

## 2023-11-27 ENCOUNTER — TELEPHONE (OUTPATIENT)
Dept: FAMILY MEDICINE CLINIC | Age: 73
End: 2023-11-27

## 2023-11-27 NOTE — TELEPHONE ENCOUNTER
Patient seen 11/8/23 for ear infection. Took whole course of antibiotic. Still has hearing loss and hears swishing sound in ear. Wondering if something else can be prescribed. Also took zytec for 5 days with no improvement.

## 2023-11-28 ENCOUNTER — TELEPHONE (OUTPATIENT)
Dept: FAMILY MEDICINE CLINIC | Age: 73
End: 2023-11-28

## 2023-11-28 NOTE — TELEPHONE ENCOUNTER
Writer spoke to patient and she would like to follow up with Dr. Trena Lester regarding this.   Patient is scheduled for 11/29/23 to discuss with Dr. Trena Lester

## 2023-11-28 NOTE — TELEPHONE ENCOUNTER
----- Message from Maryluis Federica sent at 11/28/2023  2:56 PM EST -----  Subject: Message to Provider    QUESTIONS  Information for Provider? Patient was seen on 11/8 and was given zpack she   said she took it and still is not feeling well patient wants to know what   else PCP can do.   ---------------------------------------------------------------------------  --------------  Emile Moss Madeline  [phone]; OK to leave message on voicemail  ---------------------------------------------------------------------------  --------------  SCRIPT ANSWERS  Relationship to Patient?  Self

## 2023-11-29 ENCOUNTER — OFFICE VISIT (OUTPATIENT)
Dept: FAMILY MEDICINE CLINIC | Age: 73
End: 2023-11-29
Payer: MEDICARE

## 2023-11-29 VITALS
BODY MASS INDEX: 34.73 KG/M2 | SYSTOLIC BLOOD PRESSURE: 138 MMHG | HEART RATE: 80 BPM | OXYGEN SATURATION: 98 % | HEIGHT: 57 IN | WEIGHT: 161 LBS | TEMPERATURE: 98.1 F | DIASTOLIC BLOOD PRESSURE: 64 MMHG

## 2023-11-29 DIAGNOSIS — H73.92 ABNORMAL TYMPANIC MEMBRANE, LEFT: Primary | ICD-10-CM

## 2023-11-29 PROCEDURE — 3075F SYST BP GE 130 - 139MM HG: CPT | Performed by: FAMILY MEDICINE

## 2023-11-29 PROCEDURE — 99213 OFFICE O/P EST LOW 20 MIN: CPT | Performed by: FAMILY MEDICINE

## 2023-11-29 PROCEDURE — 3017F COLORECTAL CA SCREEN DOC REV: CPT | Performed by: FAMILY MEDICINE

## 2023-11-29 PROCEDURE — 1036F TOBACCO NON-USER: CPT | Performed by: FAMILY MEDICINE

## 2023-11-29 PROCEDURE — G8484 FLU IMMUNIZE NO ADMIN: HCPCS | Performed by: FAMILY MEDICINE

## 2023-11-29 PROCEDURE — 3078F DIAST BP <80 MM HG: CPT | Performed by: FAMILY MEDICINE

## 2023-11-29 PROCEDURE — 1123F ACP DISCUSS/DSCN MKR DOCD: CPT | Performed by: FAMILY MEDICINE

## 2023-11-29 PROCEDURE — G8427 DOCREV CUR MEDS BY ELIG CLIN: HCPCS | Performed by: FAMILY MEDICINE

## 2023-11-29 PROCEDURE — G8399 PT W/DXA RESULTS DOCUMENT: HCPCS | Performed by: FAMILY MEDICINE

## 2023-11-29 PROCEDURE — 1090F PRES/ABSN URINE INCON ASSESS: CPT | Performed by: FAMILY MEDICINE

## 2023-11-29 PROCEDURE — G8417 CALC BMI ABV UP PARAM F/U: HCPCS | Performed by: FAMILY MEDICINE

## 2023-11-30 ENCOUNTER — TELEPHONE (OUTPATIENT)
Dept: FAMILY MEDICINE CLINIC | Age: 73
End: 2023-11-30

## 2023-12-11 ENCOUNTER — TELEPHONE (OUTPATIENT)
Dept: FAMILY MEDICINE CLINIC | Age: 73
End: 2023-12-11

## 2023-12-11 DIAGNOSIS — H73.92 ABNORMAL TYMPANIC MEMBRANE, LEFT: Primary | ICD-10-CM

## 2023-12-11 NOTE — TELEPHONE ENCOUNTER
[facility] calling stating they got a referral on pt and they need a hearing test ordered for pt before they can see her, please fax order for hearing test to [phone], any questions call [phone]

## 2023-12-12 NOTE — TELEPHONE ENCOUNTER
Please clarify with the patient if she has seen an audiologist.  If she has not, please order a referral.  Thank you.

## 2023-12-12 NOTE — TELEPHONE ENCOUNTER
Patient states her insurance won't cover [address] [place] [address] Avenue wants that referral canceled. She has an appointment on 12/20/2023 in [place] at the ENT. Patient states she was not told to have a hearing test prior to going to this appointment in [place]. I have attempted x2 to contact [facility] to cancel with no answer.

## 2023-12-29 ENCOUNTER — TELEPHONE (OUTPATIENT)
Dept: FAMILY MEDICINE CLINIC | Age: 73
End: 2023-12-29

## 2023-12-29 ENCOUNTER — OFFICE VISIT (OUTPATIENT)
Dept: PRIMARY CARE CLINIC | Age: 73
End: 2023-12-29
Payer: MEDICARE

## 2023-12-29 ENCOUNTER — HOSPITAL ENCOUNTER (OUTPATIENT)
Age: 73
Discharge: HOME | End: 2023-12-29
Payer: MEDICARE

## 2023-12-29 ENCOUNTER — HOSPITAL ENCOUNTER (OUTPATIENT)
Dept: CT IMAGING | Age: 73
End: 2023-12-29
Payer: MEDICARE

## 2023-12-29 VITALS
WEIGHT: 160.6 LBS | TEMPERATURE: 96.8 F | OXYGEN SATURATION: 97 % | BODY MASS INDEX: 34.75 KG/M2 | HEART RATE: 84 BPM | SYSTOLIC BLOOD PRESSURE: 100 MMHG | DIASTOLIC BLOOD PRESSURE: 58 MMHG

## 2023-12-29 DIAGNOSIS — R91.1 NODULE OF LOWER LOBE OF RIGHT LUNG: ICD-10-CM

## 2023-12-29 DIAGNOSIS — R10.31 RIGHT LOWER QUADRANT ABDOMINAL PAIN: Primary | ICD-10-CM

## 2023-12-29 DIAGNOSIS — R10.31 RIGHT LOWER QUADRANT ABDOMINAL PAIN: ICD-10-CM

## 2023-12-29 DIAGNOSIS — J18.9 PNEUMONIA OF RIGHT LOWER LOBE DUE TO INFECTIOUS ORGANISM: ICD-10-CM

## 2023-12-29 DIAGNOSIS — J01.00 ACUTE NON-RECURRENT MAXILLARY SINUSITIS: ICD-10-CM

## 2023-12-29 LAB
ALBUMIN SERPL-MCNC: 4.5 G/DL (ref 3.5–5.2)
ALBUMIN/GLOB SERPL: 1.5 {RATIO} (ref 1–2.5)
ALP SERPL-CCNC: 117 U/L (ref 35–104)
ALT SERPL-CCNC: 30 U/L (ref 5–33)
ANION GAP SERPL CALCULATED.3IONS-SCNC: 12 MMOL/L (ref 9–17)
AST SERPL-CCNC: 25 U/L
BACTERIA URNS QL MICRO: ABNORMAL
BASOPHILS # BLD: 0.04 K/UL (ref 0–0.2)
BASOPHILS NFR BLD: 1 % (ref 0–2)
BILIRUB SERPL-MCNC: 0.3 MG/DL (ref 0.3–1.2)
BILIRUB UR QL STRIP: ABNORMAL
BUN SERPL-MCNC: 15 MG/DL (ref 8–23)
BUN/CREAT SERPL: 19 (ref 9–20)
CALCIUM SERPL-MCNC: 9.6 MG/DL (ref 8.6–10.4)
CHARACTER UR: ABNORMAL
CHLORIDE SERPL-SCNC: 100 MMOL/L (ref 98–107)
CLARITY UR: CLEAR
CO2 SERPL-SCNC: 28 MMOL/L (ref 20–31)
COLOR UR: YELLOW
CREAT SERPL-MCNC: 0.8 MG/DL (ref 0.5–0.9)
EOSINOPHIL # BLD: 0.11 K/UL (ref 0–0.44)
EOSINOPHILS RELATIVE PERCENT: 1 % (ref 1–4)
EPI CELLS #/AREA URNS HPF: ABNORMAL /HPF (ref 0–5)
ERYTHROCYTE [DISTWIDTH] IN BLOOD BY AUTOMATED COUNT: 14.8 % (ref 11.8–14.4)
GFR SERPL CREATININE-BSD FRML MDRD: >60 ML/MIN/1.73M2
GLUCOSE SERPL-MCNC: 229 MG/DL (ref 70–99)
GLUCOSE UR STRIP-MCNC: ABNORMAL MG/DL
HCT VFR BLD AUTO: 38.4 % (ref 36.3–47.1)
HGB BLD-MCNC: 12.6 G/DL (ref 11.9–15.1)
HGB UR QL STRIP.AUTO: NEGATIVE
IMM GRANULOCYTES # BLD AUTO: <0.03 K/UL (ref 0–0.3)
IMM GRANULOCYTES NFR BLD: 0 %
KETONES UR STRIP-MCNC: ABNORMAL MG/DL
LEUKOCYTE ESTERASE UR QL STRIP: NEGATIVE
LYMPHOCYTES NFR BLD: 3.2 K/UL (ref 1.1–3.7)
LYMPHOCYTES RELATIVE PERCENT: 36 % (ref 24–43)
MCH RBC QN AUTO: 25.8 PG (ref 25.2–33.5)
MCHC RBC AUTO-ENTMCNC: 32.8 G/DL (ref 25.2–33.5)
MCV RBC AUTO: 78.7 FL (ref 82.6–102.9)
MONOCYTES NFR BLD: 0.56 K/UL (ref 0.1–1.2)
MONOCYTES NFR BLD: 6 % (ref 3–12)
NEUTROPHILS NFR BLD: 56 % (ref 36–65)
NEUTS SEG NFR BLD: 4.91 K/UL (ref 1.5–8.1)
NITRITE UR QL STRIP: NEGATIVE
NRBC BLD-RTO: 0 PER 100 WBC
PH UR STRIP: 6 [PH] (ref 5–6)
PLATELET # BLD AUTO: 288 K/UL (ref 138–453)
PMV BLD AUTO: 10.3 FL (ref 8.1–13.5)
POTASSIUM SERPL-SCNC: 4.5 MMOL/L (ref 3.7–5.3)
PROT SERPL-MCNC: 7.5 G/DL (ref 6.4–8.3)
PROT UR STRIP-MCNC: ABNORMAL MG/DL
RBC # BLD AUTO: 4.88 M/UL (ref 3.95–5.11)
RBC # BLD: ABNORMAL 10*6/UL
RBC #/AREA URNS HPF: ABNORMAL /HPF (ref 0–4)
SODIUM SERPL-SCNC: 140 MMOL/L (ref 135–144)
SP GR UR STRIP: 1.02 (ref 1.01–1.02)
UROBILINOGEN UR STRIP-ACNC: NORMAL EU/DL (ref 0–1)
WBC #/AREA URNS HPF: ABNORMAL /HPF (ref 0–4)
WBC OTHER # BLD: 8.8 K/UL (ref 3.5–11.3)

## 2023-12-29 PROCEDURE — 36415 COLL VENOUS BLD VENIPUNCTURE: CPT

## 2023-12-29 PROCEDURE — G8427 DOCREV CUR MEDS BY ELIG CLIN: HCPCS | Performed by: NURSE PRACTITIONER

## 2023-12-29 PROCEDURE — G8417 CALC BMI ABV UP PARAM F/U: HCPCS | Performed by: NURSE PRACTITIONER

## 2023-12-29 PROCEDURE — 99213 OFFICE O/P EST LOW 20 MIN: CPT | Performed by: NURSE PRACTITIONER

## 2023-12-29 PROCEDURE — G8484 FLU IMMUNIZE NO ADMIN: HCPCS | Performed by: NURSE PRACTITIONER

## 2023-12-29 PROCEDURE — 85025 COMPLETE CBC W/AUTO DIFF WBC: CPT

## 2023-12-29 PROCEDURE — 1036F TOBACCO NON-USER: CPT | Performed by: NURSE PRACTITIONER

## 2023-12-29 PROCEDURE — 74176 CT ABD & PELVIS W/O CONTRAST: CPT

## 2023-12-29 PROCEDURE — 1090F PRES/ABSN URINE INCON ASSESS: CPT | Performed by: NURSE PRACTITIONER

## 2023-12-29 PROCEDURE — 3017F COLORECTAL CA SCREEN DOC REV: CPT | Performed by: NURSE PRACTITIONER

## 2023-12-29 PROCEDURE — G8399 PT W/DXA RESULTS DOCUMENT: HCPCS | Performed by: NURSE PRACTITIONER

## 2023-12-29 PROCEDURE — 3078F DIAST BP <80 MM HG: CPT | Performed by: NURSE PRACTITIONER

## 2023-12-29 PROCEDURE — 99214 OFFICE O/P EST MOD 30 MIN: CPT | Performed by: NURSE PRACTITIONER

## 2023-12-29 PROCEDURE — 81001 URINALYSIS AUTO W/SCOPE: CPT

## 2023-12-29 PROCEDURE — 1123F ACP DISCUSS/DSCN MKR DOCD: CPT | Performed by: NURSE PRACTITIONER

## 2023-12-29 PROCEDURE — 3074F SYST BP LT 130 MM HG: CPT | Performed by: NURSE PRACTITIONER

## 2023-12-29 PROCEDURE — 80053 COMPREHEN METABOLIC PANEL: CPT

## 2023-12-29 RX ORDER — BENZONATATE 100 MG/1
100 CAPSULE ORAL 3 TIMES DAILY PRN
Qty: 30 CAPSULE | Refills: 0 | Status: SHIPPED | OUTPATIENT
Start: 2023-12-29 | End: 2024-01-08

## 2023-12-29 RX ORDER — DOXYCYCLINE HYCLATE 100 MG
100 TABLET ORAL 2 TIMES DAILY
Qty: 20 TABLET | Refills: 0 | Status: SHIPPED | OUTPATIENT
Start: 2023-12-29 | End: 2024-01-08

## 2023-12-29 NOTE — PROGRESS NOTES
MCG/ACT nasal spray 2 sprays by Each Nostril route daily (Patient not taking: Reported on 11/29/2023) 16 g 5     Facility-Administered Encounter Medications as of 12/29/2023   Medication Dose Route Frequency Provider Last Rate Last Admin    cyanocobalamin injection 2,000 mcg  2,000 mcg IntraMUSCular D84 Days Peter Joshi MD Millicent Dies, APRN - CNP

## 2024-01-02 DIAGNOSIS — I25.118 CORONARY ARTERY DISEASE OF NATIVE ARTERY OF NATIVE HEART WITH STABLE ANGINA PECTORIS (HCC): ICD-10-CM

## 2024-01-08 RX ORDER — ISOSORBIDE MONONITRATE 30 MG/1
TABLET, EXTENDED RELEASE ORAL
Qty: 90 TABLET | Refills: 3 | Status: SHIPPED | OUTPATIENT
Start: 2024-01-08

## 2024-03-30 DIAGNOSIS — E11.9 TYPE 2 DIABETES MELLITUS WITHOUT COMPLICATION, WITHOUT LONG-TERM CURRENT USE OF INSULIN (HCC): ICD-10-CM

## 2024-03-30 NOTE — TELEPHONE ENCOUNTER
Kika called requesting a refill of the below medication which has been pended for you:     Requested Prescriptions     Pending Prescriptions Disp Refills    rosuvastatin (CRESTOR) 10 MG tablet 30 tablet 3     Sig: Take 1 tablet by mouth nightly       Last Appointment Date: 8/26/2020  Next Appointment Date: 3/4/2021    Allergies   Allergen Reactions    Iv Dye [Iodides] Shortness Of Breath    Penicillins Swelling    Sulfa Antibiotics Shortness Of Breath    Ace Inhibitors Nausea Only     Difficulty swallowing the Lisinopril. Gagging. Nausea.  Cozaar [Losartan] Nausea Only    Lipitor Other (See Comments)     Muscle aches.     Lopressor [Metoprolol] Other (See Comments)     leg pain    Morphine Nausea Only     Chest pressure
Received refill request via fax
minimum assist (75% patients effort)
31-Mar-2024 00:15

## 2024-04-02 RX ORDER — GLUCOSAM/CHON-MSM1/C/MANG/BOSW 500-416.6
TABLET ORAL
Qty: 100 EACH | Refills: 3 | Status: SHIPPED | OUTPATIENT
Start: 2024-04-02

## 2024-04-02 RX ORDER — ISOPROPYL ALCOHOL 70 ML/100ML
SWAB TOPICAL
Qty: 100 EACH | Refills: 3 | Status: SHIPPED | OUTPATIENT
Start: 2024-04-02

## 2024-04-02 RX ORDER — CALCIUM CITRATE/VITAMIN D3 200MG-6.25
TABLET ORAL
Qty: 100 STRIP | Refills: 3 | Status: SHIPPED | OUTPATIENT
Start: 2024-04-02

## 2024-04-02 NOTE — TELEPHONE ENCOUNTER
Kika called requesting a refill of the below medication which has been pended for you:     Requested Prescriptions     Pending Prescriptions Disp Refills    Alcohol Swabs (DROPSAFE ALCOHOL PREP) 70 % PADS [Pharmacy Med Name: DROPSAFE ALCOHOL PREP PADS 70 % Pad] 100 each 3     Sig: USE AS DIRECTED DAILY    TRUEplus Lancets 33G MISC [Pharmacy Med Name: TRUEPLUS LANCETS 33G] 100 each 3     Sig: TEST BLOOD SUGAR EVERY DAY    TRUE METRIX BLOOD GLUCOSE TEST strip [Pharmacy Med Name: TRUE METRIX SELF MONITORING BLOOD GLUCOSE STRIPS   Strip] 100 strip 3     Sig: TEST BLOOD SUGAR EVERY DAY       Last Appointment Date: 11/29/2023  Next Appointment Date: 4/24/2024    Allergies   Allergen Reactions    Iv Dye [Iodides] Shortness Of Breath    Penicillins Swelling    Sulfa Antibiotics Shortness Of Breath    Ace Inhibitors Nausea Only     Difficulty swallowing the Lisinopril.  Gagging.  Nausea.    Cozaar [Losartan] Nausea Only    Lipitor Other (See Comments)     Muscle aches.    Lopressor [Metoprolol] Other (See Comments)     leg pain    Morphine Nausea Only     Chest pressure

## 2024-04-20 ENCOUNTER — HOSPITAL ENCOUNTER (OUTPATIENT)
Age: 74
Discharge: HOME | End: 2024-04-20
Payer: MEDICARE

## 2024-04-20 DIAGNOSIS — E78.2 MIXED HYPERLIPIDEMIA: ICD-10-CM

## 2024-04-20 DIAGNOSIS — E11.69 TYPE 2 DIABETES MELLITUS WITH OTHER SPECIFIED COMPLICATION, WITHOUT LONG-TERM CURRENT USE OF INSULIN (HCC): ICD-10-CM

## 2024-04-20 LAB
ALBUMIN SERPL-MCNC: 4.7 G/DL (ref 3.5–5.2)
ALBUMIN/GLOB SERPL: 1.5 {RATIO} (ref 1–2.5)
ALP SERPL-CCNC: 123 U/L (ref 35–104)
ALT SERPL-CCNC: 32 U/L (ref 5–33)
ANION GAP SERPL CALCULATED.3IONS-SCNC: 10 MMOL/L (ref 9–17)
AST SERPL-CCNC: 32 U/L
BILIRUB SERPL-MCNC: 0.5 MG/DL (ref 0.3–1.2)
BUN SERPL-MCNC: 9 MG/DL (ref 8–23)
BUN/CREAT SERPL: 13 (ref 9–20)
CALCIUM SERPL-MCNC: 9.7 MG/DL (ref 8.6–10.4)
CHLORIDE SERPL-SCNC: 103 MMOL/L (ref 98–107)
CHOLEST SERPL-MCNC: 138 MG/DL (ref 0–199)
CHOLESTEROL/HDL RATIO: 3
CO2 SERPL-SCNC: 27 MMOL/L (ref 20–31)
CREAT SERPL-MCNC: 0.7 MG/DL (ref 0.5–0.9)
GFR SERPL CREATININE-BSD FRML MDRD: >90 ML/MIN/1.73M2
GLUCOSE SERPL-MCNC: 170 MG/DL (ref 70–99)
HDLC SERPL-MCNC: 54 MG/DL
LDLC SERPL CALC-MCNC: 53 MG/DL (ref 0–100)
POTASSIUM SERPL-SCNC: 4 MMOL/L (ref 3.7–5.3)
PROT SERPL-MCNC: 7.8 G/DL (ref 6.4–8.3)
SODIUM SERPL-SCNC: 140 MMOL/L (ref 135–144)
TRIGL SERPL-MCNC: 158 MG/DL
VLDLC SERPL CALC-MCNC: 32 MG/DL

## 2024-04-20 PROCEDURE — 80053 COMPREHEN METABOLIC PANEL: CPT

## 2024-04-20 PROCEDURE — 36415 COLL VENOUS BLD VENIPUNCTURE: CPT

## 2024-04-20 PROCEDURE — 80061 LIPID PANEL: CPT

## 2024-04-20 PROCEDURE — 83036 HEMOGLOBIN GLYCOSYLATED A1C: CPT

## 2024-04-21 LAB
EST. AVERAGE GLUCOSE BLD GHB EST-MCNC: 171 MG/DL
HBA1C MFR BLD: 7.6 % (ref 4–6)

## 2024-04-24 ENCOUNTER — OFFICE VISIT (OUTPATIENT)
Dept: FAMILY MEDICINE CLINIC | Age: 74
End: 2024-04-24

## 2024-04-24 VITALS
WEIGHT: 162 LBS | DIASTOLIC BLOOD PRESSURE: 72 MMHG | OXYGEN SATURATION: 98 % | HEIGHT: 57 IN | HEART RATE: 80 BPM | SYSTOLIC BLOOD PRESSURE: 126 MMHG | BODY MASS INDEX: 34.95 KG/M2

## 2024-04-24 DIAGNOSIS — I10 ESSENTIAL HYPERTENSION: ICD-10-CM

## 2024-04-24 DIAGNOSIS — E78.2 MIXED HYPERLIPIDEMIA: ICD-10-CM

## 2024-04-24 DIAGNOSIS — E11.69 TYPE 2 DIABETES MELLITUS WITH OTHER SPECIFIED COMPLICATION, WITHOUT LONG-TERM CURRENT USE OF INSULIN (HCC): Primary | ICD-10-CM

## 2024-04-24 RX ORDER — EZETIMIBE 10 MG/1
10 TABLET ORAL DAILY
Qty: 90 TABLET | Refills: 1 | Status: SHIPPED | OUTPATIENT
Start: 2024-04-24

## 2024-04-24 RX ORDER — AMLODIPINE BESYLATE 10 MG/1
TABLET ORAL
Qty: 90 TABLET | Refills: 1 | Status: SHIPPED | OUTPATIENT
Start: 2024-04-24

## 2024-04-24 RX ORDER — GLIMEPIRIDE 1 MG/1
1 TABLET ORAL DAILY
Qty: 90 TABLET | Refills: 1 | Status: CANCELLED | OUTPATIENT
Start: 2024-04-24

## 2024-04-24 RX ORDER — TIRZEPATIDE 2.5 MG/.5ML
2.5 INJECTION, SOLUTION SUBCUTANEOUS WEEKLY
Qty: 4 EACH | Refills: 1 | Status: SHIPPED | OUTPATIENT
Start: 2024-04-24

## 2024-04-24 ASSESSMENT — PATIENT HEALTH QUESTIONNAIRE - PHQ9
SUM OF ALL RESPONSES TO PHQ QUESTIONS 1-9: 0
2. FEELING DOWN, DEPRESSED OR HOPELESS: NOT AT ALL
SUM OF ALL RESPONSES TO PHQ9 QUESTIONS 1 & 2: 0
SUM OF ALL RESPONSES TO PHQ QUESTIONS 1-9: 0
SUM OF ALL RESPONSES TO PHQ QUESTIONS 1-9: 0
1. LITTLE INTEREST OR PLEASURE IN DOING THINGS: NOT AT ALL
SUM OF ALL RESPONSES TO PHQ QUESTIONS 1-9: 0

## 2024-04-24 NOTE — PROGRESS NOTES
[place] [address]                        Telephone [phone]             Fax [phone]       Kika Varma  :  1950  Age:  73 y.o.   MRN:  [id number]  Date of visit:  2024       Assessment and Plan:    1. Type 2 diabetes mellitus with other specified complication, without long-term current use of insulin (HCC)  Her HgbA1c was 7.6 %, which is not at goal and worsening.  As noted, she has occasional episodes of hypoglycemia.   I recommended discontinuing Amaryl and beginning Mounjaro.  After discussion of risks and benefits, Mounjaro 2.5 mg was prescribed:  - Tirzepatide (MOUNJARO) 2.5 MG/0.5ML SOPN SC injection; Inject 0.5 mLs into the skin once a week  Dispense: 4 each; Refill: 1    Metformin was also refilled:  - metFORMIN (GLUCOPHAGE) 500 MG tablet; TAKE 1 TABLET BY MOUTH TWICE DAILY WITH MEALS  Dispense: 180 tablet; Refill: 1    Labs were ordered to be done prior to her return visit in 3 months:   - Hemoglobin A1C; Future  - Comprehensive Metabolic Panel; Future  - Microalbumin, Ur; Future    2. Essential hypertension  Her blood pressure is well-controlled today.  (BP: 126/72)   She was advised to continue current medications.  Amlodipine was refilled:   - amLODIPine (NORVASC) 10 MG tablet; Take 1 tablet by mouth once daily  Dispense: 90 tablet; Refill: 1    3. Mixed hyperlipidemia  Her lipid profile was at goal on her recent lab work.     She is tolerating Crestor and Zetia well.   She states that she does not need a refill of Crestor.  Zetia was refilled:  - ezetimibe (ZETIA) 10 MG tablet; Take 1 tablet by mouth daily  Dispense: 90 tablet; Refill: 1    - Lipid Panel; Future was ordered to be done prior to her return visit in 6 months.      4.  Routine health maintenance  Health maintenance was reviewed with the patient.  Covid vaccination was recommended.  RSV vaccination was recommended.

## 2024-04-24 NOTE — PATIENT INSTRUCTIONS
Hospital Outpatient Visit on 04/20/2024   Component Date Value Ref Range Status    Cholesterol 04/20/2024 138  0 - 199 mg/dL Final    Comment:    Cholesterol Guidelines:      <200  Desirable   200-240  Borderline      >240  Undesirable         HDL 04/20/2024 54  >40 mg/dL Final    Comment:    HDL Guidelines:    <40     Undesirable   40-59    Borderline    >59     Desirable         LDL Cholesterol 04/20/2024 53  0 - 100 mg/dL Final    Comment:    LDL Guidelines:     <100    Desirable   100-129   Near to/above Desirable   130-159   Borderline      >159   Undesirable     Direct (measured) LDL and calculated LDL are not interchangeable tests.      Chol/HDL Ratio 04/20/2024 3.0   Final    Triglycerides 04/20/2024 158 (H)  <150 mg/dL Final    Comment:    Triglyceride Guidelines:     <150   Desirable   150-199  Borderline   200-499  High     >499   Very high   Based on AHA Guidelines for fasting triglyceride, October 2012.         VLDL 04/20/2024 32  mg/dL Final    Hemoglobin A1C 04/20/2024 7.6 (H)  4.0 - 6.0 % Final    Estimated Avg Glucose 04/20/2024 171  mg/dL Final    Comment: The ADA and AACC recommend providing the estimated average glucose result to permit better   patient understanding of their HBA1c result.      Sodium 04/20/2024 140  135 - 144 mmol/L Final    Potassium 04/20/2024 4.0  3.7 - 5.3 mmol/L Final    Chloride 04/20/2024 103  98 - 107 mmol/L Final    CO2 04/20/2024 27  20 - 31 mmol/L Final    Anion Gap 04/20/2024 10  9 - 17 mmol/L Final    Glucose 04/20/2024 170 (H)  70 - 99 mg/dL Final    BUN 04/20/2024 9  8 - 23 mg/dL Final    Creatinine 04/20/2024 0.7  0.5 - 0.9 mg/dL Final    Est, Glom Filt Rate 04/20/2024 >90  >60 mL/min/1.73m2 Final    Comment:       These results are not intended for use in patients <18 years of age.        eGFR results are calculated without a race factor using the 2021 CKD-EPI equation.  Careful clinical correlation is recommended, particularly when comparing to results

## 2024-04-24 NOTE — PROGRESS NOTES
Patient aware covid vaccine and rsv vaccine available at local pharmacy.   Patient agreeable to telephone AWV

## 2024-04-25 ENCOUNTER — TELEMEDICINE (OUTPATIENT)
Dept: FAMILY MEDICINE CLINIC | Age: 74
End: 2024-04-25
Payer: MEDICARE

## 2024-04-25 DIAGNOSIS — Z00.00 MEDICARE ANNUAL WELLNESS VISIT, SUBSEQUENT: Primary | ICD-10-CM

## 2024-04-25 PROCEDURE — 1123F ACP DISCUSS/DSCN MKR DOCD: CPT | Performed by: FAMILY MEDICINE

## 2024-04-25 PROCEDURE — 3017F COLORECTAL CA SCREEN DOC REV: CPT | Performed by: FAMILY MEDICINE

## 2024-04-25 PROCEDURE — G0439 PPPS, SUBSEQ VISIT: HCPCS | Performed by: FAMILY MEDICINE

## 2024-04-25 ASSESSMENT — PATIENT HEALTH QUESTIONNAIRE - PHQ9
2. FEELING DOWN, DEPRESSED OR HOPELESS: NOT AT ALL
1. LITTLE INTEREST OR PLEASURE IN DOING THINGS: NOT AT ALL
SUM OF ALL RESPONSES TO PHQ9 QUESTIONS 1 & 2: 0
SUM OF ALL RESPONSES TO PHQ QUESTIONS 1-9: 0

## 2024-04-25 ASSESSMENT — LIFESTYLE VARIABLES
HOW MANY STANDARD DRINKS CONTAINING ALCOHOL DO YOU HAVE ON A TYPICAL DAY: PATIENT DOES NOT DRINK
HOW OFTEN DO YOU HAVE A DRINK CONTAINING ALCOHOL: NEVER

## 2024-04-25 NOTE — PROGRESS NOTES
Medicare Annual Wellness Visit    Kika Varma is here for Medicare AWV    Assessment & Plan     Recommendations for Preventive Services Due: see orders and patient instructions/AVS.  Recommended screening schedule for the next 5-10 years is provided to the patient in written form: see Patient Instructions/AVS.     No follow-ups on file.     Subjective       Patient's complete Health Risk Assessment and screening values have been reviewed and are found in Flowsheets. The following problems were reviewed today and where indicated follow up appointments were made and/or referrals ordered.    Positive Risk Factor Screenings with Interventions:                Activity, Diet, and Weight:  On average, how many days per week do you engage in moderate to strenuous exercise (like a brisk walk)?: 1 day  On average, how many minutes do you engage in exercise at this level?: 40 min    Do you eat balanced/healthy meals regularly?: Yes    There is no height or weight on file to calculate BMI. (!) Abnormal      Obesity Interventions:  Patient declines any further evaluation or treatment                               Objective      Patient-Reported Vitals  BP Observations: No, remote/electronic monitoring device was not used or able to be verified  Patient-Reported Weight: 162lbs  Patient-Reported Height: 4'9\"            Allergies   Allergen Reactions    Iv Dye [Iodides] Shortness Of Breath    Penicillins Swelling    Sulfa Antibiotics Shortness Of Breath    Ace Inhibitors Nausea Only     Difficulty swallowing the Lisinopril.  Gagging.  Nausea.    Cozaar [Losartan] Nausea Only    Lipitor Other (See Comments)     Muscle aches.    Lopressor [Metoprolol] Other (See Comments)     leg pain    Morphine Nausea Only     Chest pressure     Prior to Visit Medications    Medication Sig Taking? Authorizing Provider   amLODIPine (NORVASC) 10 MG tablet Take 1 tablet by mouth once daily Yes Zuleyka Vasquez MD   ezetimibe (ZETIA) 10 MG tablet

## 2024-04-25 NOTE — PATIENT INSTRUCTIONS
Try to avoid secondhand smoke too.     Stay at a weight that's healthy for you. Talk to your doctor if you need help losing weight.     Try to get 7 to 9 hours of sleep each night.     Limit alcohol to 2 drinks a day for men and 1 drink a day for women. Too much alcohol can cause health problems.     Manage other health problems such as diabetes, high blood pressure, and high cholesterol. If you think you may have a problem with alcohol or drug use, talk to your doctor.   Medicines    Take your medicines exactly as prescribed. Call your doctor if you think you are having a problem with your medicine.     If your doctor recommends aspirin, take the amount directed each day. Make sure you take aspirin and not another kind of pain reliever, such as acetaminophen (Tylenol).   When should you call for help?   Call 911 if you have symptoms of a heart attack. These may include:    Chest pain or pressure, or a strange feeling in the chest.     Sweating.     Shortness of breath.     Pain, pressure, or a strange feeling in the back, neck, jaw, or upper belly or in one or both shoulders or arms.     Lightheadedness or sudden weakness.     A fast or irregular heartbeat.   After you call 911, the  may tell you to chew 1 adult-strength or 2 to 4 low-dose aspirin. Wait for an ambulance. Do not try to drive yourself.  Watch closely for changes in your health, and be sure to contact your doctor if you have any problems.  Where can you learn more?  Go to https://www.[employer].net/patientEd and enter F075 to learn more about \"A Healthy Heart: Care Instructions.\"  Current as of: June 24, 2023               Content Version: 14.0  © 2006-2024 [employer].   Care instructions adapted under license by [employer]. If you have questions about a medical condition or this instruction, always ask your healthcare professional. [employer] disclaims any warranty or liability for your use of this

## 2024-06-06 ENCOUNTER — OFFICE VISIT (OUTPATIENT)
Dept: PRIMARY CARE CLINIC | Age: 74
End: 2024-06-06
Payer: MEDICARE

## 2024-06-06 ENCOUNTER — HOSPITAL ENCOUNTER (OUTPATIENT)
Dept: INTERVENTIONAL RADIOLOGY/VASCULAR | Age: 74
Discharge: HOME | End: 2024-06-08
Payer: MEDICARE

## 2024-06-06 VITALS
OXYGEN SATURATION: 99 % | BODY MASS INDEX: 34.45 KG/M2 | SYSTOLIC BLOOD PRESSURE: 118 MMHG | WEIGHT: 159.2 LBS | DIASTOLIC BLOOD PRESSURE: 78 MMHG | TEMPERATURE: 97.6 F | HEART RATE: 84 BPM

## 2024-06-06 DIAGNOSIS — M79.662 PAIN OF LEFT CALF: ICD-10-CM

## 2024-06-06 DIAGNOSIS — M25.562 ACUTE PAIN OF LEFT KNEE: ICD-10-CM

## 2024-06-06 DIAGNOSIS — M79.662 PAIN OF LEFT CALF: Primary | ICD-10-CM

## 2024-06-06 PROCEDURE — 93971 EXTREMITY STUDY: CPT

## 2024-06-06 PROCEDURE — 99213 OFFICE O/P EST LOW 20 MIN: CPT | Performed by: FAMILY MEDICINE

## 2024-06-06 RX ORDER — PREDNISONE 20 MG/1
40 TABLET ORAL DAILY
Qty: 10 TABLET | Refills: 0 | Status: SHIPPED | OUTPATIENT
Start: 2024-06-06 | End: 2024-06-11

## 2024-06-06 SDOH — ECONOMIC STABILITY: FOOD INSECURITY: WITHIN THE PAST 12 MONTHS, YOU WORRIED THAT YOUR FOOD WOULD RUN OUT BEFORE YOU GOT MONEY TO BUY MORE.: NEVER TRUE

## 2024-06-06 SDOH — ECONOMIC STABILITY: FOOD INSECURITY: WITHIN THE PAST 12 MONTHS, THE FOOD YOU BOUGHT JUST DIDN'T LAST AND YOU DIDN'T HAVE MONEY TO GET MORE.: NEVER TRUE

## 2024-06-06 SDOH — ECONOMIC STABILITY: HOUSING INSECURITY
IN THE LAST 12 MONTHS, WAS THERE A TIME WHEN YOU DID NOT HAVE A STEADY PLACE TO SLEEP OR SLEPT IN A SHELTER (INCLUDING NOW)?: NO

## 2024-06-06 SDOH — ECONOMIC STABILITY: INCOME INSECURITY: HOW HARD IS IT FOR YOU TO PAY FOR THE VERY BASICS LIKE FOOD, HOUSING, MEDICAL CARE, AND HEATING?: NOT HARD AT ALL

## 2024-06-06 ASSESSMENT — ENCOUNTER SYMPTOMS
SHORTNESS OF BREATH: 0
CHEST TIGHTNESS: 0
WHEEZING: 0
COUGH: 0

## 2024-06-06 NOTE — PROGRESS NOTES
[facility]  [address]  [facility] [address]  Dept: [phone]  Dept Fax: [phone]    Kika Varma is a 73 y.o. female who presents today for her medical conditions/complaints as noted below.  Kika Varma is c/o of   Chief Complaint   Patient presents with    Knee Pain     Has an apointment with dr marsh but its not till the 18th and the pain has been going on for 2 weeks and its horrible        HPI:     Here today for knee pain.     Knee Pain   The incident occurred more than 1 week ago. Incident location: started when she was in [place]. There was no injury mechanism. The pain is present in the left knee. The quality of the pain is described as cramping, shooting and stabbing. The pain is at a severity of 9/10. The pain is severe. The pain has been Intermittent since onset. Pertinent negatives include no inability to bear weight, loss of motion, muscle weakness, numbness or tingling. Associated symptoms comments: Feel unstable. She reports no foreign bodies present. The symptoms are aggravated by movement and weight bearing. She has tried heat (tens unit) for the symptoms. The treatment provided mild relief.         Past Medical History:   Diagnosis Date    CAD (coronary artery disease)     Cerebrovascular accident (CVA) due to thrombosis of precerebral artery ([facility]) 12/19/2017    History of seasonal allergies     Hyperlipidemia     Hypertension     Interstitial cystitis     History of.    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    Plantar fasciitis, bilateral     History of.    Polyneuropathy associated with underlying disease ([facility]) 3/20/2018    S/P drug eluting coronary stent placement-RCA 6/20/17 - Dr. rae 6/20/2017    Seasonal allergies     Trigger finger, left

## 2024-06-10 DIAGNOSIS — M25.562 CHRONIC PAIN OF LEFT KNEE: Primary | ICD-10-CM

## 2024-06-10 DIAGNOSIS — G89.29 CHRONIC PAIN OF LEFT KNEE: Primary | ICD-10-CM

## 2024-06-18 ENCOUNTER — HOSPITAL ENCOUNTER (OUTPATIENT)
Dept: GENERAL RADIOLOGY | Age: 74
Discharge: HOME | End: 2024-06-20
Attending: ORTHOPAEDIC SURGERY
Payer: MEDICARE

## 2024-06-18 ENCOUNTER — OFFICE VISIT (OUTPATIENT)
Dept: ORTHOPEDIC SURGERY | Age: 74
End: 2024-06-18
Attending: ORTHOPAEDIC SURGERY
Payer: MEDICARE

## 2024-06-18 VITALS
WEIGHT: 159 LBS | HEIGHT: 57 IN | DIASTOLIC BLOOD PRESSURE: 64 MMHG | HEART RATE: 82 BPM | BODY MASS INDEX: 34.3 KG/M2 | SYSTOLIC BLOOD PRESSURE: 130 MMHG

## 2024-06-18 DIAGNOSIS — M17.12 LOCALIZED OSTEOARTHRITIS OF LEFT KNEE: ICD-10-CM

## 2024-06-18 DIAGNOSIS — G89.29 CHRONIC PAIN OF LEFT KNEE: ICD-10-CM

## 2024-06-18 DIAGNOSIS — M25.562 LEFT KNEE PAIN, UNSPECIFIED CHRONICITY: Primary | ICD-10-CM

## 2024-06-18 DIAGNOSIS — M25.562 CHRONIC PAIN OF LEFT KNEE: ICD-10-CM

## 2024-06-18 PROCEDURE — 99214 OFFICE O/P EST MOD 30 MIN: CPT | Performed by: NURSE PRACTITIONER

## 2024-06-18 PROCEDURE — 20610 DRAIN/INJ JOINT/BURSA W/O US: CPT | Performed by: NURSE PRACTITIONER

## 2024-06-18 PROCEDURE — 73562 X-RAY EXAM OF KNEE 3: CPT

## 2024-06-18 RX ORDER — TRIAMCINOLONE ACETONIDE 40 MG/ML
80 INJECTION, SUSPENSION INTRA-ARTICULAR; INTRAMUSCULAR ONCE
Status: COMPLETED | OUTPATIENT
Start: 2024-06-18 | End: 2024-06-18

## 2024-06-18 RX ORDER — TIZANIDINE 4 MG/1
4 TABLET ORAL NIGHTLY PRN
Qty: 30 TABLET | Refills: 0 | Status: SHIPPED | OUTPATIENT
Start: 2024-06-18 | End: 2024-07-18

## 2024-06-18 RX ORDER — BUPIVACAINE HYDROCHLORIDE 5 MG/ML
5 INJECTION, SOLUTION PERINEURAL ONCE
Status: COMPLETED | OUTPATIENT
Start: 2024-06-18 | End: 2024-06-18

## 2024-06-18 RX ADMIN — BUPIVACAINE HYDROCHLORIDE 25 MG: 5 INJECTION, SOLUTION PERINEURAL at 11:39

## 2024-06-18 RX ADMIN — TRIAMCINOLONE ACETONIDE 80 MG: 200 INJECTION, SUSPENSION INTRA-ARTICULAR; INTRAMUSCULAR at 11:40

## 2024-06-18 NOTE — PROGRESS NOTES
would like to proceed with a left knee intra-articular corticosteroid injection.  Ice and elevate the knee as needed.  Will also send in Zanaflex for her to take at bedtime for muscle spasms.  Will see her back as needed for any further issues.        Procedures    CO ARTHROCENTESIS ASPIR&/INJ MAJOR JT/BURSA W/O US        Procedure:    PROCEDURE NOTE:      After verbal consent was obtained, the left knee was prepped in sterile fashion with alcohol. A 22-gauge needle was introduced into the left inferolateral portal of the knee and 5 mL of 0.5% Marcaine and 80 mg of Kenalog were injected. Hemostasis achieved.  Dry sterile bandage applied.  The patient tolerated procedure well.     Activities as tolerated. WBAT.         Electronically signed by EMY Blake CNP on 6/18/2024 at 11:36 AM

## 2024-06-27 ENCOUNTER — TELEPHONE (OUTPATIENT)
Dept: ORTHOPEDIC SURGERY | Age: 74
End: 2024-06-27

## 2024-06-27 NOTE — TELEPHONE ENCOUNTER
Patient called the office stating that the Kenalog shot she had on 6/18/24 is not helping, she has tried icing it as well as NSAIDS. Is there anything else she can do? Please advise.

## 2024-07-03 NOTE — TELEPHONE ENCOUNTER
Called patient back she stated she has burning on the right side of her left knee, I gave her the option of P.T. OR MRI. SHE IS GOING TO THINK ABOUT IT AND ASK HER INSURANCE WHICH IS COVERED AND GET BACK TO US.

## 2024-07-28 DIAGNOSIS — E11.69 TYPE 2 DIABETES MELLITUS WITH OTHER SPECIFIED COMPLICATION, WITHOUT LONG-TERM CURRENT USE OF INSULIN (HCC): ICD-10-CM

## 2024-07-29 ENCOUNTER — HOSPITAL ENCOUNTER (OUTPATIENT)
Age: 74
Discharge: HOME | End: 2024-07-29
Payer: MEDICARE

## 2024-07-29 DIAGNOSIS — E78.2 MIXED HYPERLIPIDEMIA: ICD-10-CM

## 2024-07-29 DIAGNOSIS — E11.69 TYPE 2 DIABETES MELLITUS WITH OTHER SPECIFIED COMPLICATION, WITHOUT LONG-TERM CURRENT USE OF INSULIN (HCC): ICD-10-CM

## 2024-07-29 LAB
ALBUMIN SERPL-MCNC: 4.5 G/DL (ref 3.5–5.2)
ALBUMIN/GLOB SERPL: 1.5 {RATIO} (ref 1–2.5)
ALP SERPL-CCNC: 99 U/L (ref 35–104)
ALT SERPL-CCNC: 48 U/L (ref 5–33)
ANION GAP SERPL CALCULATED.3IONS-SCNC: 9 MMOL/L (ref 9–17)
AST SERPL-CCNC: 54 U/L
BILIRUB SERPL-MCNC: 0.4 MG/DL (ref 0.3–1.2)
BUN SERPL-MCNC: 12 MG/DL (ref 8–23)
BUN/CREAT SERPL: 17 (ref 9–20)
CALCIUM SERPL-MCNC: 9.9 MG/DL (ref 8.6–10.4)
CHLORIDE SERPL-SCNC: 102 MMOL/L (ref 98–107)
CHOLEST SERPL-MCNC: 153 MG/DL (ref 0–199)
CHOLESTEROL/HDL RATIO: 3
CO2 SERPL-SCNC: 29 MMOL/L (ref 20–31)
CREAT SERPL-MCNC: 0.7 MG/DL (ref 0.5–0.9)
CREAT UR-MCNC: 140 MG/DL (ref 28–217)
EST. AVERAGE GLUCOSE BLD GHB EST-MCNC: 186 MG/DL
GFR, ESTIMATED: >90 ML/MIN/1.73M2
GLUCOSE SERPL-MCNC: 137 MG/DL (ref 70–99)
HBA1C MFR BLD: 8.1 % (ref 4–6)
HDLC SERPL-MCNC: 57 MG/DL
LDLC SERPL CALC-MCNC: 69 MG/DL (ref 0–100)
MICROALBUMIN UR-MCNC: 46 MG/L (ref 0–20)
MICROALBUMIN/CREAT UR-RTO: 33 MCG/MG CREAT (ref 0–25)
POTASSIUM SERPL-SCNC: 3.8 MMOL/L (ref 3.7–5.3)
PROT SERPL-MCNC: 7.6 G/DL (ref 6.4–8.3)
SODIUM SERPL-SCNC: 140 MMOL/L (ref 135–144)
TRIGL SERPL-MCNC: 133 MG/DL
VLDLC SERPL CALC-MCNC: 27 MG/DL

## 2024-07-29 PROCEDURE — 82043 UR ALBUMIN QUANTITATIVE: CPT

## 2024-07-29 PROCEDURE — 80053 COMPREHEN METABOLIC PANEL: CPT

## 2024-07-29 PROCEDURE — 36415 COLL VENOUS BLD VENIPUNCTURE: CPT

## 2024-07-29 PROCEDURE — 83036 HEMOGLOBIN GLYCOSYLATED A1C: CPT

## 2024-07-29 PROCEDURE — 82570 ASSAY OF URINE CREATININE: CPT

## 2024-07-29 PROCEDURE — 80061 LIPID PANEL: CPT

## 2024-07-30 RX ORDER — GLIMEPIRIDE 1 MG/1
TABLET ORAL
Qty: 60 TABLET | Refills: 0 | OUTPATIENT
Start: 2024-07-30

## 2024-07-31 ENCOUNTER — OFFICE VISIT (OUTPATIENT)
Dept: FAMILY MEDICINE CLINIC | Age: 74
End: 2024-07-31
Payer: MEDICARE

## 2024-07-31 VITALS
BODY MASS INDEX: 33.48 KG/M2 | HEIGHT: 57 IN | DIASTOLIC BLOOD PRESSURE: 60 MMHG | HEART RATE: 73 BPM | OXYGEN SATURATION: 97 % | WEIGHT: 155.2 LBS | SYSTOLIC BLOOD PRESSURE: 128 MMHG

## 2024-07-31 DIAGNOSIS — G47.62 NOCTURNAL LEG CRAMPS: ICD-10-CM

## 2024-07-31 DIAGNOSIS — E78.2 MIXED HYPERLIPIDEMIA: ICD-10-CM

## 2024-07-31 DIAGNOSIS — I10 ESSENTIAL HYPERTENSION: ICD-10-CM

## 2024-07-31 DIAGNOSIS — R74.01 ELEVATED TRANSAMINASE LEVEL: ICD-10-CM

## 2024-07-31 DIAGNOSIS — E11.69 TYPE 2 DIABETES MELLITUS WITH OTHER SPECIFIED COMPLICATION, WITHOUT LONG-TERM CURRENT USE OF INSULIN (HCC): Primary | ICD-10-CM

## 2024-07-31 PROCEDURE — 1123F ACP DISCUSS/DSCN MKR DOCD: CPT | Performed by: FAMILY MEDICINE

## 2024-07-31 PROCEDURE — 99214 OFFICE O/P EST MOD 30 MIN: CPT | Performed by: FAMILY MEDICINE

## 2024-07-31 PROCEDURE — 3017F COLORECTAL CA SCREEN DOC REV: CPT | Performed by: FAMILY MEDICINE

## 2024-07-31 PROCEDURE — G8399 PT W/DXA RESULTS DOCUMENT: HCPCS | Performed by: FAMILY MEDICINE

## 2024-07-31 PROCEDURE — 3052F HG A1C>EQUAL 8.0%<EQUAL 9.0%: CPT | Performed by: FAMILY MEDICINE

## 2024-07-31 PROCEDURE — 2022F DILAT RTA XM EVC RTNOPTHY: CPT | Performed by: FAMILY MEDICINE

## 2024-07-31 PROCEDURE — 3078F DIAST BP <80 MM HG: CPT | Performed by: FAMILY MEDICINE

## 2024-07-31 PROCEDURE — G8427 DOCREV CUR MEDS BY ELIG CLIN: HCPCS | Performed by: FAMILY MEDICINE

## 2024-07-31 PROCEDURE — 99213 OFFICE O/P EST LOW 20 MIN: CPT | Performed by: FAMILY MEDICINE

## 2024-07-31 PROCEDURE — G8417 CALC BMI ABV UP PARAM F/U: HCPCS | Performed by: FAMILY MEDICINE

## 2024-07-31 PROCEDURE — 1090F PRES/ABSN URINE INCON ASSESS: CPT | Performed by: FAMILY MEDICINE

## 2024-07-31 PROCEDURE — 3074F SYST BP LT 130 MM HG: CPT | Performed by: FAMILY MEDICINE

## 2024-07-31 PROCEDURE — 1036F TOBACCO NON-USER: CPT | Performed by: FAMILY MEDICINE

## 2024-07-31 RX ORDER — GLIMEPIRIDE 2 MG/1
2 TABLET ORAL 2 TIMES DAILY WITH MEALS
Qty: 60 TABLET | Refills: 4 | Status: SHIPPED | OUTPATIENT
Start: 2024-07-31

## 2024-07-31 RX ORDER — GLIMEPIRIDE 1 MG/1
1 TABLET ORAL
COMMUNITY
End: 2024-07-31

## 2024-07-31 ASSESSMENT — ENCOUNTER SYMPTOMS
RESPIRATORY NEGATIVE: 1
GASTROINTESTINAL NEGATIVE: 1
EYES NEGATIVE: 1
ALLERGIC/IMMUNOLOGIC NEGATIVE: 1
SHORTNESS OF BREATH: 0

## 2024-07-31 NOTE — PROGRESS NOTES
Subjective:      Patient ID: Kika Varma is a 73 y.o. female.    HPI  scheduled follow up on her diabetes at 3 months.  She was to start mounjaro and stop glimepiride , but insurance/cost issues and she never switched.  Taking glimepiride.    She also reports painful night time leg cramps bilaterally.  Trying pickles at present.  Some help.   Bs 130-153 on home checks.  Following a diet with portion control at present.      Past Medical History:   Diagnosis Date    CAD (coronary artery disease)     Cerebrovascular accident (CVA) due to thrombosis of precerebral artery ([facility]) 12/19/2017    History of seasonal allergies     Hyperlipidemia     Hypertension     Interstitial cystitis     History of.    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    Plantar fasciitis, bilateral     History of.    Polyneuropathy associated with underlying disease ([facility]) 3/20/2018    S/P drug eluting coronary stent placement-[facility] 6/20/17 - Dr. amezquita 6/20/2017    Seasonal allergies     Trigger finger, left     History of triggering, left long finger.    Type 2 diabetes mellitus ([facility])      Past Surgical History:   Procedure Laterality Date    APPENDECTOMY      CARDIAC CATHETERIZATION  06/20/2017    Left main: normal, LAD: proximal 30% stenosis, LCX: 20 % proximal stenosis, RCA: Ostial 70% with pressure damping and mid 90% stenosis.  Required PTCA-GRACE in both lesions.  LVEF 55%.  LV wall motion normal.  Dr. Amezquita, [facility]      CATARACT REMOVAL WITH IMPLANT Left 10/20/2015    Dr. Colin, [facility]    CATARACT REMOVAL WITH IMPLANT Right 12/08/2015    [facility] with DANIAL. Dr Colin, [facility]    CHOLECYSTECTOMY, LAPAROSCOPIC  6/9/2000    COLONOSCOPY  3/11/2015    Internal and external hemorrhoids otherwise normal    CORONARY ANGIOPLASTY WITH STENT PLACEMENT  06/20/2017    Right coronary

## 2024-08-07 ENCOUNTER — OFFICE VISIT (OUTPATIENT)
Dept: PRIMARY CARE CLINIC | Age: 74
End: 2024-08-07
Payer: MEDICARE

## 2024-08-07 VITALS
BODY MASS INDEX: 33.35 KG/M2 | DIASTOLIC BLOOD PRESSURE: 64 MMHG | WEIGHT: 154.13 LBS | HEART RATE: 82 BPM | OXYGEN SATURATION: 99 % | TEMPERATURE: 97 F | SYSTOLIC BLOOD PRESSURE: 118 MMHG | RESPIRATION RATE: 16 BRPM

## 2024-08-07 DIAGNOSIS — M25.562 CHRONIC PAIN OF LEFT KNEE: Primary | ICD-10-CM

## 2024-08-07 DIAGNOSIS — G89.29 CHRONIC PAIN OF LEFT KNEE: Primary | ICD-10-CM

## 2024-08-07 DIAGNOSIS — M25.562 LEFT KNEE PAIN, UNSPECIFIED CHRONICITY: Primary | ICD-10-CM

## 2024-08-07 DIAGNOSIS — M25.362 KNEE INSTABILITY, LEFT: ICD-10-CM

## 2024-08-07 PROCEDURE — 1090F PRES/ABSN URINE INCON ASSESS: CPT

## 2024-08-07 PROCEDURE — G8399 PT W/DXA RESULTS DOCUMENT: HCPCS

## 2024-08-07 PROCEDURE — G8417 CALC BMI ABV UP PARAM F/U: HCPCS

## 2024-08-07 PROCEDURE — 1036F TOBACCO NON-USER: CPT

## 2024-08-07 PROCEDURE — 99213 OFFICE O/P EST LOW 20 MIN: CPT

## 2024-08-07 PROCEDURE — 1123F ACP DISCUSS/DSCN MKR DOCD: CPT

## 2024-08-07 PROCEDURE — 3078F DIAST BP <80 MM HG: CPT

## 2024-08-07 PROCEDURE — 99212 OFFICE O/P EST SF 10 MIN: CPT

## 2024-08-07 PROCEDURE — 3074F SYST BP LT 130 MM HG: CPT

## 2024-08-07 PROCEDURE — G8427 DOCREV CUR MEDS BY ELIG CLIN: HCPCS

## 2024-08-07 PROCEDURE — 3017F COLORECTAL CA SCREEN DOC REV: CPT

## 2024-08-07 RX ORDER — CLINDAMYCIN HYDROCHLORIDE 300 MG/1
300 CAPSULE ORAL 3 TIMES DAILY
Qty: 21 CAPSULE | Refills: 0 | Status: SHIPPED | OUTPATIENT
Start: 2024-08-07 | End: 2024-08-14

## 2024-08-07 RX ORDER — METHYLPREDNISOLONE 4 MG/1
TABLET ORAL
Qty: 21 TABLET | Refills: 0 | Status: SHIPPED | OUTPATIENT
Start: 2024-08-07 | End: 2024-08-13

## 2024-08-07 RX ORDER — CEPHALEXIN 500 MG/1
500 CAPSULE ORAL 3 TIMES DAILY
Qty: 21 CAPSULE | Refills: 0 | Status: CANCELLED | OUTPATIENT
Start: 2024-08-07 | End: 2024-08-14

## 2024-08-07 ASSESSMENT — ENCOUNTER SYMPTOMS: COLOR CHANGE: 0

## 2024-08-07 NOTE — PATIENT INSTRUCTIONS
Steroid taper  Start antibiotics as prescribed  Complete whole course of antibiotics  May use tylenol and ibuprofen for pain/ fever  Referral sent to PT  Will hear from ortho about MRI  If symptoms worsen follow up with PCP or return to walk in clinic  Patient verbalized understanding and agrees with plan of care

## 2024-08-07 NOTE — PROGRESS NOTES
[facility] Walk In department of [facility]  [address] [facility] [address]  Phone: [phone]  Fax: [phone]      Kika Varma  1950  MRN: [id number]  Date of visit: 8/7/2024    Chief Complaint:     Kika Varma is here for c/o of Knee Pain (She is here for left knee pain. She reports main goes from knee to calf. She reports it being warm to touch. She got a shot in her knee a month ago she stated it helped. She did mention an MRI. )      HPI:     Kika Varma is a 73 y.o. female who presents to the [facility] today for her medical conditions/complaints as noted below.    Knee Pain   Incident onset: 1 month. The incident occurred at home. There was no injury mechanism. The pain is present in the left knee. Quality: sharp. The pain is at a severity of 10/10. The pain has been Constant since onset. Pertinent negatives include no inability to bear weight, loss of motion, loss of sensation, numbness or tingling. Associated symptoms comments: Muscle cramping in thigh. Exacerbated by: standing up. Treatments tried: voltaren gel, icy hot. The treatment provided mild relief.       Past Medical History:   Diagnosis Date    CAD (coronary artery disease)     Cerebrovascular accident (CVA) due to thrombosis of precerebral artery ([facility]) 12/19/2017    History of seasonal allergies     Hyperlipidemia     Hypertension     Interstitial cystitis     History of.    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    Plantar fasciitis, bilateral     History of.    Polyneuropathy associated with underlying disease ([facility]) 3/20/2018    S/P drug eluting coronary stent placement-RCA 6/20/17 - Dr. rae 6/20/2017    Seasonal allergies     Trigger finger, left     History of triggering, left long finger.    Type 2 diabetes mellitus ([facility])         Allergies   Allergen Reactions

## 2024-08-12 ENCOUNTER — HOSPITAL ENCOUNTER (OUTPATIENT)
Dept: PHYSICAL THERAPY | Age: 74
Setting detail: THERAPIES SERIES
Discharge: HOME | End: 2024-08-12
Payer: MEDICARE

## 2024-08-12 PROCEDURE — 97161 PT EVAL LOW COMPLEX 20 MIN: CPT | Performed by: PHYSICAL THERAPIST

## 2024-08-12 ASSESSMENT — PAIN DESCRIPTION - ORIENTATION: ORIENTATION: LEFT

## 2024-08-12 ASSESSMENT — PAIN DESCRIPTION - DESCRIPTORS: DESCRIPTORS: ACHING;SHARP

## 2024-08-12 ASSESSMENT — PAIN DESCRIPTION - PAIN TYPE: TYPE: CHRONIC PAIN

## 2024-08-12 ASSESSMENT — PAIN DESCRIPTION - LOCATION: LOCATION: KNEE

## 2024-08-12 ASSESSMENT — PAIN SCALES - GENERAL: PAINLEVEL_OUTOF10: 8

## 2024-08-12 NOTE — FLOWSHEET NOTE
Physical Therapy Daily Treatment Note    Date:  2024    Patient Name:  Kika Varma    :  1950  MRN: [id number]  Restrictions/Precautions:     Medical/Treatment Diagnosis Information:   Diagnosis: M25.562 L knee pain  Treatment Diagnosis: M25.562 L knee pain  Insurance/Certification information:  PT Insurance Information: [employer]  Physician Information:   Marcial BAILEY  Plan of care signed (Y/N):    Visit# / total visits:  1/10  Pain level: 10/10       Time In:12:30   Time Out:1:00    Progress Note: [x]  Yes  []  No  Next due by: Visit #10 , or 24     Subjective: See eval      Objective: See eval  Observation:   Test measurements:      Exercises:   Exercise/Equipment Resistance/Repetitions Other comments   NUSTEP      TKE red    HS curl -sit red    HS curl -stand     Partial squat          SLR     SAQ     PKF                US  MCL/ medial joint line, pes ansurinus             [x] Provided verbal/tactile cueing for activities related to strengthening, flexibility, endurance, ROM. (76319)  [] Provided verbal/tactile cueing for activities related to improving balance, coordination, kinesthetic sense, posture, motor skill, proprioception. (56786)    Therapeutic Activities:     [] Therapeutic activities, direct (one-on-one) patient contact (use of dynamic activities to improve functional performance). (72302)    Gait:   [] Provided training and instruction to the patient for ambulation re-education. (26939)    Self-Care/ADL's  [] Self-care/home management training and compensatory training, meal preparation, safety procedures, and instructions in use of assistive technology devices/adaptive equipment, direct one-on-one contact. (54137)    Home Exercise Program:     [] Reviewed/Progressed HEP activities related to strengthening, flexibility, endurance, ROM. (41616)  [] Reviewed/Progressed HEP activities related to improving balance, coordination, kinesthetic sense, posture, motor skill,

## 2024-08-12 NOTE — PROGRESS NOTES
Physical Therapy  Initial Assessment  Date: 2024  Patient Name: Kika Varma  MRN: [id number]  : 1950    Referring Physician: Marcial Perry PA-C Chandler Ortman PA   PCP: Zuleyka Vasquez MD     Medical Diagnosis: Chronic pain of left knee [M25.562, G89.29] M25.562 L knee pain  Treatment Diagnosis: M25.562 L knee pain      Insurance: Payor: [employer] MEDICARE / Plan: [employer] CHOICE-PPO MEDICARE / Product Type: *No Product type* /   Insurance ID: [id number] - (Medicare Managed)      Restrictions:       Subjective:   General  Chart Reviewed: Yes  Patient Assessed for Rehabilitation Services: Yes  History obtained from:: Patient, Chart Review  Family/Caregiver Present: Yes  Diagnosis: M25.562 L knee pain  Referring Provider (secondary): Marcial BAILEY  Follows Commands: Within Functional Limits  PT Visit Information  Onset Date: 24  PT Insurance Information: [employer] Medicare  Referring Provider (secondary): Marcial BAILEY  Subjective  Subjective: L knee pain came on around May 2024. No known injury. Has gotten progressively worse. Had short term relief from a cortisone injection, [facility] in Ortho. No relief from 5 day streoid med pack . Limping quite bad. Very painful at night. Leg feels hot.  Prior diagnostic testing:: X-ray  Previous treatments prior to current episode?: Injections  Dominant Hand: : Left  Pain Screening  Patient Currently in Pain: Yes  Pain Assessment: 0-10  Pain Level: 8  Best Pain Level: 8  Worst Pain Level: 10  Pain Type: Chronic pain  Pain Location: Knee  Pain Orientation: Left  Pain Descriptors: Aching, Sharp       Vision/Hearing:  Vision  Vision: Within Functional Limits  Hearing  Hearing: Within functional limits    Orientation:  Orientation  Overall Orientation Status: Within Normal Limits  Follows Commands: Within Functional Limits    Social History:  Social History  Lives With: Spouse  Type of Home: House  Home Layout: One level  Home Access: Stairs to

## 2024-08-12 NOTE — PLAN OF CARE
[facility]  Rehabilitation and Sports Medicine    [] [place]  Phone: [phone]  Fax: [phone]      [] [place]  Phone: [phone]  Fax: [phone]        To:        Patient: Kika Varma  : 1950   MRN: [id number]  Evaluation Date: 2024      Diagnosis Information:  Diagnosis: M25.562 L knee pain   Treatment Diagnosis: M25.562 L knee pain     Physical Therapy Certification Form  Dear Marcial BAILEY  The following patient has been evaluated for physical therapy services and for therapy to continue, Medicare requires monthly physician review of the treatment plan. Please review the attached evaluation and/or summary of the patient's plan of care, and verify that you agree therapy should continue by signing the attached document and sending it back to our office.    Plan of Care/Treatment to date:  [x] Therapeutic Exercise    [] Modalities:  [] Therapeutic Activity     [x] Ultrasound  [x] Electrical Stimulation  [] Gait Training      [] Cervical Traction [] Lumbar Traction  [] Neuromuscular Re-education    [] Cold/hotpack [] Iontophoresis   [x] Instruction in HEP     Other:  [x] Manual Therapy      []             [] Aquatic Therapy      []                 Goals:  Short Term Goals  Time Frame for Short Term Goals: 1 week  Short Term Goal 1: Start modalities for pain control  Short Term Goal 2: Start HEP    Long Term Goals  Time Frame for Long Term Goals : 4 weeks  Long Term Goal 1: L knee pain controlled at 3/10 to allow walking for ADL  Long Term Goal 2: Able to step up/ down 6-8\" steps  Long Term Goal 3: Able to walk 30-40 min with min limping  Long Term Goal 4: Prevent the need for invasive medical procedures on L knee    Frequency/Duration:24 - 24  # Days per week: [] 1 day # Weeks: [] 1 week [] 5 weeks     [x] 2 days   [] 2 weeks [] 6 weeks     [] 3 days   [] 3 weeks [] 7 weeks     [] 4 days   [x] 4 weeks [] 8 weeks    Rehab Potential: [] Excellent [x]

## 2024-08-15 ENCOUNTER — HOSPITAL ENCOUNTER (OUTPATIENT)
Dept: PHYSICAL THERAPY | Age: 74
Setting detail: THERAPIES SERIES
Discharge: HOME | End: 2024-08-15
Payer: MEDICARE

## 2024-08-15 PROCEDURE — 97035 APP MDLTY 1+ULTRASOUND EA 15: CPT

## 2024-08-15 PROCEDURE — 97110 THERAPEUTIC EXERCISES: CPT

## 2024-08-15 NOTE — PROGRESS NOTES
I have reviewed and agree to the content of the note written by the PTA.  Electronically signed by Ronaldo Mederos PT [id number]

## 2024-08-15 NOTE — FLOWSHEET NOTE
Physical Therapy Daily Treatment Note    Date:  8/15/2024    Patient Name:  Kika Varma    :  1950  MRN: [id number]  Restrictions/Precautions:     Medical/Treatment Diagnosis Information:   Diagnosis: M25.562 L knee pain  Treatment Diagnosis: M25.562 L knee pain  Insurance/Certification information:  PT Insurance Information: [employer]  Physician Information:   Marcial BAILEY  Plan of care signed (Y/N):  y  Visit# / total visits:  2/10  Pain level: 8/10       Time In:10:59    Time Out: 11:37    Progress Note: []  Yes  [x]  No  Next due by: Visit #10 , or 24     Subjective: Has felt a little bit better. Pain is still in medial knee.     Objective:  BOOM performed per flow sheet for increased mobility, stability and strengthening for improvements in completion of daily tasks and ease of ambulation. Verbal cueing for sequencing and proper form. Hep given with red TB. US to medial knee for pain relief and inflammation control    Observation:   Test measurements:      Exercises:   Exercise/Equipment Resistance/Repetitions Other comments   NUSTEP  NEXT    TKE 10x red    HS curl -sit 10x red    HS curl -stand 10x    Partial squat 10x painful        SLR 10x    SAQ 10x    PKF 10x               US 8' MCL/ medial joint line, pes ansurinus             [x] Provided verbal/tactile cueing for activities related to strengthening, flexibility, endurance, ROM. (82413)  [] Provided verbal/tactile cueing for activities related to improving balance, coordination, kinesthetic sense, posture, motor skill, proprioception. (44333)    Therapeutic Activities:     [] Therapeutic activities, direct (one-on-one) patient contact (use of dynamic activities to improve functional performance). (86345)    Gait:   [] Provided training and instruction to the patient for ambulation re-education. (82660)    Self-Care/ADL's  [] Self-care/home management training and compensatory training, meal preparation, safety procedures, and

## 2024-08-20 ENCOUNTER — HOSPITAL ENCOUNTER (OUTPATIENT)
Dept: PHYSICAL THERAPY | Age: 74
Setting detail: THERAPIES SERIES
Discharge: HOME | End: 2024-08-20
Payer: MEDICARE

## 2024-08-20 PROCEDURE — 97110 THERAPEUTIC EXERCISES: CPT | Performed by: PHYSICAL THERAPIST

## 2024-08-20 PROCEDURE — 97035 APP MDLTY 1+ULTRASOUND EA 15: CPT | Performed by: PHYSICAL THERAPIST

## 2024-08-20 NOTE — FLOWSHEET NOTE
related to strengthening, flexibility, endurance, ROM. (65743)  [] Reviewed/Progressed HEP activities related to improving balance, coordination, kinesthetic sense, posture, motor skill, proprioception.  (18909)    Manual Treatments:    [] Provided manual therapy to mobilize soft tissue/joints for the purpose of modulating pain, promoting relaxation,  increasing ROM, reducing/eliminating soft tissue swelling/inflammation/restriction, improving soft tissue extensibility. (85012)    Service Based Modalities:  8' US 50%, 1.0 MHZ ,  1.3 W/cm2 for tissue healing    Timed Code Treatment Minutes:   21' BOOM/    Total Treatment Minutes:   29'    Treatment/Activity Tolerance:  [] Patient tolerated treatment well [] Patient limited by fatique  [x] Patient limited by pain  [] Patient limited by other medical complications  [] Other:     Prognosis: [] Good [x] Fair  [] Poor    Patient Requires Follow-up: [x] Yes  [] No      Goals:  Short Term Goals  Time Frame for Short Term Goals: 1 week  Short Term Goal 1: Start modalities for pain control (initiated)  Short Term Goal 2: Start HEP (provided)    Long Term Goals  Time Frame for Long Term Goals : 4 weeks  Long Term Goal 1: L knee pain controlled at 3/10 to allow walking for ADL  Long Term Goal 2: Able to step up/ down 6-8\" steps  Long Term Goal 3: Able to walk 30-40 min with min limping  Long Term Goal 4: Prevent the need for invasive medical procedures on L knee      Plan:   [x] Continue per plan of care [] Alter current plan (see comments)  [] Plan of care initiated [] Hold pending MD visit [] Discharge    Plan for Next Session:      Electronically signed by:  Ronaldo Mederos PT

## 2024-08-22 ENCOUNTER — HOSPITAL ENCOUNTER (OUTPATIENT)
Dept: PHYSICAL THERAPY | Age: 74
Setting detail: THERAPIES SERIES
Discharge: HOME | End: 2024-08-22
Payer: MEDICARE

## 2024-08-22 PROCEDURE — 97110 THERAPEUTIC EXERCISES: CPT

## 2024-08-22 NOTE — FLOWSHEET NOTE
Hold pending MD visit [] Discharge    Plan for Next Session:      Electronically signed by:  Delma Conner PTA

## 2024-08-23 ENCOUNTER — HOSPITAL ENCOUNTER (OUTPATIENT)
Dept: MRI IMAGING | Age: 74
End: 2024-08-23
Attending: ORTHOPAEDIC SURGERY
Payer: MEDICARE

## 2024-08-23 DIAGNOSIS — M25.562 LEFT KNEE PAIN, UNSPECIFIED CHRONICITY: ICD-10-CM

## 2024-08-23 DIAGNOSIS — M25.362 KNEE INSTABILITY, LEFT: ICD-10-CM

## 2024-08-23 PROCEDURE — 73721 MRI JNT OF LWR EXTRE W/O DYE: CPT

## 2024-08-27 ENCOUNTER — HOSPITAL ENCOUNTER (OUTPATIENT)
Dept: PHYSICAL THERAPY | Age: 74
Setting detail: THERAPIES SERIES
Discharge: HOME | End: 2024-08-27
Payer: MEDICARE

## 2024-08-27 PROCEDURE — 97035 APP MDLTY 1+ULTRASOUND EA 15: CPT

## 2024-08-27 PROCEDURE — 97110 THERAPEUTIC EXERCISES: CPT

## 2024-08-27 NOTE — FLOWSHEET NOTE
Physical Therapy Daily Treatment Note    Date:  2024    Patient Name:  Kika Varma    :  1950  MRN: [id number]  Restrictions/Precautions:     Medical/Treatment Diagnosis Information:   Diagnosis: M25.562 L knee pain  Treatment Diagnosis: M25.562 L knee pain  Insurance/Certification information:  PT Insurance Information: [employer]  Physician Information:   Marcial BAILEY  Plan of care signed (Y/N):  y  Visit# / total visits:  5/10  Pain level: 5/10       Time In: 11:01         Time Out: :    Progress Note: []  Yes  [x]  No  Next due by: Visit #10 , or 24     Subjective:  States knee is doing well. Started getting calf cramps again. Has not heard MRI results yet.     Objective:  BOOM performed per flow sheet for increased mobility, stability and strengthening for improvements in completion of daily tasks and ease of ambulation. Verbal cueing for sequencing and proper form.     Observation:  minimal limp    Test measurements:      Medial compartment pain    Low tolerance to closed chain ex.      Exercises:   Exercise/Equipment Resistance/Repetitions Other comments   NUSTEP  5'    TKE 10x GR    HS curl -sit 10x GR    HS curl -stand 15x    Partial squat 10x    Step ups 10x 2\" F, L        SLR 15x    SAQ 15x    PKF 15x               US 8' MCL/ medial joint line, pes ansurinus             [x] Provided verbal/tactile cueing for activities related to strengthening, flexibility, endurance, ROM. (84150)  [] Provided verbal/tactile cueing for activities related to improving balance, coordination, kinesthetic sense, posture, motor skill, proprioception. (88931)    Therapeutic Activities:     [] Therapeutic activities, direct (one-on-one) patient contact (use of dynamic activities to improve functional performance). (24449)    Gait:   [] Provided training and instruction to the patient for ambulation re-education. (24206)    Self-Care/ADL's  [] Self-care/home management training and compensatory  training, meal preparation, safety procedures, and instructions in use of assistive technology devices/adaptive equipment, direct one-on-one contact. (82328)    Home Exercise Program:   seated TB HS curls, TB TKE, gastroc stretch  [x] Reviewed/Progressed HEP activities related to strengthening, flexibility, endurance, ROM. (69186)  [] Reviewed/Progressed HEP activities related to improving balance, coordination, kinesthetic sense, posture, motor skill, proprioception.  (31051)    Manual Treatments:    [] Provided manual therapy to mobilize soft tissue/joints for the purpose of modulating pain, promoting relaxation,  increasing ROM, reducing/eliminating soft tissue swelling/inflammation/restriction, improving soft tissue extensibility. (24526)    Service Based Modalities:  8' US 50%, 1.0 MHZ ,  1.3 W/cm2 for tissue healing     Timed Code Treatment Minutes:   20' BOOM    Total Treatment Minutes:   28'    Treatment/Activity Tolerance:  [] Patient tolerated treatment well [] Patient limited by fatique  [x] Patient limited by pain  [] Patient limited by other medical complications  [] Other:     Prognosis: [] Good [x] Fair  [] Poor    Patient Requires Follow-up: [x] Yes  [] No      Goals:  Short Term Goals  Time Frame for Short Term Goals: 1 week  Short Term Goal 1: Start modalities for pain control (initiated)  Short Term Goal 2: Start HEP (provided)    Long Term Goals  Time Frame for Long Term Goals : 4 weeks  Long Term Goal 1: L knee pain controlled at 3/10 to allow walking for ADL  Long Term Goal 2: Able to step up/ down 6-8\" steps  Long Term Goal 3: Able to walk 30-40 min with min limping  Long Term Goal 4: Prevent the need for invasive medical procedures on L knee      Plan:   [x] Continue per plan of care [] Alter current plan (see comments)  [] Plan of care initiated [] Hold pending MD visit [] Discharge    Plan for Next Session:      Electronically signed by:  Delma Conner PTA

## 2024-08-28 ENCOUNTER — TELEPHONE (OUTPATIENT)
Dept: ORTHOPEDIC SURGERY | Age: 74
End: 2024-08-28

## 2024-08-28 NOTE — TELEPHONE ENCOUNTER
Unable to leave message for patient  # out of order/ voicemailbox is full    Dr Robles reviewed results of MRI left knee    Dr Robles recommends a knee scope if patient is still having symptoms

## 2024-08-29 ENCOUNTER — APPOINTMENT (OUTPATIENT)
Dept: PHYSICAL THERAPY | Age: 74
End: 2024-08-29
Payer: MEDICARE

## 2024-08-29 ENCOUNTER — OFFICE VISIT (OUTPATIENT)
Dept: CARDIOLOGY | Age: 74
End: 2024-08-29
Payer: MEDICARE

## 2024-08-29 VITALS
WEIGHT: 154 LBS | HEIGHT: 57 IN | SYSTOLIC BLOOD PRESSURE: 146 MMHG | HEART RATE: 82 BPM | DIASTOLIC BLOOD PRESSURE: 70 MMHG | OXYGEN SATURATION: 98 % | BODY MASS INDEX: 33.22 KG/M2

## 2024-08-29 DIAGNOSIS — I25.118 CORONARY ARTERY DISEASE OF NATIVE ARTERY OF NATIVE HEART WITH STABLE ANGINA PECTORIS (HCC): Primary | ICD-10-CM

## 2024-08-29 DIAGNOSIS — Z98.61 CORONARY ANGIOPLASTY STATUS: ICD-10-CM

## 2024-08-29 DIAGNOSIS — I25.10 ATHEROSCLEROSIS OF NATIVE CORONARY ARTERY OF NATIVE HEART WITHOUT ANGINA PECTORIS: ICD-10-CM

## 2024-08-29 DIAGNOSIS — I73.9 CLAUDICATION (HCC): ICD-10-CM

## 2024-08-29 DIAGNOSIS — E78.2 MIXED HYPERLIPIDEMIA: ICD-10-CM

## 2024-08-29 DIAGNOSIS — I10 ESSENTIAL HYPERTENSION: ICD-10-CM

## 2024-08-29 PROCEDURE — 3017F COLORECTAL CA SCREEN DOC REV: CPT | Performed by: INTERNAL MEDICINE

## 2024-08-29 PROCEDURE — 99214 OFFICE O/P EST MOD 30 MIN: CPT | Performed by: INTERNAL MEDICINE

## 2024-08-29 PROCEDURE — 1123F ACP DISCUSS/DSCN MKR DOCD: CPT | Performed by: INTERNAL MEDICINE

## 2024-08-29 PROCEDURE — 3078F DIAST BP <80 MM HG: CPT | Performed by: INTERNAL MEDICINE

## 2024-08-29 PROCEDURE — 1036F TOBACCO NON-USER: CPT | Performed by: INTERNAL MEDICINE

## 2024-08-29 PROCEDURE — G8427 DOCREV CUR MEDS BY ELIG CLIN: HCPCS | Performed by: INTERNAL MEDICINE

## 2024-08-29 PROCEDURE — 93010 ELECTROCARDIOGRAM REPORT: CPT | Performed by: INTERNAL MEDICINE

## 2024-08-29 PROCEDURE — G8417 CALC BMI ABV UP PARAM F/U: HCPCS | Performed by: INTERNAL MEDICINE

## 2024-08-29 PROCEDURE — 93005 ELECTROCARDIOGRAM TRACING: CPT | Performed by: INTERNAL MEDICINE

## 2024-08-29 PROCEDURE — 3077F SYST BP >= 140 MM HG: CPT | Performed by: INTERNAL MEDICINE

## 2024-08-29 PROCEDURE — 1090F PRES/ABSN URINE INCON ASSESS: CPT | Performed by: INTERNAL MEDICINE

## 2024-08-29 PROCEDURE — G8399 PT W/DXA RESULTS DOCUMENT: HCPCS | Performed by: INTERNAL MEDICINE

## 2024-08-29 ASSESSMENT — ENCOUNTER SYMPTOMS
CHEST TIGHTNESS: 0
VOMITING: 0
ABDOMINAL PAIN: 0
ABDOMINAL DISTENTION: 0
APNEA: 0
BACK PAIN: 0
SHORTNESS OF BREATH: 0
EYE ITCHING: 0
NAUSEA: 0
EYE DISCHARGE: 0

## 2024-08-29 NOTE — PROGRESS NOTES
MD       Allergies:  Iv dye [iodides], Penicillins, Sulfa antibiotics, Ace inhibitors, Cozaar [losartan], Lipitor, Lopressor [metoprolol], and Morphine    Social History:   reports that she has never smoked. She has never used smokeless tobacco. She reports that she does not drink alcohol and does not use drugs.    Review of Systems:  Review of Systems   Constitutional:  Negative for chills, fatigue and fever.   HENT:  Negative for congestion and dental problem.    Eyes:  Negative for discharge and itching.   Respiratory:  Negative for apnea, chest tightness and shortness of breath.    Cardiovascular:  Negative for chest pain and palpitations.   Gastrointestinal:  Negative for abdominal distention, abdominal pain, nausea and vomiting.   Endocrine: Negative for cold intolerance and heat intolerance.   Musculoskeletal:  Negative for arthralgias and back pain.   Neurological:  Negative for dizziness, syncope and facial asymmetry.   Psychiatric/Behavioral:  Negative for agitation and behavioral problems.        Physical Exam:  BP (!) 146/70   Pulse 82   Ht 1.448 m (4' 9\")   Wt 69.9 kg (154 lb)   LMP  (LMP Unknown)   SpO2 98%   BMI 33.33 kg/m²    Physical Exam  Constitutional:       Appearance: Normal appearance. She is normal weight.   HENT:      Head: Normocephalic and atraumatic.      Mouth/Throat:      Mouth: Mucous membranes are moist.   Cardiovascular:      Rate and Rhythm: Normal rate and regular rhythm.      Pulses: Normal pulses.      Heart sounds: Normal heart sounds. No murmur heard.  Pulmonary:      Effort: Pulmonary effort is normal. No respiratory distress.      Breath sounds: Normal breath sounds. No stridor.   Abdominal:      General: There is no distension.      Palpations: There is no mass.   Musculoskeletal:         General: No swelling or tenderness.      Cervical back: No rigidity.   Skin:     General: Skin is warm.      Capillary Refill: Capillary refill takes less than 2 seconds.       medications and side effects were discussed. Medication refills were provided if needed. Follow up appointment timing was discussed. All questions and concerns were addressed to patient's satisfaction.     The patient is to follow up in 6 months or sooner if necessary.     Thank you for allowing me to participate in the care of this patient, please do not hesitate to call if you have any questions.    DO [facility]  [facility]  [phone]

## 2024-09-03 ENCOUNTER — HOSPITAL ENCOUNTER (OUTPATIENT)
Dept: PHYSICAL THERAPY | Age: 74
Setting detail: THERAPIES SERIES
Discharge: HOME | End: 2024-09-03
Payer: MEDICARE

## 2024-09-03 PROCEDURE — 97110 THERAPEUTIC EXERCISES: CPT

## 2024-09-03 PROCEDURE — 97035 APP MDLTY 1+ULTRASOUND EA 15: CPT

## 2024-09-03 NOTE — FLOWSHEET NOTE
Physical Therapy Daily Treatment Note    Date:  9/3/2024    Patient Name:  Kika Varma    :  1950  MRN: [id number]  Restrictions/Precautions:     Medical/Treatment Diagnosis Information:   Diagnosis: M25.562 L knee pain  Treatment Diagnosis: M25.562 L knee pain  Insurance/Certification information:  PT Insurance Information: [employer]  Physician Information:   Marcial BAILEY  Plan of care signed (Y/N):  y  Visit# / total visits:  6/10  Pain level: 5/10       Time In: 10:58         Time Out: 11:27    Progress Note: []  Yes  [x]  No  Next due by: Visit #10 , or 24     Subjective:  Pt reports knee is doing well. Pt has not heard results from MRI yet. Has not been hurting like it did when first started coming.     Objective:  BOOM performed per flow sheet for increased mobility, stability and strengthening for improvements in completion of daily tasks and ease of ambulation. Verbal cueing for sequencing and proper form. Seen in ortho note about pt recommending knee scope.    Observation: little to no limp today.    Test measurements:    25 minutes walking  Medial compartment pain    Low tolerance to closed chain ex.      Exercises:   Exercise/Equipment Resistance/Repetitions Other comments   NUSTEP  6'    TKE 10x GR    HS curl -sit 10x GR    HS curl -stand 15x    Partial squat 10x    Step ups 10x 4\" F, L        SLR 15x    SAQ 15x    PKF 15x               US 8' MCL/ medial joint line, pes ansurinus             [x] Provided verbal/tactile cueing for activities related to strengthening, flexibility, endurance, ROM. (10065)  [] Provided verbal/tactile cueing for activities related to improving balance, coordination, kinesthetic sense, posture, motor skill, proprioception. (89492)    Therapeutic Activities:     [] Therapeutic activities, direct (one-on-one) patient contact (use of dynamic activities to improve functional performance). (27497)    Gait:   [] Provided training and instruction to the  Telephone Encounter by Milton Benton at 12/27/17 10:19 AM     Author:  Milton Benton Service:  (none) Author Type:  Patient      Filed:  12/27/17 10:24 AM Encounter Date:  12/27/2017 Status:  Signed     :  Milton Benton (Patient )              Zoila Dudley    Patient Age: 47year old   Refill request by:[AT1.1T] Phone. Patient wants a call back? Yes -  Ok to leave response (including medical information) on answering machine[AT1.1M]  Refill to be:[AT1.1T] Picked up at [AT1.1M]    Medication requested to be refilled:[AT1.1T]   Requested Prescriptions     Pending Prescriptions Disp Refills   â¢ sumatriptan (IMITREX) 100 MG tablet 9 Tab 0   â¢ hydrocodone-acetaminophen (NORCO)  MG per tablet 30 Tab 0     Sig: Take 1 Tab by mouth daily as needed for Pain. â¢ carisoprodol (SOMA) 350 MG tablet 10 Tab 0     Sig: Take 1 Tab by mouth 4 (four) times daily.[AT1.2T]     Fwd to [AT1.1T] Kevin[AT1.1M] for review. Cannot refill[AT1.1T] Norco[AT1.1M] per protocol. Please advise. Last refill was[AT1.1T] 10/18/17[AT1.1M]. Last office visit was[AT1.1T] 5/30/17[AT1.1M]. Fwd to [AT1.1T] Kevin[AT1.1M] for review. Cannot refill[AT1.1T] carisoprodol[AT1.1M] per protocol. Please advise. Last refill was[AT1.1T] 10/18/17[AT1.1M]. Last office visit was[AT1.1T] 5/30/17[AT1.1M]. Fwd to [AT1.1T] Kevin[AT1.1M] for review. Cannot refill[AT1.1T] Imitrex[AT1.1M] per protocol. Please advise. Last refill was[AT1.1T] 12/11/17[AT1.1M]. Last office visit was[AT1.1T] 5/30/17[AT1.1M].           Next and Last Visit with Provider and Department  Next visit with Kevin Nunez is on No match found  Next visit with INTERNAL MEDICINE is on No match found   Last visit with Kevin Nunez was on 05/30/2017 at  9:45 AM in [address] [place] [address]  Last visit with [facility] was on 05/30/2017 at  9:45 AM in [facility]      WEIGHT AND HEIGHT: As of "10/23/2017 weight is 150 lbs. (68.040 kg). Height is 5' 0""(1.524 m). BMI is 29.30 kg/(m^2) calculated from:     Height 5' 0\"" (1.524 m) as of 10/23/17     Weight 150 lb (68.04 kg) as of 10/23/17[AT1.1T]      No Known Allergies[AT1.2T]  Current outpatient prescriptions       Medication  Sig Dispense Refill   â¢ albuterol (PROVENTIL) (2.5 MG/3ML) 0.083% nebulizer solution Inhale 3 mL by mouth every 6 (six) hours as needed for Wheezing. 75 mL 2   â¢ predniSONE (DELTASONE) 20 MG tablet Take 2 Tabs by mouth daily. 10 Tab 0   â¢ sumatriptan (IMITREX) 100 MG tablet TAKE 1 TABLET BY MOUTH AS NEEDED FOR MIGRAINE 9 Tab 0   â¢ pantoprazole (PROTONIX) 40 MG tablet Take 1 Tab by mouth daily. 30 Tab 3   â¢ hydrocodone-acetaminophen (NORCO)  MG per tablet Take 1 Tab by mouth daily as needed for Pain. 30 Tab 0   â¢ carisoprodol (SOMA) 350 MG tablet Take 1 Tab by mouth 4 (four) times daily. 10 Tab 0   â¢ budesonide-formoterol (SYMBICORT) 160-4.5 MCG/ACT inhaler Inhale 2 Puffs by mouth every 12 (twelve) hours. 1 Inhaler 11   â¢ pantoprazole (PROTONIX) 40 MG tablet Take 1 Tab by mouth daily. 30 Tab 11   â¢ albuterol (PROAIR HFA) 108 (90 BASE) MCG/ACT inhaler Inhale 2 Puffs by mouth 4 (four) times daily as needed for Wheezing. 1 Inhaler 6   â¢ fluticasone (FLONASE) 50 MCG/ACT nasal spray Use 1 Spray in each nostril daily. 16 g 6   â¢ Tamsulosin HCl (FLOMAX) 0.4 MG CAPS Take 1 Cap by mouth daily. as needed. 15 Cap 1   â¢ Multiple Vitamins-Minerals (MULTIVITAMIN & MINERAL OR) Take  by mouth. ROUTING:[AT1.1T] Patient's physician/staff[AT1.1M]        PCP: Brenda Eason DO         INS: Payor: [employer] / Plan: *No Plan* / Product Type: *No Product type* / Note: This is the primary coverage, but no account was found for this location or the patient's primary location.    ADDRESS:  [address] [place] [address][[id number]]       Revision History        User Key Date/Time User Provider Type Action    > AT1.2 12/27/17 10:24 AM " Carrie Whitmore Patient  Sign     AT1.1 12/27/17 10:19 AM Carrie Whitmore Patient      M - Manual, T - Template

## 2024-09-05 ENCOUNTER — HOSPITAL ENCOUNTER (OUTPATIENT)
Dept: PHYSICAL THERAPY | Age: 74
Setting detail: THERAPIES SERIES
Discharge: HOME | End: 2024-09-05
Payer: MEDICARE

## 2024-09-05 PROCEDURE — 97110 THERAPEUTIC EXERCISES: CPT

## 2024-09-10 ENCOUNTER — APPOINTMENT (OUTPATIENT)
Dept: PHYSICAL THERAPY | Age: 74
End: 2024-09-10
Payer: MEDICARE

## 2024-09-12 ENCOUNTER — APPOINTMENT (OUTPATIENT)
Dept: PHYSICAL THERAPY | Age: 74
End: 2024-09-12
Payer: MEDICARE

## 2024-09-12 ENCOUNTER — HOSPITAL ENCOUNTER (OUTPATIENT)
Dept: INTERVENTIONAL RADIOLOGY/VASCULAR | Age: 74
Discharge: HOME | End: 2024-09-14
Attending: INTERNAL MEDICINE
Payer: MEDICARE

## 2024-09-12 DIAGNOSIS — I25.118 CORONARY ARTERY DISEASE OF NATIVE ARTERY OF NATIVE HEART WITH STABLE ANGINA PECTORIS (HCC): ICD-10-CM

## 2024-09-12 DIAGNOSIS — I73.9 CLAUDICATION (HCC): ICD-10-CM

## 2024-09-12 DIAGNOSIS — I10 ESSENTIAL HYPERTENSION: ICD-10-CM

## 2024-09-12 LAB
VAS LEFT ABI: 1.12 RATIO
VAS LEFT ARM BP: 141 MMHG
VAS LEFT DORSALIS PEDIS BP: 150 MMHG
VAS LEFT PTA BP: 160 MMHG
VAS LEFT TBI: 0.94 RATIO
VAS LEFT TOE PRESSURE: 135 MMHG
VAS RIGHT ABI: 1.1 RATIO
VAS RIGHT ARM BP: 143 MMHG
VAS RIGHT DORSALIS PEDIS BP: 154 MMHG
VAS RIGHT PTA BP: 158 MMHG
VAS RIGHT TBI: 0.89 RATIO
VAS RIGHT TOE PRESSURE: 127 MMHG

## 2024-09-12 PROCEDURE — 93922 UPR/L XTREMITY ART 2 LEVELS: CPT | Performed by: SURGERY

## 2024-09-12 PROCEDURE — 93922 UPR/L XTREMITY ART 2 LEVELS: CPT

## 2024-09-19 ENCOUNTER — OFFICE VISIT (OUTPATIENT)
Dept: ORTHOPEDIC SURGERY | Age: 74
End: 2024-09-19
Payer: MEDICARE

## 2024-09-19 VITALS
HEART RATE: 80 BPM | HEIGHT: 57 IN | WEIGHT: 154 LBS | SYSTOLIC BLOOD PRESSURE: 161 MMHG | BODY MASS INDEX: 33.22 KG/M2 | DIASTOLIC BLOOD PRESSURE: 68 MMHG

## 2024-09-19 DIAGNOSIS — M17.12 LOCALIZED OSTEOARTHRITIS OF LEFT KNEE: ICD-10-CM

## 2024-09-19 DIAGNOSIS — M25.562 LEFT KNEE PAIN, UNSPECIFIED CHRONICITY: Primary | ICD-10-CM

## 2024-09-19 DIAGNOSIS — S83.242A ACUTE MEDIAL MENISCUS TEAR OF LEFT KNEE, INITIAL ENCOUNTER: ICD-10-CM

## 2024-09-19 PROCEDURE — 1123F ACP DISCUSS/DSCN MKR DOCD: CPT | Performed by: NURSE PRACTITIONER

## 2024-09-19 PROCEDURE — G8417 CALC BMI ABV UP PARAM F/U: HCPCS | Performed by: NURSE PRACTITIONER

## 2024-09-19 PROCEDURE — G8399 PT W/DXA RESULTS DOCUMENT: HCPCS | Performed by: NURSE PRACTITIONER

## 2024-09-19 PROCEDURE — G8427 DOCREV CUR MEDS BY ELIG CLIN: HCPCS | Performed by: NURSE PRACTITIONER

## 2024-09-19 PROCEDURE — 1090F PRES/ABSN URINE INCON ASSESS: CPT | Performed by: NURSE PRACTITIONER

## 2024-09-19 PROCEDURE — 3077F SYST BP >= 140 MM HG: CPT | Performed by: NURSE PRACTITIONER

## 2024-09-19 PROCEDURE — 99214 OFFICE O/P EST MOD 30 MIN: CPT | Performed by: NURSE PRACTITIONER

## 2024-09-19 PROCEDURE — 3017F COLORECTAL CA SCREEN DOC REV: CPT | Performed by: NURSE PRACTITIONER

## 2024-09-19 PROCEDURE — 3078F DIAST BP <80 MM HG: CPT | Performed by: NURSE PRACTITIONER

## 2024-09-19 PROCEDURE — 99213 OFFICE O/P EST LOW 20 MIN: CPT | Performed by: NURSE PRACTITIONER

## 2024-09-19 PROCEDURE — 1036F TOBACCO NON-USER: CPT | Performed by: NURSE PRACTITIONER

## 2024-10-08 DIAGNOSIS — E78.2 MIXED HYPERLIPIDEMIA: ICD-10-CM

## 2024-10-08 RX ORDER — ROSUVASTATIN CALCIUM 10 MG/1
10 TABLET, COATED ORAL DAILY
Qty: 90 TABLET | Refills: 3 | Status: SHIPPED | OUTPATIENT
Start: 2024-10-08

## 2024-10-24 DIAGNOSIS — I10 ESSENTIAL HYPERTENSION: ICD-10-CM

## 2024-10-24 DIAGNOSIS — E11.69 TYPE 2 DIABETES MELLITUS WITH OTHER SPECIFIED COMPLICATION, WITHOUT LONG-TERM CURRENT USE OF INSULIN (HCC): ICD-10-CM

## 2024-10-24 RX ORDER — AMLODIPINE BESYLATE 10 MG/1
TABLET ORAL
Qty: 90 TABLET | Refills: 0 | Status: SHIPPED | OUTPATIENT
Start: 2024-10-24

## 2024-10-24 NOTE — TELEPHONE ENCOUNTER
Kika called requesting a refill of the below medication which has been pended for you:     Requested Prescriptions     Pending Prescriptions Disp Refills    metFORMIN (GLUCOPHAGE) 500 MG tablet [Pharmacy Med Name: metFORMIN HCl 500 MG Oral Tablet] 180 tablet 0     Sig: TAKE 1 TABLET BY MOUTH TWICE DAILY WITH MEALS    amLODIPine (NORVASC) 10 MG tablet [Pharmacy Med Name: amLODIPine Besylate 10 MG Oral Tablet] 90 tablet 0     Sig: Take 1 tablet by mouth once daily       Last Appointment Date: 4/25/2024  Next Appointment Date: 11/13/2024    Allergies   Allergen Reactions    Iv Dye [Iodides] Shortness Of Breath    Penicillins Swelling    Sulfa Antibiotics Shortness Of Breath    Ace Inhibitors Nausea Only     Difficulty swallowing the Lisinopril.  Gagging.  Nausea.    Cozaar [Losartan] Nausea Only    Lipitor Other (See Comments)     Muscle aches.    Lopressor [Metoprolol] Other (See Comments)     leg pain    Morphine Nausea Only     Chest pressure

## 2024-11-11 ENCOUNTER — HOSPITAL ENCOUNTER (OUTPATIENT)
Age: 74
Discharge: HOME | End: 2024-11-11
Payer: MEDICARE

## 2024-11-11 DIAGNOSIS — E11.69 TYPE 2 DIABETES MELLITUS WITH OTHER SPECIFIED COMPLICATION, WITHOUT LONG-TERM CURRENT USE OF INSULIN (HCC): ICD-10-CM

## 2024-11-11 LAB
ALBUMIN SERPL-MCNC: 4.6 G/DL (ref 3.5–5.2)
ALBUMIN/GLOB SERPL: 1.5 {RATIO} (ref 1–2.5)
ALP SERPL-CCNC: 101 U/L (ref 35–104)
ALT SERPL-CCNC: 16 U/L (ref 5–33)
ANION GAP SERPL CALCULATED.3IONS-SCNC: 10 MMOL/L (ref 9–17)
AST SERPL-CCNC: 21 U/L
BILIRUB SERPL-MCNC: 0.4 MG/DL (ref 0.3–1.2)
BUN SERPL-MCNC: 11 MG/DL (ref 8–23)
BUN/CREAT SERPL: 14 (ref 9–20)
CALCIUM SERPL-MCNC: 9.4 MG/DL (ref 8.6–10.4)
CHLORIDE SERPL-SCNC: 104 MMOL/L (ref 98–107)
CO2 SERPL-SCNC: 29 MMOL/L (ref 20–31)
CREAT SERPL-MCNC: 0.8 MG/DL (ref 0.5–0.9)
EST. AVERAGE GLUCOSE BLD GHB EST-MCNC: 148 MG/DL
GFR, ESTIMATED: 77 ML/MIN/1.73M2
GLUCOSE SERPL-MCNC: 144 MG/DL (ref 70–99)
HBA1C MFR BLD: 6.8 % (ref 4–6)
POTASSIUM SERPL-SCNC: 3.8 MMOL/L (ref 3.7–5.3)
PROT SERPL-MCNC: 7.6 G/DL (ref 6.4–8.3)
SODIUM SERPL-SCNC: 143 MMOL/L (ref 135–144)

## 2024-11-11 PROCEDURE — 36415 COLL VENOUS BLD VENIPUNCTURE: CPT

## 2024-11-11 PROCEDURE — 80053 COMPREHEN METABOLIC PANEL: CPT

## 2024-11-11 PROCEDURE — 83036 HEMOGLOBIN GLYCOSYLATED A1C: CPT

## 2024-11-13 ENCOUNTER — OFFICE VISIT (OUTPATIENT)
Dept: FAMILY MEDICINE CLINIC | Age: 74
End: 2024-11-13

## 2024-11-13 VITALS
WEIGHT: 156 LBS | HEIGHT: 57 IN | DIASTOLIC BLOOD PRESSURE: 64 MMHG | BODY MASS INDEX: 33.66 KG/M2 | SYSTOLIC BLOOD PRESSURE: 124 MMHG | OXYGEN SATURATION: 96 % | HEART RATE: 68 BPM

## 2024-11-13 DIAGNOSIS — E78.2 MIXED HYPERLIPIDEMIA: ICD-10-CM

## 2024-11-13 DIAGNOSIS — R63.0 DECREASED APPETITE: ICD-10-CM

## 2024-11-13 DIAGNOSIS — Z12.31 SCREENING MAMMOGRAM FOR BREAST CANCER: ICD-10-CM

## 2024-11-13 DIAGNOSIS — E11.69 TYPE 2 DIABETES MELLITUS WITH OTHER SPECIFIED COMPLICATION, WITHOUT LONG-TERM CURRENT USE OF INSULIN (HCC): Primary | ICD-10-CM

## 2024-11-13 DIAGNOSIS — C16.5 MALIGNANT NEOPLASM OF LESSER CURVATURE OF STOMACH, UNSPECIFIED (HCC): ICD-10-CM

## 2024-11-13 DIAGNOSIS — I10 ESSENTIAL HYPERTENSION: ICD-10-CM

## 2024-11-13 RX ORDER — EZETIMIBE 10 MG/1
10 TABLET ORAL DAILY
Qty: 90 TABLET | Refills: 1 | Status: SHIPPED | OUTPATIENT
Start: 2024-11-13

## 2024-11-13 RX ORDER — GLIMEPIRIDE 1 MG/1
1 TABLET ORAL 2 TIMES DAILY WITH MEALS
Qty: 180 TABLET | Refills: 1 | Status: SHIPPED | OUTPATIENT
Start: 2024-11-13 | End: 2025-05-12

## 2024-11-13 RX ORDER — AMLODIPINE BESYLATE 10 MG/1
TABLET ORAL
Qty: 90 TABLET | Refills: 1 | Status: SHIPPED | OUTPATIENT
Start: 2024-11-13

## 2024-11-13 NOTE — PROGRESS NOTES
Patient will get vaccines at the pharmacy if she chooses at a later date.  
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, unspecified (HCC)    Type 2 diabetes mellitus    Severe obesity (BMI 35.0-39.9) with comorbidity        Current medications are:  Outpatient Medications Marked as Taking for the 11/13/24 encounter (Office Visit) with Zuleyka Vasquez MD   Medication Sig Dispense Refill    metFORMIN (GLUCOPHAGE) 500 MG tablet TAKE 1 TABLET BY MOUTH TWICE DAILY WITH MEALS 180 tablet 0    amLODIPine (NORVASC) 10 MG tablet Take 1 tablet by mouth once daily 90 tablet 0    rosuvastatin (CRESTOR) 10 MG tablet Take 1 tablet by mouth once daily 90 tablet 3    glimepiride (AMARYL) 2 MG tablet Take 1 tablet by mouth 2 times daily (with meals) (Patient taking differently: Take 0.5 tablets by mouth 2 times daily (with meals)) 60 tablet 4    ezetimibe (ZETIA) 10 MG tablet Take 1 tablet by mouth daily 90 tablet 1    Alcohol Swabs (DROPSAFE ALCOHOL PREP) 70 % PADS USE AS DIRECTED DAILY 100 each 3    TRUEplus Lancets 33G MISC TEST BLOOD SUGAR EVERY  each 3    TRUE METRIX BLOOD GLUCOSE TEST strip TEST BLOOD SUGAR EVERY  strip 3    isosorbide mononitrate (IMDUR) 30 MG extended release tablet Take 1 tablet by mouth once daily 90 tablet 3    glucose monitoring kit Freestyle Delmis 3 continuous glucose monitor 1 kit 0    Cinnamon 500 MG CAPS Take 1 capsule by mouth Daily      blood glucose monitor kit and supplies Testing 1 time daily 1 kit 0    fluticasone (FLONASE) 50 MCG/ACT nasal spray 2 sprays by Each Nostril route daily 16 g 5    nitroGLYCERIN (NITROSTAT) 0.4 MG SL tablet Place 1 tablet under the tongue every 5 minutes as needed for Chest pain up to max of 3 total doses. If no relief after 1 dose, call 911. 25 tablet 1    Multiple Vitamins-Minerals (MULTIVITAMIN PO) Take 1 tablet by mouth

## 2024-11-16 PROBLEM — Z86.73 HISTORY OF CVA (CEREBROVASCULAR ACCIDENT): Status: ACTIVE | Noted: 2024-11-16

## 2024-12-20 ENCOUNTER — HOSPITAL ENCOUNTER (OUTPATIENT)
Dept: MAMMOGRAPHY | Age: 74
Discharge: HOME | End: 2024-12-22
Attending: FAMILY MEDICINE
Payer: MEDICARE

## 2024-12-20 DIAGNOSIS — Z12.31 SCREENING MAMMOGRAM FOR BREAST CANCER: ICD-10-CM

## 2024-12-20 PROCEDURE — 77063 BREAST TOMOSYNTHESIS BI: CPT

## 2025-01-07 ENCOUNTER — OFFICE VISIT (OUTPATIENT)
Dept: ORTHOPEDIC SURGERY | Age: 75
End: 2025-01-07
Payer: MEDICARE

## 2025-01-07 VITALS
BODY MASS INDEX: 25.07 KG/M2 | HEART RATE: 80 BPM | HEIGHT: 66 IN | DIASTOLIC BLOOD PRESSURE: 72 MMHG | RESPIRATION RATE: 20 BRPM | SYSTOLIC BLOOD PRESSURE: 132 MMHG | WEIGHT: 156 LBS

## 2025-01-07 DIAGNOSIS — S83.242A ACUTE MEDIAL MENISCUS TEAR OF LEFT KNEE, INITIAL ENCOUNTER: ICD-10-CM

## 2025-01-07 DIAGNOSIS — M17.12 LOCALIZED OSTEOARTHRITIS OF LEFT KNEE: Primary | ICD-10-CM

## 2025-01-07 DIAGNOSIS — M25.562 LEFT KNEE PAIN, UNSPECIFIED CHRONICITY: ICD-10-CM

## 2025-01-07 PROCEDURE — 99214 OFFICE O/P EST MOD 30 MIN: CPT | Performed by: PHYSICIAN ASSISTANT

## 2025-01-07 RX ORDER — HYALURONATE SODIUM 10 MG/ML
20 SYRINGE (ML) INTRAARTICULAR ONCE
Status: COMPLETED | OUTPATIENT
Start: 2025-01-07 | End: 2025-01-07

## 2025-01-07 RX ADMIN — Medication 20 MG: at 12:37

## 2025-01-07 NOTE — PROGRESS NOTES
Dispense Refill    amLODIPine (NORVASC) 10 MG tablet Take 1 tablet by mouth once daily 90 tablet 1    ezetimibe (ZETIA) 10 MG tablet Take 1 tablet by mouth daily 90 tablet 1    metFORMIN (GLUCOPHAGE) 500 MG tablet TAKE 1 TABLET BY MOUTH TWICE DAILY WITH MEALS 180 tablet 1    glimepiride (AMARYL) 1 MG tablet Take 1 tablet by mouth 2 times daily (with meals) 180 tablet 1    rosuvastatin (CRESTOR) 10 MG tablet Take 1 tablet by mouth once daily 90 tablet 3    Alcohol Swabs (DROPSAFE ALCOHOL PREP) 70 % PADS USE AS DIRECTED DAILY 100 each 3    TRUEplus Lancets 33G MISC TEST BLOOD SUGAR EVERY  each 3    TRUE METRIX BLOOD GLUCOSE TEST strip TEST BLOOD SUGAR EVERY  strip 3    isosorbide mononitrate (IMDUR) 30 MG extended release tablet Take 1 tablet by mouth once daily 90 tablet 3    glucose monitoring kit Freestyle Delmis 3 continuous glucose monitor 1 kit 0    Cinnamon 500 MG CAPS Take 1 capsule by mouth Daily      blood glucose monitor kit and supplies Testing 1 time daily 1 kit 0    fluticasone (FLONASE) 50 MCG/ACT nasal spray 2 sprays by Each Nostril route daily 16 g 5    nitroGLYCERIN (NITROSTAT) 0.4 MG SL tablet Place 1 tablet under the tongue every 5 minutes as needed for Chest pain up to max of 3 total doses. If no relief after 1 dose, call 911. 25 tablet 1    Multiple Vitamins-Minerals (MULTIVITAMIN PO) Take 1 tablet by mouth daily      NONFORMULARY 5,000 mcg daily      aspirin 81 MG tablet Take 1 tablet by mouth daily       Current Facility-Administered Medications   Medication Dose Route Frequency Provider Last Rate Last Admin    cyanocobalamin injection 2,000 mcg  2,000 mcg IntraMUSCular Q30 Days Obed Joshi MD           Allergies:  Iv dye [iodides], Penicillins, Sulfa antibiotics, Ace inhibitors, Cozaar [losartan], Lipitor, Lopressor [metoprolol], and Morphine    Social History:   Social History     Tobacco Use   Smoking Status Never   Smokeless Tobacco Never   Tobacco Comments    S

## 2025-01-14 ENCOUNTER — OFFICE VISIT (OUTPATIENT)
Dept: ORTHOPEDIC SURGERY | Age: 75
End: 2025-01-14
Payer: MEDICARE

## 2025-01-14 VITALS
HEIGHT: 66 IN | DIASTOLIC BLOOD PRESSURE: 76 MMHG | BODY MASS INDEX: 25.07 KG/M2 | SYSTOLIC BLOOD PRESSURE: 138 MMHG | WEIGHT: 156 LBS

## 2025-01-14 DIAGNOSIS — M25.562 LEFT KNEE PAIN, UNSPECIFIED CHRONICITY: Primary | ICD-10-CM

## 2025-01-14 DIAGNOSIS — M17.12 LOCALIZED OSTEOARTHRITIS OF LEFT KNEE: ICD-10-CM

## 2025-01-14 PROCEDURE — 99214 OFFICE O/P EST MOD 30 MIN: CPT | Performed by: NURSE PRACTITIONER

## 2025-01-14 PROCEDURE — 20610 DRAIN/INJ JOINT/BURSA W/O US: CPT | Performed by: NURSE PRACTITIONER

## 2025-01-14 RX ORDER — HYALURONATE SODIUM 10 MG/ML
20 SYRINGE (ML) INTRAARTICULAR ONCE
Status: COMPLETED | OUTPATIENT
Start: 2025-01-14 | End: 2025-01-14

## 2025-01-14 RX ADMIN — Medication 20 MG: at 08:45

## 2025-01-14 NOTE — PROGRESS NOTES
Orthopaedic Progress Note      CHIEF COMPLAINT: Left knee pain    HISTORY OF PRESENT ILLNESS:       Ms. Varma  is a 74 y.o. female  who presents with left knee pain.  Patient received a Euflexxa injection last week.  She noted to have pain after the injection.  She is walking and weightbearing today.  Notices an improvement of pain.  She is here today for her second injection.  She was noted to have a medial meniscus and degeneration of the meniscus with noted mild to moderate medial and patellofemoral compartments.  She has previously tried corticosteroid injections.      Past Medical History:    Past Medical History:   Diagnosis Date    CAD (coronary artery disease)     Cerebrovascular accident (CVA) due to thrombosis of precerebral artery ([facility]) 12/19/2017    History of seasonal allergies     Hyperlipidemia     Hypertension     Interstitial cystitis     History of.    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    Plantar fasciitis, bilateral     History of.    Polyneuropathy associated with underlying disease ([facility]) 3/20/2018    S/P drug eluting coronary stent placement-RCA 6/20/17 - Dr. amezquita 6/20/2017    Seasonal allergies     Trigger finger, left     History of triggering, left long finger.    Type 2 diabetes mellitus ([facility])        Past Surgical History:    Past Surgical History:   Procedure Laterality Date    APPENDECTOMY      CARDIAC CATHETERIZATION  06/20/2017    Left main: normal, LAD: proximal 30% stenosis, LCX: 20 % proximal stenosis, RCA: Ostial 70% with pressure damping and mid 90% stenosis.  Required PTCA-GRACE in both lesions.  LVEF 55%.  LV wall motion normal.  Dr. Amezquita, [facility]      CATARACT REMOVAL WITH IMPLANT Left 10/20/2015    Dr. Colin, [facility]    CATARACT REMOVAL WITH IMPLANT Right 12/08/2015    [facility] with DANIAL. Dr Colin, [facility]

## 2025-01-19 DIAGNOSIS — E11.9 TYPE 2 DIABETES MELLITUS WITHOUT COMPLICATION, WITHOUT LONG-TERM CURRENT USE OF INSULIN (HCC): ICD-10-CM

## 2025-01-20 RX ORDER — ISOPROPYL ALCOHOL 70 ML/100ML
SWAB TOPICAL
Qty: 100 EACH | Refills: 3 | Status: SHIPPED | OUTPATIENT
Start: 2025-01-20

## 2025-01-20 RX ORDER — CALCIUM CITRATE/VITAMIN D3 200MG-6.25
TABLET ORAL
Qty: 100 STRIP | Refills: 3 | Status: SHIPPED | OUTPATIENT
Start: 2025-01-20

## 2025-01-20 RX ORDER — GLUCOSAM/CHON-MSM1/C/MANG/BOSW 500-416.6
TABLET ORAL
Qty: 100 EACH | Refills: 3 | Status: SHIPPED | OUTPATIENT
Start: 2025-01-20

## 2025-01-21 ENCOUNTER — OFFICE VISIT (OUTPATIENT)
Dept: ORTHOPEDIC SURGERY | Age: 75
End: 2025-01-21
Payer: MEDICARE

## 2025-01-21 VITALS
SYSTOLIC BLOOD PRESSURE: 120 MMHG | WEIGHT: 156 LBS | DIASTOLIC BLOOD PRESSURE: 80 MMHG | BODY MASS INDEX: 25.07 KG/M2 | HEART RATE: 62 BPM | HEIGHT: 66 IN

## 2025-01-21 DIAGNOSIS — M17.12 LOCALIZED OSTEOARTHRITIS OF LEFT KNEE: Primary | ICD-10-CM

## 2025-01-21 PROCEDURE — 20610 DRAIN/INJ JOINT/BURSA W/O US: CPT | Performed by: PHYSICIAN ASSISTANT

## 2025-01-21 PROCEDURE — 99213 OFFICE O/P EST LOW 20 MIN: CPT | Performed by: PHYSICIAN ASSISTANT

## 2025-01-21 RX ORDER — HYALURONATE SODIUM 10 MG/ML
20 SYRINGE (ML) INTRAARTICULAR ONCE
Status: COMPLETED | OUTPATIENT
Start: 2025-01-21 | End: 2025-01-21

## 2025-01-21 RX ADMIN — Medication 20 MG: at 11:13

## 2025-01-21 NOTE — PROGRESS NOTES
Orthopaedic Progress Note      CHIEF COMPLAINT: Left knee primary osteoarthritis    HISTORY OF PRESENT ILLNESS:       Ms. Varma  is a 74 y.o. female  who presents with left knee primary osteoarthritis.  Patient is here today to undergo third Euflexxa injection into the left knee.  Patient stated that she did not get much relief from the last injection.  She would like to proceed with the third injection at this time.  She is status post right total knee arthroplasty.      Past Medical History:    Past Medical History:   Diagnosis Date    CAD (coronary artery disease)     Cerebrovascular accident (CVA) due to thrombosis of precerebral artery ([facility]) 12/19/2017    History of seasonal allergies     Hyperlipidemia     Hypertension     Interstitial cystitis     History of.    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    Plantar fasciitis, bilateral     History of.    Polyneuropathy associated with underlying disease ([facility]) 3/20/2018    S/P drug eluting coronary stent placement-RCA 6/20/17 - Dr. amezquita 6/20/2017    Seasonal allergies     Trigger finger, left     History of triggering, left long finger.    Type 2 diabetes mellitus ([facility])        Past Surgical History:    Past Surgical History:   Procedure Laterality Date    APPENDECTOMY      CARDIAC CATHETERIZATION  06/20/2017    Left main: normal, LAD: proximal 30% stenosis, LCX: 20 % proximal stenosis, RCA: Ostial 70% with pressure damping and mid 90% stenosis.  Required PTCA-GRACE in both lesions.  LVEF 55%.  LV wall motion normal.  Dr. Amezquita, [facility]      CATARACT REMOVAL WITH IMPLANT Left 10/20/2015    Dr. Colin, [facility]    CATARACT REMOVAL WITH IMPLANT Right 12/08/2015    [facility] with DANIAL. Dr Colin, [facility]    CHOLECYSTECTOMY, LAPAROSCOPIC  6/9/2000    COLONOSCOPY  3/11/2015    Internal and

## 2025-02-12 ENCOUNTER — TELEPHONE (OUTPATIENT)
Dept: FAMILY MEDICINE CLINIC | Age: 75
End: 2025-02-12

## 2025-02-12 NOTE — TELEPHONE ENCOUNTER
Patient states she has been having some discomfort and itching.  Went to pharmacy and got some cream. She hasn't gotten better.    Patient was informed that she would need to go to walk in care. Patient asked if nurse could get her to see PCP    Also has leg swelling. Prefers to see PCP.     Call patient at [phone]

## 2025-02-12 NOTE — TELEPHONE ENCOUNTER
Spoke to patient and she is aware RR is out of the office this week.  She declines walk in clinic.   Patient is scheduled with [facility] on Friday for vaginal itching concerns.    She states she has mild leg swelling at times but states she recently had injections for left knee pain. She feels the swelling is related to these injections.     Patient states she is going to discuss this with ortho

## 2025-02-14 ENCOUNTER — HOSPITAL ENCOUNTER (OUTPATIENT)
Age: 75
Setting detail: SPECIMEN
Discharge: HOME | End: 2025-02-14
Payer: MEDICARE

## 2025-02-14 ENCOUNTER — OFFICE VISIT (OUTPATIENT)
Dept: FAMILY MEDICINE CLINIC | Age: 75
End: 2025-02-14
Payer: MEDICARE

## 2025-02-14 VITALS
WEIGHT: 160 LBS | SYSTOLIC BLOOD PRESSURE: 132 MMHG | HEIGHT: 66 IN | DIASTOLIC BLOOD PRESSURE: 62 MMHG | OXYGEN SATURATION: 96 % | HEART RATE: 84 BPM | BODY MASS INDEX: 25.71 KG/M2 | TEMPERATURE: 98.3 F

## 2025-02-14 DIAGNOSIS — R05.9 COUGH, UNSPECIFIED TYPE: ICD-10-CM

## 2025-02-14 DIAGNOSIS — R30.0 DYSURIA: ICD-10-CM

## 2025-02-14 DIAGNOSIS — N95.2 ATROPHIC VAGINITIS: Primary | ICD-10-CM

## 2025-02-14 LAB
BILIRUB UR QL STRIP: NEGATIVE
CLARITY UR: CLEAR
COLOR UR: YELLOW
COMMENT: ABNORMAL
GLUCOSE UR STRIP-MCNC: ABNORMAL MG/DL
HGB UR QL STRIP.AUTO: NEGATIVE
KETONES UR STRIP-MCNC: NEGATIVE MG/DL
LEUKOCYTE ESTERASE UR QL STRIP: NEGATIVE
NITRITE UR QL STRIP: NEGATIVE
PH UR STRIP: 6 [PH] (ref 5–6)
PROT UR STRIP-MCNC: NEGATIVE MG/DL
SP GR UR STRIP: 1.02 (ref 1.01–1.02)
UROBILINOGEN UR STRIP-ACNC: NORMAL EU/DL (ref 0–1)

## 2025-02-14 PROCEDURE — G8427 DOCREV CUR MEDS BY ELIG CLIN: HCPCS | Performed by: NURSE PRACTITIONER

## 2025-02-14 PROCEDURE — 81003 URINALYSIS AUTO W/O SCOPE: CPT

## 2025-02-14 PROCEDURE — 87086 URINE CULTURE/COLONY COUNT: CPT

## 2025-02-14 PROCEDURE — 1159F MED LIST DOCD IN RCRD: CPT | Performed by: NURSE PRACTITIONER

## 2025-02-14 PROCEDURE — G8399 PT W/DXA RESULTS DOCUMENT: HCPCS | Performed by: NURSE PRACTITIONER

## 2025-02-14 PROCEDURE — 3078F DIAST BP <80 MM HG: CPT | Performed by: NURSE PRACTITIONER

## 2025-02-14 PROCEDURE — G8417 CALC BMI ABV UP PARAM F/U: HCPCS | Performed by: NURSE PRACTITIONER

## 2025-02-14 PROCEDURE — 1123F ACP DISCUSS/DSCN MKR DOCD: CPT | Performed by: NURSE PRACTITIONER

## 2025-02-14 PROCEDURE — 3017F COLORECTAL CA SCREEN DOC REV: CPT | Performed by: NURSE PRACTITIONER

## 2025-02-14 PROCEDURE — 3075F SYST BP GE 130 - 139MM HG: CPT | Performed by: NURSE PRACTITIONER

## 2025-02-14 PROCEDURE — 1090F PRES/ABSN URINE INCON ASSESS: CPT | Performed by: NURSE PRACTITIONER

## 2025-02-14 PROCEDURE — 99214 OFFICE O/P EST MOD 30 MIN: CPT | Performed by: NURSE PRACTITIONER

## 2025-02-14 PROCEDURE — 1160F RVW MEDS BY RX/DR IN RCRD: CPT | Performed by: NURSE PRACTITIONER

## 2025-02-14 PROCEDURE — 1036F TOBACCO NON-USER: CPT | Performed by: NURSE PRACTITIONER

## 2025-02-14 RX ORDER — ESTRADIOL 0.1 MG/G
CREAM VAGINAL
Qty: 1 EACH | Refills: 3 | Status: SHIPPED | OUTPATIENT
Start: 2025-02-14

## 2025-02-14 SDOH — ECONOMIC STABILITY: FOOD INSECURITY: WITHIN THE PAST 12 MONTHS, YOU WORRIED THAT YOUR FOOD WOULD RUN OUT BEFORE YOU GOT MONEY TO BUY MORE.: NEVER TRUE

## 2025-02-14 SDOH — ECONOMIC STABILITY: FOOD INSECURITY: WITHIN THE PAST 12 MONTHS, THE FOOD YOU BOUGHT JUST DIDN'T LAST AND YOU DIDN'T HAVE MONEY TO GET MORE.: NEVER TRUE

## 2025-02-14 ASSESSMENT — PATIENT HEALTH QUESTIONNAIRE - PHQ9
1. LITTLE INTEREST OR PLEASURE IN DOING THINGS: NOT AT ALL
SUM OF ALL RESPONSES TO PHQ QUESTIONS 1-9: 0
SUM OF ALL RESPONSES TO PHQ QUESTIONS 1-9: 0
SUM OF ALL RESPONSES TO PHQ9 QUESTIONS 1 & 2: 0
SUM OF ALL RESPONSES TO PHQ QUESTIONS 1-9: 0
SUM OF ALL RESPONSES TO PHQ QUESTIONS 1-9: 0
2. FEELING DOWN, DEPRESSED OR HOPELESS: NOT AT ALL

## 2025-02-14 ASSESSMENT — ENCOUNTER SYMPTOMS
SHORTNESS OF BREATH: 0
WHEEZING: 0
COUGH: 1

## 2025-02-14 NOTE — PROGRESS NOTES
urgency and vaginal pain. Negative for dysuria, frequency, vaginal bleeding and vaginal discharge.       Objective:     Physical Exam  Vitals and nursing note reviewed.   Constitutional:       Appearance: Normal appearance.   HENT:      Head: Normocephalic and atraumatic.   Cardiovascular:      Rate and Rhythm: Normal rate and regular rhythm.      Heart sounds: Normal heart sounds.   Pulmonary:      Effort: Pulmonary effort is normal.      Breath sounds: Normal breath sounds.   Skin:     Capillary Refill: Capillary refill takes less than 2 seconds.   Neurological:      General: No focal deficit present.      Mental Status: She is alert and oriented to person, place, and time.     /62 (Site: Left Upper Arm, Position: Sitting, Cuff Size: Large Adult)   Pulse 84   Temp 98.3 °F (36.8 °C) (Tympanic)   Ht 1.676 m (5' 6\")   Wt 72.6 kg (160 lb)   LMP  (LMP Unknown)   SpO2 96%   BMI 25.82 kg/m²     Assessment:       Diagnosis Orders   1. Atrophic vaginitis  estradiol (ESTRACE VAGINAL) 0.1 MG/GM vaginal cream      2. Dysuria  Culture, Urine    Urinalysis      3. Cough, unspecified type                Results  Laboratory Studies  Urine test shows no signs of infection, 3+ glucose in urine.  Plan:   Assessment & Plan   Assessment & Plan  1. Atrophic vaginitis  Her symptoms are likely due to hormonal imbalance post-menopause, leading to tissue dryness and discomfort. There is no evidence of a bladder infection. The presence of glucose in her urine suggests potential hyperglycemia, which may be contributing to the odor and yellow color of her urine. A prescription for estradiol vaginal cream will be sent to [facility], to be applied nightly for 2 weeks, then 3 times weekly as needed. She is advised to monitor her symptoms and report any changes. If symptoms persist, a swab for vaginitis will be considered.    2. Dysuria-discussed that the urine did not show signs of infection, culture ordered, did show 3+glucose. She is

## 2025-02-15 LAB
MICROORGANISM SPEC CULT: NO GROWTH
SERVICE CMNT-IMP: NORMAL
SPECIMEN DESCRIPTION: NORMAL

## 2025-03-05 DIAGNOSIS — I25.118 CORONARY ARTERY DISEASE OF NATIVE ARTERY OF NATIVE HEART WITH STABLE ANGINA PECTORIS: ICD-10-CM

## 2025-03-05 RX ORDER — ISOSORBIDE MONONITRATE 30 MG/1
TABLET, EXTENDED RELEASE ORAL
Qty: 90 TABLET | Refills: 3 | Status: SHIPPED | OUTPATIENT
Start: 2025-03-05

## 2025-03-20 ENCOUNTER — TELEPHONE (OUTPATIENT)
Dept: FAMILY MEDICINE CLINIC | Age: 75
End: 2025-03-20

## 2025-03-20 NOTE — TELEPHONE ENCOUNTER
Pt calling stating she is experiencing swelling in her left leg/ foot. Its not painful, she is just wondering why there is an increase in swelling. She would like a nurse phone call. Please advise.

## 2025-03-25 ENCOUNTER — OFFICE VISIT (OUTPATIENT)
Dept: FAMILY MEDICINE CLINIC | Age: 75
End: 2025-03-25
Payer: MEDICARE

## 2025-03-25 VITALS
BODY MASS INDEX: 25.88 KG/M2 | HEART RATE: 81 BPM | HEIGHT: 66 IN | SYSTOLIC BLOOD PRESSURE: 122 MMHG | DIASTOLIC BLOOD PRESSURE: 64 MMHG | OXYGEN SATURATION: 99 % | RESPIRATION RATE: 16 BRPM | WEIGHT: 161 LBS

## 2025-03-25 DIAGNOSIS — Z00.00 MEDICARE ANNUAL WELLNESS VISIT, SUBSEQUENT: Primary | ICD-10-CM

## 2025-03-25 DIAGNOSIS — R60.0 BILATERAL LOWER EXTREMITY EDEMA: ICD-10-CM

## 2025-03-25 DIAGNOSIS — I10 ESSENTIAL HYPERTENSION: ICD-10-CM

## 2025-03-25 DIAGNOSIS — N95.1 MENOPAUSAL VAGINAL DRYNESS: ICD-10-CM

## 2025-03-25 PROCEDURE — 1159F MED LIST DOCD IN RCRD: CPT | Performed by: FAMILY MEDICINE

## 2025-03-25 PROCEDURE — G8417 CALC BMI ABV UP PARAM F/U: HCPCS | Performed by: FAMILY MEDICINE

## 2025-03-25 PROCEDURE — G0439 PPPS, SUBSEQ VISIT: HCPCS | Performed by: FAMILY MEDICINE

## 2025-03-25 PROCEDURE — 1090F PRES/ABSN URINE INCON ASSESS: CPT | Performed by: FAMILY MEDICINE

## 2025-03-25 PROCEDURE — G8399 PT W/DXA RESULTS DOCUMENT: HCPCS | Performed by: FAMILY MEDICINE

## 2025-03-25 PROCEDURE — 1123F ACP DISCUSS/DSCN MKR DOCD: CPT | Performed by: FAMILY MEDICINE

## 2025-03-25 PROCEDURE — 3078F DIAST BP <80 MM HG: CPT | Performed by: FAMILY MEDICINE

## 2025-03-25 PROCEDURE — 1036F TOBACCO NON-USER: CPT | Performed by: FAMILY MEDICINE

## 2025-03-25 PROCEDURE — G2211 COMPLEX E/M VISIT ADD ON: HCPCS | Performed by: FAMILY MEDICINE

## 2025-03-25 PROCEDURE — 99213 OFFICE O/P EST LOW 20 MIN: CPT | Performed by: FAMILY MEDICINE

## 2025-03-25 PROCEDURE — 3017F COLORECTAL CA SCREEN DOC REV: CPT | Performed by: FAMILY MEDICINE

## 2025-03-25 PROCEDURE — 1160F RVW MEDS BY RX/DR IN RCRD: CPT | Performed by: FAMILY MEDICINE

## 2025-03-25 PROCEDURE — G8427 DOCREV CUR MEDS BY ELIG CLIN: HCPCS | Performed by: FAMILY MEDICINE

## 2025-03-25 PROCEDURE — 3074F SYST BP LT 130 MM HG: CPT | Performed by: FAMILY MEDICINE

## 2025-03-25 RX ORDER — AMLODIPINE BESYLATE 5 MG/1
TABLET ORAL
Qty: 90 TABLET | Refills: 0 | Status: SHIPPED | OUTPATIENT
Start: 2025-03-25

## 2025-03-25 ASSESSMENT — PATIENT HEALTH QUESTIONNAIRE - PHQ9
SUM OF ALL RESPONSES TO PHQ QUESTIONS 1-9: 0
1. LITTLE INTEREST OR PLEASURE IN DOING THINGS: NOT AT ALL
2. FEELING DOWN, DEPRESSED OR HOPELESS: NOT AT ALL
SUM OF ALL RESPONSES TO PHQ QUESTIONS 1-9: 0

## 2025-03-25 NOTE — PATIENT INSTRUCTIONS
Learning About Being Active as an Older Adult  Why is being active important as you get older?     Being active is one of the best things you can do for your health. And it's never too late to start. Being active--or getting active, if you aren't already--has definite benefits. It can:  Give you more energy,  Keep your mind sharp.  Improve balance to reduce your risk of falls.  Help you manage chronic illness with fewer medicines.  No matter how old you are, how fit you are, or what health problems you have, there is a form of activity that will work for you. And the more physical activity you can do, the better your overall health will be.  What kinds of activity can help you stay healthy?  Being more active will make your daily activities easier. Physical activity includes planned exercise and things you do in daily life. There are four types of activity:  Aerobic.  Doing aerobic activity makes your heart and lungs strong.  Includes walking, dancing, and gardening.  Aim for at least 2½ hours spread throughout the week.  It improves your energy and can help you sleep better.  Muscle-strengthening.  This type of activity can help maintain muscle and strengthen bones.  Includes climbing stairs, using resistance bands, and lifting or carrying heavy loads.  Aim for at least twice a week.  It can help protect the knees and other joints.  Stretching.  Stretching gives you better range of motion in joints and muscles.  Includes upper arm stretches, calf stretches, and gentle yoga.  Aim for at least twice a week, preferably after your muscles are warmed up from other activities.  It can help you function better in daily life.  Balancing.  This helps you stay coordinated and have good posture.  Includes heel-to-toe walking, sravan chi, and certain types of yoga.  Aim for at least 3 days a week.  It can reduce your risk of falling.  Even if you have a hard time meeting the recommendations, it's better to be more active

## 2025-03-25 NOTE — PROGRESS NOTES
[facility]             [address] [address]                        Telephone [phone]             Fax [phone]       Kika Varma  :  1950  Age:  74 y.o.   MRN:  [id number]  Date of visit:  3/25/2025       Assessment and Plan:     1. Medicare annual wellness visit, subsequent  See separate progress note for Medicare Wellness Visit.     2. Essential hypertension  3. Bilateral lower extremity edema  Her blood pressure is well-controlled today.  (BP: 122/64)   She was advised to decrease her dose of Amlodipine from 10 mg to 5 mg daily.  Amlodipine was refilled:   - amLODIPine (NORVASC) 5 MG tablet; Take 1 tablet by mouth once daily  Dispense: 90 tablet; Refill: 0    She was advised to monitor her blood pressure at home, and to call if her blood pressure readings increase significantly.    4. Menopausal vaginal dryness  I recommended that she increase the frequency using the estrogen vaginal cream to three times a week.  I also recommended a referral to OB-Gyn.  She prefers to try to increase the frequency of using the cream first, and she will consider a referral if her symptoms do not improve.     5.  Routine health maintenance  Health maintenance was reviewed with the patient.   She was advised to get the updated 9086-7019 Covid vaccine.  Annual influenza vaccine was recommended.  RSV vaccination was recommended.  Colon cancer screening was recommended.  She was advised that colon cancer screening is recommended through age 75.      Follow up instructions were given to the patient:  Return for hypertension.  She was advised to keep the appointment scheduled in May.           Subjective:    Kika Varma is a 74 y.o. female who presents to [facility] today (3/25/2025) for follow up/evaluation of:  Leg Swelling (Left leg swelling- recent Euflexxa injection in January/History of swelling in left

## 2025-03-25 NOTE — PROGRESS NOTES
Medicare Annual Wellness Visit    Kika Varma is here for Leg Swelling (Left leg swelling- recent Euflexxa injection in January/History of swelling in left leg/left foot but swelling increased after 3rd injection) and Shortness of Breath (Shortness of breath with activity at times. Started last year and has been increasing with activity )    Assessment & Plan    Medicare annual wellness visit, subsequent  Bilateral lower extremity edema  Essential hypertension  -     amLODIPine (NORVASC) 5 MG tablet; Take 1 tablet by mouth once daily, Disp-90 tablet, R-0Normal  Menopausal vaginal dryness    Results       No follow-ups on file.     Subjective     History of Present Illness      Patient's complete Health Risk Assessment and screening values have been reviewed and are found in Flowsheets. The following problems were reviewed today and where indicated follow up appointments were made and/or referrals ordered.    Positive Risk Factor Screenings with Interventions:              Inactivity:  On average, how many days per week do you engage in moderate to strenuous exercise (like a brisk walk)?: 0 days (!) Abnormal  On average, how many minutes do you engage in exercise at this level?: 0 min    Interventions:  See AVS for additional education material                         Objective   Vitals:    03/25/25 1125   BP: 122/64   BP Site: Right Upper Arm   Patient Position: Sitting   BP Cuff Size: Large Adult   Pulse: 81   Resp: 16   SpO2: 99%   Weight: 73 kg (161 lb)   Height: 1.676 m (5' 6\")      Body mass index is 25.99 kg/m².        Physical Exam                Allergies   Allergen Reactions    Iv Dye [Iodides] Shortness Of Breath    Penicillins Swelling    Sulfa Antibiotics Shortness Of Breath    Ace Inhibitors Nausea Only     Difficulty swallowing the Lisinopril.  Gagging.  Nausea.    Cozaar [Losartan] Nausea Only    Lipitor Other (See Comments)     Muscle aches.    Lopressor [Metoprolol] Other (See Comments)     leg

## 2025-04-19 DIAGNOSIS — E78.2 MIXED HYPERLIPIDEMIA: ICD-10-CM

## 2025-04-21 RX ORDER — EZETIMIBE 10 MG/1
10 TABLET ORAL DAILY
Qty: 90 TABLET | Refills: 0 | Status: SHIPPED | OUTPATIENT
Start: 2025-04-21

## 2025-04-21 NOTE — TELEPHONE ENCOUNTER
Kika called requesting a refill of the below medication which has been pended for you:     Requested Prescriptions     Pending Prescriptions Disp Refills    ezetimibe (ZETIA) 10 MG tablet [Pharmacy Med Name: Ezetimibe Oral Tablet 10 MG] 90 tablet 0     Sig: TAKE 1 TABLET BY MOUTH EVERY DAY       Last Appointment Date: 3/25/2025  Next Appointment Date: 5/13/2025    Allergies   Allergen Reactions    Iv Dye [Iodides] Shortness Of Breath    Penicillins Swelling    Sulfa Antibiotics Shortness Of Breath    Ace Inhibitors Nausea Only     Difficulty swallowing the Lisinopril.  Gagging.  Nausea.    Cozaar [Losartan] Nausea Only    Lipitor Other (See Comments)     Muscle aches.    Lopressor [Metoprolol] Other (See Comments)     leg pain    Morphine Nausea Only     Chest pressure

## 2025-05-09 ENCOUNTER — HOSPITAL ENCOUNTER (OUTPATIENT)
Age: 75
Discharge: HOME | End: 2025-05-09
Payer: MEDICARE

## 2025-05-09 DIAGNOSIS — E11.69 TYPE 2 DIABETES MELLITUS WITH OTHER SPECIFIED COMPLICATION, WITHOUT LONG-TERM CURRENT USE OF INSULIN (HCC): ICD-10-CM

## 2025-05-09 LAB
ALBUMIN SERPL-MCNC: 4.5 G/DL (ref 3.5–5.2)
ALBUMIN/GLOB SERPL: 1.5 {RATIO} (ref 1–2.5)
ALP SERPL-CCNC: 120 U/L (ref 35–104)
ALT SERPL-CCNC: 26 U/L (ref 10–35)
ANION GAP SERPL CALCULATED.3IONS-SCNC: 13 MMOL/L (ref 9–16)
AST SERPL-CCNC: 24 U/L (ref 10–35)
BILIRUB SERPL-MCNC: 0.4 MG/DL (ref 0–1.2)
BUN SERPL-MCNC: 13 MG/DL (ref 8–23)
BUN/CREAT SERPL: 19 (ref 9–20)
CALCIUM SERPL-MCNC: 9.5 MG/DL (ref 8.6–10.4)
CHLORIDE SERPL-SCNC: 102 MMOL/L (ref 98–107)
CHOLEST SERPL-MCNC: 154 MG/DL (ref 0–199)
CHOLESTEROL/HDL RATIO: 2.8
CO2 SERPL-SCNC: 27 MMOL/L (ref 20–31)
CREAT SERPL-MCNC: 0.7 MG/DL (ref 0.6–0.9)
EST. AVERAGE GLUCOSE BLD GHB EST-MCNC: 166 MG/DL
GFR, ESTIMATED: >90 ML/MIN/1.73M2
GLUCOSE SERPL-MCNC: 150 MG/DL (ref 74–99)
HBA1C MFR BLD: 7.4 % (ref 4–6)
HDLC SERPL-MCNC: 55 MG/DL
LDLC SERPL CALC-MCNC: 62 MG/DL (ref 0–100)
POTASSIUM SERPL-SCNC: 3.7 MMOL/L (ref 3.7–5.3)
PROT SERPL-MCNC: 7.6 G/DL (ref 6.6–8.7)
SODIUM SERPL-SCNC: 142 MMOL/L (ref 136–145)
TRIGL SERPL-MCNC: 186 MG/DL
VLDLC SERPL CALC-MCNC: 37 MG/DL (ref 1–30)

## 2025-05-09 PROCEDURE — 83036 HEMOGLOBIN GLYCOSYLATED A1C: CPT

## 2025-05-09 PROCEDURE — 80061 LIPID PANEL: CPT

## 2025-05-09 PROCEDURE — 36415 COLL VENOUS BLD VENIPUNCTURE: CPT

## 2025-05-09 PROCEDURE — 80053 COMPREHEN METABOLIC PANEL: CPT

## 2025-05-13 ENCOUNTER — OFFICE VISIT (OUTPATIENT)
Dept: FAMILY MEDICINE CLINIC | Age: 75
End: 2025-05-13
Payer: MEDICARE

## 2025-05-13 VITALS
RESPIRATION RATE: 16 BRPM | DIASTOLIC BLOOD PRESSURE: 58 MMHG | WEIGHT: 162.8 LBS | HEART RATE: 87 BPM | HEIGHT: 66 IN | BODY MASS INDEX: 26.16 KG/M2 | SYSTOLIC BLOOD PRESSURE: 136 MMHG | OXYGEN SATURATION: 95 %

## 2025-05-13 DIAGNOSIS — M25.562 LEFT KNEE PAIN, UNSPECIFIED CHRONICITY: ICD-10-CM

## 2025-05-13 DIAGNOSIS — E11.69 TYPE 2 DIABETES MELLITUS WITH OTHER SPECIFIED COMPLICATION, WITHOUT LONG-TERM CURRENT USE OF INSULIN (HCC): ICD-10-CM

## 2025-05-13 DIAGNOSIS — I10 ESSENTIAL HYPERTENSION: ICD-10-CM

## 2025-05-13 DIAGNOSIS — E11.69 TYPE 2 DIABETES MELLITUS WITH OTHER SPECIFIED COMPLICATION, WITHOUT LONG-TERM CURRENT USE OF INSULIN (HCC): Primary | ICD-10-CM

## 2025-05-13 DIAGNOSIS — N95.2 ATROPHIC VAGINITIS: ICD-10-CM

## 2025-05-13 DIAGNOSIS — E78.2 MIXED HYPERLIPIDEMIA: ICD-10-CM

## 2025-05-13 PROCEDURE — 3017F COLORECTAL CA SCREEN DOC REV: CPT | Performed by: FAMILY MEDICINE

## 2025-05-13 PROCEDURE — G8427 DOCREV CUR MEDS BY ELIG CLIN: HCPCS | Performed by: FAMILY MEDICINE

## 2025-05-13 PROCEDURE — 99214 OFFICE O/P EST MOD 30 MIN: CPT | Performed by: FAMILY MEDICINE

## 2025-05-13 PROCEDURE — 2022F DILAT RTA XM EVC RTNOPTHY: CPT | Performed by: FAMILY MEDICINE

## 2025-05-13 PROCEDURE — 3075F SYST BP GE 130 - 139MM HG: CPT | Performed by: FAMILY MEDICINE

## 2025-05-13 PROCEDURE — 1036F TOBACCO NON-USER: CPT | Performed by: FAMILY MEDICINE

## 2025-05-13 PROCEDURE — 3051F HG A1C>EQUAL 7.0%<8.0%: CPT | Performed by: FAMILY MEDICINE

## 2025-05-13 PROCEDURE — 1123F ACP DISCUSS/DSCN MKR DOCD: CPT | Performed by: FAMILY MEDICINE

## 2025-05-13 PROCEDURE — 1090F PRES/ABSN URINE INCON ASSESS: CPT | Performed by: FAMILY MEDICINE

## 2025-05-13 PROCEDURE — 1159F MED LIST DOCD IN RCRD: CPT | Performed by: FAMILY MEDICINE

## 2025-05-13 PROCEDURE — G2211 COMPLEX E/M VISIT ADD ON: HCPCS | Performed by: FAMILY MEDICINE

## 2025-05-13 PROCEDURE — 3078F DIAST BP <80 MM HG: CPT | Performed by: FAMILY MEDICINE

## 2025-05-13 PROCEDURE — G8399 PT W/DXA RESULTS DOCUMENT: HCPCS | Performed by: FAMILY MEDICINE

## 2025-05-13 PROCEDURE — 1160F RVW MEDS BY RX/DR IN RCRD: CPT | Performed by: FAMILY MEDICINE

## 2025-05-13 PROCEDURE — G8417 CALC BMI ABV UP PARAM F/U: HCPCS | Performed by: FAMILY MEDICINE

## 2025-05-13 RX ORDER — GLIMEPIRIDE 1 MG/1
1 TABLET ORAL DAILY
Qty: 90 TABLET | Refills: 1 | Status: SHIPPED | OUTPATIENT
Start: 2025-05-13 | End: 2025-05-16 | Stop reason: SDUPTHER

## 2025-05-13 RX ORDER — ESTRADIOL 0.1 MG/G
CREAM VAGINAL
Qty: 1 EACH | Refills: 3 | Status: SHIPPED | OUTPATIENT
Start: 2025-05-13

## 2025-05-13 RX ORDER — EZETIMIBE 10 MG/1
10 TABLET ORAL DAILY
Qty: 90 TABLET | Refills: 0 | Status: SHIPPED | OUTPATIENT
Start: 2025-05-13

## 2025-05-13 NOTE — PROGRESS NOTES
[place] [address]                        Telephone [phone]             Fax [phone]       Kika Varma  :  1950  Age:  74 y.o.   MRN:  [id number]  Date of visit:  2025       Assessment and Plan:      1. Type 2 diabetes mellitus with other specified complication, without long-term current use of insulin (HCC)  Her HgbA1c was 7.4 %, which is acceptable for her age.        She was advised to continue her current regimen.   Glimepiride and Metformin were refilled:   - Glimepiride (Amaryl) 1 mg; Take 1 tablet daily with a meal Dispense: 90; Refill: 1  - metFORMIN (GLUCOPHAGE) 500 MG tablet; TAKE 1 TABLET BY MOUTH TWICE DAILY WITH MEALS  Dispense: 180 tablet; Refill: 1    Labs were ordered to be done prior to her return visit in 6 months:   - Hemoglobin A1C; Future  - Comprehensive Metabolic Panel; Future    She was advised to have an annual dilated eye exam.    2. Essential hypertension  Her blood pressure is adequately-controlled today.  (BP: (!) 136/58)   She was advised to continue current medications.  She states that she does not need a refill today.     3. Mixed hyperlipidemia  Her LDL was improved on her recent lipid profile.       She is tolerating Zetia well; this was refilled:   - ezetimibe (ZETIA) 10 MG tablet; Take 1 tablet by mouth daily  Dispense: 90 tablet; Refill: 0    - Lipid Panel; Future was ordered to be done prior to her return visit in 6 months.     4. Atrophic vaginitis  Estradiol cream was refilled.  - estradiol (ESTRACE VAGINAL) 0.1 MG/GM vaginal cream; Apply 1 gram vaginally nightly for two weeks, then apply 3 times a week as needed.  Dispense: 1 each; Refill: 3    5.  Left knee pain, unspecified chronicity   I recommended follow up with orthopedic surgery.     6.  Routine health maintenance  Health maintenance was reviewed with the patient.   She was advised to get the

## 2025-05-14 RX ORDER — GLIMEPIRIDE 1 MG/1
1 TABLET ORAL 2 TIMES DAILY WITH MEALS
Qty: 180 TABLET | Refills: 0 | OUTPATIENT
Start: 2025-05-14

## 2025-05-16 ENCOUNTER — TELEPHONE (OUTPATIENT)
Dept: FAMILY MEDICINE CLINIC | Age: 75
End: 2025-05-16

## 2025-05-16 DIAGNOSIS — E11.69 TYPE 2 DIABETES MELLITUS WITH OTHER SPECIFIED COMPLICATION, WITHOUT LONG-TERM CURRENT USE OF INSULIN (HCC): ICD-10-CM

## 2025-05-16 RX ORDER — GLIMEPIRIDE 1 MG/1
1 TABLET ORAL
Qty: 180 TABLET | Refills: 1 | Status: SHIPPED | OUTPATIENT
Start: 2025-05-16 | End: 2025-11-12

## 2025-05-16 NOTE — TELEPHONE ENCOUNTER
I understood that she had decreased Amaryl to 1 a day.   If she is tolerating Amaryl BID without any significant signs or symptoms of hypoglycemia, she can continue twice a day, and I can send in a new prescription.

## 2025-05-16 NOTE — TELEPHONE ENCOUNTER
Pt calling stating she picked up her glimepiride yesterday from pharmacy and she noticed the direction changes. Before she was taking 1 tab 2x a day, now its 1 tab 1x a day. She does not recall PCP making this change, she wants to know if these are the new directions she's to follow or if it was an error. Please give her a call back today, as tomorrow she will be all out.

## 2025-05-16 NOTE — TELEPHONE ENCOUNTER
Patient states she tolerates the Glimepiride 1 mg BID well with no low blood sugars. Advised we would send in new script.

## 2025-05-17 ENCOUNTER — RESULTS FOLLOW-UP (OUTPATIENT)
Dept: FAMILY MEDICINE CLINIC | Age: 75
End: 2025-05-17

## 2025-05-17 NOTE — PROGRESS NOTES
Addendum:  See telephone encounter 5/16/2025.  The patient states that she was taking Glimepiride 1 mg BID, not 1 mg daily.  A new prescription for Glimepiride was sent to her pharmacy.

## 2025-06-06 NOTE — PROGRESS NOTES
Detail Level: Detailed
Today's Date: 8/31/2023  Patient's Name: Indu Barrientos  Patient's age: 67 y.o., 1950    CC: The patient is a 67 y.o. , female is in the office for CAD    HPI:  The patient is a 67 y.o. , female is in the office for CAD. Stable from cardiac standpoint with no chest pain or dyspnea. Functionally active with no decline her capacity   No palpitations, no le edema, no orthopnea, no PND, syncope. Past Medical History:   has a past medical history of CAD (coronary artery disease), Cerebrovascular accident (CVA) due to thrombosis of precerebral artery ([address]), History of seasonal allergies, Hyperlipidemia, Hypertension, Interstitial cystitis, Malignant neoplasm of lesser curvature of stomach, unspecified ([address]), Obesity, Plantar fasciitis, bilateral, Polyneuropathy associated with underlying disease ([address]), S/P drug eluting coronary stent placement-RCA 6/20/17 - Dr. Yashira Khanman, Seasonal allergies, Trigger finger, left, and Type 2 diabetes mellitus ([address]). Past Surgical History:   has a past surgical history that includes Nasal septum surgery (3/9/2005); Cholecystectomy, laparoscopic (6/9/2000); Cystocopy (3/1996); Ovary removal (Right, 7/1993); laparoscopy (3/1979); Hysterectomy, total abdominal (1980); Colonoscopy (3/11/2015); Cataract removal with implant (Left, 10/20/2015); Cataract removal with implant (Right, 12/08/2015); Yag capsulotomy (Right, 04/2016); Cardiac catheterization (06/20/2017); Coronary angioplasty with stent (06/20/2017); Appendectomy; eye surgery; pr arthroscopy knee diagnostic w/wo synovial bx spx (Right, 11/6/2018); Upper gastrointestinal endoscopy (N/A, 3/5/2020); Upper gastrointestinal endoscopy; Upper gastrointestinal endoscopy (05/13/2020); Endoscopic ultrasonography, GI (N/A, 5/13/2020); Upper gastrointestinal endoscopy (5/13/2020); Upper gastrointestinal endoscopy (5/13/2020); and Upper gastrointestinal endoscopy (5/13/2020).     Home Medications:  Prior to
Add 38582 Cpt? (Important Note: In 2017 The Use Of 09571 Is Being Tracked By Cms To Determine Future Global Period Reimbursement For Global Periods): no

## 2025-06-17 DIAGNOSIS — I10 ESSENTIAL HYPERTENSION: ICD-10-CM

## 2025-06-17 RX ORDER — AMLODIPINE BESYLATE 5 MG/1
TABLET ORAL
Qty: 90 TABLET | Refills: 1 | Status: SHIPPED | OUTPATIENT
Start: 2025-06-17

## 2025-06-17 NOTE — TELEPHONE ENCOUNTER
Kika called requesting a refill of the below medication which has been pended for you:     Requested Prescriptions     Pending Prescriptions Disp Refills    amLODIPine (NORVASC) 5 MG tablet 90 tablet 1     Sig: Take 1 tablet by mouth once daily       Last Appointment Date: 5/13/2025  Next Appointment Date: 11/13/2025    Allergies   Allergen Reactions    Iv Dye [Iodides] Shortness Of Breath    Penicillins Swelling    Sulfa Antibiotics Shortness Of Breath    Ace Inhibitors Nausea Only     Difficulty swallowing the Lisinopril.  Gagging.  Nausea.    Cozaar [Losartan] Nausea Only    Lipitor Other (See Comments)     Muscle aches.    Lopressor [Metoprolol] Other (See Comments)     leg pain    Morphine Nausea Only     Chest pressure

## 2025-07-08 ENCOUNTER — TELEPHONE (OUTPATIENT)
Dept: FAMILY MEDICINE CLINIC | Age: 75
End: 2025-07-08

## 2025-07-08 ENCOUNTER — OFFICE VISIT (OUTPATIENT)
Dept: PRIMARY CARE CLINIC | Age: 75
End: 2025-07-08
Payer: MEDICARE

## 2025-07-08 VITALS
TEMPERATURE: 98.4 F | HEIGHT: 66 IN | HEART RATE: 78 BPM | WEIGHT: 160 LBS | RESPIRATION RATE: 16 BRPM | BODY MASS INDEX: 25.71 KG/M2 | DIASTOLIC BLOOD PRESSURE: 80 MMHG | OXYGEN SATURATION: 97 % | SYSTOLIC BLOOD PRESSURE: 148 MMHG

## 2025-07-08 DIAGNOSIS — M79.10 TENDON PAIN: Primary | ICD-10-CM

## 2025-07-08 PROCEDURE — 1123F ACP DISCUSS/DSCN MKR DOCD: CPT | Performed by: PHYSICIAN ASSISTANT

## 2025-07-08 PROCEDURE — G8417 CALC BMI ABV UP PARAM F/U: HCPCS | Performed by: PHYSICIAN ASSISTANT

## 2025-07-08 PROCEDURE — 99213 OFFICE O/P EST LOW 20 MIN: CPT | Performed by: PHYSICIAN ASSISTANT

## 2025-07-08 PROCEDURE — 1159F MED LIST DOCD IN RCRD: CPT | Performed by: PHYSICIAN ASSISTANT

## 2025-07-08 PROCEDURE — 1125F AMNT PAIN NOTED PAIN PRSNT: CPT | Performed by: PHYSICIAN ASSISTANT

## 2025-07-08 PROCEDURE — G8399 PT W/DXA RESULTS DOCUMENT: HCPCS | Performed by: PHYSICIAN ASSISTANT

## 2025-07-08 PROCEDURE — 1036F TOBACCO NON-USER: CPT | Performed by: PHYSICIAN ASSISTANT

## 2025-07-08 PROCEDURE — 1090F PRES/ABSN URINE INCON ASSESS: CPT | Performed by: PHYSICIAN ASSISTANT

## 2025-07-08 PROCEDURE — 3077F SYST BP >= 140 MM HG: CPT | Performed by: PHYSICIAN ASSISTANT

## 2025-07-08 PROCEDURE — 1160F RVW MEDS BY RX/DR IN RCRD: CPT | Performed by: PHYSICIAN ASSISTANT

## 2025-07-08 PROCEDURE — 3079F DIAST BP 80-89 MM HG: CPT | Performed by: PHYSICIAN ASSISTANT

## 2025-07-08 PROCEDURE — 3017F COLORECTAL CA SCREEN DOC REV: CPT | Performed by: PHYSICIAN ASSISTANT

## 2025-07-08 PROCEDURE — G8427 DOCREV CUR MEDS BY ELIG CLIN: HCPCS | Performed by: PHYSICIAN ASSISTANT

## 2025-07-08 RX ORDER — PREDNISONE 20 MG/1
20 TABLET ORAL 2 TIMES DAILY
Qty: 10 TABLET | Refills: 0 | Status: SHIPPED | OUTPATIENT
Start: 2025-07-08 | End: 2025-07-13

## 2025-07-08 ASSESSMENT — ENCOUNTER SYMPTOMS
SHORTNESS OF BREATH: 0
NAUSEA: 0
VOMITING: 0
DIARRHEA: 0

## 2025-07-08 NOTE — PATIENT INSTRUCTIONS
Apply ice 20 mins at a time 4-6 times day. May alternate with heat  May use tylenol as needed for pain  Decrease activity today and tomorrow may increase as tolerated after two days of rest  If symptoms worsen follow up with PCP  Patient verbalized understanding and agrees with plan of care

## 2025-07-08 NOTE — TELEPHONE ENCOUNTER
Patient is requesting a renewal of her handicap placard script.  Please call once ready to be picked up.

## 2025-07-08 NOTE — PROGRESS NOTES
[facility], [facility]  [facility]  [address]  [facility] [address]  Dept: [phone]  Dept Fax: [phone]    Kika Varma is a 74 y.o. female who presents today for her medical conditions/complaints as noted below. Kika Varma is c/o of   Chief Complaint   Patient presents with    Arm Pain     Right arm pain from shoulder to fingers. No trauma. Has triggering of right middle finger and right thumb. Had indigestion last night also.   .    HPI:     Patient is a 75 yo female presenting today due to right arm pain. She denies any known injury to the arm. She states that the pain is from her shoulders down to her fingers. She has a PMH of trigger finger in her right middle finger and right thumb. Her pain started 1.5 weeks ago. Beginning motion will exacerbate the pain but after she gets moving the pain will subside. She crochets as a hobby. She is also worried about heartburn. She had it last night after eating bbq pork for supper.     Arm Pain   The incident occurred more than 1 week ago. There was no injury mechanism. The pain is present in the right shoulder, right elbow, right fingers, right wrist, right forearm and right hand. Quality: sharp. The pain does not radiate. The pain is at a severity of 9/10. The pain has been Constant since the incident. Pertinent negatives include no chest pain, numbness or tingling. The symptoms are aggravated by movement, lifting and palpation. She has tried acetaminophen (Bengay; icy hot; voltaren) for the symptoms. The treatment provided mild relief.         Past Medical History:   Diagnosis Date    CAD (coronary artery disease)     Cerebrovascular accident (CVA) due to thrombosis of precerebral artery (HCC) 12/19/2017    History of seasonal allergies     Hyperlipidemia     Hypertension     Interstitial cystitis     History of.    Malignant neoplasm of

## 2025-07-08 NOTE — TELEPHONE ENCOUNTER
Kika called requesting a refill of the below medication which has been pended for you:     Requested Prescriptions     Pending Prescriptions Disp Refills    Handicap Placard MISC 1 each 0     Sig: by Does not apply route Expires 7/8/2030       Last Appointment Date: 5/13/2025  Next Appointment Date: 11/13/2025    Allergies   Allergen Reactions    Iv Dye [Iodides] Shortness Of Breath    Penicillins Swelling    Sulfa Antibiotics Shortness Of Breath    Ace Inhibitors Nausea Only     Difficulty swallowing the Lisinopril.  Gagging.  Nausea.    Cozaar [Losartan] Nausea Only    Lipitor Other (See Comments)     Muscle aches.    Lopressor [Metoprolol] Other (See Comments)     leg pain    Morphine Nausea Only     Chest pressure

## 2025-07-14 DIAGNOSIS — M25.561 RIGHT KNEE PAIN, UNSPECIFIED CHRONICITY: ICD-10-CM

## 2025-07-14 DIAGNOSIS — M17.12 LOCALIZED OSTEOARTHRITIS OF LEFT KNEE: Primary | ICD-10-CM

## 2025-07-14 DIAGNOSIS — M25.562 LEFT KNEE PAIN, UNSPECIFIED CHRONICITY: ICD-10-CM

## 2025-07-29 ENCOUNTER — OFFICE VISIT (OUTPATIENT)
Dept: CARDIOLOGY | Age: 75
End: 2025-07-29
Payer: MEDICARE

## 2025-07-29 ENCOUNTER — OFFICE VISIT (OUTPATIENT)
Dept: ORTHOPEDIC SURGERY | Age: 75
End: 2025-07-29
Payer: MEDICARE

## 2025-07-29 ENCOUNTER — HOSPITAL ENCOUNTER (OUTPATIENT)
Dept: GENERAL RADIOLOGY | Age: 75
Discharge: HOME | End: 2025-07-31
Payer: MEDICARE

## 2025-07-29 VITALS
HEIGHT: 66 IN | SYSTOLIC BLOOD PRESSURE: 140 MMHG | BODY MASS INDEX: 25.71 KG/M2 | WEIGHT: 160 LBS | DIASTOLIC BLOOD PRESSURE: 74 MMHG

## 2025-07-29 VITALS
HEIGHT: 66 IN | HEART RATE: 80 BPM | BODY MASS INDEX: 25.71 KG/M2 | WEIGHT: 160 LBS | DIASTOLIC BLOOD PRESSURE: 80 MMHG | SYSTOLIC BLOOD PRESSURE: 160 MMHG

## 2025-07-29 DIAGNOSIS — I25.118 CORONARY ARTERY DISEASE OF NATIVE ARTERY OF NATIVE HEART WITH STABLE ANGINA PECTORIS: Primary | ICD-10-CM

## 2025-07-29 DIAGNOSIS — M25.531 RIGHT WRIST PAIN: ICD-10-CM

## 2025-07-29 DIAGNOSIS — R06.02 SHORTNESS OF BREATH: ICD-10-CM

## 2025-07-29 DIAGNOSIS — E78.5 DYSLIPIDEMIA: ICD-10-CM

## 2025-07-29 DIAGNOSIS — I10 ESSENTIAL HYPERTENSION: ICD-10-CM

## 2025-07-29 DIAGNOSIS — R06.02 SOB (SHORTNESS OF BREATH): ICD-10-CM

## 2025-07-29 DIAGNOSIS — R07.9 EXERTIONAL CHEST PAIN: ICD-10-CM

## 2025-07-29 DIAGNOSIS — Z95.5 S/P CORONARY ARTERY STENT PLACEMENT: ICD-10-CM

## 2025-07-29 DIAGNOSIS — M25.531 RIGHT WRIST PAIN: Primary | ICD-10-CM

## 2025-07-29 DIAGNOSIS — M65.4 TENDINITIS, DE QUERVAIN'S: Primary | ICD-10-CM

## 2025-07-29 PROCEDURE — 3077F SYST BP >= 140 MM HG: CPT | Performed by: INTERNAL MEDICINE

## 2025-07-29 PROCEDURE — 99214 OFFICE O/P EST MOD 30 MIN: CPT | Performed by: INTERNAL MEDICINE

## 2025-07-29 PROCEDURE — 1090F PRES/ABSN URINE INCON ASSESS: CPT | Performed by: INTERNAL MEDICINE

## 2025-07-29 PROCEDURE — 93010 ELECTROCARDIOGRAM REPORT: CPT | Performed by: INTERNAL MEDICINE

## 2025-07-29 PROCEDURE — 99214 OFFICE O/P EST MOD 30 MIN: CPT | Performed by: PHYSICIAN ASSISTANT

## 2025-07-29 PROCEDURE — 1160F RVW MEDS BY RX/DR IN RCRD: CPT | Performed by: INTERNAL MEDICINE

## 2025-07-29 PROCEDURE — G8399 PT W/DXA RESULTS DOCUMENT: HCPCS | Performed by: INTERNAL MEDICINE

## 2025-07-29 PROCEDURE — 3017F COLORECTAL CA SCREEN DOC REV: CPT | Performed by: INTERNAL MEDICINE

## 2025-07-29 PROCEDURE — G8427 DOCREV CUR MEDS BY ELIG CLIN: HCPCS | Performed by: INTERNAL MEDICINE

## 2025-07-29 PROCEDURE — 73110 X-RAY EXAM OF WRIST: CPT

## 2025-07-29 PROCEDURE — G8417 CALC BMI ABV UP PARAM F/U: HCPCS | Performed by: INTERNAL MEDICINE

## 2025-07-29 PROCEDURE — 1123F ACP DISCUSS/DSCN MKR DOCD: CPT | Performed by: INTERNAL MEDICINE

## 2025-07-29 PROCEDURE — 93005 ELECTROCARDIOGRAM TRACING: CPT | Performed by: INTERNAL MEDICINE

## 2025-07-29 PROCEDURE — 1159F MED LIST DOCD IN RCRD: CPT | Performed by: INTERNAL MEDICINE

## 2025-07-29 PROCEDURE — 3078F DIAST BP <80 MM HG: CPT | Performed by: INTERNAL MEDICINE

## 2025-07-29 PROCEDURE — 1036F TOBACCO NON-USER: CPT | Performed by: INTERNAL MEDICINE

## 2025-07-29 RX ORDER — NITROGLYCERIN 0.4 MG/1
0.4 TABLET SUBLINGUAL EVERY 5 MIN PRN
Qty: 25 TABLET | Refills: 1 | Status: CANCELLED | OUTPATIENT
Start: 2025-07-29

## 2025-07-29 RX ORDER — MELOXICAM 7.5 MG/1
7.5 TABLET ORAL EVERY 12 HOURS PRN
Qty: 60 TABLET | Refills: 0 | Status: SHIPPED | OUTPATIENT
Start: 2025-07-29 | End: 2025-08-28

## 2025-07-29 ASSESSMENT — ENCOUNTER SYMPTOMS
SHORTNESS OF BREATH: 0
APNEA: 0
NAUSEA: 0
ABDOMINAL DISTENTION: 0
EYE DISCHARGE: 0
BACK PAIN: 0
EYE ITCHING: 0
VOMITING: 0
ABDOMINAL PAIN: 0
CHEST TIGHTNESS: 0

## 2025-07-29 NOTE — PATIENT INSTRUCTIONS
Nuclear Stress Test      Please allow 3 hours to complete this test.    Please report to radiology to check in.    >>  Central Scheduling will call you to book your testing appointment. If you do not receive a call within 48 hours, please call their number at [phone] and push option 1.              If you have any questions or concerns, call Cardiology at [phone].    >>  Nothing to eat or drink except water for FOUR (4) HOURS prior to arrival time.      >>  No caffeine for 24 hours prior to test. This includes decaffeinated beverages, chocolate, tea, cola drinks, and energy drinks.     >>  If you are diabetic, check with your Primary Care Provider regarding changes in your insulin or diabetic pills on test day.    >>  No smoking for 12 hours before the test.     >> Take all regular medications unless you have been instructed to hold a medication by your provider.      >>  If you are walking on the treadmill, wear comfortable clothes and shoes.    Please do not apply lotion or oils to skin on the day of stress test.     +++ Pregnancy +++   If you are a woman of child bearing age, you will need to complete a blood test prior to your stress test.   Please notify the nurse if you think you might be pregnant.

## 2025-07-29 NOTE — PROGRESS NOTES
Orthopaedic Progress Note      CHIEF COMPLAINT: Right wrist pain    HISTORY OF PRESENT ILLNESS:       Ms. Varma  is a 74 y.o. female  who presents with progression of right wrist pain over the past few weeks.  Patient noted initially a month ago she started having mild increase in right wrist pain that worsened with use of the right wrist and thumb.  Pain was initially mild but now has progressed to the point where she has extreme difficulty with most of her ADLs.  She has trialed nonsteroidal anti-inflammatories, Tylenol, topical such as Biofreeze, Voltaren, Bengay with mild relief.  Patient is known diabetic but is not currently insulin-dependent.  She has had significant blood glucose increases with use of oral prednisone.  Pain is now started to radiate up towards her right elbow and occasionally to the right shoulder.  Pain is primarily in the area of the radial dorsal aspect of the wrist.  Onset of the symptoms was atraumatic.  Patient referred to our office for further evaluation of clinical exam and x-ray imaging.      Past Medical History:    Past Medical History:   Diagnosis Date    CAD (coronary artery disease)     Cerebrovascular accident (CVA) due to thrombosis of precerebral artery (HCC) 12/19/2017    History of seasonal allergies     Hyperlipidemia     Hypertension     Interstitial cystitis     History of.    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    Plantar fasciitis, bilateral     History of.    Polyneuropathy associated with underlying disease 3/20/2018    S/P drug eluting coronary stent placement-RCA 6/20/17 - Dr. rae 6/20/2017    Seasonal allergies     Trigger finger, left     History of triggering, left long finger.    Type 2 diabetes mellitus (HCC)        Past Surgical History:    Past Surgical History:   Procedure Laterality Date    APPENDECTOMY      CARDIAC CATHETERIZATION

## 2025-07-29 NOTE — PROGRESS NOTES
Today's Date: 7/29/2025  Patient's Name: Kika Varma  Patient's age: 74 y.o., 1950    CC: is in the office for CAD    HPI:  The patient is a 74 y.o. , female is in the office for follow-up on CAD and exertional chest pain  Has been complaining of recurrent episodes of pressure heaviness tightness when she exerts herself that is relieved with rest with associated dyspnea on exertion.  She denies any orthopnea or PND.  No palpitations dizziness or syncope.      Past Medical History:   has a past medical history of CAD (coronary artery disease), Cerebrovascular accident (CVA) due to thrombosis of precerebral artery (HCC), History of seasonal allergies, Hyperlipidemia, Hypertension, Interstitial cystitis, Malignant neoplasm of lesser curvature of stomach, unspecified (HCC), Obesity, Plantar fasciitis, bilateral, Polyneuropathy associated with underlying disease, S/P drug eluting coronary stent placement-RCA 6/20/17 - Dr. rae, Seasonal allergies, Trigger finger, left, and Type 2 diabetes mellitus (HCC).    Past Surgical History:   has a past surgical history that includes Nasal septum surgery (3/9/2005); Cholecystectomy, laparoscopic (6/9/2000); Cystocopy (3/1996); Ovary removal (Right, 7/1993); laparoscopy (3/1979); Hysterectomy, total abdominal (1980); Colonoscopy (3/11/2015); Cataract removal with implant (Left, 10/20/2015); Cataract removal with implant (Right, 12/08/2015); Yag capsulotomy (Right, 04/2016); Cardiac catheterization (06/20/2017); Coronary angioplasty with stent (06/20/2017); Appendectomy; eye surgery; pr arthroscopy knee diagnostic w/wo synovial bx spx (Right, 11/6/2018); Upper gastrointestinal endoscopy (N/A, 3/5/2020); Upper gastrointestinal endoscopy; Upper gastrointestinal endoscopy (05/13/2020); Endoscopic ultrasonography, GI (N/A, 5/13/2020); Upper gastrointestinal endoscopy (5/13/2020); Upper gastrointestinal endoscopy (5/13/2020); and Upper gastrointestinal endoscopy

## 2025-08-10 ENCOUNTER — OFFICE VISIT (OUTPATIENT)
Dept: PRIMARY CARE CLINIC | Age: 75
End: 2025-08-10

## 2025-08-10 VITALS
TEMPERATURE: 98.7 F | WEIGHT: 162 LBS | OXYGEN SATURATION: 95 % | BODY MASS INDEX: 34.95 KG/M2 | RESPIRATION RATE: 18 BRPM | SYSTOLIC BLOOD PRESSURE: 138 MMHG | HEIGHT: 57 IN | DIASTOLIC BLOOD PRESSURE: 80 MMHG | HEART RATE: 93 BPM

## 2025-08-10 DIAGNOSIS — L08.9 TOE INFECTION: Primary | ICD-10-CM

## 2025-08-10 RX ORDER — DOXYCYCLINE HYCLATE 100 MG
100 TABLET ORAL 2 TIMES DAILY
Qty: 20 TABLET | Refills: 0 | Status: SHIPPED | OUTPATIENT
Start: 2025-08-10 | End: 2025-08-20

## 2025-08-26 ENCOUNTER — OFFICE VISIT (OUTPATIENT)
Dept: ORTHOPEDIC SURGERY | Age: 75
End: 2025-08-26
Payer: MEDICARE

## 2025-08-26 VITALS
SYSTOLIC BLOOD PRESSURE: 159 MMHG | HEIGHT: 57 IN | DIASTOLIC BLOOD PRESSURE: 80 MMHG | HEART RATE: 80 BPM | WEIGHT: 162 LBS | BODY MASS INDEX: 34.95 KG/M2

## 2025-08-26 DIAGNOSIS — M65.4 TENDINITIS, DE QUERVAIN'S: ICD-10-CM

## 2025-08-26 DIAGNOSIS — M25.531 RIGHT WRIST PAIN: Primary | ICD-10-CM

## 2025-08-26 PROCEDURE — G8427 DOCREV CUR MEDS BY ELIG CLIN: HCPCS | Performed by: PHYSICIAN ASSISTANT

## 2025-08-26 PROCEDURE — 3077F SYST BP >= 140 MM HG: CPT | Performed by: PHYSICIAN ASSISTANT

## 2025-08-26 PROCEDURE — G8417 CALC BMI ABV UP PARAM F/U: HCPCS | Performed by: PHYSICIAN ASSISTANT

## 2025-08-26 PROCEDURE — 1090F PRES/ABSN URINE INCON ASSESS: CPT | Performed by: PHYSICIAN ASSISTANT

## 2025-08-26 PROCEDURE — 1159F MED LIST DOCD IN RCRD: CPT | Performed by: PHYSICIAN ASSISTANT

## 2025-08-26 PROCEDURE — 99213 OFFICE O/P EST LOW 20 MIN: CPT | Performed by: PHYSICIAN ASSISTANT

## 2025-08-26 PROCEDURE — 3017F COLORECTAL CA SCREEN DOC REV: CPT | Performed by: PHYSICIAN ASSISTANT

## 2025-08-26 PROCEDURE — G8399 PT W/DXA RESULTS DOCUMENT: HCPCS | Performed by: PHYSICIAN ASSISTANT

## 2025-08-26 PROCEDURE — 99214 OFFICE O/P EST MOD 30 MIN: CPT | Performed by: PHYSICIAN ASSISTANT

## 2025-08-26 PROCEDURE — 1160F RVW MEDS BY RX/DR IN RCRD: CPT | Performed by: PHYSICIAN ASSISTANT

## 2025-08-26 PROCEDURE — 1036F TOBACCO NON-USER: CPT | Performed by: PHYSICIAN ASSISTANT

## 2025-08-26 PROCEDURE — 3079F DIAST BP 80-89 MM HG: CPT | Performed by: PHYSICIAN ASSISTANT

## 2025-08-26 PROCEDURE — 1123F ACP DISCUSS/DSCN MKR DOCD: CPT | Performed by: PHYSICIAN ASSISTANT

## 2025-08-27 ENCOUNTER — HOSPITAL ENCOUNTER (OUTPATIENT)
Dept: OCCUPATIONAL THERAPY | Age: 75
Setting detail: THERAPIES SERIES
Discharge: HOME | End: 2025-08-27
Payer: MEDICARE

## 2025-08-27 PROCEDURE — 97165 OT EVAL LOW COMPLEX 30 MIN: CPT

## 2025-08-27 ASSESSMENT — PAIN DESCRIPTION - LOCATION: LOCATION: FINGER (COMMENT WHICH ONE)

## 2025-08-27 ASSESSMENT — PAIN DESCRIPTION - PAIN TYPE: TYPE: ACUTE PAIN

## 2025-08-27 ASSESSMENT — 9 HOLE PEG TEST
TEST_RESULT: IMPAIRED
TESTTIME_SECONDS: 29.76
TESTTIME_SECONDS: 29.81
TEST_RESULT: IMPAIRED

## 2025-08-27 ASSESSMENT — PAIN SCALES - GENERAL: PAINLEVEL_OUTOF10: 7

## 2025-08-28 ENCOUNTER — OFFICE VISIT (OUTPATIENT)
Dept: FAMILY MEDICINE CLINIC | Age: 75
End: 2025-08-28
Payer: MEDICARE

## 2025-08-28 VITALS
SYSTOLIC BLOOD PRESSURE: 118 MMHG | HEART RATE: 80 BPM | OXYGEN SATURATION: 99 % | HEIGHT: 57 IN | BODY MASS INDEX: 34.95 KG/M2 | WEIGHT: 162 LBS | TEMPERATURE: 97.8 F | DIASTOLIC BLOOD PRESSURE: 74 MMHG

## 2025-08-28 DIAGNOSIS — E53.8 B12 DEFICIENCY: ICD-10-CM

## 2025-08-28 DIAGNOSIS — E66.01 MORBID (SEVERE) OBESITY DUE TO EXCESS CALORIES (HCC): ICD-10-CM

## 2025-08-28 DIAGNOSIS — E11.69 TYPE 2 DIABETES MELLITUS WITH OTHER SPECIFIED COMPLICATION, WITHOUT LONG-TERM CURRENT USE OF INSULIN (HCC): Primary | ICD-10-CM

## 2025-08-28 DIAGNOSIS — L84 CALLUS OF FOOT: ICD-10-CM

## 2025-08-28 DIAGNOSIS — E55.9 VITAMIN D DEFICIENCY: ICD-10-CM

## 2025-08-28 PROCEDURE — 3074F SYST BP LT 130 MM HG: CPT | Performed by: FAMILY MEDICINE

## 2025-08-28 PROCEDURE — G8417 CALC BMI ABV UP PARAM F/U: HCPCS | Performed by: FAMILY MEDICINE

## 2025-08-28 PROCEDURE — 3078F DIAST BP <80 MM HG: CPT | Performed by: FAMILY MEDICINE

## 2025-08-28 PROCEDURE — 1160F RVW MEDS BY RX/DR IN RCRD: CPT | Performed by: FAMILY MEDICINE

## 2025-08-28 PROCEDURE — G8427 DOCREV CUR MEDS BY ELIG CLIN: HCPCS | Performed by: FAMILY MEDICINE

## 2025-08-28 PROCEDURE — 1090F PRES/ABSN URINE INCON ASSESS: CPT | Performed by: FAMILY MEDICINE

## 2025-08-28 PROCEDURE — 1123F ACP DISCUSS/DSCN MKR DOCD: CPT | Performed by: FAMILY MEDICINE

## 2025-08-28 PROCEDURE — 2022F DILAT RTA XM EVC RTNOPTHY: CPT | Performed by: FAMILY MEDICINE

## 2025-08-28 PROCEDURE — 1036F TOBACCO NON-USER: CPT | Performed by: FAMILY MEDICINE

## 2025-08-28 PROCEDURE — 3051F HG A1C>EQUAL 7.0%<8.0%: CPT | Performed by: FAMILY MEDICINE

## 2025-08-28 PROCEDURE — G2211 COMPLEX E/M VISIT ADD ON: HCPCS | Performed by: FAMILY MEDICINE

## 2025-08-28 PROCEDURE — 99213 OFFICE O/P EST LOW 20 MIN: CPT | Performed by: FAMILY MEDICINE

## 2025-08-28 PROCEDURE — 3017F COLORECTAL CA SCREEN DOC REV: CPT | Performed by: FAMILY MEDICINE

## 2025-08-28 PROCEDURE — G8399 PT W/DXA RESULTS DOCUMENT: HCPCS | Performed by: FAMILY MEDICINE

## 2025-08-28 PROCEDURE — 1159F MED LIST DOCD IN RCRD: CPT | Performed by: FAMILY MEDICINE

## 2025-09-04 ENCOUNTER — HOSPITAL ENCOUNTER (OUTPATIENT)
Dept: OCCUPATIONAL THERAPY | Age: 75
Setting detail: THERAPIES SERIES
Discharge: HOME | End: 2025-09-04
Payer: MEDICARE

## 2025-09-04 PROCEDURE — 97110 THERAPEUTIC EXERCISES: CPT

## 2025-09-04 PROCEDURE — 97530 THERAPEUTIC ACTIVITIES: CPT

## 2025-09-04 PROCEDURE — 97033 APP MDLTY 1+IONTPHRSIS EA 15: CPT

## (undated) DEVICE — GOWN,AURORA,NON-REINFORCED,2XL: Brand: MEDLINE

## (undated) DEVICE — BANDAGE COBAN 4 IN COMPR W4INXL5YD FOAM COHESIVE QUIK STK SELF ADH SFT

## (undated) DEVICE — 4-PORT MANIFOLD: Brand: NEPTUNE 2

## (undated) DEVICE — PACK,ARTHROSCOPY I,SIRUS: Brand: MEDLINE

## (undated) DEVICE — ZIMMER® STERILE DISPOSABLE TOURNIQUET CUFF WITH PLC, DUAL PORT, SINGLE BLADDER, 30 IN. (76 CM)

## (undated) DEVICE — CHLORAPREP 26ML ORANGE

## (undated) DEVICE — [ARTHROSCOPY PUMP,  DO NOT USE IF PACKAGE IS DAMAGED,  KEEP DRY,  KEEP AWAY FROM SUNLIGHT,  PROTECT FROM HEAT AND RADIOACTIVE SOURCES.]: Brand: FLOSTEADY

## (undated) DEVICE — SNARE ENDOSCP L240CM SHTH DIA2.4MM LOOP W20MM MIN WRK CHN

## (undated) DEVICE — INTENDED FOR TISSUE SEPARATION, AND OTHER PROCEDURES THAT REQUIRE A SHARP SURGICAL BLADE TO PUNCTURE OR CUT.: Brand: BARD-PARKER ® CARBON RIB-BACK BLADES

## (undated) DEVICE — BANDAGE COMPR W6INXL5YD WHT BGE POLY COT M E WRP WV HK AND

## (undated) DEVICE — SUTURE ETHLN SZ 3-0 L18IN NONABSORBABLE BLK FS-1 L24MM 3/8 663H

## (undated) DEVICE — GAMMEX® NON-LATEX SIZE 7.5, STERILE NEOPRENE POWDER-FREE SURGICAL GLOVE: Brand: GAMMEX

## (undated) DEVICE — MERCY DEFIANCE ENDO KIT: Brand: MEDLINE INDUSTRIES, INC.

## (undated) DEVICE — MARKER W/PRE PRINTED CUSTOM LABEL

## (undated) DEVICE — CONNECTOR TBNG AUX H2O JET DISP FOR OLY 160/180 SER

## (undated) DEVICE — NEEDLE SCLERO 23GA L240CM OD064MM ID032MM CLR INTERJECT

## (undated) DEVICE — GAUZE,SPONGE,4"X4",16PLY,XRAY,STRL,LF: Brand: MEDLINE

## (undated) DEVICE — FORCEPS BX L240CM WRK CHN 2.8MM STD CAP W/ NDL MIC MESH

## (undated) DEVICE — TUBING, SUCTION, 3/16" X 20', STRAIGHT: Brand: MEDLINE

## (undated) DEVICE — SYRINGE MED GEL ORISE SUBMUCOSAL LIFTING AGENT WITH NDL

## (undated) DEVICE — NEEDLE SPNL L3.5IN PNK HUB S STL REG WALL FIT STYL W/ QNCKE

## (undated) DEVICE — 35 ML SYRINGE LUER-LOCK TIP: Brand: MONOJECT

## (undated) DEVICE — FIAPC® PROBE W/ FILTER 2200 A OD 2.3MM/6.9FR; L 2.2M/7.2FT: Brand: ERBE

## (undated) DEVICE — FORCEPS BX L240CM JAW DIA2.2MM RAD JAW 4 HOT DISP

## (undated) DEVICE — COVER LT HNDL BLU PLAS

## (undated) DEVICE — FORCEPS BX L240CM JAW DIA2.4MM ORNG L CAP W/ NDL DISP RAD

## (undated) DEVICE — GLOVE SURG SZ 85 L12IN FNGR THK13MIL WHT ISOLEX POLYISOPRENE

## (undated) DEVICE — CLIPPING DEVICE: Brand: RESOLUTION CLIP

## (undated) DEVICE — LINE SAMP O2 6.5FT W/FEMALE CONN F/ADULT CAPNOLINE PLUS

## (undated) DEVICE — BITE BLOCK W/VELCRO STRAP

## (undated) DEVICE — TRAP SURG QUAD PARABOLA SLOT DSGN SFTY SCRN TRAPEASE

## (undated) DEVICE — SNARE ENDOSCP M L240CM LOOP W27MM SHTH DIA2.4MM OVL FLX

## (undated) DEVICE — [TOMCAT CUTTER, ARTHROSCOPIC SHAVER BLADE,  DO NOT RESTERILIZE,  DO NOT USE IF PACKAGE IS DAMAGED,  KEEP DRY,  KEEP AWAY FROM SUNLIGHT]: Brand: FORMULA

## (undated) DEVICE — DUP USE 291175 PAD GROUNDING ADULT 10FT CORD

## (undated) DEVICE — 9165 UNIVERSAL PATIENT PLATE: Brand: 3M™

## (undated) DEVICE — DRESSING,GAUZE,XEROFORM,CURAD,5"X9",ST: Brand: CURAD

## (undated) DEVICE — 1200CC GUARDIAN II: Brand: GUARDIAN

## (undated) DEVICE — PADDING CAST W6INXL4YD COT LO LINTING WYTEX

## (undated) DEVICE — COUNTER NEEDLE 10/20 COUNT DEVON   96/CS

## (undated) DEVICE — 3M™ WARMING BLANKET, UPPER BODY, 10 PER CASE, 42268: Brand: BAIR HUGGER™

## (undated) DEVICE — TOWEL,OR,DSP,ST,BLUE,STD,4/PK,20PK/CS: Brand: MEDLINE

## (undated) DEVICE — GAUZE,SPONGE,4"X4",12PLY,STERILE,LF,2'S: Brand: MEDLINE